# Patient Record
Sex: FEMALE | Race: WHITE | NOT HISPANIC OR LATINO | Employment: FULL TIME | URBAN - METROPOLITAN AREA
[De-identification: names, ages, dates, MRNs, and addresses within clinical notes are randomized per-mention and may not be internally consistent; named-entity substitution may affect disease eponyms.]

---

## 2017-10-23 ENCOUNTER — HOSPITAL ENCOUNTER (EMERGENCY)
Facility: HOSPITAL | Age: 62
Discharge: HOME | End: 2017-10-23
Attending: EMERGENCY MEDICINE | Admitting: EMERGENCY MEDICINE

## 2017-10-23 VITALS
DIASTOLIC BLOOD PRESSURE: 63 MMHG | HEART RATE: 90 BPM | TEMPERATURE: 98.5 F | OXYGEN SATURATION: 94 % | RESPIRATION RATE: 18 BRPM | SYSTOLIC BLOOD PRESSURE: 134 MMHG

## 2017-10-23 DIAGNOSIS — F43.9 FEELING STRESSED OUT: ICD-10-CM

## 2017-10-23 DIAGNOSIS — F41.9 ANXIETY: Primary | ICD-10-CM

## 2017-10-23 DIAGNOSIS — Z91.14 OVERUSE OF MEDICATION: ICD-10-CM

## 2017-10-23 PROCEDURE — 99282 EMERGENCY DEPT VISIT SF MDM: CPT

## 2017-10-23 RX ORDER — SERTRALINE HYDROCHLORIDE 100 MG/1
50 TABLET, FILM COATED ORAL
Status: ON HOLD | COMMUNITY
End: 2019-07-01 | Stop reason: DRUGHIGH

## 2017-10-23 RX ORDER — LITHIUM CARBONATE 600 MG/1
600 CAPSULE ORAL DAILY
Status: ON HOLD | COMMUNITY
End: 2019-07-01 | Stop reason: DRUGHIGH

## 2017-10-23 RX ORDER — TRAZODONE HYDROCHLORIDE 100 MG/1
400 TABLET ORAL
Status: ON HOLD | COMMUNITY
End: 2019-07-01 | Stop reason: DRUGHIGH

## 2017-10-23 NOTE — ED PROVIDER NOTES
History  Chief Complaint   Patient presents with    Psychiatric Evaluation     Pt states "I took a full bottle of 1mg Ativan yesterday and it was a full 90 days " Pt states that she is SI     39-year-old female with psychiatric disease presenting for evaluation  Patient reports that she has had many stressors over the past few days and yesterday she admits to taking more Ativan than as prescribed  She is prescribed Ativan to take on an as needed basis, however over the course of 2 hours she took approximately 20 1 mg tablets  She reports that she took these to try to "feel better ", and denies that this was a suicide attempt  She reports that she has never done anything like this before  She reports that prior to this she was taking her medications as prescribed  She sees a psychiatrist in [place] every 3 months  She required inpatient admission several years ago 1 time  No prior suicide attempts  Patient denies homicidal ideation, auditory or visual hallucinations  Patient reports that she was late going to work today and that her coworkers were calling her  She reports that when she got to work she was talking to her co-workers about her recent stressors and what happened yesterday and they brought her to the ED for evaluation  Patient reports that she feels safe at home and has neighbors and coworkers that look out for her  She denies any physical complaints  She denies any alcohol or drug use  A/P: 59 yo F with increased stress/anxiety, pt adamantly denies that yesterday was a suicide attempt and denies currently feeling suicidal/homicidal or unsafe at home  Crisis was going to evaluate the pt, however she left the ED prior to evaluation and without any discharge paperwork/information            Prior to Admission Medications   Prescriptions Last Dose Informant Patient Reported?  Taking?   lithium 600 MG capsule Past Week at Unknown time  Yes Yes   Sig: Take 600 mg by mouth daily   sertraline (ZOLOFT) 100 mg tablet Past Week at Unknown time  Yes Yes   Sig: Take 50 mg by mouth daily at bedtime   traZODone (DESYREL) 100 mg tablet Past Week at Unknown time  Yes Yes   Sig: Take 400 mg by mouth daily at bedtime      Facility-Administered Medications: None       History reviewed  No pertinent past medical history  Past Surgical History:   Procedure Laterality Date     SECTION      HYSTERECTOMY         History reviewed  No pertinent family history  I have reviewed and agree with the history as documented  Social History   Substance Use Topics    Smoking status: Current Every Day Smoker     Packs/day: 0 50     Types: Cigarettes    Smokeless tobacco: Never Used    Alcohol use Yes      Comment: rarely        Review of Systems   Constitutional: Negative for chills and fever  HENT: Negative for rhinorrhea and sore throat  Respiratory: Negative for cough and shortness of breath  Cardiovascular: Negative for chest pain and leg swelling  Gastrointestinal: Negative for abdominal pain, constipation, diarrhea, nausea and vomiting  Genitourinary: Negative for dysuria and urgency  Musculoskeletal: Negative for back pain and neck pain  Neurological: Negative for light-headedness and headaches  Psychiatric/Behavioral: Negative for agitation and self-injury  All other systems reviewed and are negative  Physical Exam  ED Triage Vitals [10/23/17 1149]   Temperature Pulse Respirations Blood Pressure SpO2   98 5 °F (36 9 °C) 90 18 134/63 94 %      Temp Source Heart Rate Source Patient Position - Orthostatic VS BP Location FiO2 (%)   Oral Monitor Sitting Left arm --      Pain Score       No Pain           Physical Exam   Constitutional: She is oriented to person, place, and time  She appears well-developed and well-nourished  HENT:   Head: Normocephalic and atraumatic  Mouth/Throat: Oropharynx is clear and moist    Neck: Normal range of motion  Neck supple     Cardiovascular: Normal rate and regular rhythm  Pulmonary/Chest: Effort normal and breath sounds normal    Abdominal: Soft  There is no tenderness  Musculoskeletal: She exhibits no edema or deformity  Neurological: She is alert and oriented to person, place, and time  She exhibits normal muscle tone  Coordination normal    Skin: Skin is warm and dry  Psychiatric: She has a normal mood and affect  Her behavior is normal    Nursing note and vitals reviewed  ED Medications  Medications - No data to display    Diagnostic Studies  Labs Reviewed - No data to display    No orders to display       Procedures  Procedures      Phone Consults  ED Phone Contact    ED Course  ED Course as of Oct 23 1414   Mon Oct 23, 2017   1356 Crisis was aware and going to evaluate the patient  Nursing staff went into patient's room and noted that she was not present  Pt appears to have left ED prior to crisis evaluation and discharge paperwork                                MDM  Number of Diagnoses or Management Options  Diagnosis management comments: 61 yo F with increased anxiety/stress who took more Ativan yesterday than prescribed  Pt denies SI/HI/AH/VH and feels safe going home  Pt reports she has good support with  across the street, other neighbors and coworkers (which are who brought her to the ED)   Unfortunately, pt left the ED prior to crisis evaluation and without discharge papers    CritCare Time    Disposition  Final diagnoses:   Anxiety   Feeling stressed out   Overuse of medication     ED Disposition     ED Disposition Condition Comment    Left from Room after Provider Exam        Follow-up Information    None       Discharge Medication List as of 10/23/2017  1:58 PM      CONTINUE these medications which have NOT CHANGED    Details   lithium 600 MG capsule Take 600 mg by mouth daily, Historical Med      sertraline (ZOLOFT) 100 mg tablet Take 50 mg by mouth daily at bedtime, Historical Med      traZODone (DESYREL) 100 mg tablet Take 400 mg by mouth daily at bedtime, Historical Med           No discharge procedures on file  ED Provider  Attending physically available and evaluated Tess Andino I managed the patient along with the ED Attending      Electronically Signed by       Anuj Dobbins DO  Resident  10/23/17 0876

## 2017-10-23 NOTE — ED ATTENDING ATTESTATION
Chris Bardales DO, saw and evaluated the patient  I have discussed the patient with the resident/non-physician practitioner and agree with the resident's/non-physician practitioner's findings, Plan of Care, and MDM as documented in the resident's/non-physician practitioner's note, except where noted  All available labs and Radiology studies were reviewed  At this point I agree with the current assessment done in the Emergency Department  I have conducted an independent evaluation of this patient a history and physical is as follows:    Patient presents for evaluation after intentionally taking excess doses of Ativan through the evening and night yesterday while upset after interacting with her ex-  She denies trying to commit suicide and wanting to die but admits that 20 1 mg tablets of Ativan, even spread out, was excessive  She has no h/o SI or prior attempts  She denies HI and command hallucinations  She does live alone but reports a good base of support in her neighborhood and at work as evidenced by her co-workers concern for her well-being today  She sees a psychiatrist  She has never been involuntarily admitted and was last voluntarily psychiatrically admitted many years ago  ROS: Denies f/c, HA, CP, SOB, abdominal pain, n/v/d  12 system ROS o/w negative  PE: NAD, appears comfortable, alert, cooperative; PERRL, EOMI; MMM, no posterior oropharyngeal exudate, edema or erythema; HRR, no murmur; lungs CTA w/o w/r/r, POx 94% on RA (nl); abdomen s/nt/nd, nl BS in all 4 quadrant; (-) LE edema or calf TTP, FROM extremities x4; skin p/w/d; CN II-XII GI/NF, oriented; Psych odd affect though not inappropriate, good eye contact, good insight  DDx: Anxiety/depression, no apparent SI or plan  A/P: Will consult crisis for improved out-patient options      Critical Care Time  CritCare Time

## 2017-10-23 NOTE — ED NOTES
Went into room to see/assess pt  Pt not in room  Dr Shaunna Pena and charge RN made aware        Amanda Lai RN  37/15/07 5921

## 2019-07-01 ENCOUNTER — APPOINTMENT (EMERGENCY)
Dept: RADIOLOGY | Facility: HOSPITAL | Age: 64
End: 2019-07-01
Payer: MEDICARE

## 2019-07-01 ENCOUNTER — HOSPITAL ENCOUNTER (OUTPATIENT)
Facility: HOSPITAL | Age: 64
Setting detail: OBSERVATION
Discharge: HOME | End: 2019-07-02
Attending: EMERGENCY MEDICINE | Admitting: INTERNAL MEDICINE
Payer: MEDICARE

## 2019-07-01 DIAGNOSIS — R94.31 T WAVE INVERSION IN EKG: ICD-10-CM

## 2019-07-01 DIAGNOSIS — R07.9 CHEST PAIN: Primary | ICD-10-CM

## 2019-07-01 PROBLEM — F31.9 BIPOLAR DISORDER (HCC): Status: ACTIVE | Noted: 2019-07-01

## 2019-07-01 PROBLEM — K21.9 GERD (GASTROESOPHAGEAL REFLUX DISEASE): Status: ACTIVE | Noted: 2019-07-01

## 2019-07-01 PROBLEM — G47.00 INSOMNIA: Status: ACTIVE | Noted: 2019-07-01

## 2019-07-01 LAB
ANION GAP SERPL CALCULATED.3IONS-SCNC: 5 MMOL/L (ref 4–13)
ATRIAL RATE: 61 BPM
ATRIAL RATE: 64 BPM
ATRIAL RATE: 76 BPM
BASOPHILS # BLD AUTO: 0.04 THOUSANDS/ΜL (ref 0–0.1)
BASOPHILS NFR BLD AUTO: 1 % (ref 0–1)
BUN SERPL-MCNC: 18 MG/DL (ref 5–25)
CALCIUM SERPL-MCNC: 8.9 MG/DL (ref 8.3–10.1)
CHLORIDE SERPL-SCNC: 107 MMOL/L (ref 100–108)
CHOLEST SERPL-MCNC: 215 MG/DL (ref 50–200)
CO2 SERPL-SCNC: 27 MMOL/L (ref 21–32)
CREAT SERPL-MCNC: 0.94 MG/DL (ref 0.6–1.3)
EOSINOPHIL # BLD AUTO: 0.14 THOUSAND/ΜL (ref 0–0.61)
EOSINOPHIL NFR BLD AUTO: 3 % (ref 0–6)
ERYTHROCYTE [DISTWIDTH] IN BLOOD BY AUTOMATED COUNT: 13.3 % (ref 11.6–15.1)
EST. AVERAGE GLUCOSE BLD GHB EST-MCNC: 120 MG/DL
GFR SERPL CREATININE-BSD FRML MDRD: 64 ML/MIN/1.73SQ M
GLUCOSE SERPL-MCNC: 112 MG/DL (ref 65–140)
HBA1C MFR BLD: 5.8 % (ref 4.2–6.3)
HCT VFR BLD AUTO: 39.5 % (ref 34.8–46.1)
HDLC SERPL-MCNC: 38 MG/DL (ref 40–60)
HGB BLD-MCNC: 13.1 G/DL (ref 11.5–15.4)
IMM GRANULOCYTES # BLD AUTO: 0.02 THOUSAND/UL (ref 0–0.2)
IMM GRANULOCYTES NFR BLD AUTO: 0 % (ref 0–2)
LDLC SERPL CALC-MCNC: 141 MG/DL (ref 0–100)
LYMPHOCYTES # BLD AUTO: 1.97 THOUSANDS/ΜL (ref 0.6–4.47)
LYMPHOCYTES NFR BLD AUTO: 38 % (ref 14–44)
MCH RBC QN AUTO: 28.5 PG (ref 26.8–34.3)
MCHC RBC AUTO-ENTMCNC: 33.2 G/DL (ref 31.4–37.4)
MCV RBC AUTO: 86 FL (ref 82–98)
MONOCYTES # BLD AUTO: 0.3 THOUSAND/ΜL (ref 0.17–1.22)
MONOCYTES NFR BLD AUTO: 6 % (ref 4–12)
NEUTROPHILS # BLD AUTO: 2.66 THOUSANDS/ΜL (ref 1.85–7.62)
NEUTS SEG NFR BLD AUTO: 52 % (ref 43–75)
NONHDLC SERPL-MCNC: 177 MG/DL
NRBC BLD AUTO-RTO: 0 /100 WBCS
P AXIS: 55 DEGREES
P AXIS: 57 DEGREES
P AXIS: 62 DEGREES
PLATELET # BLD AUTO: 154 THOUSANDS/UL (ref 149–390)
PMV BLD AUTO: 11.4 FL (ref 8.9–12.7)
POTASSIUM SERPL-SCNC: 3.7 MMOL/L (ref 3.5–5.3)
PR INTERVAL: 124 MS
PR INTERVAL: 126 MS
PR INTERVAL: 130 MS
QRS AXIS: 61 DEGREES
QRS AXIS: 63 DEGREES
QRS AXIS: 67 DEGREES
QRSD INTERVAL: 82 MS
QRSD INTERVAL: 84 MS
QRSD INTERVAL: 84 MS
QT INTERVAL: 358 MS
QT INTERVAL: 444 MS
QT INTERVAL: 464 MS
QTC INTERVAL: 402 MS
QTC INTERVAL: 458 MS
QTC INTERVAL: 467 MS
RBC # BLD AUTO: 4.6 MILLION/UL (ref 3.81–5.12)
SODIUM SERPL-SCNC: 139 MMOL/L (ref 136–145)
T WAVE AXIS: 144 DEGREES
T WAVE AXIS: 148 DEGREES
T WAVE AXIS: 187 DEGREES
TRIGL SERPL-MCNC: 179 MG/DL
TROPONIN I SERPL-MCNC: <0.02 NG/ML
VENTRICULAR RATE: 61 BPM
VENTRICULAR RATE: 64 BPM
VENTRICULAR RATE: 76 BPM
WBC # BLD AUTO: 5.13 THOUSAND/UL (ref 4.31–10.16)

## 2019-07-01 PROCEDURE — 99220 PR INITIAL OBSERVATION CARE/DAY 70 MINUTES: CPT | Performed by: INTERNAL MEDICINE

## 2019-07-01 PROCEDURE — 1124F ACP DISCUSS-NO DSCNMKR DOCD: CPT | Performed by: INTERNAL MEDICINE

## 2019-07-01 PROCEDURE — 99285 EMERGENCY DEPT VISIT HI MDM: CPT | Performed by: EMERGENCY MEDICINE

## 2019-07-01 PROCEDURE — 93005 ELECTROCARDIOGRAM TRACING: CPT

## 2019-07-01 PROCEDURE — 36415 COLL VENOUS BLD VENIPUNCTURE: CPT | Performed by: EMERGENCY MEDICINE

## 2019-07-01 PROCEDURE — 93010 ELECTROCARDIOGRAM REPORT: CPT | Performed by: INTERNAL MEDICINE

## 2019-07-01 PROCEDURE — 85025 COMPLETE CBC W/AUTO DIFF WBC: CPT | Performed by: EMERGENCY MEDICINE

## 2019-07-01 PROCEDURE — 80048 BASIC METABOLIC PNL TOTAL CA: CPT | Performed by: EMERGENCY MEDICINE

## 2019-07-01 PROCEDURE — 80061 LIPID PANEL: CPT | Performed by: INTERNAL MEDICINE

## 2019-07-01 PROCEDURE — 84484 ASSAY OF TROPONIN QUANT: CPT | Performed by: EMERGENCY MEDICINE

## 2019-07-01 PROCEDURE — 99285 EMERGENCY DEPT VISIT HI MDM: CPT

## 2019-07-01 PROCEDURE — 84484 ASSAY OF TROPONIN QUANT: CPT | Performed by: INTERNAL MEDICINE

## 2019-07-01 PROCEDURE — 71046 X-RAY EXAM CHEST 2 VIEWS: CPT

## 2019-07-01 PROCEDURE — 83036 HEMOGLOBIN GLYCOSYLATED A1C: CPT | Performed by: INTERNAL MEDICINE

## 2019-07-01 RX ORDER — LITHIUM CARBONATE 300 MG/1
300 CAPSULE ORAL
Status: DISCONTINUED | OUTPATIENT
Start: 2019-07-01 | End: 2019-07-02 | Stop reason: HOSPADM

## 2019-07-01 RX ORDER — ASPIRIN 81 MG/1
324 TABLET, CHEWABLE ORAL ONCE
Status: COMPLETED | OUTPATIENT
Start: 2019-07-01 | End: 2019-07-01

## 2019-07-01 RX ORDER — LITHIUM CARBONATE 300 MG/1
300 CAPSULE ORAL
COMMUNITY

## 2019-07-01 RX ORDER — ACETAMINOPHEN 325 MG/1
650 TABLET ORAL EVERY 6 HOURS PRN
Status: DISCONTINUED | OUTPATIENT
Start: 2019-07-01 | End: 2019-07-02 | Stop reason: HOSPADM

## 2019-07-01 RX ORDER — DIPHENHYDRAMINE HCL 25 MG
25 TABLET ORAL
Status: DISCONTINUED | OUTPATIENT
Start: 2019-07-01 | End: 2019-07-01

## 2019-07-01 RX ORDER — GABAPENTIN 300 MG/1
300 CAPSULE ORAL DAILY
Status: DISCONTINUED | OUTPATIENT
Start: 2019-07-02 | End: 2019-07-02 | Stop reason: HOSPADM

## 2019-07-01 RX ORDER — LORAZEPAM 1 MG/1
2 TABLET ORAL
COMMUNITY

## 2019-07-01 RX ORDER — CALCIUM CARBONATE 200(500)MG
500 TABLET,CHEWABLE ORAL ONCE
Status: DISCONTINUED | OUTPATIENT
Start: 2019-07-01 | End: 2019-07-01

## 2019-07-01 RX ORDER — DIPHENHYDRAMINE HCL 25 MG
25 TABLET ORAL
Status: DISCONTINUED | OUTPATIENT
Start: 2019-07-01 | End: 2019-07-02 | Stop reason: HOSPADM

## 2019-07-01 RX ORDER — KETOROLAC TROMETHAMINE 30 MG/ML
15 INJECTION, SOLUTION INTRAMUSCULAR; INTRAVENOUS ONCE
Status: COMPLETED | OUTPATIENT
Start: 2019-07-01 | End: 2019-07-01

## 2019-07-01 RX ORDER — DIPHENHYDRAMINE HCL 25 MG
25 TABLET ORAL
Status: ON HOLD | COMMUNITY
End: 2021-01-20 | Stop reason: CLARIF

## 2019-07-01 RX ORDER — SERTRALINE HYDROCHLORIDE 100 MG/1
200 TABLET, FILM COATED ORAL DAILY
COMMUNITY

## 2019-07-01 RX ORDER — OMEPRAZOLE 20 MG/1
20 CAPSULE, DELAYED RELEASE ORAL DAILY
COMMUNITY

## 2019-07-01 RX ORDER — SENNOSIDES 8.6 MG
1 TABLET ORAL
Status: DISCONTINUED | OUTPATIENT
Start: 2019-07-01 | End: 2019-07-02 | Stop reason: HOSPADM

## 2019-07-01 RX ORDER — TRAZODONE HYDROCHLORIDE 100 MG/1
200 TABLET ORAL
Status: DISCONTINUED | OUTPATIENT
Start: 2019-07-01 | End: 2019-07-02 | Stop reason: HOSPADM

## 2019-07-01 RX ORDER — TRAZODONE HYDROCHLORIDE 100 MG/1
200 TABLET ORAL
COMMUNITY

## 2019-07-01 RX ORDER — PANTOPRAZOLE SODIUM 20 MG/1
20 TABLET, DELAYED RELEASE ORAL
Status: DISCONTINUED | OUTPATIENT
Start: 2019-07-02 | End: 2019-07-02 | Stop reason: HOSPADM

## 2019-07-01 RX ORDER — GABAPENTIN 300 MG/1
300 CAPSULE ORAL DAILY
COMMUNITY

## 2019-07-01 RX ORDER — 0.9 % SODIUM CHLORIDE 0.9 %
3 VIAL (ML) INJECTION AS NEEDED
Status: DISCONTINUED | OUTPATIENT
Start: 2019-07-01 | End: 2019-07-02 | Stop reason: HOSPADM

## 2019-07-01 RX ADMIN — LITHIUM CARBONATE 300 MG: 300 CAPSULE, GELATIN COATED ORAL at 21:56

## 2019-07-01 RX ADMIN — KETOROLAC TROMETHAMINE 15 MG: 30 INJECTION, SOLUTION INTRAMUSCULAR at 10:09

## 2019-07-01 RX ADMIN — ASPIRIN 81 MG 324 MG: 81 TABLET ORAL at 08:30

## 2019-07-01 RX ADMIN — TRAZODONE HYDROCHLORIDE 200 MG: 100 TABLET ORAL at 21:56

## 2019-07-01 RX ADMIN — SERTRALINE HYDROCHLORIDE 150 MG: 100 TABLET ORAL at 21:56

## 2019-07-01 RX ADMIN — LORAZEPAM 1.5 MG: 1 TABLET ORAL at 21:57

## 2019-07-01 NOTE — ASSESSMENT & PLAN NOTE
Atypical chest pain, suspect musculoskeletal   "substernal" "pressure-like" "radiating to L shoulder," not associated with SOB, HA, lightheadedness, dizziness  Began at rest, not relieved with rest, not worsened with exertion, complete resolution after administration of IV Toradol in the ED  Heart score 4 for nonspecific ST changes, family Hx of MI in both parents, in father in his late 46s  Pt reports mother had multiple blockages and father had open-heart surgery  EKG with global T wave inversions in all leads (upright in avr), however no baseline EKG on record  CXR performed and negative for acute cardiopulmonary disease  Will monitor on telemetry  First troponin negative, will obtain every 3 hours for two more troponins  EKG with each troponin  Patient reports similar episode 9 years ago and stress test was performed inpatient which she states was negative  Patient reports she did not need to follow up with Cardiology at that time  S/p aspirin, toradol in ED  Will check lipid panel and hemoglobin a1c  Tylenol p r n  Mild pain

## 2019-07-01 NOTE — ED PROVIDER NOTES
History  Chief Complaint   Patient presents with    Chest Pain     Pt presnet swith substernal Cp since waking up this morning  HPI    69yo pmhx bipolar, GERD presents for evaluation of substernal chest pain  Patient says chest pain began around 5:00 a m and lasted about an hour  Pain radiates to her left shoulder and upper arm  Pain resolved on its own but then returned a couple of hours of later  She admits to having pain now of less intensity  Patient says she had similar pain 9 years ago and was evaluated with a stress test which was normal   She she notes mild shortness of breath  Denies fever, chills, cough, numbness/tingling, nausea, vomiting, and diaphoresis, abdominal pain, dysuria  Prior to Admission Medications   Prescriptions Last Dose Informant Patient Reported? Taking? LORazepam (ATIVAN) 1 mg tablet 2019 at Unknown time Self Yes Yes   Sig: Take 1 5 mg by mouth daily at bedtime   diphenhydrAMINE (BENADRYL) 25 mg tablet 2019 at Unknown time Self Yes Yes   Sig: Take 25 mg by mouth daily at bedtime   gabapentin (NEURONTIN) 300 mg capsule 2019 at Unknown time Self Yes Yes   Sig: Take 300 mg by mouth daily   lithium carbonate 300 mg capsule 2019 at Unknown time Self Yes Yes   Sig: Take 300 mg by mouth daily at bedtime   omeprazole (PriLOSEC) 20 mg delayed release capsule 2019 at Unknown time Self Yes Yes   Sig: Take 20 mg by mouth daily   sertraline (ZOLOFT) 100 mg tablet 2019 at Unknown time Self Yes Yes   Sig: Take 150 mg by mouth daily   traZODone (DESYREL) 100 mg tablet 2019 at Unknown time Self Yes Yes   Sig: Take 200 mg by mouth daily at bedtime      Facility-Administered Medications: None       Past Medical History:   Diagnosis Date    Bipolar disorder ([facility] [address] )     GERD (gastroesophageal reflux disease)        Past Surgical History:   Procedure Laterality Date     SECTION      HYSTERECTOMY         History reviewed   No pertinent family history  I have reviewed and agree with the history as documented  Social History     Tobacco Use    Smoking status: Former Smoker     Packs/day: 0 10     Years: 5 00     Pack years: 0 50     Types: Cigarettes     Last attempt to quit: 2018     Years since quittin 6    Smokeless tobacco: Never Used   Substance Use Topics    Alcohol use: Yes     Frequency: 2-4 times a month     Drinks per session: 1 or 2     Comment: rarely    Drug use: Never        Review of Systems   Constitutional: Negative for chills, diaphoresis, fatigue and fever  HENT: Negative for congestion, rhinorrhea and sore throat  Eyes: Negative for photophobia and visual disturbance  Respiratory: Positive for shortness of breath  Negative for cough and chest tightness  Cardiovascular: Positive for chest pain  Negative for palpitations  Gastrointestinal: Negative for abdominal pain, blood in stool, constipation, diarrhea, nausea and vomiting  Genitourinary: Negative for dysuria, frequency and hematuria  Musculoskeletal: Negative for back pain, gait problem, myalgias, neck pain and neck stiffness  Skin: Negative for pallor and rash  Neurological: Negative for weakness, light-headedness, numbness and headaches  Hematological: Negative for adenopathy  Does not bruise/bleed easily  All other systems reviewed and are negative        Physical Exam  ED Triage Vitals   Temperature Pulse Respirations Blood Pressure SpO2   19 0811 19 0811 19 0811 19 0811 19 0811   98 2 °F (36 8 °C) 80 16 126/64 96 %      Temp src Heart Rate Source Patient Position - Orthostatic VS BP Location FiO2 (%)   -- 19 0901 19 0901 19 0930 --    Monitor Lying Right arm       Pain Score       19 0811       6             Orthostatic Vital Signs  Vitals:    19 1525 19 1917 19 2246 19 0711   BP: 105/79 117/68 116/67 134/76   Pulse: 71 65 59 73   Patient Position - Orthostatic VS: Physical Exam   Constitutional: She is oriented to person, place, and time  She appears well-developed and well-nourished  No distress  Patient is alert and oriented, appears well and nontoxic, in no acute distress   HENT:   Head: Normocephalic and atraumatic  Mouth/Throat: Oropharynx is clear and moist  No oropharyngeal exudate  Eyes: Pupils are equal, round, and reactive to light  Conjunctivae and EOM are normal    Neck: Normal range of motion  Neck supple  Cardiovascular: Normal rate, regular rhythm, normal heart sounds and intact distal pulses  Pulmonary/Chest: Effort normal and breath sounds normal  No respiratory distress  Abdominal: Soft  Bowel sounds are normal  She exhibits no distension  There is no tenderness  Musculoskeletal: Normal range of motion  Unable to reproduce chest pain   Lymphadenopathy:     She has no cervical adenopathy  Neurological: She is alert and oriented to person, place, and time  No facial asymmetry noted, CN 2-12 intact, full ROM of upper and lower extremities, muscle strength 5/5 throughout, DTRs normal   Skin: Skin is warm and dry  Capillary refill takes less than 2 seconds  No rash noted  She is not diaphoretic  No erythema  No pallor  Psychiatric: She has a normal mood and affect  Her behavior is normal  Judgment and thought content normal    Nursing note and vitals reviewed        ED Medications  Medications   sodium chloride (PF) 0 9 % injection 3 mL (has no administration in time range)   gabapentin (NEURONTIN) capsule 300 mg (has no administration in time range)   lithium carbonate capsule 300 mg (300 mg Oral Given 7/1/19 2156)   LORazepam (ATIVAN) tablet 1 5 mg (1 5 mg Oral Given 7/1/19 2157)   pantoprazole (PROTONIX) EC tablet 20 mg (20 mg Oral Given 7/2/19 5905)   sertraline (ZOLOFT) tablet 150 mg (150 mg Oral Given 7/1/19 2156)   traZODone (DESYREL) tablet 200 mg (200 mg Oral Given 7/1/19 2156)   acetaminophen (TYLENOL) tablet 650 mg (has no administration in time range)   senna (SENOKOT) tablet 8 6 mg (has no administration in time range)   diphenhydrAMINE (BENADRYL) tablet 25 mg (has no administration in time range)   aspirin chewable tablet 324 mg (324 mg Oral Given 7/1/19 0830)   ketorolac (TORADOL) injection 15 mg (15 mg Intravenous Given 7/1/19 1009)       Diagnostic Studies  Results Reviewed     Procedure Component Value Units Date/Time    Hemoglobin A1C [[id number]] Collected:  07/01/19 0813    Lab Status:  Final result Specimen:  Blood from Arm, Left Updated:  07/01/19 1159     Hemoglobin A1C 5 8 %       mg/dl     Troponin I [[id number]]  (Normal) Collected:  07/01/19 0813    Lab Status:  Final result Specimen:  Blood from Arm, Left Updated:  07/01/19 0836     Troponin I <0 02 ng/mL     Basic metabolic panel [[id number]] Collected:  07/01/19 0813    Lab Status:  Final result Specimen:  Blood from Arm, Left Updated:  07/01/19 1284     Sodium 139 mmol/L      Potassium 3 7 mmol/L      Chloride 107 mmol/L      CO2 27 mmol/L      ANION GAP 5 mmol/L      BUN 18 mg/dL      Creatinine 0 94 mg/dL      Glucose 112 mg/dL      Calcium 8 9 mg/dL      eGFR 64 ml/min/1 73sq m     Narrative:       [place] guidelines for Chronic Kidney Disease (CKD):     Stage 1 with normal or high GFR (GFR > 90 mL/min/1 73 square meters)    Stage 2 Mild CKD (GFR = 60-89 mL/min/1 73 square meters)    Stage 3A Moderate CKD (GFR = 45-59 mL/min/1 73 square meters)    Stage 3B Moderate CKD (GFR = 30-44 mL/min/1 73 square meters)    Stage 4 Severe CKD (GFR = 15-29 mL/min/1 73 square meters)    Stage 5 End Stage CKD (GFR <15 mL/min/1 73 square meters)  Note: GFR calculation is accurate only with a steady state creatinine    CBC and differential [[id number]] Collected:  07/01/19 0813    Lab Status:  Final result Specimen:  Blood from Arm, Left Updated:  07/01/19 0821     WBC 5 13 Thousand/uL      RBC 4 60 Million/uL      Hemoglobin 13 1 g/dL Hematocrit 39 5 %      MCV 86 fL      MCH 28 5 pg      MCHC 33 2 g/dL      RDW 13 3 %      MPV 11 4 fL      Platelets 559 Thousands/uL      nRBC 0 /100 WBCs      Neutrophils Relative 52 %      Immat GRANS % 0 %      Lymphocytes Relative 38 %      Monocytes Relative 6 %      Eosinophils Relative 3 %      Basophils Relative 1 %      Neutrophils Absolute 2 66 Thousands/µL      Immature Grans Absolute 0 02 Thousand/uL      Lymphocytes Absolute 1 97 Thousands/µL      Monocytes Absolute 0 30 Thousand/µL      Eosinophils Absolute 0 14 Thousand/µL      Basophils Absolute 0 04 Thousands/µL                  X-ray chest 2 views   Final Result by Breanna Ro MD (07/01 5896)      No acute cardiopulmonary disease  Workstation performed: [id number]               Procedures  ECG 12 Lead Documentation Only  Date/Time: 7/1/2019 6:32 PM  Performed by: Mike Greco MD  Authorized by: Mike Greco MD     Indications / Diagnosis:  Chest pain  ECG reviewed by me, the ED Provider: yes    Patient location:  ED and bedside  Previous ECG:     Previous ECG:  Unavailable    Comparison to cardiac monitor: Yes    Interpretation:     Interpretation: abnormal    Rate:     ECG rate:  76    ECG rate assessment: normal    Rhythm:     Rhythm: sinus rhythm    Ectopy:     Ectopy: none    QRS:     QRS axis:  Normal    QRS intervals:  Normal  Conduction:     Conduction: normal    ST segments:     ST segments:  Normal  T waves:     T waves: inverted      Inverted:  I, II, aVL, V2, V3, V4, V5, V6 and aVF            ED Course                               MDM  Number of Diagnoses or Management Options  Chest pain:   T wave inversion in EKG:   Diagnosis management comments: 41-year-old female presents for evaluation of chest pain or shortness of breath  Patient appears well, hemodynamically stable  Will do cardiac workup and admitted for ACS rule out        Disposition  Final diagnoses:   Chest pain   T wave inversion in EKG     Time reflects when diagnosis was documented in both MDM as applicable and the Disposition within this note     Time User Action Codes Description Comment    7/1/2019  9:40 AM Zhen Jama Add [R07 9] Chest pain     7/1/2019  9:40 AM Zhen Jama Add [R94 31] T wave inversion in EKG       ED Disposition     ED Disposition Condition Date/Time Comment    Admit Stable Mon Jul 1, 2019  9:40 AM Case was discussed with SOD resident and the patient's admission status was agreed to be Admission Status: observation status to the service of Dr Karina Wang   Follow-up Information     Follow up With Specialties Details Why Contact Info    [facility]  Follow up  [phone]            Current Discharge Medication List      CONTINUE these medications which have NOT CHANGED    Details   diphenhydrAMINE (BENADRYL) 25 mg tablet Take 25 mg by mouth daily at bedtime      gabapentin (NEURONTIN) 300 mg capsule Take 300 mg by mouth daily      lithium carbonate 300 mg capsule Take 300 mg by mouth daily at bedtime      LORazepam (ATIVAN) 1 mg tablet Take 1 5 mg by mouth daily at bedtime      omeprazole (PriLOSEC) 20 mg delayed release capsule Take 20 mg by mouth daily      sertraline (ZOLOFT) 100 mg tablet Take 150 mg by mouth daily      traZODone (DESYREL) 100 mg tablet Take 200 mg by mouth daily at bedtime           No discharge procedures on file  ED Provider  Attending physically available and evaluated Clarke Le  I managed the patient along with the ED Attending      Electronically Signed by         Ariel Bryan MD  07/02/19 5694

## 2019-07-01 NOTE — SOCIAL WORK
CM met w/ pt for d/c planning  Pt aware of CM role  Pt is a RN employed by [employer]  Pt lives alone in 1st floor apartment w/ 3 steps to enter  Bed & bath on main level  PTA pt is independent w/ all ADLS & Ambulation without assistive device  Pt drives  Pt does not have POA  Emergency contact is [facility] [phone]  Pt uses [email or web address] in [place] for pharmacy needs  Pt does not have PCP  [facility] information provided  Pt has had no short term rehab, or D & A admission  Pt had inpatient psych in 2010  CM reviewed d/c planning process including the following: identifying help at home, patient preference for d/c planning needs, Discharge Lounge, [facility] Meds to Bed program, availability of treatment team to discuss questions or concerns patient and/or family may have regarding understanding medications and recognizing signs and symptoms once discharged  CM also encouraged patient to follow up with all recommended appointments after discharge  Patient advised of importance for patient and family to participate in managing patients medical well being    Discharge checklist discussed with patient

## 2019-07-01 NOTE — PLAN OF CARE
Problem: Potential for Falls  Goal: Patient will remain free of falls  Description  INTERVENTIONS:  - Assess patient frequently for physical needs  -  Identify cognitive and physical deficits and behaviors that affect risk of falls    -  [place] fall precautions as indicated by assessment   - Educate patient/family on patient safety including physical limitations  - Instruct patient to call for assistance with activity based on assessment  - Modify environment to reduce risk of injury  - Consider OT/PT consult to assist with strengthening/mobility  Outcome: Progressing

## 2019-07-01 NOTE — H&P
INTERNAL MEDICINE RESIDENCY ADMISSION H&P     Name: Kailee Pedersen   Age & Sex: 71 y o  female   MRN: [id number]  Unit/Bed#: -01   Encounter: [id number]  Primary Care Provider: No primary care provider on file  Code Status: Level 1 - Full Code  Admission Status: OBSERVATION  Disposition: Patient requires Med/Surg with Telemetry    ASSESSMENT/PLAN     Principal Problem:    Chest pain  Active Problems:    Bipolar disorder (HCC)    GERD (gastroesophageal reflux disease)    Insomnia    * Chest pain  Assessment & Plan  Atypical chest pain, suspect musculoskeletal   "substernal" "pressure-like" "radiating to L shoulder," not associated with SOB, HA, lightheadedness, dizziness  Began at rest, not relieved with rest, not worsened with exertion, complete resolution after administration of IV Toradol in the ED  Heart score 4 for nonspecific ST changes, family Hx of MI in both parents, in father in his late 46s  Pt reports mother had multiple blockages and father had open-heart surgery  EKG with global T wave inversions in all leads (upright in avr), however no baseline EKG on record  CXR performed and negative for acute cardiopulmonary disease  Will monitor on telemetry  First troponin negative, will obtain every 3 hours for two more troponins  EKG with each troponin  Patient reports similar episode 9 years ago and stress test was performed inpatient which she states was negative  Patient reports she did not need to follow up with Cardiology at that time  S/p aspirin, toradol in ED  Will check lipid panel and hemoglobin a1c  Tylenol p r n  Mild pain  Insomnia  Assessment & Plan  Continue home trazodone 200 mg at night, Benadryl q h s  P r n  GERD (gastroesophageal reflux disease)  Assessment & Plan  On omeprazole 20 mg daily at home, will continue pantoprazole inpatient      Bipolar disorder [facility])  Assessment & Plan  Patient follows with Psychiatry outpatient,  Continue lithium, zoloft, Ativan  VTE Pharmacologic Prophylaxis: Reason for no pharmacologic prophylaxis low risk  VTE Mechanical Prophylaxis: sequential compression device    CHIEF COMPLAINT     Chief Complaint   Patient presents with    Chest Pain     Pt presnet jc substernal Cp since waking up this morning  HISTORY OF PRESENT ILLNESS     Katja Young is a pleasant 14-year-old female with history of bipolar disorder, GERD presenting with substernal pressure-like chest pain with radiation to the left shoulder since 5:00 a m  this morning  Patient states pain is a 5/10  Pain started at rest, and had no alleviating or exacerbating factors (not relieved with rest, not relieved with exertion) prior to admission  Patient reports she had an episode like this several weeks ago that resolved within several hours without intervention  Patient also reports similar episode 9 years ago in which she was admitted and stress test was performed inpatient which came back negative  Patient was not recommended follow up with Cardiology at that time  Patient denied any other symptoms related to her chest pain - denies shortness of breath, nausea, vomiting, lightheadedness, diaphoresis, palpitations  Patient did report chronic temporary dizziness with standing up  Patient lives at home alone but 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  Patient reports being fully functional at home; states she is estranged from her children  Reports she has smoked approximately 5 years total in her lifetime, most recently for 2 or so years approximately 2 cigarettes per day and quit in November 2018; had previously smoked in college roughly same amount at that time  Does not currently smoke, drinks alcohol one drink once a week, does not use recreational drugs  Pt is interested in establishing care with a PCP and states it has been many years since she has had a primary doctor      In the ED, patient's vitals were stable at 98 2 F, pulse 80, respirations 16, blood pressure 126/64, SpO2 96% on room air  Patient was given aspirin 324 mg and IV Toradol in the ED  Patient reported relief of her symptoms with Toradol and at time of my re-evaluation reported 0/10 pain  Pt is admitted to observation for ACS rule out  REVIEW OF SYSTEMS     Review of Systems   Constitutional: Negative for chills, fatigue and fever  Eyes: Negative for visual disturbance  Respiratory: Negative for choking, shortness of breath and wheezing  Cardiovascular: Negative for chest pain, palpitations and leg swelling  Gastrointestinal: Positive for constipation (chronic)  Negative for abdominal distention, abdominal pain, diarrhea, nausea and vomiting  Genitourinary: Negative for difficulty urinating  Skin: Negative for rash and wound  Neurological: Negative for weakness, light-headedness and headaches  Psychiatric/Behavioral: Positive for sleep disturbance (chronic insomnia)  OBJECTIVE     Vitals:    19 0811 19 0901 19 0930 19 1056   BP: 126/64 125/57 114/58 114/68   BP Location:   Right arm    Pulse: 80 73 70 63   Resp: 16 16 18 18   Temp: 98 2 °F (36 8 °C)   97 9 °F (36 6 °C)   SpO2: 96% 96% 96% 96%      Temperature:   Temp (24hrs), Av 1 °F (36 7 °C), Min:97 9 °F (36 6 °C), Max:98 2 °F (36 8 °C)    Temperature: 97 9 °F (36 6 °C)  Intake & Output:  I/O     None        Weights: There is no height or weight on file to calculate BMI  Weight (last 2 days)     None        Physical Exam   Constitutional: She is oriented to person, place, and time  She appears well-developed and well-nourished  HENT:   Head: Normocephalic and atraumatic  Nose: Nose normal    Mouth/Throat: Oropharynx is clear and moist    Eyes: Right eye exhibits no discharge  Left eye exhibits no discharge  Cardiovascular: Normal rate, regular rhythm and normal heart sounds  Exam reveals no gallop and no friction rub     No murmur heard  No systolic murmur is present  Pulmonary/Chest: Effort normal and breath sounds normal  No respiratory distress  She has no wheezes  She has no rales  Chest pain is not reproducible   Abdominal: Soft  Bowel sounds are normal  She exhibits no distension  There is no tenderness  There is no guarding  Neurological: She is alert and oriented to person, place, and time  Skin: Skin is warm and dry  Psychiatric: Her speech is normal      PAST MEDICAL HISTORY     Past Medical History:   Diagnosis Date    Bipolar disorder ([address] )     GERD (gastroesophageal reflux disease)      PAST SURGICAL HISTORY     Past Surgical History:   Procedure Laterality Date     SECTION      HYSTERECTOMY       SOCIAL & FAMILY HISTORY     Social History     Substance and Sexual Activity   Alcohol Use Yes    Frequency: 2-4 times a month    Drinks per session: 1 or 2    Comment: rarely     Substance and Sexual Activity   Alcohol Use Yes    Frequency: 2-4 times a month    Drinks per session: 1 or 2    Comment: rarely        Substance and Sexual Activity   Drug Use Never     Social History     Tobacco Use   Smoking Status Former Smoker    Packs/day: 0 10    Years: 5 00    Pack years: 0 50    Types: Cigarettes    Last attempt to quit: 2018    Years since quittin 6   Smokeless Tobacco Never Used     History reviewed  No pertinent family history  LABORATORY DATA     Labs: I have personally reviewed pertinent reports  Results from last 7 days   Lab Units 19  0813   WBC Thousand/uL 5 13   HEMOGLOBIN g/dL 13 1   HEMATOCRIT % 39 5   PLATELETS Thousands/uL 154   NEUTROS PCT % 52   MONOS PCT % 6      Results from last 7 days   Lab Units 19  0813   POTASSIUM mmol/L 3 7   CHLORIDE mmol/L 107   CO2 mmol/L 27   BUN mg/dL 18   CREATININE mg/dL 0 94   CALCIUM mg/dL 8 9                      Results from last 7 days   Lab Units 19  1118 19  0813   TROPONIN I ng/mL <0 02 <0 02     Micro:   No results found for: Jessica Pierce [address]37 DIAGNOSTIC TESTS     Imaging: I have personally reviewed pertinent reports  X-ray Chest 2 Views    Result Date: 7/1/2019  Impression: No acute cardiopulmonary disease  Workstation performed: [id number]     EKG, Pathology, and Other Studies: I have personally reviewed pertinent reports  ALLERGIES   No Known Allergies  MEDICATIONS PRIOR TO ARRIVAL     Prior to Admission medications    Medication Sig Start Date End Date Taking?  Authorizing Provider   diphenhydrAMINE (BENADRYL) 25 mg tablet Take 25 mg by mouth daily at bedtime   Yes Historical Provider, MD   gabapentin (NEURONTIN) 300 mg capsule Take 300 mg by mouth daily   Yes Historical Provider, MD   lithium carbonate 300 mg capsule Take 300 mg by mouth daily at bedtime   Yes Historical Provider, MD   LORazepam (ATIVAN) 1 mg tablet Take 1 5 mg by mouth daily at bedtime   Yes Historical Provider, MD   omeprazole (PriLOSEC) 20 mg delayed release capsule Take 20 mg by mouth daily   Yes Historical Provider, MD   sertraline (ZOLOFT) 100 mg tablet Take 150 mg by mouth daily   Yes Historical Provider, MD   traZODone (DESYREL) 100 mg tablet Take 200 mg by mouth daily at bedtime   Yes Historical Provider, MD   lithium 600 MG capsule Take 600 mg by mouth daily  7/1/19  Historical Provider, MD   sertraline (ZOLOFT) 100 mg tablet Take 50 mg by mouth daily at bedtime  7/1/19  Historical Provider, MD   traZODone (DESYREL) 100 mg tablet Take 400 mg by mouth daily at bedtime  7/1/19  Historical Provider, MD     MEDICATIONS ADMINISTERED IN LAST 24 HOURS     Medication Administration - last 24 hours from 06/30/2019 1224 to 07/01/2019 1224       Date/Time Order Dose Route Action Action by     07/01/2019 0830 aspirin chewable tablet 324 mg 324 mg Oral Given Makenna Altamirano RN     07/01/2019 1009 ketorolac (TORADOL) injection 15 mg 15 mg Intravenous Given Geovani Abdul RN     07/01/2019 1211 calcium carbonate (TUMS) chewable tablet 500 mg 500 mg Oral Refused Linda Carbone RN        CURRENT MEDICATIONS       Current Facility-Administered Medications:  acetaminophen 650 mg Oral Q6H PRN Richa Chamakkala, DO   diphenhydrAMINE 25 mg Oral HS PRN Richa Chamakkala, DO   [START ON 7/2/2019] gabapentin 300 mg Oral Daily Richa Chamakkala, DO   lithium carbonate 300 mg Oral HS Richa Chamakkala, DO   LORazepam 1 5 mg Oral HS Richa Chamakkala, DO   [START ON 7/2/2019] pantoprazole 20 mg Oral Early Morning Richa Chamakkala, DO   senna 1 tablet Oral HS PRN Richa Chamakkala, DO   sertraline 150 mg Oral HS Richa Chamakkala, DO   sodium chloride (PF) 3 mL Intravenous PRN Holden Calderon MD   traZODone 200 mg Oral HS Richa Chamakkala, DO          acetaminophen 650 mg Q6H PRN   diphenhydrAMINE 25 mg HS PRN   senna 1 tablet HS PRN   sodium chloride (PF) 3 mL PRN       Admission Time  I spent 30 minutes admitting the patient  This involved direct patient contact where I performed a full history and physical, reviewing previous records, and reviewing laboratory and other diagnostic studies  Portions of the record may have been created with voice recognition software  Occasional wrong word or "sound a like" substitutions may have occurred due to the inherent limitations of voice recognition software    Read the chart carefully and recognize, using context, where substitutions have occurred     ==  Fabiola Martinez DO  [address]  Internal Medicine Residency PGY-2

## 2019-07-02 VITALS
DIASTOLIC BLOOD PRESSURE: 71 MMHG | SYSTOLIC BLOOD PRESSURE: 108 MMHG | OXYGEN SATURATION: 97 % | HEART RATE: 60 BPM | TEMPERATURE: 97.9 F | RESPIRATION RATE: 18 BRPM

## 2019-07-02 LAB
ANION GAP SERPL CALCULATED.3IONS-SCNC: 5 MMOL/L (ref 4–13)
BUN SERPL-MCNC: 24 MG/DL (ref 5–25)
CALCIUM SERPL-MCNC: 9 MG/DL (ref 8.3–10.1)
CHLORIDE SERPL-SCNC: 108 MMOL/L (ref 100–108)
CO2 SERPL-SCNC: 27 MMOL/L (ref 21–32)
CREAT SERPL-MCNC: 0.89 MG/DL (ref 0.6–1.3)
ERYTHROCYTE [DISTWIDTH] IN BLOOD BY AUTOMATED COUNT: 13.3 % (ref 11.6–15.1)
GFR SERPL CREATININE-BSD FRML MDRD: 68 ML/MIN/1.73SQ M
GLUCOSE SERPL-MCNC: 99 MG/DL (ref 65–140)
HCT VFR BLD AUTO: 40.1 % (ref 34.8–46.1)
HGB BLD-MCNC: 13.3 G/DL (ref 11.5–15.4)
MCH RBC QN AUTO: 28.5 PG (ref 26.8–34.3)
MCHC RBC AUTO-ENTMCNC: 33.2 G/DL (ref 31.4–37.4)
MCV RBC AUTO: 86 FL (ref 82–98)
PLATELET # BLD AUTO: 169 THOUSANDS/UL (ref 149–390)
PMV BLD AUTO: 12.1 FL (ref 8.9–12.7)
POTASSIUM SERPL-SCNC: 4 MMOL/L (ref 3.5–5.3)
RBC # BLD AUTO: 4.66 MILLION/UL (ref 3.81–5.12)
SODIUM SERPL-SCNC: 140 MMOL/L (ref 136–145)
WBC # BLD AUTO: 5.89 THOUSAND/UL (ref 4.31–10.16)

## 2019-07-02 PROCEDURE — 85027 COMPLETE CBC AUTOMATED: CPT | Performed by: INTERNAL MEDICINE

## 2019-07-02 PROCEDURE — 80048 BASIC METABOLIC PNL TOTAL CA: CPT | Performed by: INTERNAL MEDICINE

## 2019-07-02 PROCEDURE — NC001 PR NO CHARGE: Performed by: INTERNAL MEDICINE

## 2019-07-02 RX ORDER — KETOROLAC TROMETHAMINE 10 MG/1
10 TABLET, FILM COATED ORAL EVERY 6 HOURS PRN
Qty: 20 TABLET | Refills: 0 | Status: SHIPPED | OUTPATIENT
Start: 2019-07-02 | End: 2020-12-04

## 2019-07-02 RX ADMIN — PANTOPRAZOLE SODIUM 20 MG: 20 TABLET, DELAYED RELEASE ORAL at 05:47

## 2019-07-02 RX ADMIN — GABAPENTIN 300 MG: 300 CAPSULE ORAL at 08:24

## 2019-07-02 NOTE — UTILIZATION REVIEW
Initial Clinical Review    Admission: Date/Time/Statement: 07-01-19 @ 0710    Orders Placed This Encounter   Procedures    Place in Observation (expected length of stay for this patient is less than two midnights)     Standing Status:   Standing     Number of Occurrences:   1     Order Specific Question:   Admitting Physician     Answer:   Sasha Mays     Order Specific Question:   Level of Care     Answer:   Med Surg [16]     ED Arrival Information     Expected Arrival Acuity Means of Arrival Escorted By Service Admission Type    - 7/1/2019 07:41 Emergent Walk-In Self General Medicine Emergency    Arrival Complaint    -        Chief Complaint   Patient presents with    Chest Pain     Pt presnet swith substernal Cp since waking up this morning  Assessment/Plan: 65yo pmhx bipolar, GERD presents for evaluation of substernal chest pain  Patient says chest pain began around 5:00 a m and lasted about an hour  Pain radiates to her left shoulder and upper arm  Pain resolved on its own but then returned a couple of hours of later  She admits to having pain now of less intensity  Patient says she had similar pain 9 years ago and was evaluated with a stress test which was normal   She she notes mild shortness of breath    Denies fever, chills, cough, numbness/tingling, nausea, vomiting, and diaphoresis, abdominal pain, dysuria  ED Triage Vitals   Temperature Pulse Respirations Blood Pressure SpO2   07/01/19 0811 07/01/19 0811 07/01/19 0811 07/01/19 0811 07/01/19 0811   98 2 °F (36 8 °C) 80 16 126/64 96 %      Temp src Heart Rate Source Patient Position - Orthostatic VS BP Location FiO2 (%)   -- 07/01/19 0901 07/01/19 0901 07/01/19 0930 --    Monitor Lying Right arm       Pain Score       07/01/19 0811       6        Wt Readings from Last 1 Encounters:   No data found for Wt     Additional Vital Signs:   07/02/19 07:11:17  96 5 °F (35 8 °C)Abnormal   73  18  134/76  95  93 %    07/01/19 22:46:57  97 6 °F (36 4 °C)  59  16  116/67  83  97 %    07/01/19 19:17:09    65    117/68  84  96 %    07/01/19 15:25:58  98 5 °F (36 9 °C)  71  18  105/79  88  97 %    07/01/19 10:56:24  97 9 °F (36 6 °C)  63  18  114/68  83  96 %    07/01/19 0930    70  18  114/58    96 %    07/01/19 0901    73  16  125/57    96 %    07/01/19 0816                    Pertinent Labs/Diagnostic Test Results:   Results from last 7 days   Lab Units 07/02/19  0430 07/01/19  0813   WBC Thousand/uL 5 89 5 13   HEMOGLOBIN g/dL 13 3 13 1   HEMATOCRIT % 40 1 39 5   PLATELETS Thousands/uL 169 154   NEUTROS ABS Thousands/µL  --  2 66     Results from last 7 days   Lab Units 07/02/19  0429 07/01/19  0813   SODIUM mmol/L 140 139   POTASSIUM mmol/L 4 0 3 7   CHLORIDE mmol/L 108 107   CO2 mmol/L 27 27   ANION GAP mmol/L 5 5   BUN mg/dL 24 18   CREATININE mg/dL 0 89 0 94   EGFR ml/min/1 73sq m 68 64   CALCIUM mg/dL 9 0 8 9     Results from last 7 days   Lab Units 07/02/19  0429 07/01/19  0813   GLUCOSE RANDOM mg/dL 99 112     Results from last 7 days   Lab Units 07/01/19  0813   HEMOGLOBIN A1C % 5 8   EAG mg/dl 120     Results from last 7 days   Lab Units 07/01/19  1443 07/01/19  1118 07/01/19  0813   TROPONIN I ng/mL <0 02 <0 02 <0 02     EKG   Normal sinus rhythm  ST & T wave abnormality, consider inferior ischemia  ST & T wave abnormality, consider anterolateral ischemia  Abnormal ECG   7/1/2019 8:56:15 AM    EKG  ST & Marked T wave abnormality, consider anterolateral ischemia  Abnormal ECG  When compared with ECG of 01-JUL-2019 07:58,  Non-specific change in ST segment in Inferior leads  Nonspecific T wave abnormality has replaced inverted T waves in Inferior leads  T wave inversion more evident in Anterolateral leads  QT has lengthened  07-01-19  @ 11;00    EKG   Normal sinus rhythm  ST & Marked T wave abnormality, consider anterolateral ischemia  Abnormal ECG  When compared with ECG of 01-JUL-2019 11:02, (unconfirmed)  on 7/1/2019 10:59:47 PM      ED Treatment:   Medication Administration from 07/01/2019 0741 to 07/01/2019 1014       Date/Time Order Dose Route Action Action by Comments     07/01/2019 0830 aspirin chewable tablet 324 mg 324 mg Oral Given Marianne Holland RN      07/01/2019 1009 ketorolac (TORADOL) injection 15 mg 15 mg Intravenous Given Marianne Holland RN         Past Medical History:   Diagnosis Date    Bipolar disorder ([facility] )     GERD (gastroesophageal reflux disease)      Present on Admission:   Chest pain   Bipolar disorder ([facility] )   GERD (gastroesophageal reflux disease)      Admitting Diagnosis: Chest pain [R07 9]  T wave inversion in EKG [R94 31]  Age/Sex: 71 y o  female  Admission Orders:    Current Facility-Administered Medications:  acetaminophen 650 mg Oral Q6H PRN   diphenhydrAMINE 25 mg Oral HS PRN   gabapentin 300 mg Oral Daily   lithium carbonate 300 mg Oral HS   LORazepam 1 5 mg Oral HS   pantoprazole 20 mg Oral Early Morning   senna 1 tablet Oral HS PRN   sertraline 150 mg Oral HS   sodium chloride (PF) 3 mL Intravenous PRN   traZODone 200 mg Oral HS       ST SEGMENT MONITORING  TELEMETRY  SCD      Network Utilization Review Department  Phone: [phone]; Fax [phone]  [email or web address]  ATTENTION: Please call with any questions or concerns to [phone]  and carefully listen to the prompts so that you are directed to the right person  Send all requests for admission clinical reviews, approved or denied determinations and any other requests to fax [phone]   All voicemails are confidential

## 2019-07-02 NOTE — ED ATTENDING ATTESTATION
I, [address] [place] [address], DO, saw and evaluated the patient  I have discussed the patient with the resident/non-physician practitioner and agree with the resident's/non-physician practitioner's findings, Plan of Care, and MDM as documented in the resident's/non-physician practitioner's note, except where noted  All available labs and Radiology studies were reviewed  I was present for key portions of any procedure(s) performed by the resident/non-physician practitioner and I was immediately available to provide assistance  At this point I agree with the current assessment done in the Emergency Department  I have conducted an independent evaluation of this patient a history and physical is as follows:    69-year-old male presents for evaluation chest pain  Patient states that is substernal and began around 5:00 a m     Last 1 hour  Radiates to her left shoulder and upper arm  Resolve spontaneously but return to couple hours later  If it is having intermittent pain now as well  Had similar pain 9 years ago and evaluate the stress test which was normal   Also admits to mild shortness of breath  Denies fevers or chills, no cough, no nausea or vomiting, denies diaphoresis, no abdominal pain  Patient states she has a strong family history of heart disease as well  On exam-no acute distress, heart regular, lungs clear, abdomen soft nontender    Plan-cardiac evaluation and likely admit secondary to EKG changes and no old EKG to compare to    Critical Care Time  Procedures

## 2019-07-02 NOTE — DISCHARGE SUMMARY
IMR Discharge Summary - Medical [facility] 71 y o  female MRN: [id number]    [place] [address] Room / Bed: /-01 Encounter: [id number]    BRIEF OVERVIEW    Admitting Provider: Jane Galan MD  Discharge Provider: Bentley Yepez MD  Primary Care Physician at Discharge: No PCP - establish care with [id number]  Admission Date: 7/1/2019     Discharge Date: 7/2/2019    Hospital Course  Patient came in for ACS rule out  She was complaining of left sided chest pain that was pressure-like in nature and radiated to the shoulder  She also mentioned that for the past 2 days, her dog (large in size) has been pulling on the leash when she walked her  She had a series of 3 EKGs that showed normal sinus rhythm, ST and T wave abnormalities  Troponins x3 were negative  Her pain diminished with toradol  Her pain seemed to be musculoskeletal in nature  Patient was given 5 days of toradol for pain and told to follow up with PCP  Physical Exam   Constitutional: She is oriented to person, place, and time  She appears well-developed and well-nourished  HENT:   Head: Normocephalic and atraumatic  Eyes: Pupils are equal, round, and reactive to light  Conjunctivae and EOM are normal    Neck: Normal range of motion  Neck supple  Cardiovascular: Normal rate, regular rhythm, normal heart sounds and intact distal pulses  Pulmonary/Chest: Effort normal and breath sounds normal    Abdominal: Soft  Bowel sounds are normal    Musculoskeletal: She exhibits tenderness  Left sided chest wall pain on palpation   Neurological: She is alert and oriented to person, place, and time  Presenting Problem/History of Present Illness  Principal Problem:    Chest pain  Active Problems:    Bipolar disorder (HCC)    GERD (gastroesophageal reflux disease)    Insomnia  Resolved Problems:    * No resolved hospital problems   *        Diagnostic Procedures Performed  Imaging Studies:    X-ray chest 2 views   Final Result by Shima Boss MD (07/01 8953)      No acute cardiopulmonary disease              Workstation performed: [id number]             Pertinent Labs:      Results Reviewed     Procedure Component Value Units Date/Time    Hemoglobin A1C [[id number]] Collected:  07/01/19 0813    Lab Status:  Final result Specimen:  Blood from Arm, Left Updated:  07/01/19 1159     Hemoglobin A1C 5 8 %       mg/dl     Troponin I [[id number]]  (Normal) Collected:  07/01/19 0813    Lab Status:  Final result Specimen:  Blood from Arm, Left Updated:  07/01/19 0836     Troponin I <0 02 ng/mL     Basic metabolic panel [[id number]] Collected:  07/01/19 0813    Lab Status:  Final result Specimen:  Blood from Arm, Left Updated:  07/01/19 4504     Sodium 139 mmol/L      Potassium 3 7 mmol/L      Chloride 107 mmol/L      CO2 27 mmol/L      ANION GAP 5 mmol/L      BUN 18 mg/dL      Creatinine 0 94 mg/dL      Glucose 112 mg/dL      Calcium 8 9 mg/dL      eGFR 64 ml/min/1 73sq m     Narrative:       [facility] guidelines for [facility] Kid[facility] (CKD):     Stage 1 with normal or high GFR (GFR > 90 mL/min/1 73 square meters)    Stage 2 Mild CKD (GFR = 60-89 mL/min/1 73 square meters)    Stage 3A Moderate CKD (GFR = 45-59 mL/min/1 73 square meters)    Stage 3B Moderate CKD (GFR = 30-44 mL/min/1 73 square meters)    Stage 4 Severe CKD (GFR = 15-29 mL/min/1 73 square meters)    Stage 5 End Stage CKD (GFR <15 mL/min/1 73 square meters)  Note: GFR calculation is accurate only with a steady state creatinine    CBC and differential [[id number]] Collected:  07/01/19 0813    Lab Status:  Final result Specimen:  Blood from Arm, Left Updated:  07/01/19 0821     WBC 5 13 Thousand/uL      RBC 4 60 Million/uL      Hemoglobin 13 1 g/dL      Hematocrit 39 5 %      MCV 86 fL      MCH 28 5 pg      MCHC 33 2 g/dL      RDW 13 3 %      MPV 11 4 fL      Platelets 374 Thousands/uL      nRBC 0 /100 WBCs      Neutrophils Relative 52 %      Immat GRANS % 0 %      Lymphocytes Relative 38 %      Monocytes Relative 6 %      Eosinophils Relative 3 %      Basophils Relative 1 %      Neutrophils Absolute 2 66 Thousands/µL      Immature Grans Absolute 0 02 Thousand/uL      Lymphocytes Absolute 1 97 Thousands/µL      Monocytes Absolute 0 30 Thousand/µL      Eosinophils Absolute 0 14 Thousand/µL      Basophils Absolute 0 04 Thousands/µL           Therapeutic Procedures Performed  None    Test Results Pending at Discharge: None    Medications       Current Facility-Administered Medications:  acetaminophen 650 mg Oral Q6H PRN Richa Chamakkala, DO   diphenhydrAMINE 25 mg Oral HS PRN Richa Chamakkala, DO   gabapentin 300 mg Oral Daily Richa Chamakkala, DO   lithium carbonate 300 mg Oral HS Richa Chamakkala, DO   LORazepam 1 5 mg Oral HS Richa Chamakkala, DO   pantoprazole 20 mg Oral Early Morning Richa Chamakkala, DO   senna 1 tablet Oral HS PRN Richa Chamakkala, DO   sertraline 150 mg Oral HS Richa Chamakkala, DO   sodium chloride (PF) 3 mL Intravenous PRN Davon Grimes MD   traZODone 200 mg Oral HS Richa Chamakkala, DO       Medication List to be Continued at Discharge  Current Discharge Medication List      CONTINUE these medications which have NOT CHANGED    Details   diphenhydrAMINE (BENADRYL) 25 mg tablet Take 25 mg by mouth daily at bedtime      gabapentin (NEURONTIN) 300 mg capsule Take 300 mg by mouth daily      lithium carbonate 300 mg capsule Take 300 mg by mouth daily at bedtime      LORazepam (ATIVAN) 1 mg tablet Take 1 5 mg by mouth daily at bedtime      omeprazole (PriLOSEC) 20 mg delayed release capsule Take 20 mg by mouth daily      sertraline (ZOLOFT) 100 mg tablet Take 150 mg by mouth daily      traZODone (DESYREL) 100 mg tablet Take 200 mg by mouth daily at bedtime           Current Discharge Medication List      START taking these medications    Details   ketorolac (TORADOL) 10 mg tablet Take 1 tablet (10 mg total) by mouth every 6 (six) hours as needed for moderate pain for up to 5 days  Qty: 20 tablet, Refills: 0    Associated Diagnoses: Chest pain           Current Discharge Medication List          Allergies  No Known Allergies  Discharge Diet: regular diet  Activity restrictions: none  Discharge Condition: stable  Discharged With Lines: no    Discharge Disposition: Home/Self Care    Outpatient Follow-Up  yes      Follow up: Within 1-2 weeks  Date and time: Obtain appointment from Dr Easton  Code Status: Level 1 - Full Code  Advance Directive and Living Will: <no information>    Discharge  Statement   I spent 20 minutes minutes discharging the patient  This time was spent on the day of discharge  I had direct contact with the patient on the day of discharge  Additional documentation is required if more than 30 minutes were spent on discharge

## 2019-07-12 ENCOUNTER — TELEPHONE (OUTPATIENT)
Dept: INTERNAL MEDICINE CLINIC | Facility: CLINIC | Age: 64
End: 2019-07-12

## 2019-07-12 NOTE — TELEPHONE ENCOUNTER
Discharge summary indicated discharge date 7/2/19  Patient is not yet established and a TCM call was not placed within 2 days post discharge  This visit will be a hospital follow up visit

## 2019-07-12 NOTE — TELEPHONE ENCOUNTER
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

## 2019-10-24 ENCOUNTER — APPOINTMENT (EMERGENCY)
Dept: RADIOLOGY | Facility: HOSPITAL | Age: 64
End: 2019-10-24
Payer: MEDICARE

## 2019-10-24 ENCOUNTER — HOSPITAL ENCOUNTER (OUTPATIENT)
Facility: HOSPITAL | Age: 64
Setting detail: OBSERVATION
Discharge: HOME | End: 2019-10-25
Attending: EMERGENCY MEDICINE | Admitting: INTERNAL MEDICINE
Payer: MEDICARE

## 2019-10-24 DIAGNOSIS — J45.21 MILD INTERMITTENT ASTHMA WITH EXACERBATION: ICD-10-CM

## 2019-10-24 DIAGNOSIS — J44.1 COPD EXACERBATION (HCC): Primary | ICD-10-CM

## 2019-10-24 DIAGNOSIS — J40 BRONCHITIS: ICD-10-CM

## 2019-10-24 PROBLEM — R06.02 SOB (SHORTNESS OF BREATH): Status: ACTIVE | Noted: 2019-10-24

## 2019-10-24 PROBLEM — J45.901 ASTHMA EXACERBATION: Status: ACTIVE | Noted: 2019-10-24

## 2019-10-24 LAB
ALBUMIN SERPL BCP-MCNC: 4 G/DL (ref 3.5–5)
ALP SERPL-CCNC: 110 U/L (ref 46–116)
ALT SERPL W P-5'-P-CCNC: 17 U/L (ref 12–78)
ANION GAP SERPL CALCULATED.3IONS-SCNC: 6 MMOL/L (ref 4–13)
AST SERPL W P-5'-P-CCNC: 9 U/L (ref 5–45)
ATRIAL RATE: 90 BPM
BASOPHILS # BLD AUTO: 0.02 THOUSANDS/ΜL (ref 0–0.1)
BASOPHILS NFR BLD AUTO: 0 % (ref 0–1)
BILIRUB SERPL-MCNC: 0.29 MG/DL (ref 0.2–1)
BUN SERPL-MCNC: 15 MG/DL (ref 5–25)
CALCIUM SERPL-MCNC: 9.1 MG/DL (ref 8.3–10.1)
CHLORIDE SERPL-SCNC: 108 MMOL/L (ref 100–108)
CO2 SERPL-SCNC: 25 MMOL/L (ref 21–32)
CREAT SERPL-MCNC: 0.96 MG/DL (ref 0.6–1.3)
EOSINOPHIL # BLD AUTO: 0 THOUSAND/ΜL (ref 0–0.61)
EOSINOPHIL NFR BLD AUTO: 0 % (ref 0–6)
ERYTHROCYTE [DISTWIDTH] IN BLOOD BY AUTOMATED COUNT: 13.6 % (ref 11.6–15.1)
GFR SERPL CREATININE-BSD FRML MDRD: 62 ML/MIN/1.73SQ M
GLUCOSE SERPL-MCNC: 125 MG/DL (ref 65–140)
HCT VFR BLD AUTO: 39.3 % (ref 34.8–46.1)
HGB BLD-MCNC: 13.2 G/DL (ref 11.5–15.4)
IMM GRANULOCYTES # BLD AUTO: 0.04 THOUSAND/UL (ref 0–0.2)
IMM GRANULOCYTES NFR BLD AUTO: 0 % (ref 0–2)
LYMPHOCYTES # BLD AUTO: 0.93 THOUSANDS/ΜL (ref 0.6–4.47)
LYMPHOCYTES NFR BLD AUTO: 9 % (ref 14–44)
MCH RBC QN AUTO: 28.5 PG (ref 26.8–34.3)
MCHC RBC AUTO-ENTMCNC: 33.6 G/DL (ref 31.4–37.4)
MCV RBC AUTO: 85 FL (ref 82–98)
MONOCYTES # BLD AUTO: 0.11 THOUSAND/ΜL (ref 0.17–1.22)
MONOCYTES NFR BLD AUTO: 1 % (ref 4–12)
NEUTROPHILS # BLD AUTO: 9.32 THOUSANDS/ΜL (ref 1.85–7.62)
NEUTS SEG NFR BLD AUTO: 90 % (ref 43–75)
NRBC BLD AUTO-RTO: 0 /100 WBCS
P AXIS: 74 DEGREES
PLATELET # BLD AUTO: 189 THOUSANDS/UL (ref 149–390)
PLATELET # BLD AUTO: 200 THOUSANDS/UL (ref 149–390)
PMV BLD AUTO: 11.7 FL (ref 8.9–12.7)
PMV BLD AUTO: 11.9 FL (ref 8.9–12.7)
POTASSIUM SERPL-SCNC: 3.8 MMOL/L (ref 3.5–5.3)
PR INTERVAL: 122 MS
PROCALCITONIN SERPL-MCNC: <0.05 NG/ML
PROT SERPL-MCNC: 7 G/DL (ref 6.4–8.2)
QRS AXIS: 72 DEGREES
QRSD INTERVAL: 78 MS
QT INTERVAL: 328 MS
QTC INTERVAL: 401 MS
RBC # BLD AUTO: 4.63 MILLION/UL (ref 3.81–5.12)
SODIUM SERPL-SCNC: 139 MMOL/L (ref 136–145)
T WAVE AXIS: 234 DEGREES
TROPONIN I SERPL-MCNC: <0.02 NG/ML
VENTRICULAR RATE: 90 BPM
WBC # BLD AUTO: 10.42 THOUSAND/UL (ref 4.31–10.16)

## 2019-10-24 PROCEDURE — 93005 ELECTROCARDIOGRAM TRACING: CPT

## 2019-10-24 PROCEDURE — 85049 AUTOMATED PLATELET COUNT: CPT | Performed by: INTERNAL MEDICINE

## 2019-10-24 PROCEDURE — 36415 COLL VENOUS BLD VENIPUNCTURE: CPT | Performed by: EMERGENCY MEDICINE

## 2019-10-24 PROCEDURE — 94640 AIRWAY INHALATION TREATMENT: CPT

## 2019-10-24 PROCEDURE — 71046 X-RAY EXAM CHEST 2 VIEWS: CPT

## 2019-10-24 PROCEDURE — 85025 COMPLETE CBC W/AUTO DIFF WBC: CPT | Performed by: EMERGENCY MEDICINE

## 2019-10-24 PROCEDURE — 99285 EMERGENCY DEPT VISIT HI MDM: CPT | Performed by: EMERGENCY MEDICINE

## 2019-10-24 PROCEDURE — 93010 ELECTROCARDIOGRAM REPORT: CPT | Performed by: INTERNAL MEDICINE

## 2019-10-24 PROCEDURE — 94150 VITAL CAPACITY TEST: CPT

## 2019-10-24 PROCEDURE — 99285 EMERGENCY DEPT VISIT HI MDM: CPT

## 2019-10-24 PROCEDURE — 94664 DEMO&/EVAL PT USE INHALER: CPT

## 2019-10-24 PROCEDURE — 96375 TX/PRO/DX INJ NEW DRUG ADDON: CPT

## 2019-10-24 PROCEDURE — 94760 N-INVAS EAR/PLS OXIMETRY 1: CPT

## 2019-10-24 PROCEDURE — 96365 THER/PROPH/DIAG IV INF INIT: CPT

## 2019-10-24 PROCEDURE — 84484 ASSAY OF TROPONIN QUANT: CPT | Performed by: EMERGENCY MEDICINE

## 2019-10-24 PROCEDURE — 80053 COMPREHEN METABOLIC PANEL: CPT | Performed by: EMERGENCY MEDICINE

## 2019-10-24 PROCEDURE — 87631 RESP VIRUS 3-5 TARGETS: CPT | Performed by: INTERNAL MEDICINE

## 2019-10-24 PROCEDURE — 84145 PROCALCITONIN (PCT): CPT | Performed by: INTERNAL MEDICINE

## 2019-10-24 RX ORDER — GUAIFENESIN 100 MG/5ML
200 SOLUTION ORAL EVERY 4 HOURS PRN
Status: DISCONTINUED | OUTPATIENT
Start: 2019-10-24 | End: 2019-10-25 | Stop reason: HOSPADM

## 2019-10-24 RX ORDER — METHYLPREDNISOLONE SODIUM SUCCINATE 40 MG/ML
40 INJECTION, POWDER, LYOPHILIZED, FOR SOLUTION INTRAMUSCULAR; INTRAVENOUS DAILY
Status: DISCONTINUED | OUTPATIENT
Start: 2019-10-25 | End: 2019-10-25 | Stop reason: HOSPADM

## 2019-10-24 RX ORDER — PANTOPRAZOLE SODIUM 40 MG/1
40 TABLET, DELAYED RELEASE ORAL
Status: DISCONTINUED | OUTPATIENT
Start: 2019-10-25 | End: 2019-10-25 | Stop reason: HOSPADM

## 2019-10-24 RX ORDER — OSELTAMIVIR PHOSPHATE 75 MG/1
75 CAPSULE ORAL ONCE
Status: DISCONTINUED | OUTPATIENT
Start: 2019-10-24 | End: 2019-10-24

## 2019-10-24 RX ORDER — ACETAMINOPHEN 325 MG/1
650 TABLET ORAL EVERY 4 HOURS PRN
Status: DISCONTINUED | OUTPATIENT
Start: 2019-10-24 | End: 2019-10-25 | Stop reason: HOSPADM

## 2019-10-24 RX ORDER — GUAIFENESIN/DEXTROMETHORPHAN 100-10MG/5
10 SYRUP ORAL ONCE
Status: COMPLETED | OUTPATIENT
Start: 2019-10-24 | End: 2019-10-24

## 2019-10-24 RX ORDER — ALBUTEROL SULFATE 2.5 MG/3ML
2.5 SOLUTION RESPIRATORY (INHALATION) EVERY 4 HOURS PRN
Status: DISCONTINUED | OUTPATIENT
Start: 2019-10-24 | End: 2019-10-24

## 2019-10-24 RX ORDER — ALBUTEROL SULFATE 2.5 MG/3ML
5 SOLUTION RESPIRATORY (INHALATION) ONCE
Status: COMPLETED | OUTPATIENT
Start: 2019-10-24 | End: 2019-10-24

## 2019-10-24 RX ORDER — ALBUTEROL SULFATE 90 UG/1
2 AEROSOL, METERED RESPIRATORY (INHALATION) EVERY 4 HOURS PRN
Status: DISCONTINUED | OUTPATIENT
Start: 2019-10-24 | End: 2019-10-25 | Stop reason: HOSPADM

## 2019-10-24 RX ORDER — SODIUM CHLORIDE FOR INHALATION 0.9 %
3 VIAL, NEBULIZER (ML) INHALATION
Status: DISCONTINUED | OUTPATIENT
Start: 2019-10-24 | End: 2019-10-24

## 2019-10-24 RX ORDER — TRAZODONE HYDROCHLORIDE 100 MG/1
200 TABLET ORAL
Status: DISCONTINUED | OUTPATIENT
Start: 2019-10-24 | End: 2019-10-25 | Stop reason: HOSPADM

## 2019-10-24 RX ORDER — BUDESONIDE 0.5 MG/2ML
0.5 INHALANT ORAL
Status: DISCONTINUED | OUTPATIENT
Start: 2019-10-24 | End: 2019-10-25 | Stop reason: HOSPADM

## 2019-10-24 RX ORDER — LEVALBUTEROL 1.25 MG/.5ML
1.25 SOLUTION, CONCENTRATE RESPIRATORY (INHALATION)
Status: DISCONTINUED | OUTPATIENT
Start: 2019-10-24 | End: 2019-10-25 | Stop reason: HOSPADM

## 2019-10-24 RX ORDER — LORAZEPAM 0.5 MG/1
1.5 TABLET ORAL
Status: DISCONTINUED | OUTPATIENT
Start: 2019-10-24 | End: 2019-10-25 | Stop reason: HOSPADM

## 2019-10-24 RX ORDER — METHYLPREDNISOLONE SODIUM SUCCINATE 125 MG/2ML
125 INJECTION, POWDER, LYOPHILIZED, FOR SOLUTION INTRAMUSCULAR; INTRAVENOUS ONCE
Status: COMPLETED | OUTPATIENT
Start: 2019-10-24 | End: 2019-10-24

## 2019-10-24 RX ORDER — LITHIUM CARBONATE 300 MG/1
300 CAPSULE ORAL
Status: DISCONTINUED | OUTPATIENT
Start: 2019-10-24 | End: 2019-10-25 | Stop reason: HOSPADM

## 2019-10-24 RX ORDER — GABAPENTIN 300 MG/1
300 CAPSULE ORAL DAILY
Status: DISCONTINUED | OUTPATIENT
Start: 2019-10-25 | End: 2019-10-25 | Stop reason: HOSPADM

## 2019-10-24 RX ORDER — OSELTAMIVIR PHOSPHATE 30 MG/1
30 CAPSULE ORAL EVERY 12 HOURS SCHEDULED
Status: DISCONTINUED | OUTPATIENT
Start: 2019-10-24 | End: 2019-10-25 | Stop reason: HOSPADM

## 2019-10-24 RX ORDER — OSELTAMIVIR PHOSPHATE 30 MG/1
30 CAPSULE ORAL ONCE
Status: COMPLETED | OUTPATIENT
Start: 2019-10-24 | End: 2019-10-24

## 2019-10-24 RX ADMIN — IPRATROPIUM BROMIDE 0.5 MG: 0.5 SOLUTION RESPIRATORY (INHALATION) at 16:52

## 2019-10-24 RX ADMIN — ACETAMINOPHEN 650 MG: 325 TABLET ORAL at 13:22

## 2019-10-24 RX ADMIN — METHYLPREDNISOLONE SODIUM SUCCINATE 125 MG: 125 INJECTION, POWDER, FOR SOLUTION INTRAMUSCULAR; INTRAVENOUS at 11:33

## 2019-10-24 RX ADMIN — IPRATROPIUM BROMIDE 0.5 MG: 0.5 SOLUTION RESPIRATORY (INHALATION) at 10:40

## 2019-10-24 RX ADMIN — LEVALBUTEROL HYDROCHLORIDE 1.25 MG: 1.25 SOLUTION, CONCENTRATE RESPIRATORY (INHALATION) at 16:52

## 2019-10-24 RX ADMIN — GUAIFENESIN AND DEXTROMETHORPHAN 10 ML: 100; 10 SYRUP ORAL at 13:22

## 2019-10-24 RX ADMIN — IPRATROPIUM BROMIDE 0.5 MG: 0.5 SOLUTION RESPIRATORY (INHALATION) at 20:16

## 2019-10-24 RX ADMIN — BUDESONIDE 0.5 MG: 0.5 INHALANT RESPIRATORY (INHALATION) at 20:16

## 2019-10-24 RX ADMIN — GUAIFENESIN 200 MG: 100 SOLUTION ORAL at 21:07

## 2019-10-24 RX ADMIN — LEVALBUTEROL HYDROCHLORIDE 1.25 MG: 1.25 SOLUTION, CONCENTRATE RESPIRATORY (INHALATION) at 20:16

## 2019-10-24 RX ADMIN — AZITHROMYCIN MONOHYDRATE 500 MG: 500 INJECTION, POWDER, LYOPHILIZED, FOR SOLUTION INTRAVENOUS at 11:45

## 2019-10-24 RX ADMIN — ALBUTEROL SULFATE 5 MG: 2.5 SOLUTION RESPIRATORY (INHALATION) at 10:40

## 2019-10-24 RX ADMIN — LITHIUM CARBONATE 300 MG: 300 CAPSULE, GELATIN COATED ORAL at 21:05

## 2019-10-24 RX ADMIN — GUAIFENESIN AND DEXTROMETHORPHAN 10 ML: 100; 10 SYRUP ORAL at 16:53

## 2019-10-24 RX ADMIN — SERTRALINE HYDROCHLORIDE 150 MG: 100 TABLET ORAL at 21:04

## 2019-10-24 RX ADMIN — OSELTAMIVIR PHOSPHATE 30 MG: 30 CAPSULE ORAL at 21:05

## 2019-10-24 RX ADMIN — LORAZEPAM 1.5 MG: 0.5 TABLET ORAL at 21:04

## 2019-10-24 RX ADMIN — OSELTAMIVIR PHOSPHATE 30 MG: 30 CAPSULE ORAL at 13:17

## 2019-10-24 RX ADMIN — TRAZODONE HYDROCHLORIDE 200 MG: 100 TABLET ORAL at 21:04

## 2019-10-24 NOTE — ED PROVIDER NOTES
History  Chief Complaint   Patient presents with    Shortness of Breath     Pt reports sob, wheezing, congestion, fevers starting yesterday  [facility]  Pt reports dizziness from fever  Patient is a 70-year-old female who presents today complaining of shortness of breath, wheezing, congestion and fevers that started yesterday  [facility] was [facility] for evaluation  Patient also complains of dizziness  Upon further questioning patient admits to having a tobacco history for which he quit smoking approximately a year ago  She states that she smoked on and off for many years but did not provide further information  She has not been hospitalized for any respiratory illnesses but does use a Ventolin inhaler at home  She said she has not received flu shot to date  Patient states that she is coughing yellow/green sputum  No blood in her sputum  She denies having nausea vomiting or diarrhea  She states that she has had low-grade temperatures 99  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 Taking?    LORazepam (ATIVAN) 1 mg tablet  Self Yes No   Sig: Take 1 5 mg by mouth daily at bedtime   diphenhydrAMINE (BENADRYL) 25 mg tablet  Self Yes No   Sig: Take 25 mg by mouth daily at bedtime   gabapentin (NEURONTIN) 300 mg capsule  Self Yes No   Sig: Take 300 mg by mouth daily   ketorolac (TORADOL) 10 mg tablet   No No   Sig: Take 1 tablet (10 mg total) by mouth every 6 (six) hours as needed for moderate pain for up to 5 days   lithium carbonate 300 mg capsule  Self Yes No   Sig: Take 300 mg by mouth daily at bedtime   omeprazole (PriLOSEC) 20 mg delayed release capsule  Self Yes No   Sig: Take 20 mg by mouth daily   sertraline (ZOLOFT) 100 mg tablet  Self Yes No   Sig: Take 150 mg by mouth daily   traZODone (DESYREL) 100 mg tablet Self Yes No   Sig: Take 200 mg by mouth daily at bedtime      Facility-Administered Medications: None       Past Medical History:   Diagnosis Date    Bipolar disorder ([facility] [address] )     GERD (gastroesophageal reflux disease)        Past Surgical History:   Procedure Laterality Date     SECTION      HYSTERECTOMY         History reviewed  No pertinent family history  I have reviewed and agree with the history as documented  Social History     Tobacco Use    Smoking status: Former Smoker     Packs/day: 0 10     Years: 5 00     Pack years: 0 50     Types: Cigarettes     Last attempt to quit: 2018     Years since quittin 9    Smokeless tobacco: Never Used   Substance Use Topics    Alcohol use: Yes     Frequency: 2-4 times a month     Drinks per session: 1 or 2     Comment: rarely    Drug use: Never        Review of Systems   Constitutional: Positive for fever  Negative for chills and fatigue  HENT: Negative for sinus pressure and sore throat  Eyes: Negative for visual disturbance  Respiratory: Positive for shortness of breath and wheezing  Negative for cough  Cardiovascular: Negative for chest pain, palpitations and leg swelling  Gastrointestinal: Negative for abdominal pain, constipation, diarrhea, nausea and vomiting  Genitourinary: Negative for dysuria  Neurological: Positive for dizziness  Negative for light-headedness and headaches  Psychiatric/Behavioral: Negative for agitation and confusion  All other systems reviewed and are negative  Physical Exam  Physical Exam   Constitutional: She appears well-developed and well-nourished  No distress  HENT:   Head: Normocephalic and atraumatic  Mouth/Throat: Oropharynx is clear and moist    Post nasal drip   Eyes: Pupils are equal, round, and reactive to light  Conjunctivae and EOM are normal    Neck: Normal range of motion  Neck supple  Cardiovascular: Normal rate, regular rhythm, normal heart sounds and intact distal pulses  No murmur heard  Pulmonary/Chest: Effort normal  No respiratory distress  She has wheezes  She has rhonchi  She has no rales  Coarse breath sounds anterior more than posterior   Abdominal: Soft  Bowel sounds are normal  There is no rebound and no guarding  Musculoskeletal: Normal range of motion  Neurological: She is alert  She has normal strength  No cranial nerve deficit  Skin: Skin is warm and dry  Capillary refill takes less than 2 seconds  No rash noted  No erythema  Psychiatric: She has a normal mood and affect  Nursing note and vitals reviewed  Vital Signs  ED Triage Vitals [10/24/19 1032]   Temp Pulse Respirations Blood Pressure SpO2   -- 80 20 138/75 93 %      Temp src Heart Rate Source Patient Position - Orthostatic VS BP Location FiO2 (%)   -- Monitor Sitting Right arm --      Pain Score       No Pain           Vitals:    10/24/19 1032   BP: 138/75   Pulse: 80   Patient Position - Orthostatic VS: Sitting         Visual Acuity      ED Medications  Medications   albuterol inhalation solution 5 mg (5 mg Nebulization Given 10/24/19 1040)   ipratropium (ATROVENT) 0 02 % inhalation solution 0 5 mg (0 5 mg Nebulization Given 10/24/19 1040)       Diagnostic Studies  Results Reviewed     Procedure Component Value Units Date/Time    CBC and differential [[id number]]     Lab Status:  No result Specimen:  Blood     Comprehensive metabolic panel [[id number]]     Lab Status:  No result Specimen:  Blood     Troponin I [[id number]]     Lab Status:  No result Specimen:  Blood                  XR chest 2 views    (Results Pending)              Procedures  Procedures       ED Course  ED Course as of Oct 31 0854   Thu Oct 24, 2019   1119 EKG with sinus rhythm, T-wave inversions V3, V4 ,V5, V6 with new inversions noted in 1 and aVL  No obvious ST elevations or depressions nonspecific changes  1320 Patient requesting medications for her cough as well as Tylenol for headache    SOD contacted for observation admission  Identification of Seniors at Risk      Most Recent Value   (ISAR) Identification of Seniors at Risk   Before the illness or injury that brought you to the Emergency, did you need someone to help you on a regular basis? 0 Filed at: 10/24/2019 1035   In the last 24 hours, have you needed more help than usual?  0 Filed at: 10/24/2019 1035   Have you been hospitalized for one or more nights during the past 6 months? 0 Filed at: 10/24/2019 1035   In general, do you see well?  0 Filed at: 10/24/2019 1035   In general, do you have serious problems with your memory? 0 Filed at: 10/24/2019 1035   Do you take more than three different medications every day? 1 Filed at: 10/24/2019 1035   ISAR Score  1 Filed at: 10/24/2019 1035                          MDM  Number of Diagnoses or Management Options  Diagnosis management comments: Shortness of breath possibly related to bronchitis versus COPD exacerbation versus influenza versus pneumonia  Plan to check laboratory workup as well as imaging  Patient is presently receiving DuoNeb, plan to add steroids as well as Zithromax  If patient has pneumonia on chest x-ray antibiotics will be broadened to cover community-acquired pneumonia  Disposition  Final diagnoses:   None     ED Disposition     None      Follow-up Information    None         Patient's Medications   Discharge Prescriptions    No medications on file     No discharge procedures on file      ED Provider  Electronically Signed by           Sudhir Waddell DO  10/31/19 9315

## 2019-10-24 NOTE — SOCIAL WORK
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

## 2019-10-24 NOTE — H&P
INTERNAL MEDICINE HISTORY AND PHYSICAL  - SOD Team B     NAME: Chely Ponce  AGE: 71 y o  SEX: female  : 1955   MRN: [id number]  ENCOUNTER: [id number]    DATE: 10/24/2019  TIME: 6:00 PM    Primary Care Physician: No primary care provider on file  Admitting Provider: Leigh Ann Best MD    Chief complaint: SOB/Cough    History of Present Illness     Chely Ponce is a 71 y o  female medical history of asthma, bipolar disorder, GERD  Patient denies history of asthma exacerbation requiring intubation or hospitalization  Patient presenting for evaluation of shortness of breath/cough  She reports that she felt well prior to going to bed last night but subsequently developed cough, fever, congestion, shortness of breath  Cough productive of yellow sputum without blood  She tried using her home beta agonist inhaler without significant relief  She does report she smoked a few cigarettes daily during her college years but states that she has not had a cigarette in decades  She has not had a flu shot at this time  She denies nausea, vomiting, diarrhea  States that she feels feverish and has a home temperature of 99°  She does have a 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  In the ED vital signs unremarkable, breathing room air  WBC 10 4  Procalcitonin negative  Chest x-ray without acute issue  Patient was given Atrovent and albuterol neb in the ED was well as IV steroids without significant relief of symptoms  Review of Systems     Review of Systems   Constitutional: Positive for fever  Negative for chills and fatigue  HENT: Negative for sinus pressure and sore throat  Eyes: Negative for visual disturbance  Respiratory: Positive for shortness of breath and wheezing  Negative for cough  Cardiovascular: Negative for chest pain, palpitations and leg swelling  Gastrointestinal: Negative for abdominal pain, constipation, diarrhea, nausea and vomiting     Genitourinary: Negative for dysuria  Neurological: Positive for dizziness  Negative for light-headedness and headaches  Psychiatric/Behavioral: Negative for agitation and confusion  All other systems reviewed and are negative  Past Medical History     Past Medical History:   Diagnosis Date    Asthma     Bipolar disorder ([facility] [address] [facility]     GERD (gastroesophageal reflux disease)        Past Surgical History     Past Surgical History:   Procedure Laterality Date     SECTION      HYSTERECTOMY         Social History     Social History     Substance and Sexual Activity   Alcohol Use Yes    Frequency: 2-4 times a month    Drinks per session: 1 or 2    Comment: rarely     Social History     Substance and Sexual Activity   Drug Use Never     Social History     Tobacco Use   Smoking Status Former Smoker    Packs/day: 0 10    Years: 5 00    Pack years: 0 50    Types: Cigarettes    Last attempt to quit: 2018    Years since quittin 9   Smokeless Tobacco Never Used       Family History   History reviewed  No pertinent family history  Medications Prior to Admission     Prior to Admission medications    Medication Sig Start Date End Date Taking?  Authorizing Provider   diphenhydrAMINE (BENADRYL) 25 mg tablet Take 25 mg by mouth daily at bedtime   Yes Historical Provider, MD   gabapentin (NEURONTIN) 300 mg capsule Take 300 mg by mouth daily   Yes Historical Provider, MD   lithium carbonate 300 mg capsule Take 300 mg by mouth daily at bedtime   Yes Historical Provider, MD   LORazepam (ATIVAN) 1 mg tablet Take 1 5 mg by mouth daily at bedtime   Yes Historical Provider, MD   omeprazole (PriLOSEC) 20 mg delayed release capsule Take 20 mg by mouth daily   Yes Historical Provider, MD   sertraline (ZOLOFT) 100 mg tablet Take 150 mg by mouth daily   Yes Historical Provider, MD   traZODone (DESYREL) 100 mg tablet Take 200 mg by mouth daily at bedtime   Yes Historical Provider, MD   ketorolac (TORADOL) 10 mg tablet Take 1 tablet (10 mg total) by mouth every 6 (six) hours as needed for moderate pain for up to 5 days 7/2/19 7/7/19  Luba Sky MD       Allergies     Allergies   Allergen Reactions    Adderall [Amphetamine-Dextroamphetamine]      Chest pain- was placed on Adderall after son passed away for depression, and she developed chest pain in 1990's; she had full cardiac work up, and was negative, so she has allergy to Adderall    Molds & Smuts     Other      Cats    Sulfa Antibiotics Other (See Comments)       Objective     Vitals:    10/24/19 1256 10/24/19 1612 10/24/19 1621 10/24/19 1652   BP: 125/56  128/64    BP Location: Right arm      Pulse: 92  97    Resp: 20  17    Temp:   99 5 °F (37 5 °C)    TempSrc:       SpO2: 93%  94% 96%   Weight:  83 7 kg (184 lb 9 6 oz)     Height:  5' 1" (1 549 m)       Body mass index is 34 88 kg/m²  Intake/Output Summary (Last 24 hours) at 10/24/2019 1800  Last data filed at 10/24/2019 1258  Gross per 24 hour   Intake 250 ml   Output --   Net 250 ml     Invasive Devices     Peripheral Intravenous Line            Peripheral IV 10/24/19 Left Forearm less than 1 day                Physical Exam  GENERAL: Appears well-developed and well-nourished  Appears in no acute distress   HEENT: Normocephalic and atraumatic  No scleral icterus  PERRLA  EOMI B/L  No oropharyngeal edema  MM moist    NECK: Neck supple with no lymphadenopathy  Trachea midline  No JVD  CARDIOVASCULAR: S1 and S2 are present  Regular rate and rhythm  No murmurs, rubs, or gallops  RESPIRATORY: With coarse breath sounds throughout and wheezing throughout  Normal respiratory expansion  ABDOMINAL: Bowel sounds present in all 4 quadrants, non-tender, soft, non-distended  No organomegaly, rebound, or guarding  EXTREMITIES: 2+ DP and PT pulses bilaterally; no cyanosis, clubbing, edema  ROM intact  MALDONADO x4   MUSCULOSKELETAL: No joint tenderness, deformity or swelling, full range of motion without pain     NEUROLOGIC: Patient is alert and oriented to person, place, and time  No sensory or motor deficits  CN 2-12 intact  Plantars downgoing bilaterally  Speech fluent  SKIN: Skin is warm and dry  No skin lesions are present  No rashes  PSYCHIATRIC: Normal mood and affect     Lab Results: I have personally reviewed pertinent reports  CBC:   Results from last 7 days   Lab Units 10/24/19  1643 10/24/19  1048   WBC Thousand/uL  --  10 42*   RBC Million/uL  --  4 63   HEMOGLOBIN g/dL  --  13 2   HEMATOCRIT %  --  39 3   MCV fL  --  85   MCH pg  --  28 5   MCHC g/dL  --  33 6   RDW %  --  13 6   MPV fL 11 9 11 7   PLATELETS Thousands/uL 200 189   NRBC AUTO /100 WBCs  --  0   NEUTROS PCT %  --  90*   LYMPHS PCT %  --  9*   MONOS PCT %  --  1*   EOS PCT %  --  0   BASOS PCT %  --  0   NEUTROS ABS Thousands/µL  --  9 32*   LYMPHS ABS Thousands/µL  --  0 93   MONOS ABS Thousand/µL  --  0 11*   EOS ABS Thousand/µL  --  0 00   , Chemistry Profile:   Results from last 7 days   Lab Units 10/24/19  1048   POTASSIUM mmol/L 3 8   CHLORIDE mmol/L 108   CO2 mmol/L 25   BUN mg/dL 15   CREATININE mg/dL 0 96   CALCIUM mg/dL 9 1   AST U/L 9   ALT U/L 17   ALK PHOS U/L 110   EGFR ml/min/1 73sq m 62       Imaging: I have personally reviewed pertinent films in PACS  Xr Chest 2 Views    Result Date: 10/24/2019  Narrative: CHEST INDICATION:   Chest Pain  Wheezing COMPARISON:  July 1, 2019 EXAM PERFORMED/VIEWS:  XR CHEST PA & LATERAL FINDINGS: Cardiomediastinal silhouette appears unremarkable  The lungs are clear  Prior calcified nodule in the right mid lung likely partially secured by the overlying EKG leads  No pneumothorax or pleural effusion  Osseous structures appear within normal limits for patient age  Impression: Stable exam   No acute cardiopulmonary disease   Workstation performed: [id number]           Urinalysis:       Invalid input(s): URIBILINOGEN     Urine Micro:        EKG, Pathology, and Other Studies: I have personally reviewed pertinent reports        Medications given in Emergency Department     Medication Administration - last 24 hours from 10/23/2019 1800 to 10/24/2019 1800       Date/Time Order Dose Route Action Action by     10/24/2019 1040 albuterol inhalation solution 5 mg 5 mg Nebulization Given Alton Vargas, RN     10/24/2019 1040 ipratropium (ATROVENT) 0 02 % inhalation solution 0 5 mg 0 5 mg Nebulization Given Alton Vargas, RN     10/24/2019 1136 oseltamivir (TAMIFLU) capsule 75 mg 75 mg Oral Not Given Alton Vargas, RN     10/24/2019 1258 azithromycin (ZITHROMAX) 500 mg in sodium chloride 0 9% 250mL IVPB 500 mg 0 mg Intravenous Stopped Dnag Alicia, RN     10/24/2019 1145 azithromycin (ZITHROMAX) 500 mg in sodium chloride 0 9% 250mL IVPB 500 mg 500 mg Intravenous [facility], RN     10/24/2019 1133 methylPREDNISolone sodium succinate (Solu-MEDROL) injection 125 mg 125 mg Intravenous Given Dang Alicia, RN     10/24/2019 1317 oseltamivir (TAMIFLU) capsule 30 mg 30 mg Oral Given Dang Derickint, RN     10/24/2019 1322 dextromethorphan-guaiFENesin (ROBITUSSIN DM)  mg/5 mL oral syrup 10 mL 10 mL Oral Given Alton Vargas, RN     10/24/2019 1322 acetaminophen (TYLENOL) tablet 650 mg 650 mg Oral Given Dang Alicia, RN     10/24/2019 1653 dextromethorphan-guaiFENesin (ROBITUSSIN DM)  mg/5 mL oral syrup 10 mL 10 mL Oral Given Rosangela Domínguez, RN     10/24/2019 1652 levalbuterol (XOPENEX) inhalation solution 1 25 mg 1 25 mg Nebulization Given Lady Marlow, RT     10/24/2019 1722 sodium chloride 0 9 % inhalation solution 3 mL 3 mL Nebulization Not Given Lady Marlow, RT     10/24/2019 1652 ipratropium (ATROVENT) 0 02 % inhalation solution 0 5 mg 0 5 mg Nebulization Given Lady Marlow, RT          Assessment and Plan     Patient Active Problem List   Diagnosis    Chest pain    [facility] disorder ([facility] [address] )    GERD (gastroesophageal reflux disease)    Insomnia    Asthma exacerbation    SOB (shortness of breath)     Shortness of breath:  Likely secondary to asthma exacerbation  Possibly in the setting of influenza versus viral URI  Procalcitonin and chest x-ray negative   -continue Tamiflu  Will check influenza  -IV Solu-Medrol  -albuterol p r n , Pulmicort b i d , Atrovent t i d , Xopenex t i d   -Robitussin for symptomatic relief  -will check peak flow      Bipolar disorder:  Will controled this time  -continue home lithium      GERD  -Protonix    Insomnia  -home Ativan, Zoloft, trazodone    Code Status: Level 1 - Full Code  VTE Pharmacologic Prophylaxis: Sequential compression device (Venodyne)    VTE Mechanical Prophylaxis: sequential compression device  Admission Status: OBSERVATION    Admission Time  I spent 30 minutes admitting the patient  This involved direct patient contact where I performed a full history and physical, reviewing previous records, and reviewing laboratory and other diagnostic studies      Barbara Dillon MD  Internal Medicine  PGY-2

## 2019-10-24 NOTE — ED NOTES
SOD at bedside  Pt requesting another breathing treatment   Physician made aware     Gwendolyn Reese, DAHLIA  10/24/19 180 Zapata Kirill, DAHLIA  10/24/19 8735

## 2019-10-24 NOTE — RESPIRATORY THERAPY NOTE
RT Protocol Note  Marleen Little 71 y o  female MRN: [id number]  Unit/Bed#: -01 Encounter: [id number]    Assessment    Principal Problem:    Asthma exacerbation  Active Problems:    Bipolar disorder ([address] )    GERD (gastroesophageal reflux disease)    SOB (shortness of breath)      Home Pulmonary Medications:    Home Devices/Therapy: (P) Other (Comment)(UDN/MDI PRN)    Past Medical History:   Diagnosis Date    Asthma     Bipolar disorder (HCC)     GERD (gastroesophageal reflux disease)      Social History     Socioeconomic History    Marital status: Single     Spouse name: None    Number of children: None    Years of education: None    Highest education level: None   Occupational History    None   Social Needs    Financial resource strain: None    Food insecurity:     Worry: None     Inability: None    Transportation needs:     Medical: None     Non-medical: None   Tobacco Use    Smoking status: Former Smoker     Packs/day: 0 10     Years: 5 00     Pack years: 0 50     Types: Cigarettes     Last attempt to quit: 2018     Years since quittin 9    Smokeless tobacco: Never Used   Substance and Sexual Activity    Alcohol use: Yes     Frequency: 2-4 times a month     Drinks per session: 1 or 2     Comment: rarely    Drug use: Never    Sexual activity: None   Lifestyle    Physical activity:     Days per week: None     Minutes per session: None    Stress: None   Relationships    Social connections:     Talks on phone: None     Gets together: None     Attends [employer] service: None     Active member of club or organization: None     Attends meetings of clubs or organizations: None     Relationship status: None    Intimate partner violence:     Fear of current or ex partner: None     Emotionally abused: None     Physically abused: None     Forced sexual activity: None   Other Topics Concern    None   Social History Narrative    None       Subjective         Objective    Physical Exam: Assessment Type: (P) Pre-treatment  General Appearance: (P) Alert, Awake  Respiratory Pattern: (P) Normal  Chest Assessment: (P) Chest expansion symmetrical  Bilateral Breath Sounds: (P) Clear, Diminished    Vitals:  Blood pressure 128/64, pulse 97, temperature 99 5 °F (37 5 °C), resp  rate 17, height 5' 1" (1 549 m), weight 83 7 kg (184 lb 9 6 oz), SpO2 96 %  Imaging and other studies: I have personally reviewed pertinent reports  O2 Device: (P) (Room air)     Plan    Respiratory Plan: (P) Home Bronchodilator Patient pathway        Resp Comments: (P) Pt  requested UDN  Pt  instructed and given  Per protocol will continue UDN TID  Albuterol MDI q4 PRN

## 2019-10-25 VITALS
WEIGHT: 184.6 LBS | SYSTOLIC BLOOD PRESSURE: 128 MMHG | BODY MASS INDEX: 34.85 KG/M2 | HEIGHT: 61 IN | TEMPERATURE: 98.1 F | HEART RATE: 84 BPM | RESPIRATION RATE: 16 BRPM | DIASTOLIC BLOOD PRESSURE: 66 MMHG | OXYGEN SATURATION: 94 %

## 2019-10-25 PROBLEM — J45.901 ASTHMA EXACERBATION: Status: RESOLVED | Noted: 2019-10-24 | Resolved: 2019-10-25

## 2019-10-25 PROBLEM — R06.02 SOB (SHORTNESS OF BREATH): Status: RESOLVED | Noted: 2019-10-24 | Resolved: 2019-10-25

## 2019-10-25 LAB
ANION GAP SERPL CALCULATED.3IONS-SCNC: 7 MMOL/L (ref 4–13)
BUN SERPL-MCNC: 20 MG/DL (ref 5–25)
CALCIUM SERPL-MCNC: 9.5 MG/DL (ref 8.3–10.1)
CHLORIDE SERPL-SCNC: 112 MMOL/L (ref 100–108)
CHOLEST SERPL-MCNC: 195 MG/DL (ref 50–200)
CO2 SERPL-SCNC: 25 MMOL/L (ref 21–32)
CREAT SERPL-MCNC: 0.89 MG/DL (ref 0.6–1.3)
ERYTHROCYTE [DISTWIDTH] IN BLOOD BY AUTOMATED COUNT: 13.9 % (ref 11.6–15.1)
EST. AVERAGE GLUCOSE BLD GHB EST-MCNC: 120 MG/DL
FLUAV AG SPEC QL: NOT DETECTED
FLUBV AG SPEC QL: NOT DETECTED
GFR SERPL CREATININE-BSD FRML MDRD: 68 ML/MIN/1.73SQ M
GLUCOSE P FAST SERPL-MCNC: 112 MG/DL (ref 65–99)
GLUCOSE SERPL-MCNC: 112 MG/DL (ref 65–140)
HBA1C MFR BLD: 5.8 % (ref 4.2–6.3)
HCT VFR BLD AUTO: 36.8 % (ref 34.8–46.1)
HDLC SERPL-MCNC: 47 MG/DL
HGB BLD-MCNC: 12.2 G/DL (ref 11.5–15.4)
LDLC SERPL CALC-MCNC: 119 MG/DL (ref 0–100)
MCH RBC QN AUTO: 28.4 PG (ref 26.8–34.3)
MCHC RBC AUTO-ENTMCNC: 33.2 G/DL (ref 31.4–37.4)
MCV RBC AUTO: 86 FL (ref 82–98)
PLATELET # BLD AUTO: 179 THOUSANDS/UL (ref 149–390)
PMV BLD AUTO: 12.3 FL (ref 8.9–12.7)
POTASSIUM SERPL-SCNC: 3.9 MMOL/L (ref 3.5–5.3)
RBC # BLD AUTO: 4.29 MILLION/UL (ref 3.81–5.12)
RSV B RNA SPEC QL NAA+PROBE: NOT DETECTED
SODIUM SERPL-SCNC: 144 MMOL/L (ref 136–145)
TRIGL SERPL-MCNC: 145 MG/DL
WBC # BLD AUTO: 11.49 THOUSAND/UL (ref 4.31–10.16)

## 2019-10-25 PROCEDURE — 85027 COMPLETE CBC AUTOMATED: CPT | Performed by: INTERNAL MEDICINE

## 2019-10-25 PROCEDURE — 80061 LIPID PANEL: CPT | Performed by: INTERNAL MEDICINE

## 2019-10-25 PROCEDURE — 94150 VITAL CAPACITY TEST: CPT

## 2019-10-25 PROCEDURE — 94760 N-INVAS EAR/PLS OXIMETRY 1: CPT

## 2019-10-25 PROCEDURE — 94640 AIRWAY INHALATION TREATMENT: CPT

## 2019-10-25 PROCEDURE — 80048 BASIC METABOLIC PNL TOTAL CA: CPT | Performed by: INTERNAL MEDICINE

## 2019-10-25 PROCEDURE — 83036 HEMOGLOBIN GLYCOSYLATED A1C: CPT | Performed by: INTERNAL MEDICINE

## 2019-10-25 RX ORDER — PREDNISONE 20 MG/1
40 TABLET ORAL DAILY
Qty: 8 TABLET | Refills: 0 | Status: SHIPPED | OUTPATIENT
Start: 2019-10-25 | End: 2020-04-08 | Stop reason: ALTCHOICE

## 2019-10-25 RX ORDER — FLUTICASONE PROPIONATE 220 UG/1
2 AEROSOL, METERED RESPIRATORY (INHALATION) 2 TIMES DAILY
Qty: 3 INHALER | Refills: 0 | Status: SHIPPED | OUTPATIENT
Start: 2019-10-25 | End: 2019-10-29

## 2019-10-25 RX ORDER — ALBUTEROL SULFATE 90 UG/1
2 AEROSOL, METERED RESPIRATORY (INHALATION) EVERY 4 HOURS PRN
Qty: 18 G | Refills: 2 | Status: SHIPPED | OUTPATIENT
Start: 2019-10-25

## 2019-10-25 RX ADMIN — GABAPENTIN 300 MG: 300 CAPSULE ORAL at 08:28

## 2019-10-25 RX ADMIN — METHYLPREDNISOLONE SODIUM SUCCINATE 40 MG: 40 INJECTION, POWDER, FOR SOLUTION INTRAMUSCULAR; INTRAVENOUS at 08:29

## 2019-10-25 RX ADMIN — BUDESONIDE 0.5 MG: 0.5 INHALANT RESPIRATORY (INHALATION) at 08:30

## 2019-10-25 RX ADMIN — LEVALBUTEROL HYDROCHLORIDE 1.25 MG: 1.25 SOLUTION, CONCENTRATE RESPIRATORY (INHALATION) at 08:30

## 2019-10-25 RX ADMIN — OSELTAMIVIR PHOSPHATE 30 MG: 30 CAPSULE ORAL at 08:28

## 2019-10-25 RX ADMIN — PANTOPRAZOLE SODIUM 40 MG: 40 TABLET, DELAYED RELEASE ORAL at 05:16

## 2019-10-25 RX ADMIN — IPRATROPIUM BROMIDE 0.5 MG: 0.5 SOLUTION RESPIRATORY (INHALATION) at 08:30

## 2019-10-25 NOTE — UTILIZATION REVIEW
Initial Clinical Review    Admission: Date/Time/Statement:  10/24/19 1407  Orders Placed This Encounter   Procedures    Place in Observation (expected length of stay for this patient is less than two midnights)     Standing Status:   Standing     Number of Occurrences:   1     Order Specific Question:   Admitting Physician     Answer:   Padmini Hussein     Order Specific Question:   Level of Care     Answer:   Med Surg [16]     ED Arrival Information     Expected Arrival Acuity Means of Arrival Escorted By Service Admission Type    - 10/24/2019 10:25 Emergent Ambulance [address]  Complaint    Sob        Chief Complaint   Patient presents with    Shortness of Breath     Pt reports sob, wheezing, congestion, fevers starting yesterday  [facility]  Pt reports dizziness from fever  Assessment/Plan:     71year old female presents today from home for evaluation and treatment of dizziness, wheezing, shortness of breath, congestion and fevers starting yesterday  PMHX of smoking  Physical examination positive for wheezes, rhonchi and coarse breath sound  Treated in ed with iv azithromycin, iv solumedrol, atrovent, tamiflu  Admit to observation for asthma exacerbation    Plan to continue iv solumedrol, check peak flow, atrovent and xopenex nebulizers,          ED Triage Vitals   10/24/19 1137 10/24/19 1032 10/24/19 1032 10/24/19 1032 10/24/19 1032   98 4 °F (36 9 °C) 80 20 138/75 93 %      Oral          No Pain       10/24/19 83 7 kg (184 lb 9 6 oz)     Additional Vital Signs:    Date/Time  Temp  Pulse  Resp  BP  MAP (mmHg)  SpO2  O2 Device    10/25/19 0825  --  84  --  --  --  94 %  None (Room air)    10/25/19 0733  98 1 °F (36 7 °C)  75  16  128/66  87  94 %  --    10/24/19 2352  --  --  --  --  --  95 %  None (Room air)    10/24/19 23:28:53  97 4 °F (36 3 °C)Abnormal   78  16  105/44Abnormal   64  95 %  --    10/24/19 2016  --  --  --  --  -- 95 %  --    10/24/19 1652  --  --  --  --  --  96 %  None (Room air)    10/24/19 16:21:21  99 5 °F (37 5 °C)  97  17  128/64  85  94 %  --    10/24/19 1256  --  92  20  125/56  --  93 %  None (Room air)    10/24/19 1137  98 4 °F (36 9 °C)  90  20  140/77  --  93 %  None (Room air)    10/24/19 1035  --  --  --  --  --  --  None (Room air)              Pertinent Labs/Diagnostic Test Results:     Chest x ray  10-24 -19  1449  No acute finding       Results from last 7 days   Lab Units 10/25/19  0520 10/24/19  1643 10/24/19  1048   WBC Thousand/uL 11 49*  --  10 42*   HEMOGLOBIN g/dL 12 2  --  13 2   HEMATOCRIT % 36 8  --  39 3   PLATELETS Thousands/uL 179 200 189   NEUTROS ABS Thousands/µL  --   --  9 32*         Results from last 7 days   Lab Units 10/25/19  0520 10/24/19  1048   SODIUM mmol/L 144 139   POTASSIUM mmol/L 3 9 3 8   CHLORIDE mmol/L 112* 108   CO2 mmol/L 25 25   ANION GAP mmol/L 7 6   BUN mg/dL 20 15   CREATININE mg/dL 0 89 0 96   EGFR ml/min/1 73sq m 68 62   CALCIUM mg/dL 9 5 9 1     Results from last 7 days   Lab Units 10/24/19  1048   AST U/L 9   ALT U/L 17   ALK PHOS U/L 110   TOTAL PROTEIN g/dL 7 0   ALBUMIN g/dL 4 0   TOTAL BILIRUBIN mg/dL 0 29         Results from last 7 days   Lab Units 10/25/19  0520 10/24/19  1048   GLUCOSE RANDOM mg/dL 112 125     Results from last 7 days   Lab Units 10/24/19  1048   TROPONIN I ng/mL <0 02     Results from last 7 days   Lab Units 10/24/19  1642   PROCALCITONIN ng/ml <0 05     ED Treatment:   Medication Administration from 10/24/2019 1025 to 10/24/2019 1612       Date/Time Order Dose Route Action     10/24/2019 1040 albuterol inhalation solution 5 mg 5 mg Nebulization Given     10/24/2019 1040 ipratropium (ATROVENT) 0 02 % inhalation solution 0 5 mg 0 5 mg Nebulization Given     10/24/2019 1145 azithromycin (ZITHROMAX) 500 mg in sodium chloride 0 9% 250mL IVPB 500 mg 500 mg Intravenous New Bag     10/24/2019 1133 methylPREDNISolone sodium succinate (Solu-MEDROL) injection 125 mg 125 mg Intravenous Given     10/24/2019 1317 oseltamivir (TAMIFLU) capsule 30 mg 30 mg Oral Given     10/24/2019 1322 dextromethorphan-guaiFENesin (ROBITUSSIN DM)  mg/5 mL oral syrup 10 mL 10 mL Oral Given     10/24/2019 1322 acetaminophen (TYLENOL) tablet 650 mg 650 mg Oral Given        Past Medical History:   Diagnosis Date    Asthma     [facility]ipolar disorder ([facility] )     GERD (gastroesophageal reflux disease)      Present on Admission:   Bipolar disorder ([facility] )   GERD (gastroesophageal reflux disease)      Admitting Diagnosis:   SOB (shortness of breath) [R06 02]  Bronchitis [J40]  COPD exacerbation ([facility]) [J44 1]    Age/Sex: 71 y o  female     Admission Orders:    Medications:  budesonide 0 5 mg Nebulization Q12H   enoxaparin 40 mg Subcutaneous Daily   gabapentin 300 mg Oral Daily   ipratropium 0 5 mg Nebulization TID   levalbuterol 1 25 mg Nebulization TID   lithium carbonate 300 mg Oral HS   LORazepam 1 5 mg Oral HS   methylPREDNISolone sodium succinate 40 mg Intravenous Daily   oseltamivir 30 mg Oral Q12H LEAH   pantoprazole 40 mg Oral Early Morning   sertraline 150 mg Oral Daily   traZODone 200 mg Oral HS          acetaminophen 650 mg Oral Q4H PRN   albuterol 2 puff Inhalation Q4H PRN   guaiFENesin 200 mg Oral Q4H PRN       IP CONSULT TO CASE MANAGEMENT    Network Utilization Review Department  [email or web address]  ATTENTION: Please call with any questions or concerns to [phone] and carefully listen to the prompts so that you are directed to the right person  All voicemails are confidential   Amanda Malone all requests for admission clinical reviews, approved or denied determinations and any other requests to dedicated fax number below belonging to the campus where the patient is receiving treatment    FACILITY NAME UR FAX NUMBER   ADMISSION DENIALS (Administrative/Medical Necessity) [phone]   [address] (Maternity/NICU/Pediatrics) [phone]   [facility] Maureen Kirk [phone]   [facility] [phone]   [place] [phone]   [place] [id number]  [phone]   [facility] [address] [phone]

## 2019-10-25 NOTE — DISCHARGE INSTRUCTIONS
Asthma, Ambulatory Care   GENERAL INFORMATION:   Asthma  is a lung disease that makes breathing difficult  Chronic inflammation and reactions to triggers narrow the airways in your lungs  Asthma can become life-threatening if it is not managed  Common symptoms include the following:   · Coughing     · Wheezing     · Shortness of breath     · Chest tightness  Seek immediate care for the following symptoms:   · Severe shortness of breath    · Blue or gray lips or nails    · Skin around your neck and ribs pulls in with each breath    · Shortness of breath, even after you take your short-term medicine as directed     · Peak flow numbers in the red zone of your asthma action plan  Treatment for asthma  will depend on how severe it is  Medicine may decrease inflammation, open airways, and make it easier to breathe  Medicines may be inhaled, taken as a pill, or injected  Short-term medicines relieve your symptoms quickly  Long-term medicines are used to prevent future attacks  You may also need medicine to help control your allergies  Manage and prevent future asthma attacks:   · Follow your asthma action pan  This is a written plan that you and your healthcare provider create  It explains which medicine you need and when to change doses if necessary  It also explains how you can monitor symptoms and use a peak flow meter  The meter measures how well your lungs are working  · Manage other health conditions , such as allergies, acid reflux, and sleep apnea  · Identify and avoid triggers  These may include pets, dust mites, mold, and cockroaches  · Do not smoke and avoid others who smoke  If you smoke, it is never too late to quit  Ask your healthcare provider if you need help quitting  · Ask about a flu vaccine  The flu can make your asthma worse  You may need a yearly flu shot  Follow up with your healthcare provider as directed:   You will need to return to make sure your medicine is working and your symptoms are controlled  You may be referred to an asthma or allergy specialist  Eugeniawolf Tompkins may be asked to keep a record of your peak flow values and bring it with you to your appointments  Write down your questions so you remember to ask them during your visits  CARE AGREEMENT:   You have the right to help plan your care  Learn about your health condition and how it may be treated  Discuss treatment options with your caregivers to decide what care you want to receive  You always have the right to refuse treatment  The above information is an  only  It is not intended as medical advice for individual conditions or treatments  Talk to your doctor, nurse or pharmacist before following any medical regimen to see if it is safe and effective for you  © 2014 [address] is for End User's use only and may not be sold, redistributed or otherwise used for commercial purposes  All illustrations and images included in CareNotes® are the copyrighted property of A D A M , Inc  or Kaleb Schwab

## 2019-10-25 NOTE — SOCIAL WORK
CM discussed pt in care coordination rounds  CM met w/ pt for d/c planning  Pt aware of CM role  Pt is a RN employed by [employer]  Pt lives alone in 1st floor apartment w/ 3 steps to enter  Bed & bath on main level  PTA pt is independent w/ all ADLS & Ambulation without assistive device  Pt drives  Pt does not have POA  Emergency contact is partner Susie Taylor [phone]  Pt uses [facility] in [place] for pharmacy needs  Pt does not have PCP  [facility] information provided  Pt has had no short term rehab, or D & A admission  Pt had inpatient psych in 2010  CM reviewed d/c planning process including the following: identifying help at home, patient preference for d/c planning needs, Discharge Lounge, [facility] Meds to Bed program, availability of treatment team to discuss questions or concerns patient and/or family may have regarding understanding medications and recognizing signs and symptoms once discharged  CM also encouraged patient to follow up with all recommended appointments after discharge  Patient advised of importance for patient and family to participate in managing patients medical well being    Discharge checklist discussed with patient

## 2019-10-28 ENCOUNTER — TRANSITIONAL CARE MANAGEMENT (OUTPATIENT)
Dept: INTERNAL MEDICINE CLINIC | Facility: CLINIC | Age: 64
End: 2019-10-28

## 2019-10-29 ENCOUNTER — OFFICE VISIT (OUTPATIENT)
Dept: INTERNAL MEDICINE CLINIC | Facility: CLINIC | Age: 64
End: 2019-10-29

## 2019-10-29 VITALS
HEIGHT: 61 IN | SYSTOLIC BLOOD PRESSURE: 120 MMHG | TEMPERATURE: 98 F | DIASTOLIC BLOOD PRESSURE: 70 MMHG | BODY MASS INDEX: 34.71 KG/M2 | WEIGHT: 183.86 LBS | HEART RATE: 72 BPM

## 2019-10-29 DIAGNOSIS — J45.21 MILD INTERMITTENT ASTHMA WITH ACUTE EXACERBATION: Primary | ICD-10-CM

## 2019-10-29 PROCEDURE — 99496 TRANSJ CARE MGMT HIGH F2F 7D: CPT | Performed by: HOSPITALIST

## 2019-10-29 RX ORDER — PREDNISONE 20 MG/1
20 TABLET ORAL DAILY
Qty: 3 TABLET | Refills: 0 | Status: SHIPPED | OUTPATIENT
Start: 2019-10-29 | End: 2019-11-01

## 2019-10-29 NOTE — PROGRESS NOTES
ASSESSMENT/PLAN:  Diagnoses and all orders for this visit:    Mild intermittent asthma with acute exacerbation  -     predniSONE 20 mg tablet; Take 1 tablet (20 mg total) by mouth daily for 3 days  - Patient was hospitalized 10/24-10/25 for an acute asthma exacerbation 2/2 sick contacts (patient works as a nurse)  - Today she is feeling much better, but she still has some wheezing  She finished her oral prednisone 40 mg for 4 days; I gave her another 3 days of prednisone 20 mg    -Patient wanted to go back to work as soon as possible, stating that she wants to go back tomorrow  I told her to wait 1-2 more days, but she did not want to, stating that she had a light schedule    -She was given a note stating that she can go back to work  -She was educated on the use of albuterol and told to use only if she is in an exacerbation, not on a scheduled basis  -She does not need a daily inhaler for her asthma; she states that she uses her albuterol once a year  Health Maintenance:  Advised diet and exercise  Advised to refrain from tobacco, alcohol, illicit drug use  Advised medical compliance  Schedule a follow-up appointment in 6 months  CHIEF COMPLAINT: TCM    HISTORY OF PRESENT ILLNESS:    Patient is a 71year old female with a PMH significant for asthma, bipolar disorder, and GERD  She presents to the clinic after being hospitalized from 10/24/2019 to 10/25/2019 for an acute asthma exacerbation that did not require intubation  She reported that she has been feeling better and would like to go back to work      She finished her 4 days of prednisone 40 mg; I gave her another 20 mg for 3 days  The following portions of the patient's history were reviewed and updated as appropriate: allergies, current medications, past family history, past medical history, past social history, past surgical history and problem list     Review of Systems   Constitutional: Negative  HENT: Negative      Eyes: Negative  Respiratory: Positive for cough and wheezing  Negative for apnea, choking, chest tightness, shortness of breath and stridor  Cardiovascular: Negative  Gastrointestinal: Negative  Endocrine: Negative  Genitourinary: Negative  Musculoskeletal: Negative  Allergic/Immunologic: Negative  Neurological: Negative  Hematological: Negative  Psychiatric/Behavioral: Negative  OBJECTIVE:  Vitals:    10/29/19 0939   BP: 120/70   Pulse: 72   Temp: 98 °F (36 7 °C)   Weight: 83 4 kg (183 lb 13 8 oz)   Height: 5' 1" (1 549 m)     Physical Exam   Constitutional: She is oriented to person, place, and time  She appears well-developed and well-nourished  HENT:   Head: Normocephalic and atraumatic  Eyes: Pupils are equal, round, and reactive to light  Conjunctivae and EOM are normal    Neck: Normal range of motion  Neck supple  Pulmonary/Chest: Effort normal  She has wheezes  End expiratory wheezes heard through out  Coarse breath sounds heard in the right anterior upper lung field  Abdominal: Soft  Bowel sounds are normal    Musculoskeletal: Normal range of motion  Neurological: She is alert and oriented to person, place, and time  Skin: Skin is warm and dry  Psychiatric: She has a normal mood and affect   Her behavior is normal          Current Outpatient Medications:     albuterol (PROVENTIL HFA,VENTOLIN HFA) 90 mcg/act inhaler, Inhale 2 puffs every 4 (four) hours as needed for wheezing, Disp: 18 g, Rfl: 2    diphenhydrAMINE (BENADRYL) 25 mg tablet, Take 25 mg by mouth daily at bedtime, Disp: , Rfl:     gabapentin (NEURONTIN) 300 mg capsule, Take 300 mg by mouth daily, Disp: , Rfl:     lithium carbonate 300 mg capsule, Take 300 mg by mouth daily at bedtime, Disp: , Rfl:     LORazepam (ATIVAN) 1 mg tablet, Take 1 5 mg by mouth daily at bedtime, Disp: , Rfl:     omeprazole (PriLOSEC) 20 mg delayed release capsule, Take 20 mg by mouth daily, Disp: , Rfl:    predniSONE 20 mg tablet, Take 2 tablets (40 mg total) by mouth daily, Disp: 8 tablet, Rfl: 0    sertraline (ZOLOFT) 100 mg tablet, Take 150 mg by mouth daily, Disp: , Rfl:     traZODone (DESYREL) 100 mg tablet, Take 200 mg by mouth daily at bedtime, Disp: , Rfl:     fluticasone (FLOVENT HFA) 220 mcg/act inhaler, Inhale 2 puffs 2 (two) times a day Rinse mouth after use  (Patient not taking: Reported on 10/29/2019), Disp: 3 Inhaler, Rfl: 0    ketorolac (TORADOL) 10 mg tablet, Take 1 tablet (10 mg total) by mouth every 6 (six) hours as needed for moderate pain for up to 5 days, Disp: 20 tablet, Rfl: 0    Past Medical History:   Diagnosis Date    Asthma     [facility]     GERD (gastroesophageal reflux disease)      Past Surgical History:   Procedure Laterality Date     SECTION      HYSTERECTOMY       Social History     Socioeconomic History    Marital status: Single     Spouse name: Not on file    Number of children: Not on file    Years of education: Not on file    Highest education level: Not on file   Occupational History    Not on file   Social Needs    Financial resource strain: Not hard at all   AVIS insecurity:     Worry: Never true     Inability: Never true   [employer] needs:     Medical: No     Non-medical: No   Tobacco Use    Smoking status: Current Every Day Smoker     Packs/day: 0 10     Years: 5 00     Pack years: 0 50     Types: Cigarettes     Last attempt to quit: 2018     Years since quittin 9    Smokeless tobacco: Never Used   Substance and Sexual Activity    Alcohol use: Yes     Frequency: 2-4 times a month     Drinks per session: 1 or 2     Comment: rarely    Drug use: Never    Sexual activity: Not on file   Lifestyle    Physical activity:     Days per week: 3 days     Minutes per session: 10 min    Stress:  Only a little   Relationships    Social connections:     Talks on phone: More than three times a week     Gets together: Never     Attends [employer] service: More than 4 times per year     Active member of club or organization: Yes     Attends meetings of clubs or organizations: Never     Relationship status:     Intimate partner violence:     Fear of current or ex partner: No     Emotionally abused: No     Physically abused: No     Forced sexual activity: No   Other Topics Concern    Not on file   Social History Narrative    Not on file     History reviewed  No pertinent family history     ==  MD Marivel Maier 72 Internal Medicine PGY-1    [facility]  [address] , [address], [address] [place] [address]  Office: [phone]  Fax: [phone]

## 2019-10-29 NOTE — LETTER
October 29, 2019     Patient: Jami Regan   YOB: 1955   Date of Visit: 10/29/2019       To Whom it May Concern:    Jami Regan is under my professional care  She was seen in my office on 10/29/2019  She may return to work on 10/30/2019  If you have any questions or concerns, please don't hesitate to call           Sincerely,          Elizabeth Givens MD        CC: No Recipients

## 2020-04-08 ENCOUNTER — TELEMEDICINE (OUTPATIENT)
Dept: FAMILY MEDICINE CLINIC | Facility: CLINIC | Age: 65
End: 2020-04-08
Payer: MEDICARE

## 2020-04-08 DIAGNOSIS — R50.9 FEVER, UNSPECIFIED FEVER CAUSE: ICD-10-CM

## 2020-04-08 DIAGNOSIS — Z20.828 EXPOSURE TO SARS-ASSOCIATED CORONAVIRUS: Primary | ICD-10-CM

## 2020-04-08 DIAGNOSIS — M79.10 MYALGIA: ICD-10-CM

## 2020-04-08 DIAGNOSIS — R21 RASH AND NONSPECIFIC SKIN ERUPTION: ICD-10-CM

## 2020-04-08 PROCEDURE — 99213 OFFICE O/P EST LOW 20 MIN: CPT | Performed by: NURSE PRACTITIONER

## 2020-04-08 RX ORDER — BETAMETHASONE DIPROPIONATE 0.05 %
OINTMENT (GRAM) TOPICAL DAILY
Status: SHIPPED | OUTPATIENT
Start: 2020-04-08

## 2020-09-28 ENCOUNTER — TELEMEDICINE (OUTPATIENT)
Dept: FAMILY MEDICINE CLINIC | Facility: CLINIC | Age: 65
End: 2020-09-28
Payer: MEDICARE

## 2020-09-28 VITALS — WEIGHT: 183 LBS | TEMPERATURE: 96.7 F | HEIGHT: 61 IN | BODY MASS INDEX: 34.55 KG/M2

## 2020-09-28 DIAGNOSIS — H00.015 HORDEOLUM EXTERNUM OF LEFT LOWER EYELID: Primary | ICD-10-CM

## 2020-09-28 PROCEDURE — 99212 OFFICE O/P EST SF 10 MIN: CPT | Performed by: NURSE PRACTITIONER

## 2020-09-28 RX ORDER — IMIQUIMOD 12.5 MG/.25G
CREAM TOPICAL
Status: ON HOLD | COMMUNITY
Start: 2020-06-23 | End: 2021-01-20 | Stop reason: CLARIF

## 2020-09-28 RX ORDER — FLUOCINONIDE CREAM (EMULSIFIED BASE) 0.5 MG/G
CREAM TOPICAL
COMMUNITY
Start: 2020-06-24

## 2020-09-28 RX ORDER — ERYTHROMYCIN 5 MG/G
0.5 OINTMENT OPHTHALMIC
Qty: 3.5 G | Refills: 0 | Status: ON HOLD | OUTPATIENT
Start: 2020-09-28 | End: 2021-01-20 | Stop reason: CLARIF

## 2020-09-28 NOTE — PROGRESS NOTES
Virtual Regular Visit      Assessment/Plan:    Problem List Items Addressed This Visit     None               Reason for visit is   Chief Complaint   Patient presents with    Facial Swelling     left eye swollen, red, pain, has been draining for 5 days        Encounter provider LUIS CARLOS Quinones    Provider located at [address] [facility] [id number] [phone]      Recent Visits  No visits were found meeting these conditions  Showing recent visits within past 7 days and meeting all other requirements     Today's Visits  Date Type Provider Dept   09/28/20 819 [facility], [address] [facility]   Showing today's visits and meeting all other requirements     Future Appointments  No visits were found meeting these conditions  Showing future appointments within next 150 days and meeting all other requirements        The patient was identified by name and date of birth  Marilu Benz was informed that this is a telemedicine visit and that the visit is being conducted through [facility] [place] and patient was informed that this is not a secure, HIPAA-complaint platform  She agrees to proceed     Other methods to assure confidentiality were taken   No one else was in the room  She acknowledged consent and understanding of privacy and security of the video platform  The patient has agreed to participate and understands they can discontinue the visit at any time  Patient is aware this is a billable service  Subjective  Marilu Benz is a 76 y o  female  With slight L eye tenderness at the lower lid no changes in vision   [place] [employer] has had a bump on the lower lid of the L eye for several days  It is red and slightly tender  No changes in vision  She frequesntly gets "stys" and that is what she is call this  She denies any other symptoms          Past Medical History:   Diagnosis Date    Asthma     Bipolar disorder ([address] )     GERD (gastroesophageal reflux disease)        Past Surgical History:   Procedure Laterality Date     SECTION      x3    CHOLECYSTECTOMY      HYSTERECTOMY         Current Outpatient Medications   Medication Sig Dispense Refill    albuterol (PROVENTIL HFA,VENTOLIN HFA) 90 mcg/act inhaler Inhale 2 puffs every 4 (four) hours as needed for wheezing 18 g 2    diphenhydrAMINE (BENADRYL) 25 mg tablet Take 25 mg by mouth daily at bedtime      Fluocinonide Emulsified Base 0 05 % CREA APPLY TO AFFECTED AREA(S) TOPICALLY TWO TIMES DAILY TO LOWER LEGS FOR 14 DAYS MAX AS NEEDED      gabapentin (NEURONTIN) 300 mg capsule Take 300 mg by mouth daily      imiquimod (ALDARA) 5 % cream APPLY TO AFFECTED AREA(S) TOPICALLY EVERY DAY AT BEDTIME      lithium carbonate 300 mg capsule Take 300 mg by mouth daily at bedtime      LORazepam (ATIVAN) 1 mg tablet Take 1 5 mg by mouth daily at bedtime      mupirocin (BACTROBAN) 2 % ointment APPLY TO AFFECTED AREA(S) TOPICALLY TWO TIMES DAILY      omeprazole (PriLOSEC) 20 mg delayed release capsule Take 20 mg by mouth daily      sertraline (ZOLOFT) 100 mg tablet Take 150 mg by mouth daily      traZODone (DESYREL) 100 mg tablet Take 200 mg by mouth daily at bedtime      ketorolac (TORADOL) 10 mg tablet Take 1 tablet (10 mg total) by mouth every 6 (six) hours as needed for moderate pain for up to 5 days 20 tablet 0     Current Facility-Administered Medications   Medication Dose Route Frequency Provider Last Rate Last Dose    betamethasone dipropionate (DIPROSONE) 0 05 % ointment   Topical Daily Everlina Mortimer, CRNP            Allergies   Allergen Reactions    Amphetamine-Dextroamphetamine Other (See Comments)     Chest pain- was placed on Adderall after son passed away for depression, and she developed chest pain in ; she had full cardiac work up, and was negative, so she has allergy to Adderall  Chest pain- was placed on Adderall after son passed away for depression, and she developed chest pain in 1990's; she had full cardiac work up, and was negative, so she has allergy to Adderall    Molds & Smuts     Other      Cats    Sulfa Antibiotics Other (See Comments)       Review of Systems   Constitutional: Negative for appetite change and fever  HENT: Negative for sinus pressure and sore throat  Eyes: Positive for redness (LL lid) and itching  Negative for pain  Respiratory: Negative for shortness of breath  Cardiovascular: Negative for chest pain  Gastrointestinal: Negative for abdominal pain  Genitourinary: Negative for dysuria  Musculoskeletal: Negative for arthralgias and myalgias  Skin: Negative for color change  Neurological: Negative for light-headedness  Psychiatric/Behavioral: Negative for behavioral problems  Video Exam    Vitals:    09/28/20 0908   Temp: (!) 96 7 °F (35 9 °C)   TempSrc: Temporal   Weight: 83 kg (183 lb)   Height: 5' 1" (1 549 m)       Physical Exam  Vitals signs and nursing note reviewed  Constitutional:       General: She is not in acute distress  Appearance: She is well-developed  She is not diaphoretic  HENT:      Head: Normocephalic and atraumatic  Eyes:      Pupils: Pupils are equal, round, and reactive to light  Comments: 4 mm lesion LL eye lid slight redness s;ight tender no changes in the conjunctiva   Neck:      Musculoskeletal: Normal range of motion  Pulmonary:      Effort: Pulmonary effort is normal    Skin:     General: Skin is dry  Neurological:      General: No focal deficit present  Mental Status: She is alert and oriented to person, place, and time  Psychiatric:         Behavior: Behavior normal          Thought Content: Thought content normal           I spent 10 minutes directly with the patient during this visit      VIRTUAL VISIT DISCLAIMER    Kiley Schrader acknowledges that she has consented to an online visit or consultation   She understands that the online visit is based solely on information provided by her, and that, in the absence of a face-to-face physical evaluation by the physician, the diagnosis she receives is both limited and provisional in terms of accuracy and completeness  This is not intended to replace a full medical face-to-face evaluation by the physician  Linwood Diana understands and accepts these terms

## 2020-10-02 ENCOUNTER — TELEPHONE (OUTPATIENT)
Dept: FAMILY MEDICINE CLINIC | Facility: CLINIC | Age: 65
End: 2020-10-02

## 2020-10-09 ENCOUNTER — TELEPHONE (OUTPATIENT)
Dept: FAMILY MEDICINE CLINIC | Facility: CLINIC | Age: 65
End: 2020-10-09

## 2020-10-09 ENCOUNTER — NURSE TRIAGE (OUTPATIENT)
Dept: OTHER | Facility: OTHER | Age: 65
End: 2020-10-09

## 2020-10-09 DIAGNOSIS — R50.9 FEVER, UNSPECIFIED FEVER CAUSE: Primary | ICD-10-CM

## 2020-10-09 DIAGNOSIS — R50.9 FEVER, UNSPECIFIED FEVER CAUSE: ICD-10-CM

## 2020-10-09 DIAGNOSIS — Z20.828 SARS-ASSOCIATED CORONAVIRUS EXPOSURE: Primary | ICD-10-CM

## 2020-10-09 PROCEDURE — U0003 INFECTIOUS AGENT DETECTION BY NUCLEIC ACID (DNA OR RNA); SEVERE ACUTE RESPIRATORY SYNDROME CORONAVIRUS 2 (SARS-COV-2) (CORONAVIRUS DISEASE [COVID-19]), AMPLIFIED PROBE TECHNIQUE, MAKING USE OF HIGH THROUGHPUT TECHNOLOGIES AS DESCRIBED BY CMS-2020-01-R: HCPCS | Performed by: FAMILY MEDICINE

## 2020-10-10 LAB — SARS-COV-2 RNA SPEC QL NAA+PROBE: NOT DETECTED

## 2020-11-29 ENCOUNTER — OFFICE VISIT (OUTPATIENT)
Dept: URGENT CARE | Age: 65
End: 2020-11-29
Payer: MEDICARE

## 2020-11-29 VITALS
HEIGHT: 61 IN | TEMPERATURE: 99.8 F | OXYGEN SATURATION: 95 % | WEIGHT: 183 LBS | BODY MASS INDEX: 34.55 KG/M2 | RESPIRATION RATE: 18 BRPM | HEART RATE: 94 BPM

## 2020-11-29 DIAGNOSIS — R06.2 WHEEZING: ICD-10-CM

## 2020-11-29 DIAGNOSIS — J06.9 VIRAL UPPER RESPIRATORY TRACT INFECTION: Primary | ICD-10-CM

## 2020-11-29 PROCEDURE — G0463 HOSPITAL OUTPT CLINIC VISIT: HCPCS | Performed by: FAMILY MEDICINE

## 2020-11-29 PROCEDURE — U0003 INFECTIOUS AGENT DETECTION BY NUCLEIC ACID (DNA OR RNA); SEVERE ACUTE RESPIRATORY SYNDROME CORONAVIRUS 2 (SARS-COV-2) (CORONAVIRUS DISEASE [COVID-19]), AMPLIFIED PROBE TECHNIQUE, MAKING USE OF HIGH THROUGHPUT TECHNOLOGIES AS DESCRIBED BY CMS-2020-01-R: HCPCS | Performed by: FAMILY MEDICINE

## 2020-11-29 PROCEDURE — 99213 OFFICE O/P EST LOW 20 MIN: CPT | Performed by: FAMILY MEDICINE

## 2020-11-29 RX ORDER — PREDNISONE 50 MG/1
50 TABLET ORAL DAILY
Qty: 5 TABLET | Refills: 0 | Status: SHIPPED | OUTPATIENT
Start: 2020-11-29 | End: 2021-01-11 | Stop reason: HOSPADM

## 2020-11-30 LAB — SARS-COV-2 RNA SPEC QL NAA+PROBE: DETECTED

## 2020-12-01 ENCOUNTER — TELEPHONE (OUTPATIENT)
Dept: OTHER | Facility: OTHER | Age: 65
End: 2020-12-01

## 2020-12-04 ENCOUNTER — APPOINTMENT (EMERGENCY)
Dept: RADIOLOGY | Facility: HOSPITAL | Age: 65
DRG: 177 | End: 2020-12-04
Payer: MEDICARE

## 2020-12-04 ENCOUNTER — HOSPITAL ENCOUNTER (INPATIENT)
Facility: HOSPITAL | Age: 65
LOS: 38 days | Discharge: HOME HEALTH | DRG: 177 | End: 2021-01-11
Attending: EMERGENCY MEDICINE | Admitting: INTERNAL MEDICINE
Payer: MEDICARE

## 2020-12-04 DIAGNOSIS — R77.8 ELEVATED TROPONIN: ICD-10-CM

## 2020-12-04 DIAGNOSIS — R09.02 HYPOXIA: Primary | ICD-10-CM

## 2020-12-04 DIAGNOSIS — J44.9 COPD (CHRONIC OBSTRUCTIVE PULMONARY DISEASE) (HCC): ICD-10-CM

## 2020-12-04 DIAGNOSIS — U07.1 COVID-19 VIRUS INFECTION: ICD-10-CM

## 2020-12-04 DIAGNOSIS — J96.01 ACUTE RESPIRATORY FAILURE WITH HYPOXIA (HCC): ICD-10-CM

## 2020-12-04 DIAGNOSIS — U07.1 COVID-19: ICD-10-CM

## 2020-12-04 DIAGNOSIS — J18.9 PNEUMONIA: ICD-10-CM

## 2020-12-04 PROBLEM — R79.89 ELEVATED TROPONIN: Status: ACTIVE | Noted: 2020-12-04

## 2020-12-04 LAB
ABO GROUP BLD: NORMAL
ALBUMIN SERPL BCP-MCNC: 3.7 G/DL (ref 3.5–5)
ALP SERPL-CCNC: 85 U/L (ref 46–116)
ALT SERPL W P-5'-P-CCNC: 31 U/L (ref 12–78)
ANION GAP SERPL CALCULATED.3IONS-SCNC: 6 MMOL/L (ref 4–13)
AST SERPL W P-5'-P-CCNC: 33 U/L (ref 5–45)
ATRIAL RATE: 80 BPM
BASOPHILS # BLD AUTO: 0.01 THOUSANDS/ΜL (ref 0–0.1)
BASOPHILS NFR BLD AUTO: 0 % (ref 0–1)
BILIRUB SERPL-MCNC: 0.27 MG/DL (ref 0.2–1)
BUN SERPL-MCNC: 21 MG/DL (ref 5–25)
CALCIUM SERPL-MCNC: 8.9 MG/DL (ref 8.3–10.1)
CHLORIDE SERPL-SCNC: 106 MMOL/L (ref 100–108)
CK SERPL-CCNC: 105 U/L (ref 26–192)
CO2 SERPL-SCNC: 29 MMOL/L (ref 21–32)
CREAT SERPL-MCNC: 1.15 MG/DL (ref 0.6–1.3)
CRP SERPL QL: 36.6 MG/L
D DIMER PPP FEU-MCNC: 0.64 UG/ML FEU
EOSINOPHIL # BLD AUTO: 0 THOUSAND/ΜL (ref 0–0.61)
EOSINOPHIL NFR BLD AUTO: 0 % (ref 0–6)
ERYTHROCYTE [DISTWIDTH] IN BLOOD BY AUTOMATED COUNT: 13.8 % (ref 11.6–15.1)
EST. AVERAGE GLUCOSE BLD GHB EST-MCNC: 117 MG/DL
FERRITIN SERPL-MCNC: 369 NG/ML (ref 8–388)
GFR SERPL CREATININE-BSD FRML MDRD: 50 ML/MIN/1.73SQ M
GLUCOSE SERPL-MCNC: 116 MG/DL (ref 65–140)
HBA1C MFR BLD: 5.7 %
HBV CORE AB SER QL: NORMAL
HBV CORE IGM SER QL: NORMAL
HBV SURFACE AG SER QL: NORMAL
HCT VFR BLD AUTO: 44.1 % (ref 34.8–46.1)
HCV AB SER QL: NORMAL
HGB BLD-MCNC: 13.9 G/DL (ref 11.5–15.4)
HIV 1+2 AB+HIV1 P24 AG SERPL QL IA: NORMAL
HIV1 P24 AG SER QL: NORMAL
IMM GRANULOCYTES # BLD AUTO: 0.01 THOUSAND/UL (ref 0–0.2)
IMM GRANULOCYTES NFR BLD AUTO: 0 % (ref 0–2)
LYMPHOCYTES # BLD AUTO: 1.12 THOUSANDS/ΜL (ref 0.6–4.47)
LYMPHOCYTES NFR BLD AUTO: 23 % (ref 14–44)
MCH RBC QN AUTO: 27 PG (ref 26.8–34.3)
MCHC RBC AUTO-ENTMCNC: 31.5 G/DL (ref 31.4–37.4)
MCV RBC AUTO: 86 FL (ref 82–98)
MONOCYTES # BLD AUTO: 0.25 THOUSAND/ΜL (ref 0.17–1.22)
MONOCYTES NFR BLD AUTO: 5 % (ref 4–12)
NEUTROPHILS # BLD AUTO: 3.46 THOUSANDS/ΜL (ref 1.85–7.62)
NEUTS SEG NFR BLD AUTO: 72 % (ref 43–75)
NRBC BLD AUTO-RTO: 0 /100 WBCS
NT-PROBNP SERPL-MCNC: 144 PG/ML
P AXIS: 82 DEGREES
PLATELET # BLD AUTO: 116 THOUSANDS/UL (ref 149–390)
PMV BLD AUTO: 11.5 FL (ref 8.9–12.7)
POTASSIUM SERPL-SCNC: 3.8 MMOL/L (ref 3.5–5.3)
PR INTERVAL: 112 MS
PROCALCITONIN SERPL-MCNC: 0.05 NG/ML
PROT SERPL-MCNC: 7.2 G/DL (ref 6.4–8.2)
QRS AXIS: 60 DEGREES
QRSD INTERVAL: 80 MS
QT INTERVAL: 350 MS
QTC INTERVAL: 403 MS
RBC # BLD AUTO: 5.14 MILLION/UL (ref 3.81–5.12)
RH BLD: POSITIVE
SODIUM SERPL-SCNC: 141 MMOL/L (ref 136–145)
T WAVE AXIS: 208 DEGREES
TROPONIN I SERPL-MCNC: 0.26 NG/ML
TROPONIN I SERPL-MCNC: 0.28 NG/ML
TROPONIN I SERPL-MCNC: 0.3 NG/ML
TROPONIN I SERPL-MCNC: 0.32 NG/ML
VENTRICULAR RATE: 80 BPM
WBC # BLD AUTO: 4.85 THOUSAND/UL (ref 4.31–10.16)

## 2020-12-04 PROCEDURE — 83036 HEMOGLOBIN GLYCOSYLATED A1C: CPT | Performed by: INTERNAL MEDICINE

## 2020-12-04 PROCEDURE — 71045 X-RAY EXAM CHEST 1 VIEW: CPT

## 2020-12-04 PROCEDURE — 86705 HEP B CORE ANTIBODY IGM: CPT | Performed by: EMERGENCY MEDICINE

## 2020-12-04 PROCEDURE — 83520 IMMUNOASSAY QUANT NOS NONAB: CPT | Performed by: EMERGENCY MEDICINE

## 2020-12-04 PROCEDURE — 96365 THER/PROPH/DIAG IV INF INIT: CPT

## 2020-12-04 PROCEDURE — 80053 COMPREHEN METABOLIC PANEL: CPT | Performed by: EMERGENCY MEDICINE

## 2020-12-04 PROCEDURE — 99283 EMERGENCY DEPT VISIT LOW MDM: CPT | Performed by: EMERGENCY MEDICINE

## 2020-12-04 PROCEDURE — 83880 ASSAY OF NATRIURETIC PEPTIDE: CPT | Performed by: EMERGENCY MEDICINE

## 2020-12-04 PROCEDURE — 36415 COLL VENOUS BLD VENIPUNCTURE: CPT | Performed by: EMERGENCY MEDICINE

## 2020-12-04 PROCEDURE — 84484 ASSAY OF TROPONIN QUANT: CPT | Performed by: EMERGENCY MEDICINE

## 2020-12-04 PROCEDURE — 87340 HEPATITIS B SURFACE AG IA: CPT | Performed by: EMERGENCY MEDICINE

## 2020-12-04 PROCEDURE — XW033E5 INTRODUCTION OF REMDESIVIR ANTI-INFECTIVE INTO PERIPHERAL VEIN, PERCUTANEOUS APPROACH, NEW TECHNOLOGY GROUP 5: ICD-10-PCS | Performed by: EMERGENCY MEDICINE

## 2020-12-04 PROCEDURE — 84145 PROCALCITONIN (PCT): CPT | Performed by: EMERGENCY MEDICINE

## 2020-12-04 PROCEDURE — 85379 FIBRIN DEGRADATION QUANT: CPT | Performed by: EMERGENCY MEDICINE

## 2020-12-04 PROCEDURE — 93005 ELECTROCARDIOGRAM TRACING: CPT

## 2020-12-04 PROCEDURE — 86803 HEPATITIS C AB TEST: CPT | Performed by: EMERGENCY MEDICINE

## 2020-12-04 PROCEDURE — 86704 HEP B CORE ANTIBODY TOTAL: CPT | Performed by: EMERGENCY MEDICINE

## 2020-12-04 PROCEDURE — 93010 ELECTROCARDIOGRAM REPORT: CPT | Performed by: INTERNAL MEDICINE

## 2020-12-04 PROCEDURE — 86901 BLOOD TYPING SEROLOGIC RH(D): CPT | Performed by: EMERGENCY MEDICINE

## 2020-12-04 PROCEDURE — 87806 HIV AG W/HIV1&2 ANTB W/OPTIC: CPT | Performed by: EMERGENCY MEDICINE

## 2020-12-04 PROCEDURE — 84484 ASSAY OF TROPONIN QUANT: CPT | Performed by: INTERNAL MEDICINE

## 2020-12-04 PROCEDURE — 86140 C-REACTIVE PROTEIN: CPT | Performed by: EMERGENCY MEDICINE

## 2020-12-04 PROCEDURE — 86900 BLOOD TYPING SEROLOGIC ABO: CPT | Performed by: EMERGENCY MEDICINE

## 2020-12-04 PROCEDURE — 99285 EMERGENCY DEPT VISIT HI MDM: CPT

## 2020-12-04 PROCEDURE — 85025 COMPLETE CBC W/AUTO DIFF WBC: CPT | Performed by: EMERGENCY MEDICINE

## 2020-12-04 PROCEDURE — 1124F ACP DISCUSS-NO DSCNMKR DOCD: CPT | Performed by: EMERGENCY MEDICINE

## 2020-12-04 PROCEDURE — 82728 ASSAY OF FERRITIN: CPT | Performed by: EMERGENCY MEDICINE

## 2020-12-04 PROCEDURE — 82550 ASSAY OF CK (CPK): CPT | Performed by: EMERGENCY MEDICINE

## 2020-12-04 PROCEDURE — 99223 1ST HOSP IP/OBS HIGH 75: CPT | Performed by: INTERNAL MEDICINE

## 2020-12-04 RX ORDER — FAMOTIDINE 20 MG/1
20 TABLET, FILM COATED ORAL DAILY
Status: DISCONTINUED | OUTPATIENT
Start: 2020-12-04 | End: 2020-12-07

## 2020-12-04 RX ORDER — ONDANSETRON 2 MG/ML
4 INJECTION INTRAMUSCULAR; INTRAVENOUS EVERY 6 HOURS PRN
Status: DISCONTINUED | OUTPATIENT
Start: 2020-12-04 | End: 2021-01-11 | Stop reason: HOSPADM

## 2020-12-04 RX ORDER — DEXAMETHASONE SODIUM PHOSPHATE 4 MG/ML
6 INJECTION, SOLUTION INTRA-ARTICULAR; INTRALESIONAL; INTRAMUSCULAR; INTRAVENOUS; SOFT TISSUE EVERY 24 HOURS
Status: COMPLETED | OUTPATIENT
Start: 2020-12-04 | End: 2020-12-13

## 2020-12-04 RX ORDER — DOCUSATE SODIUM 100 MG/1
100 CAPSULE, LIQUID FILLED ORAL 2 TIMES DAILY
Status: DISCONTINUED | OUTPATIENT
Start: 2020-12-04 | End: 2020-12-07

## 2020-12-04 RX ORDER — LITHIUM CARBONATE 300 MG/1
300 CAPSULE ORAL
Status: DISCONTINUED | OUTPATIENT
Start: 2020-12-04 | End: 2021-01-11 | Stop reason: HOSPADM

## 2020-12-04 RX ORDER — MAGNESIUM HYDROXIDE/ALUMINUM HYDROXICE/SIMETHICONE 120; 1200; 1200 MG/30ML; MG/30ML; MG/30ML
30 SUSPENSION ORAL EVERY 6 HOURS PRN
Status: DISCONTINUED | OUTPATIENT
Start: 2020-12-04 | End: 2020-12-07

## 2020-12-04 RX ORDER — ZINC SULFATE 50(220)MG
220 CAPSULE ORAL DAILY
Status: COMPLETED | OUTPATIENT
Start: 2020-12-04 | End: 2020-12-10

## 2020-12-04 RX ORDER — MELATONIN
2000 DAILY
Status: DISCONTINUED | OUTPATIENT
Start: 2020-12-04 | End: 2020-12-24

## 2020-12-04 RX ORDER — ASCORBIC ACID 500 MG
1000 TABLET ORAL EVERY 12 HOURS SCHEDULED
Status: COMPLETED | OUTPATIENT
Start: 2020-12-04 | End: 2020-12-10

## 2020-12-04 RX ORDER — MULTIVIT-MIN/FERROUS GLUCONATE 9 MG/15 ML
15 LIQUID (ML) ORAL DAILY
Status: DISCONTINUED | OUTPATIENT
Start: 2020-12-11 | End: 2020-12-24

## 2020-12-04 RX ORDER — ACETAMINOPHEN 325 MG/1
650 TABLET ORAL EVERY 6 HOURS PRN
Status: DISCONTINUED | OUTPATIENT
Start: 2020-12-04 | End: 2021-01-11 | Stop reason: HOSPADM

## 2020-12-04 RX ORDER — SERTRALINE HYDROCHLORIDE 100 MG/1
100 TABLET, FILM COATED ORAL
Status: DISCONTINUED | OUTPATIENT
Start: 2020-12-04 | End: 2021-01-11 | Stop reason: HOSPADM

## 2020-12-04 RX ORDER — TRAZODONE HYDROCHLORIDE 100 MG/1
100 TABLET ORAL
Status: DISCONTINUED | OUTPATIENT
Start: 2020-12-04 | End: 2021-01-11 | Stop reason: HOSPADM

## 2020-12-04 RX ORDER — ASPIRIN 81 MG/1
324 TABLET, CHEWABLE ORAL ONCE
Status: COMPLETED | OUTPATIENT
Start: 2020-12-04 | End: 2020-12-04

## 2020-12-04 RX ADMIN — ASPIRIN 81 MG CHEWABLE TABLET 324 MG: 81 TABLET CHEWABLE at 10:42

## 2020-12-04 RX ADMIN — Medication 2000 UNITS: at 14:32

## 2020-12-04 RX ADMIN — DOXYCYCLINE 100 MG: 100 INJECTION, POWDER, LYOPHILIZED, FOR SOLUTION INTRAVENOUS at 11:31

## 2020-12-04 RX ADMIN — CEFTRIAXONE SODIUM 1000 MG: 10 INJECTION, POWDER, FOR SOLUTION INTRAVENOUS at 10:26

## 2020-12-04 RX ADMIN — ACETAMINOPHEN 650 MG: 325 TABLET, FILM COATED ORAL at 15:30

## 2020-12-04 RX ADMIN — OXYCODONE HYDROCHLORIDE AND ACETAMINOPHEN 1000 MG: 500 TABLET ORAL at 21:21

## 2020-12-04 RX ADMIN — ENOXAPARIN SODIUM 40 MG: 40 INJECTION SUBCUTANEOUS at 15:29

## 2020-12-04 RX ADMIN — REMDESIVIR 200 MG: 100 INJECTION, POWDER, LYOPHILIZED, FOR SOLUTION INTRAVENOUS at 15:27

## 2020-12-04 RX ADMIN — ZINC SULFATE 220 MG (50 MG) CAPSULE 220 MG: CAPSULE at 14:32

## 2020-12-04 RX ADMIN — FAMOTIDINE 20 MG: 20 TABLET ORAL at 14:32

## 2020-12-04 RX ADMIN — DEXAMETHASONE SODIUM PHOSPHATE 6 MG: 4 INJECTION INTRA-ARTICULAR; INTRALESIONAL; INTRAMUSCULAR; INTRAVENOUS; SOFT TISSUE at 14:33

## 2020-12-04 RX ADMIN — OXYCODONE HYDROCHLORIDE AND ACETAMINOPHEN 1000 MG: 500 TABLET ORAL at 14:32

## 2020-12-04 NOTE — ASSESSMENT & PLAN NOTE
COVID-19 infection requiring 2-3 L supplemental oxygen worsening shortness of breath  Patient be placed on mild treatment protocol  Monitor inflammatory markers  IV dexamethasone, IV remdesivir  IV antibiotics consider discontinuing if procalcitonin is negative x2  Supportive care

## 2020-12-04 NOTE — ED PROVIDER NOTES
History  Chief Complaint   Patient presents with    Shortness of Breath     Patient reports that starting last Sunday she wasn't feeling well and slowly felt worse; tested positive for COVID-19 a few days ago was started on steroids but last yesterday felt like she was getting worse again and started with more SOB     71-year-old female presents for evaluation of dyspnea  Patient was recently diagnosed with COVID-19 infection, she states that her symptoms started around Thanksgiving and had been persistent with nonbloody non dark diarrhea, anosmia, lack of appetite  Patient presents today for worsening dyspnea over the last few days, patient is a former nurse and 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 Taking?    Fluocinonide Emulsified Base 0 05 % CREA 12/3/2020  Yes Yes   Sig: APPLY TO AFFECTED AREA(S) TOPICALLY TWO TIMES DAILY TO LOWER LEGS FOR 14 DAYS MAX AS NEEDED   LORazepam (ATIVAN) 1 mg tablet 12/3/2020 Self Yes Yes   Sig: Take 1 5 mg by mouth daily at bedtime   albuterol (PROVENTIL HFA,VENTOLIN HFA) 90 mcg/act inhaler 12/3/2020  No Yes   Sig: Inhale 2 puffs every 4 (four) hours as needed for wheezing   diphenhydrAMINE (BENADRYL) 25 mg tablet 12/3/2020 Self Yes Yes   Sig: Take 25 mg by mouth daily at bedtime   erythromycin (ILOTYCIN) ophthalmic ointment 12/3/2020  No Yes   Sig: Administer 0 5 inches into the left eye daily at bedtime   gabapentin (NEURONTIN) 300 mg capsule 12/3/2020 Self Yes Yes Sig: Take 300 mg by mouth daily   imiquimod (ALDARA) 5 % cream 12/3/2020  Yes Yes   Sig: APPLY TO AFFECTED AREA(S) TOPICALLY EVERY DAY AT BEDTIME   lithium carbonate 300 mg capsule 12/3/2020 Self Yes Yes   Sig: Take 300 mg by mouth daily at bedtime   mupirocin (BACTROBAN) 2 % ointment 12/3/2020  Yes Yes   Sig: APPLY TO AFFECTED AREA(S) TOPICALLY TWO TIMES DAILY   omeprazole (PriLOSEC) 20 mg delayed release capsule 12/3/2020 Self Yes Yes   Sig: Take 20 mg by mouth daily   predniSONE 50 mg tablet 12/3/2020  No Yes   Sig: Take 1 tablet (50 mg total) by mouth daily for 5 days   sertraline (ZOLOFT) 100 mg tablet 12/3/2020 Self Yes Yes   Sig: Take 150 mg by mouth daily   traZODone (DESYREL) 100 mg tablet 12/3/2020 Self Yes Yes   Sig: Take 200 mg by mouth daily at bedtime      Facility-Administered Medications Last Administration Doses Remaining   betamethasone dipropionate (DIPROSONE) 0 05 % ointment None recorded           Past Medical History:   Diagnosis Date    Asthma     Bipolar disorder ([facility] [address] )     GERD (gastroesophageal reflux disease)        Past Surgical History:   Procedure Laterality Date     SECTION      x3    CHOLECYSTECTOMY      HYSTERECTOMY         Family History   Problem Relation Age of Onset    Heart disease Mother     Hypertension Mother     Heart disease Father     Hypertension Father      I have reviewed and agree with the history as documented  E-Cigarette/Vaping    E-Cigarette Use Never User      E-Cigarette/Vaping Substances     Social History     Tobacco Use    Smoking status: Former Smoker     Packs/day: 0 10     Years: 5 00     Pack years: 0 50     Types: Cigarettes     Quit date: 2018     Years since quittin 0    Smokeless tobacco: Never Used   Substance Use Topics    Alcohol use: Yes     Frequency: 2-4 times a month     Drinks per session: 1 or 2     Comment: rarely    Drug use: Never        Review of Systems   Constitutional: Positive for appetite change  Respiratory: Positive for cough and shortness of breath  Cardiovascular: Negative for chest pain  Gastrointestinal: Positive for diarrhea  Negative for abdominal pain and vomiting  All other systems reviewed and are negative  Physical Exam  ED Triage Vitals [12/04/20 0921]   Temperature Pulse Respirations Blood Pressure SpO2   97 9 °F (36 6 °C) 79 18 132/86 94 %      Temp Source Heart Rate Source Patient Position - Orthostatic VS BP Location FiO2 (%)   Oral Monitor Lying Right arm --      Pain Score       No Pain             Orthostatic Vital Signs  Vitals:    12/05/20 1605 12/05/20 2023 12/05/20 2229 12/06/20 0858   BP: 122/68  124/67 126/67   Pulse: 73 71 74 71   Patient Position - Orthostatic VS:           Physical Exam  Vitals signs reviewed  Constitutional:       General: She is not in acute distress  Appearance: She is well-developed  She is not ill-appearing  HENT:      Head: Normocephalic and atraumatic  Cardiovascular:      Rate and Rhythm: Normal rate and regular rhythm  Pulmonary:      Effort: Pulmonary effort is normal  No tachypnea  Breath sounds: Examination of the right-middle field reveals rales  Examination of the left-middle field reveals rales  Examination of the right-lower field reveals rales  Examination of the left-lower field reveals rales  Rales present  No decreased breath sounds, wheezing or rhonchi  Neurological:      Mental Status: She is alert           ED Medications  Medications   cholecalciferol (VITAMIN D3) tablet 2,000 Units (2,000 Units Oral Given 12/6/20 0849)   ascorbic acid (VITAMIN C) tablet 1,000 mg (1,000 mg Oral Given 12/6/20 0849)   dexamethasone (DECADRON) injection 6 mg (6 mg Intravenous Given 12/5/20 1232)   zinc sulfate (ZINCATE) capsule 220 mg (220 mg Per NG Tube Given 12/6/20 0850)     Followed by   multivitamin with iron-minerals liquid 15 mL (has no administration in time range)   famotidine (PEPCID) tablet 20 mg (20 mg Oral Given 12/6/20 0851)   acetaminophen (TYLENOL) tablet 650 mg (650 mg Oral Given 12/6/20 0436)   docusate sodium (COLACE) capsule 100 mg (100 mg Oral Not Given 12/6/20 0849)   ondansetron (ZOFRAN) injection 4 mg (has no administration in time range)   aluminum-magnesium hydroxide-simethicone (MYLANTA) oral suspension 30 mL (has no administration in time range)   remdesivir (Veklury) 200 mg in sodium chloride 0 9 % 250 mL IVPB (0 mg Intravenous Stopped 12/4/20 1557)     Followed by   remdesivir Sherleen Halt) 100 mg in sodium chloride 0 9 % 250 mL IVPB (100 mg Intravenous New Bag 12/5/20 1411)   lithium carbonate capsule 300 mg (300 mg Oral Given 12/5/20 2100)   sertraline (ZOLOFT) tablet 100 mg (100 mg Oral Given 12/5/20 2100)   traZODone (DESYREL) tablet 100 mg (100 mg Oral Given 12/5/20 2100)   enoxaparin (LOVENOX) subcutaneous injection 30 mg (30 mg Subcutaneous Given 12/6/20 0849)   albuterol (PROVENTIL HFA,VENTOLIN HFA) inhaler 2 puff (2 puffs Inhalation Given 12/5/20 2121)   sodium chloride (OCEAN) 0 65 % nasal spray 1 spray (1 spray Each Nare Given 12/5/20 2256)   fluticasone (FLONASE) 50 mcg/act nasal spray 2 spray (2 sprays Each Nare Given 12/6/20 0850)   LORazepam (ATIVAN) tablet 0 5 mg (0 5 mg Oral Given 12/6/20 0606)   atorvastatin (LIPITOR) tablet 40 mg (has no administration in time range)   ceftriaxone (ROCEPHIN) 1 g/50 mL in dextrose IVPB (0 mg Intravenous Stopped 12/4/20 1043)   doxycycline (VIBRAMYCIN) 100 mg in sodium chloride 0 9 % 100 mL IVPB (0 mg Intravenous Stopped 12/4/20 1231)   aspirin chewable tablet 324 mg (324 mg Oral Given 12/4/20 1042)       Diagnostic Studies  Results Reviewed     Procedure Component Value Units Date/Time    Procalcitonin Reflex [[id number]]  (Normal) Collected: 12/05/20 0542    Lab Status: Final result Specimen: Blood from Arm, Right Updated: 12/05/20 0855     Procalcitonin <0 05 ng/ml     CBC and differential [[id number]]  (Abnormal) Collected: 12/05/20 0537    Lab Status: Final result Specimen: Blood from Arm, Right Updated: 12/05/20 0657     WBC 4 46 Thousand/uL      RBC 4 83 Million/uL      Hemoglobin 13 0 g/dL      Hematocrit 40 8 %      MCV 85 fL      MCH 26 9 pg      MCHC 31 9 g/dL      RDW 13 6 %      MPV 11 5 fL      Platelets 806 Thousands/uL      nRBC 0 /100 WBCs      Neutrophils Relative 71 %      Immat GRANS % 0 %      Lymphocytes Relative 24 %      Monocytes Relative 5 %      Eosinophils Relative 0 %      Basophils Relative 0 %      Neutrophils Absolute 3 14 Thousands/µL      Immature Grans Absolute 0 01 Thousand/uL      Lymphocytes Absolute 1 08 Thousands/µL      Monocytes Absolute 0 23 Thousand/µL      Eosinophils Absolute 0 00 Thousand/µL      Basophils Absolute 0 00 Thousands/µL     Ferritin [[id number]]  (Abnormal) Collected: 12/05/20 0537    Lab Status: Final result Specimen: Blood from Arm, Right Updated: 12/05/20 0637     Ferritin 435 ng/mL     NT-BNP PRO [[id number]]  (Normal) Collected: 12/05/20 0537    Lab Status: Final result Specimen: Blood from Arm, Right Updated: 12/05/20 0637     NT-proBNP 114 pg/mL     Comprehensive metabolic panel [[id number]]  (Abnormal) Collected: 12/05/20 0537    Lab Status: Final result Specimen: Blood from Arm, Right Updated: 12/05/20 0636     Sodium 142 mmol/L      Potassium 3 6 mmol/L      Chloride 110 mmol/L      CO2 25 mmol/L      ANION GAP 7 mmol/L      BUN 19 mg/dL      Creatinine 0 74 mg/dL      Glucose 89 mg/dL      Calcium 9 0 mg/dL      AST 42 U/L      ALT 41 U/L      Alkaline Phosphatase 81 U/L      Total Protein 6 8 g/dL      Albumin 3 5 g/dL      Total Bilirubin 0 27 mg/dL      eGFR 85 ml/min/1 73sq m     Narrative:      [place] guidelines for Chronic Kidney Disease (CKD):     Stage 1 with normal or high GFR (GFR > 90 mL/min/1 73 square meters)    Stage 2 Mild CKD (GFR = 60-89 mL/min/1 73 square meters)    Stage 3A Moderate CKD (GFR = 45-59 mL/min/1 73 square meters)    Stage 3B Moderate CKD (GFR = 30-44 mL/min/1 73 square meters)    Stage 4 Severe CKD (GFR = 15-29 mL/min/1 73 square meters)    Stage 5 End Stage CKD (GFR <15 mL/min/1 73 square meters)  Note: GFR calculation is accurate only with a steady state creatinine    C-reactive protein [[id number]]  (Abnormal) Collected: 12/05/20 0537    Lab Status: Final result Specimen: Blood from Arm, Right Updated: 12/05/20 0636     CRP 75 0 mg/L     CK (with reflex to MB) [[id number]]  (Normal) Collected: 12/05/20 0537    Lab Status: Final result Specimen: Blood from Arm, Right Updated: 12/05/20 0636     Total  U/L     Troponin I [[id number]]  (Abnormal) Collected: 12/05/20 0537    Lab Status: Final result Specimen: Blood from Arm, Right Updated: 12/05/20 0633     Troponin I 0 22 ng/mL     D-dimer, quantitative [[id number]]  (Abnormal) Collected: 12/05/20 0537    Lab Status: Final result Specimen: Blood from Arm, Right Updated: 12/05/20 0615     D-Dimer, Quant 0 78 ug/ml FEU     Hemoglobin A1C [[id number]]  (Abnormal) Collected: 12/04/20 0959    Lab Status: Final result Specimen: Blood from Arm, Left Updated: 12/04/20 2241     Hemoglobin A1C 5 7 %       mg/dl     Troponin I [[id number]]  (Abnormal) Collected: 12/04/20 1836    Lab Status: Final result Specimen: Blood from Arm, Right Updated: 12/04/20 1917     Troponin I 0 28 ng/mL     Troponin I [[id number]]  (Abnormal) Collected: 12/04/20 1602    Lab Status: Final result Specimen: Blood from Arm, Left Updated: 12/04/20 1635     Troponin I 0 30 ng/mL     Troponin I [[id number]]  (Abnormal) Collected: 12/04/20 1304    Lab Status: Final result Specimen: Blood from Arm, Left Updated: 12/04/20 1336     Troponin I 0 32 ng/mL     Chronic Hepatitis Panel [[id number]]  (Normal) Collected: 12/04/20 0959    Lab Status: Final result Specimen: Blood from Arm, Left Updated: 12/04/20 1113     Hepatitis B Surface Ag Non-reactive     Hepatitis C Ab Non-reactive     Hep B C IgM Non-reactive     Hep B Core Total Ab Non-reactive Procalcitonin with AM Reflex [[id number]]  (Normal) Collected: 12/04/20 0959    Lab Status: Final result Specimen: Blood from Arm, Left Updated: 12/04/20 1057     Procalcitonin 0 05 ng/ml     Rapid HIV 1/2 AB-AG Combo [[id number]]  (Normal) Collected: 12/04/20 0959    Lab Status: Final result Specimen: Blood from Arm, Left Updated: 12/04/20 1049     Rapid HIV 1 AND 2 Non-Reactive     HIV-1 P24 Ag Screen Non-Reactive    Narrative:      Negative for HIV-1 p24 Antigen  Negative for HIV-1 and/or HIV-2 Antibody      D-dimer, quantitative [[id number]]  (Abnormal) Collected: 12/04/20 0959    Lab Status: Final result Specimen: Blood from Arm, Left Updated: 12/04/20 1030     D-Dimer, Quant 0 64 ug/ml FEU     Ferritin [[id number]]  (Normal) Collected: 12/04/20 0959    Lab Status: Final result Specimen: Blood from Arm, Left Updated: 12/04/20 1029     Ferritin 369 ng/mL     NT-BNP PRO [[id number]]  (Abnormal) Collected: 12/04/20 0959    Lab Status: Final result Specimen: Blood from Arm, Left Updated: 12/04/20 1029     NT-proBNP 144 pg/mL     Troponin I [[id number]]  (Abnormal) Collected: 12/04/20 0959    Lab Status: Final result Specimen: Blood from Arm, Left Updated: 12/04/20 1026     Troponin I 0 26 ng/mL     Comprehensive metabolic panel [[id number]] Collected: 12/04/20 0959    Lab Status: Final result Specimen: Blood from Arm, Left Updated: 12/04/20 1025     Sodium 141 mmol/L      Potassium 3 8 mmol/L      Chloride 106 mmol/L      CO2 29 mmol/L      ANION GAP 6 mmol/L      BUN 21 mg/dL      Creatinine 1 15 mg/dL      Glucose 116 mg/dL      Calcium 8 9 mg/dL      AST 33 U/L      ALT 31 U/L      Alkaline Phosphatase 85 U/L      Total Protein 7 2 g/dL      Albumin 3 7 g/dL      Total Bilirubin 0 27 mg/dL      eGFR 50 ml/min/1 73sq m     Narrative:      [place] guidelines for Chronic Kidney Disease (CKD):     Stage 1 with normal or high GFR (GFR > 90 mL/min/1 73 square meters)    Stage 2 Mild CKD (GFR = 60-89 mL/min/1 73 square meters)    Stage 3A Moderate CKD (GFR = 45-59 mL/min/1 73 square meters)    Stage 3B Moderate CKD (GFR = 30-44 mL/min/1 73 square meters)    Stage 4 Severe CKD (GFR = 15-29 mL/min/1 73 square meters)    Stage 5 End Stage CKD (GFR <15 mL/min/1 73 square meters)  Note: GFR calculation is accurate only with a steady state creatinine    CK (with reflex to MB) [[id number]]  (Normal) Collected: 12/04/20 0959    Lab Status: Final result Specimen: Blood from Arm, Left Updated: 12/04/20 1025     Total  U/L     C-reactive protein [[id number]]  (Abnormal) Collected: 12/04/20 0959    Lab Status: Final result Specimen: Blood from Arm, Left Updated: 12/04/20 1025     CRP 36 6 mg/L     CBC and differential [[id number]]  (Abnormal) Collected: 12/04/20 0959    Lab Status: Final result Specimen: Blood from Arm, Left Updated: 12/04/20 1012     WBC 4 85 Thousand/uL      RBC 5 14 Million/uL      Hemoglobin 13 9 g/dL      Hematocrit 44 1 %      MCV 86 fL      MCH 27 0 pg      MCHC 31 5 g/dL      RDW 13 8 %      MPV 11 5 fL      Platelets 224 Thousands/uL      nRBC 0 /100 WBCs      Neutrophils Relative 72 %      Immat GRANS % 0 %      Lymphocytes Relative 23 %      Monocytes Relative 5 %      Eosinophils Relative 0 %      Basophils Relative 0 %      Neutrophils Absolute 3 46 Thousands/µL      Immature Grans Absolute 0 01 Thousand/uL      Lymphocytes Absolute 1 12 Thousands/µL      Monocytes Absolute 0 25 Thousand/µL      Eosinophils Absolute 0 00 Thousand/µL      Basophils Absolute 0 01 Thousands/µL     Interleukin-6,Serum [[id number]] Collected: 12/04/20 0959    Lab Status: In process Specimen: Blood from Arm, Left Updated: 12/04/20 1004                 XR chest 1 view portable   Final Result by Raf Galarza MD (12/04 1013)      Left lung base infiltrate     In the setting of clinically suspected/proven COVID-19, this plain film appearance does not contain findings that raise concern for viral pneumonia such as COVID-19, but does not rule out this diagnosis  The study was marked in EPIC for significant notification  Workstation performed: [id number]               Procedures  Procedures      ED Course  ED Course as of Dec 06 1109   Fri Dec 04, 2020   1034 No chest pain, EKG shows anterolateral T-wave inversions consistent with previous EKG on 10/24/19 although worsened  Will administer ASA  Troponin I(!): 0 26             Identification of Seniors at Risk      Most Recent Value   (ISAR) Identification of Seniors at Risk   Before the illness or injury that brought you to the Emergency, did you need someone to help you on a regular basis? 0 Filed at: 12/04/2020 0926   In the last 24 hours, have you needed more help than usual?  0 Filed at: 12/04/2020 1378   Have you been hospitalized for one or more nights during the past 6 months? 0 Filed at: 12/04/2020 2586   In general, do you see well?  0 Filed at: 12/04/2020 5045   In general, do you have serious problems with your memory? 0 Filed at: 12/04/2020 4394   Do you take more than three different medications every day? 1 Filed at: 12/04/2020 2377   ISAR Score  1 Filed at: 12/04/2020 7217                    SBIRT 22yo+      Most Recent Value   SBIRT (23 yo +)   In order to provide better care to our patients, we are screening all of our patients for alcohol and drug use  Would it be okay to ask you these screening questions? No Filed at: 12/04/2020 0940                  MDM  Number of Diagnoses or Management Options  COPD (chronic obstructive pulmonary disease) ([facility] [address] ):   COVID-19:   Elevated troponin:   Hypoxia:   Pneumonia:   Diagnosis management comments: 50-year-old female presents for evaluation of dyspnea  Patient was recently diagnosed with COVID-19 infection, last night patient took her pulse ox at home and was 85% on room air  Presently she is on 2 L saturating in the low 90s with no distress    Patient was evaluated laboratory pathway for infection, chest x-ray shows possible left lower lobe infiltrate and she was started on Rocephin and doxycycline  Patient was admitted to [facility] for further care  Disposition  Final diagnoses:   Hypoxia   COVID-19   COPD (chronic obstructive pulmonary disease) (HCC)   Elevated troponin   Pneumonia     Time reflects when diagnosis was documented in both MDM as applicable and the Disposition within this note     Time User Action Codes Description Comment    12/4/2020 11:06 AM Anabela Nadege Add [R09 02] Hypoxia     12/4/2020 11:07 AM Anabela Nadege Add [U07 1] COVID-19     12/4/2020 11:07 AM Marii Mehta Add [J44 9] COPD (chronic obstructive pulmonary disease) ([facility] [address] )     12/4/2020 11:07 AM Anabelanoé Light Add [R77 8] Elevated troponin     12/4/2020 11:07 AM Marii Mehta Add [J18 9] Pneumonia     12/6/2020  5:02 AM Remi Ochoa Add [J96 01] Acute respiratory failure with hypoxia [facility])       ED Disposition     ED Disposition Condition Date/Time Comment    Admit Stable Fri Dec 4, 2020 11:06 AM Case was discussed with [facility] and the patient's admission status was agreed to be Admission Status: inpatient status to the service of Dr Radha Cardoso   Follow-up Information    None         Current Discharge Medication List      CONTINUE these medications which have NOT CHANGED    Details   albuterol (PROVENTIL HFA,VENTOLIN HFA) 90 mcg/act inhaler Inhale 2 puffs every 4 (four) hours as needed for wheezing  Qty: 18 g, Refills: 2    Comments: Substitution to a formulary equivalent within the same pharmaceutical class is authorized    Associated Diagnoses: Mild intermittent asthma with exacerbation      diphenhydrAMINE (BENADRYL) 25 mg tablet Take 25 mg by mouth daily at bedtime      erythromycin (ILOTYCIN) ophthalmic ointment Administer 0 5 inches into the left eye daily at bedtime  Qty: 3 5 g, Refills: 0    Associated Diagnoses: Hordeolum externum of left lower eyelid      Fluocinonide Emulsified Base 0 05 % CREA APPLY TO AFFECTED AREA(S) TOPICALLY TWO TIMES DAILY TO LOWER LEGS FOR 14 DAYS MAX AS NEEDED      gabapentin (NEURONTIN) 300 mg capsule Take 300 mg by mouth daily      imiquimod (ALDARA) 5 % cream APPLY TO AFFECTED AREA(S) TOPICALLY EVERY DAY AT BEDTIME      lithium carbonate 300 mg capsule Take 300 mg by mouth daily at bedtime      LORazepam (ATIVAN) 1 mg tablet Take 1 5 mg by mouth daily at bedtime      mupirocin (BACTROBAN) 2 % ointment APPLY TO AFFECTED AREA(S) TOPICALLY TWO TIMES DAILY      omeprazole (PriLOSEC) 20 mg delayed release capsule Take 20 mg by mouth daily      sertraline (ZOLOFT) 100 mg tablet Take 150 mg by mouth daily      traZODone (DESYREL) 100 mg tablet Take 200 mg by mouth daily at bedtime         STOP taking these medications       predniSONE 50 mg tablet Comments:   Reason for Stopping:             No discharge procedures on file  PDMP Review     None           ED Provider  Attending physically available and evaluated Mercedes Guerrero I managed the patient along with the ED Attending      Electronically Signed by         Yang Sierra DO  12/06/20 9985

## 2020-12-04 NOTE — H&P
H&P- Jacob Wagner 1955, 76 y o  female MRN: [id number]    Unit/Bed#: ED 15 Encounter: [id number]    Primary Care Provider: LUIS CARLOS Huerta   Date and time admitted to hospital: 12/4/2020  9:15 AM        * Acute respiratory failure with hypoxia ([address] )  Assessment & Plan  Acute hypoxic respiratory failure likely due to COVID-19 infection  Continue supplemental oxygen maintain O2 sats more than 92-94%  Prone position ventilation discussed with the patient  Incentive spirometry    COVID-19 virus infection  Assessment & Plan  COVID-19 infection requiring 2-3 L supplemental oxygen worsening shortness of breath  Patient be placed on mild treatment protocol  Monitor inflammatory markers  IV dexamethasone, IV remdesivir  IV antibiotics consider discontinuing if procalcitonin is negative x2  Supportive care    Elevated troponin  Assessment & Plan  Elevated troponin likely due to Covid-19 infection  ECG - T wave inversions noted  Monitor     GERD (gastroesophageal reflux disease)  Assessment & Plan  Famotidine    Bipolar disorder (HCC)  Assessment & Plan  Continue lithium sertraline, trazodone          VTE Prophylaxis: Enoxaparin (Lovenox)  / sequential compression device   Code Status:  Full code  POLST: There is no POLST form on file for this patient (pre-hospital)  Discussion with family:  Discussed with the patient, updated significant other Jake Galvin     Anticipated Length of Stay:  Patient will be admitted on an Inpatient basis with an anticipated length of stay of  More than 2 midnights  Justification for Hospital Stay: Acute hypoxic resp failure, Covid-19 infection       Chief Complaint:       Shortness of breath    History of Present Illness:    Jacob Wagner is a 76 y o  female who presents with worsening shortness of breath  Patient diagnosed with COVID-19 infection reports her symptoms started around Thanksgiving    She reports worsening shortness of breath since last couple of days, diarrhea watery multiple episodes generalized weakness fatigue poor appetite  Presently she reports dyspneic at rest and desaturates on minimal exertion, she is being admitted for further management  Denies chest pain diaphoresis palpitations presyncope syncope  Denies prior history of exertional chest pain palpitations or diaphoresis  No known cardiac history    Review of systems inquired and are negative    Review of Systems:    Review of Systems   All other systems reviewed and are negative  Past Medical and Surgical History:     Past Medical History:   Diagnosis Date    Asthma     Bipolar disorder ([facility] [address] )     GERD (gastroesophageal reflux disease)        Past Surgical History:   Procedure Laterality Date     SECTION      x3    CHOLECYSTECTOMY      HYSTERECTOMY         Meds/Allergies:    Prior to Admission medications    Medication Sig Start Date End Date Taking?  Authorizing Provider   albuterol (PROVENTIL HFA,VENTOLIN HFA) 90 mcg/act inhaler Inhale 2 puffs every 4 (four) hours as needed for wheezing 10/25/19  Yes Felice Ferrer MD   diphenhydrAMINE (BENADRYL) 25 mg tablet Take 25 mg by mouth daily at bedtime   Yes Historical Provider, MD   erythromycin (ILOTYCIN) ophthalmic ointment Administer 0 5 inches into the left eye daily at bedtime 20  Yes LUIS CARLOS Eli   Fluocinonide Emulsified Base 0 05 % CREA APPLY TO AFFECTED AREA(S) TOPICALLY TWO TIMES DAILY TO LOWER LEGS FOR 14 DAYS MAX AS NEEDED 20  Yes Historical Provider, MD   gabapentin (NEURONTIN) 300 mg capsule Take 300 mg by mouth daily   Yes Historical Provider, MD   imiquimod (ALDARA) 5 % cream APPLY TO AFFECTED AREA(S) TOPICALLY EVERY DAY AT BEDTIME 20  Yes Historical Provider, MD   lithium carbonate 300 mg capsule Take 300 mg by mouth daily at bedtime   Yes Historical Provider, MD   LORazepam (ATIVAN) 1 mg tablet Take 1 5 mg by mouth daily at bedtime   Yes Historical Provider, MD   mupirocin (BACTROBAN) 2 % ointment APPLY TO AFFECTED AREA(S) TOPICALLY TWO TIMES DAILY 20  Yes Historical Provider, MD   omeprazole (PriLOSEC) 20 mg delayed release capsule Take 20 mg by mouth daily   Yes Historical Provider, MD   predniSONE 50 mg tablet Take 1 tablet (50 mg total) by mouth daily for 5 days 20 Yes Theodosia Goldberg, DO   sertraline (ZOLOFT) 100 mg tablet Take 150 mg by mouth daily   Yes Historical Provider, MD   traZODone (DESYREL) 100 mg tablet Take 200 mg by mouth daily at bedtime   Yes Historical Provider, MD   ketorolac (TORADOL) 10 mg tablet Take 1 tablet (10 mg total) by mouth every 6 (six) hours as needed for moderate pain for up to 5 days 19  Danielle Cates MD     I have reviewed home medications with patient personally  Allergies:    Allergies   Allergen Reactions    Amphetamine-Dextroamphetamine Other (See Comments)     Chest pain- was placed on Adderall after son passed away for depression, and she developed chest pain in ; she had full cardiac work up, and was negative, so she has allergy to Adderall  Chest pain- was placed on Adderall after son passed away for depression, and she developed chest pain in ; she had full cardiac work up, and was negative, so she has allergy to Adderall    Molds & Smuts     Other      Cats    Sulfa Antibiotics Other (See Comments)       Social History:     Marital Status: Single   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  Patient Pre-hospital Living Situation: home  Patient Pre-hospital Level of Mobility: independent   Patient Pre-hospital Diet Restrictions: no  Substance Use History:   Social History     Substance and Sexual Activity   Alcohol Use Yes    Frequency: 2-4 times a month    Drinks per session: 1 or 2    Comment: rarely     Social History     Tobacco Use   Smoking Status Former Smoker    Packs/day: 0 10    Years: 5 00    Pack years: 0 50    Types: Cigarettes    Quit date: 2018    Years since quittin 0   Smokeless Tobacco Never Used     Social History     Substance and Sexual Activity   Drug Use Never       Family History:    Family History   Problem Relation Age of Onset    Heart disease Mother     Hypertension Mother     Heart disease Father     Hypertension Father        Physical Exam:     Vitals:   Blood Pressure: 119/73 (12/04/20 1400)  Pulse: 82 (12/04/20 1400)  Temperature: 97 9 °F (36 6 °C) (12/04/20 0921)  Temp Source: Oral (12/04/20 0921)  Respirations: 18 (12/04/20 1400)  Height: 5' 1" (154 9 cm) (12/04/20 0921)  Weight - Scale: 79 4 kg (175 lb) (12/04/20 0921)  SpO2: 94 % (12/04/20 1400)    Physical Exam    Comfortably lying in bed  Dyspneic at rest but able to complete sentences  Neck supple  Lungs diminished breath sounds bilaterally, crackles noted  Heart sounds S1 and S2 noted  Abdomen soft non tender   Awake, alert obeys verbal commands  Pulses noted  No rash  No pedal edema    Additional Data:     Lab Results: I have personally reviewed pertinent reports  Results from last 7 days   Lab Units 12/04/20  0959   WBC Thousand/uL 4 85   HEMOGLOBIN g/dL 13 9   HEMATOCRIT % 44 1   PLATELETS Thousands/uL 116*   NEUTROS PCT % 72   LYMPHS PCT % 23   MONOS PCT % 5   EOS PCT % 0     Results from last 7 days   Lab Units 12/04/20  0959   SODIUM mmol/L 141   POTASSIUM mmol/L 3 8   CHLORIDE mmol/L 106   CO2 mmol/L 29   BUN mg/dL 21   CREATININE mg/dL 1 15   ANION GAP mmol/L 6   CALCIUM mg/dL 8 9   ALBUMIN g/dL 3 7   TOTAL BILIRUBIN mg/dL 0 27   ALK PHOS U/L 85   ALT U/L 31   AST U/L 33   GLUCOSE RANDOM mg/dL 116         Results from last 7 days   Lab Units 12/04/20  0959   PROCALCITONIN ng/ml 0 05       Imaging: I have personally reviewed pertinent reports  Chest radiograph - basilar infiltrates noted    XR chest 1 view portable   Final Result by Salome Rubio MD (12/04 1013)      Left lung base infiltrate     In the setting of clinically suspected/proven COVID-19, this plain film appearance does not contain findings that raise concern for viral pneumonia such as COVID-19, but does not rule out this diagnosis  The study was marked in EPIC for significant notification  Workstation performed: [id number]             EKG, Pathology, and Other Studies Reviewed on Admission:   · EKG: sinus rhythm, T wave inversions I, II aVf, V3-V6    Allscripts / Epic Records Reviewed: Yes     ** Please Note: This note has been constructed using a voice recognition system   **

## 2020-12-04 NOTE — ED ATTENDING ATTESTATION
12/4/2020  I, Anh Zhang MD, saw and evaluated the patient  I have discussed the patient with the resident/non-physician practitioner and agree with the resident's/non-physician practitioner's findings, Plan of Care, and MDM as documented in the resident's/non-physician practitioner's note, except where noted  All available labs and Radiology studies were reviewed  I was present for key portions of any procedure(s) performed by the resident/non-physician practitioner and I was immediately available to provide assistance  At this point I agree with the current assessment done in the Emergency Department    I have conducted an independent evaluation of this patient a history and physical is as follows:  Pt diagnosed with covid on Tuesday Pt has had symptoms since thanksgiving Pt states symptoms worsening with sob Pt states she had a pulse ox of 85% 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

## 2020-12-04 NOTE — ED NOTES
Patient wanted to try RA; taken off of 2L NC for about 15 mins; oxygen dropped to about 86% on RA; placed back on 2L NC and resting in bed O2 back up to 94%; patient provided with turkey sandwich and drink; resting comfortably at this time with no complaints       Rod Parra RN  12/04/20 6998

## 2020-12-04 NOTE — ASSESSMENT & PLAN NOTE
Acute hypoxic respiratory failure likely due to COVID-19 infection  Continue supplemental oxygen maintain O2 sats more than 92-94%  Prone position ventilation discussed with the patient  Incentive spirometry

## 2020-12-05 LAB
ALBUMIN SERPL BCP-MCNC: 3.5 G/DL (ref 3.5–5)
ALP SERPL-CCNC: 81 U/L (ref 46–116)
ALT SERPL W P-5'-P-CCNC: 41 U/L (ref 12–78)
ANION GAP SERPL CALCULATED.3IONS-SCNC: 7 MMOL/L (ref 4–13)
AST SERPL W P-5'-P-CCNC: 42 U/L (ref 5–45)
BASOPHILS # BLD AUTO: 0 THOUSANDS/ΜL (ref 0–0.1)
BASOPHILS NFR BLD AUTO: 0 % (ref 0–1)
BILIRUB SERPL-MCNC: 0.27 MG/DL (ref 0.2–1)
BUN SERPL-MCNC: 19 MG/DL (ref 5–25)
CALCIUM SERPL-MCNC: 9 MG/DL (ref 8.3–10.1)
CHLORIDE SERPL-SCNC: 110 MMOL/L (ref 100–108)
CK SERPL-CCNC: 105 U/L (ref 26–192)
CO2 SERPL-SCNC: 25 MMOL/L (ref 21–32)
CREAT SERPL-MCNC: 0.74 MG/DL (ref 0.6–1.3)
CRP SERPL QL: 75 MG/L
D DIMER PPP FEU-MCNC: 0.78 UG/ML FEU
EOSINOPHIL # BLD AUTO: 0 THOUSAND/ΜL (ref 0–0.61)
EOSINOPHIL NFR BLD AUTO: 0 % (ref 0–6)
ERYTHROCYTE [DISTWIDTH] IN BLOOD BY AUTOMATED COUNT: 13.6 % (ref 11.6–15.1)
FERRITIN SERPL-MCNC: 435 NG/ML (ref 8–388)
GFR SERPL CREATININE-BSD FRML MDRD: 85 ML/MIN/1.73SQ M
GLUCOSE SERPL-MCNC: 89 MG/DL (ref 65–140)
HCT VFR BLD AUTO: 40.8 % (ref 34.8–46.1)
HGB BLD-MCNC: 13 G/DL (ref 11.5–15.4)
IMM GRANULOCYTES # BLD AUTO: 0.01 THOUSAND/UL (ref 0–0.2)
IMM GRANULOCYTES NFR BLD AUTO: 0 % (ref 0–2)
LYMPHOCYTES # BLD AUTO: 1.08 THOUSANDS/ΜL (ref 0.6–4.47)
LYMPHOCYTES NFR BLD AUTO: 24 % (ref 14–44)
MCH RBC QN AUTO: 26.9 PG (ref 26.8–34.3)
MCHC RBC AUTO-ENTMCNC: 31.9 G/DL (ref 31.4–37.4)
MCV RBC AUTO: 85 FL (ref 82–98)
MONOCYTES # BLD AUTO: 0.23 THOUSAND/ΜL (ref 0.17–1.22)
MONOCYTES NFR BLD AUTO: 5 % (ref 4–12)
NEUTROPHILS # BLD AUTO: 3.14 THOUSANDS/ΜL (ref 1.85–7.62)
NEUTS SEG NFR BLD AUTO: 71 % (ref 43–75)
NRBC BLD AUTO-RTO: 0 /100 WBCS
NT-PROBNP SERPL-MCNC: 114 PG/ML
PLATELET # BLD AUTO: 116 THOUSANDS/UL (ref 149–390)
PMV BLD AUTO: 11.5 FL (ref 8.9–12.7)
POTASSIUM SERPL-SCNC: 3.6 MMOL/L (ref 3.5–5.3)
PROCALCITONIN SERPL-MCNC: <0.05 NG/ML
PROT SERPL-MCNC: 6.8 G/DL (ref 6.4–8.2)
RBC # BLD AUTO: 4.83 MILLION/UL (ref 3.81–5.12)
SODIUM SERPL-SCNC: 142 MMOL/L (ref 136–145)
TROPONIN I SERPL-MCNC: 0.14 NG/ML
TROPONIN I SERPL-MCNC: 0.22 NG/ML
WBC # BLD AUTO: 4.46 THOUSAND/UL (ref 4.31–10.16)

## 2020-12-05 PROCEDURE — 85379 FIBRIN DEGRADATION QUANT: CPT | Performed by: INTERNAL MEDICINE

## 2020-12-05 PROCEDURE — 84145 PROCALCITONIN (PCT): CPT | Performed by: EMERGENCY MEDICINE

## 2020-12-05 PROCEDURE — 85025 COMPLETE CBC W/AUTO DIFF WBC: CPT | Performed by: INTERNAL MEDICINE

## 2020-12-05 PROCEDURE — 82550 ASSAY OF CK (CPK): CPT | Performed by: INTERNAL MEDICINE

## 2020-12-05 PROCEDURE — 84484 ASSAY OF TROPONIN QUANT: CPT | Performed by: INTERNAL MEDICINE

## 2020-12-05 PROCEDURE — 99232 SBSQ HOSP IP/OBS MODERATE 35: CPT | Performed by: INTERNAL MEDICINE

## 2020-12-05 PROCEDURE — 82728 ASSAY OF FERRITIN: CPT | Performed by: INTERNAL MEDICINE

## 2020-12-05 PROCEDURE — 93005 ELECTROCARDIOGRAM TRACING: CPT

## 2020-12-05 PROCEDURE — 84484 ASSAY OF TROPONIN QUANT: CPT | Performed by: PHYSICIAN ASSISTANT

## 2020-12-05 PROCEDURE — 86140 C-REACTIVE PROTEIN: CPT | Performed by: INTERNAL MEDICINE

## 2020-12-05 PROCEDURE — 83880 ASSAY OF NATRIURETIC PEPTIDE: CPT | Performed by: INTERNAL MEDICINE

## 2020-12-05 PROCEDURE — 80053 COMPREHEN METABOLIC PANEL: CPT | Performed by: INTERNAL MEDICINE

## 2020-12-05 RX ORDER — FLUTICASONE PROPIONATE 50 MCG
2 SPRAY, SUSPENSION (ML) NASAL DAILY
Status: DISCONTINUED | OUTPATIENT
Start: 2020-12-05 | End: 2021-01-11 | Stop reason: HOSPADM

## 2020-12-05 RX ORDER — ECHINACEA PURPUREA EXTRACT 125 MG
1 TABLET ORAL EVERY 2 HOUR PRN
Status: DISCONTINUED | OUTPATIENT
Start: 2020-12-05 | End: 2020-12-20

## 2020-12-05 RX ORDER — ALBUTEROL SULFATE 90 UG/1
2 AEROSOL, METERED RESPIRATORY (INHALATION) EVERY 4 HOURS PRN
Status: DISCONTINUED | OUTPATIENT
Start: 2020-12-05 | End: 2021-01-11 | Stop reason: HOSPADM

## 2020-12-05 RX ADMIN — TRAZODONE HYDROCHLORIDE 100 MG: 100 TABLET ORAL at 00:23

## 2020-12-05 RX ADMIN — ALBUTEROL SULFATE 2 PUFF: 90 AEROSOL, METERED RESPIRATORY (INHALATION) at 21:21

## 2020-12-05 RX ADMIN — ENOXAPARIN SODIUM 30 MG: 30 INJECTION SUBCUTANEOUS at 21:00

## 2020-12-05 RX ADMIN — OXYCODONE HYDROCHLORIDE AND ACETAMINOPHEN 1000 MG: 500 TABLET ORAL at 21:00

## 2020-12-05 RX ADMIN — FAMOTIDINE 20 MG: 20 TABLET ORAL at 09:51

## 2020-12-05 RX ADMIN — REMDESIVIR 100 MG: 100 INJECTION, POWDER, LYOPHILIZED, FOR SOLUTION INTRAVENOUS at 14:11

## 2020-12-05 RX ADMIN — LITHIUM CARBONATE 300 MG: 300 CAPSULE, GELATIN COATED ORAL at 21:00

## 2020-12-05 RX ADMIN — Medication 1 SPRAY: at 22:56

## 2020-12-05 RX ADMIN — SERTRALINE HYDROCHLORIDE 100 MG: 100 TABLET ORAL at 21:00

## 2020-12-05 RX ADMIN — Medication 2000 UNITS: at 09:51

## 2020-12-05 RX ADMIN — ACETAMINOPHEN 650 MG: 325 TABLET, FILM COATED ORAL at 21:20

## 2020-12-05 RX ADMIN — DOXYCYCLINE 100 MG: 100 INJECTION, POWDER, LYOPHILIZED, FOR SOLUTION INTRAVENOUS at 00:23

## 2020-12-05 RX ADMIN — SERTRALINE HYDROCHLORIDE 100 MG: 100 TABLET ORAL at 00:24

## 2020-12-05 RX ADMIN — ZINC SULFATE 220 MG (50 MG) CAPSULE 220 MG: CAPSULE at 09:51

## 2020-12-05 RX ADMIN — TRAZODONE HYDROCHLORIDE 100 MG: 100 TABLET ORAL at 21:00

## 2020-12-05 RX ADMIN — CEFTRIAXONE SODIUM 1000 MG: 10 INJECTION, POWDER, FOR SOLUTION INTRAVENOUS at 09:51

## 2020-12-05 RX ADMIN — FLUTICASONE PROPIONATE 2 SPRAY: 50 SPRAY, METERED NASAL at 22:56

## 2020-12-05 RX ADMIN — LITHIUM CARBONATE 300 MG: 300 CAPSULE, GELATIN COATED ORAL at 00:24

## 2020-12-05 RX ADMIN — DOXYCYCLINE 100 MG: 100 INJECTION, POWDER, LYOPHILIZED, FOR SOLUTION INTRAVENOUS at 11:35

## 2020-12-05 RX ADMIN — DEXAMETHASONE SODIUM PHOSPHATE 6 MG: 4 INJECTION INTRA-ARTICULAR; INTRALESIONAL; INTRAMUSCULAR; INTRAVENOUS; SOFT TISSUE at 12:32

## 2020-12-05 RX ADMIN — ENOXAPARIN SODIUM 40 MG: 40 INJECTION SUBCUTANEOUS at 09:51

## 2020-12-05 RX ADMIN — ACETAMINOPHEN 650 MG: 325 TABLET, FILM COATED ORAL at 10:34

## 2020-12-05 RX ADMIN — OXYCODONE HYDROCHLORIDE AND ACETAMINOPHEN 1000 MG: 500 TABLET ORAL at 09:51

## 2020-12-05 NOTE — PLAN OF CARE
Problem: Potential for Falls  Goal: Patient will remain free of falls  Description: INTERVENTIONS:  - Assess patient frequently for physical needs  -  Identify cognitive and physical deficits and behaviors that affect risk of falls    -  [place] fall precautions as indicated by assessment   - Educate patient/family on patient safety including physical limitations  - Instruct patient to call for assistance with activity based on assessment  - Modify environment to reduce risk of injury  - Consider OT/PT consult to assist with strengthening/mobility  Outcome: Progressing     Problem: RESPIRATORY - ADULT  Goal: Achieves optimal ventilation and oxygenation  Description: INTERVENTIONS:  - Assess for changes in respiratory status  - Assess for changes in mentation and behavior  - Position to facilitate oxygenation and minimize respiratory effort  - Oxygen administered by appropriate delivery if ordered  - Initiate smoking cessation education as indicated  - Encourage broncho-pulmonary hygiene including cough, deep breathe, Incentive Spirometry  - Assess the need for suctioning and aspirate as needed  - Assess and instruct to report SOB or any respiratory difficulty  - Respiratory Therapy support as indicated  Outcome: Progressing     Problem: METABOLIC, FLUID AND ELECTROLYTES - ADULT  Goal: Electrolytes maintained within normal limits  Description: INTERVENTIONS:  - Monitor labs and assess patient for signs and symptoms of electrolyte imbalances  - Administer electrolyte replacement as ordered  - Monitor response to electrolyte replacements, including repeat lab results as appropriate  - Instruct patient on fluid and nutrition as appropriate  Outcome: Progressing  Goal: Fluid balance maintained  Description: INTERVENTIONS:  - Monitor labs   - Monitor I/O and WT  - Instruct patient on fluid and nutrition as appropriate  - Assess for signs & symptoms of volume excess or deficit  Outcome: Progressing     Problem: HEMATOLOGIC - ADULT  Goal: Maintains hematologic stability  Description: INTERVENTIONS  - Assess for signs and symptoms of bleeding or hemorrhage  - Monitor labs  - Administer supportive blood products/factors as ordered and appropriate  Outcome: Progressing     Problem: INFECTION - ADULT  Goal: Absence or prevention of progression during hospitalization  Description: INTERVENTIONS:  - Assess and monitor for signs and symptoms of infection  - Monitor lab/diagnostic results  - Monitor all insertion sites, i e  indwelling lines, tubes, and drains  - Monitor endotracheal if appropriate and nasal secretions for changes in amount and color  - [place] appropriate cooling/warming therapies per order  - Administer medications as ordered  - Instruct and encourage patient and family to use good hand hygiene technique  - Identify and instruct in appropriate isolation precautions for identified infection/condition  Outcome: Progressing  Goal: Absence of fever/infection during neutropenic period  Description: INTERVENTIONS:  - Monitor WBC    Outcome: Progressing     Problem: SAFETY ADULT  Goal: Patient will remain free of falls  Description: INTERVENTIONS:  - Assess patient frequently for physical needs  -  Identify cognitive and physical deficits and behaviors that affect risk of falls    -  [place] fall precautions as indicated by assessment   - Educate patient/family on patient safety including physical limitations  - Instruct patient to call for assistance with activity based on assessment  - Modify environment to reduce risk of injury  - Consider OT/PT consult to assist with strengthening/mobility  Outcome: Progressing  Goal: Maintain or return to baseline ADL function  Description: INTERVENTIONS:  -  Assess patient's ability to carry out ADLs; assess patient's baseline for ADL function and identify physical deficits which impact ability to perform ADLs (bathing, care of mouth/teeth, toileting, grooming, dressing, etc )  - Assess/evaluate cause of self-care deficits   - Assess range of motion  - Assess patient's mobility; develop plan if impaired  - Assess patient's need for assistive devices and provide as appropriate  - Encourage maximum independence but intervene and supervise when necessary  - Involve family in performance of ADLs  - Assess for home care needs following discharge   - Consider OT consult to assist with ADL evaluation and planning for discharge  - Provide patient education as appropriate  Outcome: Progressing  Goal: Maintain or return mobility status to optimal level  Description: INTERVENTIONS:  - Assess patient's baseline mobility status (ambulation, transfers, stairs, etc )    - Identify cognitive and physical deficits and behaviors that affect mobility  - Identify mobility aids required to assist with transfers and/or ambulation (gait belt, sit-to-stand, lift, walker, cane, etc )  - [place] fall precautions as indicated by assessment  - Record patient progress and toleration of activity level on Mobility SBAR; progress patient to next Phase/Stage  - Instruct patient to call for assistance with activity based on assessment  - Consider rehabilitation consult to assist with strengthening/weightbearing, etc   Outcome: Progressing     Problem: DISCHARGE PLANNING  Goal: Discharge to home or other facility with appropriate resources  Description: INTERVENTIONS:  - Identify barriers to discharge w/patient and caregiver  - Arrange for needed discharge resources and transportation as appropriate  - Identify discharge learning needs (meds, wound care, etc )  - Arrange for interpretive services to assist at discharge as needed  - Refer to Case Management Department for coordinating discharge planning if the patient needs post-hospital services based on physician/advanced practitioner order or complex needs related to functional status, cognitive ability, or social support system  Outcome: Progressing     Problem: Knowledge Deficit  Goal: Patient/family/caregiver demonstrates understanding of disease process, treatment plan, medications, and discharge instructions  Description: Complete learning assessment and assess knowledge base    Interventions:  - Provide teaching at level of understanding  - Provide teaching via preferred learning methods  Outcome: Progressing

## 2020-12-05 NOTE — PROGRESS NOTES
Progress Note - Juan Carlos Fuentes 1955, 76 y o  female MRN: [id number]    Unit/Bed#: -01 Encounter: [id number]    Primary Care Provider: LUIS CARLOS Easley   Date and time admitted to hospital: 2020  9:15 AM        * Acute respiratory failure with hypoxia (HCC)  Assessment & Plan  Acute hypoxic respiratory failure likely due to COVID-19 infection  Continue supplemental oxygen maintain O2 sats more than 92-94%  Prone position ventilation discussed with the patient  Incentive spirometry    Elevated troponin  Assessment & Plan  Elevated troponin likely due to Covid-19 infection  ECG - T wave inversions noted  Monitor     COVID-19 virus infection  Assessment & Plan  COVID-19 infection requiring 2-3 L supplemental oxygen worsening shortness of breath  Patient be placed on mild treatment protocol  Monitor inflammatory markers  IV dexamethasone, IV remdesivir  IV antibiotics consider discontinuing if procalcitonin is negative x2  Supportive care    GERD (gastroesophageal reflux disease)  Assessment & Plan  Famotidine    Bipolar disorder (HCC)  Assessment & Plan  Continue lithium sertraline, trazodone        VTE Pharmacologic Prophylaxis:   Pharmacologic: Enoxaparin (Lovenox)  Mechanical VTE Prophylaxis in Place: No    Patient Centered Rounds: I have performed bedside rounds with nursing staff today  Time Spent for Care: 15 minutes  More than 50% of total time spent on counseling and coordination of care as described above      Current Length of Stay: 1 day(s)    Current Patient Status: Inpatient   Certification Statement: The patient will continue to require additional inpatient hospital stay due to Monitor symptoms        Code Status: Level 1 - Full Code      Subjective:   No acute distress    Objective:     Vitals:   Temp (24hrs), Av 1 °F (37 8 °C), Min:99 8 °F (37 7 °C), Max:100 4 °F (38 °C)    Temp:  [99 8 °F (37 7 °C)-100 4 °F (38 °C)] 99 8 °F (37 7 °C)  HR:  [72-93] 72  Resp:  [18-22] 22  BP: ()/(55-73) 122/71  SpO2:  [87 %-94 %] 91 %  Body mass index is 33 07 kg/m²  Input and Output Summary (last 24 hours): Intake/Output Summary (Last 24 hours) at 12/5/2020 1135  Last data filed at 12/4/2020 1557  Gross per 24 hour   Intake 350 ml   Output --   Net 350 ml       Physical Exam:     Physical Exam  Constitutional:       Appearance: Normal appearance  HENT:      Head: Normocephalic and atraumatic  Cardiovascular:      Pulses: Normal pulses  Heart sounds: Normal heart sounds  Pulmonary:      Effort: Pulmonary effort is normal  No respiratory distress  Breath sounds: Normal breath sounds  Abdominal:      General: Abdomen is flat  Palpations: Abdomen is soft  Musculoskeletal:         General: No swelling or deformity  Skin:     General: Skin is warm and dry  Coloration: Skin is not jaundiced  Neurological:      General: No focal deficit present  Mental Status: She is alert and oriented to person, place, and time  Additional Data:     Labs:    Results from last 7 days   Lab Units 12/05/20  0537   WBC Thousand/uL 4 46   HEMOGLOBIN g/dL 13 0   HEMATOCRIT % 40 8   PLATELETS Thousands/uL 116*   NEUTROS PCT % 71   LYMPHS PCT % 24   MONOS PCT % 5   EOS PCT % 0     Results from last 7 days   Lab Units 12/05/20  0537   POTASSIUM mmol/L 3 6   CHLORIDE mmol/L 110*   CO2 mmol/L 25   BUN mg/dL 19   CREATININE mg/dL 0 74   CALCIUM mg/dL 9 0   ALK PHOS U/L 81   ALT U/L 41   AST U/L 42           * I Have Reviewed All Lab Data Listed Above  * Additional Pertinent Lab Tests Reviewed:  All Labs Within Last 24 Hours Reviewed        Recent Cultures (last 7 days):           Last 24 Hours Medication List:   Current Facility-Administered Medications   Medication Dose Route Frequency Provider Last Rate    acetaminophen  650 mg Oral Q6H PRN Silvana Saleh MD      aluminum-magnesium hydroxide-simethicone  30 mL Oral Q6H PRN Silvana Saleh MD      ascorbic acid  1,000 mg Oral Q12H Zina Claude, MD      cholecalciferol  2,000 Units Oral Daily Candida Beal MD      dexamethasone  6 mg Intravenous Q24H Candida Beal MD      docusate sodium  100 mg Oral BID Candida Beal MD      enoxaparin  40 mg Subcutaneous Daily Candida Beal MD      famotidine  20 mg Oral Daily Candida Beal MD      lithium carbonate  300 mg Oral HS Remer Minus, PA-C      zinc sulfate  220 mg Per NG Tube Daily Candida Beal MD      Followed by   William Callejas ON 12/11/2020] multivitamin with iron-minerals  15 mL Per NG Tube Daily Candida Beal MD      ondansetron  4 mg Intravenous Q6H PRN Candida Beal MD      remdesivir  100 mg Intravenous Q24H Candida Beal MD      sertraline  100 mg Oral HS Remer Minus, PA-C      traZODone  100 mg Oral HS Kiana Minus, PA-C          Today, Patient Was Seen By: Shakira Gutierrez DO    ** Please Note: Dictation voice to text software may have been used in the creation of this document   **

## 2020-12-05 NOTE — CASE MANAGEMENT
Cm spoke to pt primary contact Alvarez, introduced self, role and discharged process  Per Alvarez, pt lives alone in 1st floor aprtmt, 2 steps from front and 30 feet to aprtmt  Pt indep with ADLS PTA  Pt owns no DME  Per Alvarez, pt has hx of inpt rehab, denied HHC, MH or subst abuse  Mayco Gu reported that pt works full time and drives independently  Transportation to [facility]  Cm will continue to follow  CM reviewed d/c planning process including the following: identifying help at home, patient preference for d/c planning needs, Discharge Lounge, [facility] Meds to Bed program, availability of treatment team to discuss questions or concerns patient and/or family may have regarding understanding medications and recognizing signs and symptoms once discharged  CM also encouraged patient to follow up with all recommended appointments after discharge  Patient advised of importance for patient and family to participate in managing patients medical well being  Stable

## 2020-12-06 LAB
ALBUMIN SERPL BCP-MCNC: 3.4 G/DL (ref 3.5–5)
ALP SERPL-CCNC: 85 U/L (ref 46–116)
ALT SERPL W P-5'-P-CCNC: 36 U/L (ref 12–78)
ANION GAP SERPL CALCULATED.3IONS-SCNC: 6 MMOL/L (ref 4–13)
AST SERPL W P-5'-P-CCNC: 40 U/L (ref 5–45)
ATRIAL RATE: 69 BPM
BILIRUB SERPL-MCNC: 0.28 MG/DL (ref 0.2–1)
BUN SERPL-MCNC: 22 MG/DL (ref 5–25)
CALCIUM ALBUM COR SERPL-MCNC: 9.5 MG/DL (ref 8.3–10.1)
CALCIUM SERPL-MCNC: 9 MG/DL (ref 8.3–10.1)
CHLORIDE SERPL-SCNC: 114 MMOL/L (ref 100–108)
CO2 SERPL-SCNC: 24 MMOL/L (ref 21–32)
CREAT SERPL-MCNC: 0.78 MG/DL (ref 0.6–1.3)
CRP SERPL QL: 49.1 MG/L
D DIMER PPP FEU-MCNC: 0.83 UG/ML FEU
ERYTHROCYTE [DISTWIDTH] IN BLOOD BY AUTOMATED COUNT: 13.8 % (ref 11.6–15.1)
FERRITIN SERPL-MCNC: 513 NG/ML (ref 8–388)
GFR SERPL CREATININE-BSD FRML MDRD: 79 ML/MIN/1.73SQ M
GLUCOSE SERPL-MCNC: 102 MG/DL (ref 65–140)
HCT VFR BLD AUTO: 41.6 % (ref 34.8–46.1)
HGB BLD-MCNC: 13.5 G/DL (ref 11.5–15.4)
MCH RBC QN AUTO: 27 PG (ref 26.8–34.3)
MCHC RBC AUTO-ENTMCNC: 32.5 G/DL (ref 31.4–37.4)
MCV RBC AUTO: 83 FL (ref 82–98)
P AXIS: 30 DEGREES
PLATELET # BLD AUTO: 147 THOUSANDS/UL (ref 149–390)
PMV BLD AUTO: 11.5 FL (ref 8.9–12.7)
POTASSIUM SERPL-SCNC: 3.6 MMOL/L (ref 3.5–5.3)
PR INTERVAL: 114 MS
PROCALCITONIN SERPL-MCNC: <0.05 NG/ML
PROT SERPL-MCNC: 6.9 G/DL (ref 6.4–8.2)
QRS AXIS: 50 DEGREES
QRSD INTERVAL: 86 MS
QT INTERVAL: 422 MS
QTC INTERVAL: 452 MS
RBC # BLD AUTO: 5 MILLION/UL (ref 3.81–5.12)
SODIUM SERPL-SCNC: 144 MMOL/L (ref 136–145)
T WAVE AXIS: 161 DEGREES
VENTRICULAR RATE: 69 BPM
WBC # BLD AUTO: 4.45 THOUSAND/UL (ref 4.31–10.16)

## 2020-12-06 PROCEDURE — 84145 PROCALCITONIN (PCT): CPT | Performed by: PHYSICIAN ASSISTANT

## 2020-12-06 PROCEDURE — 82728 ASSAY OF FERRITIN: CPT | Performed by: PHYSICIAN ASSISTANT

## 2020-12-06 PROCEDURE — 93010 ELECTROCARDIOGRAM REPORT: CPT | Performed by: INTERNAL MEDICINE

## 2020-12-06 PROCEDURE — 86140 C-REACTIVE PROTEIN: CPT | Performed by: PHYSICIAN ASSISTANT

## 2020-12-06 PROCEDURE — 99232 SBSQ HOSP IP/OBS MODERATE 35: CPT | Performed by: INTERNAL MEDICINE

## 2020-12-06 PROCEDURE — 80053 COMPREHEN METABOLIC PANEL: CPT | Performed by: PHYSICIAN ASSISTANT

## 2020-12-06 PROCEDURE — 85379 FIBRIN DEGRADATION QUANT: CPT | Performed by: PHYSICIAN ASSISTANT

## 2020-12-06 PROCEDURE — 85027 COMPLETE CBC AUTOMATED: CPT | Performed by: PHYSICIAN ASSISTANT

## 2020-12-06 PROCEDURE — 94760 N-INVAS EAR/PLS OXIMETRY 1: CPT

## 2020-12-06 PROCEDURE — XW13325 TRANSFUSION OF CONVALESCENT PLASMA (NONAUTOLOGOUS) INTO PERIPHERAL VEIN, PERCUTANEOUS APPROACH, NEW TECHNOLOGY GROUP 5: ICD-10-PCS | Performed by: INTERNAL MEDICINE

## 2020-12-06 PROCEDURE — 99222 1ST HOSP IP/OBS MODERATE 55: CPT | Performed by: INTERNAL MEDICINE

## 2020-12-06 RX ORDER — OXYCODONE HYDROCHLORIDE 5 MG/1
2.5 TABLET ORAL EVERY 4 HOURS PRN
Status: DISCONTINUED | OUTPATIENT
Start: 2020-12-06 | End: 2020-12-07

## 2020-12-06 RX ORDER — ATORVASTATIN CALCIUM 40 MG/1
40 TABLET, FILM COATED ORAL
Status: DISCONTINUED | OUTPATIENT
Start: 2020-12-06 | End: 2020-12-21

## 2020-12-06 RX ORDER — LORAZEPAM 0.5 MG/1
0.5 TABLET ORAL
Status: DISCONTINUED | OUTPATIENT
Start: 2020-12-06 | End: 2020-12-06

## 2020-12-06 RX ORDER — OXYMETAZOLINE HYDROCHLORIDE 0.05 G/100ML
2 SPRAY NASAL EVERY 12 HOURS SCHEDULED
Status: DISPENSED | OUTPATIENT
Start: 2020-12-06 | End: 2020-12-09

## 2020-12-06 RX ORDER — LORAZEPAM 1 MG/1
1 TABLET ORAL 2 TIMES DAILY PRN
Status: DISCONTINUED | OUTPATIENT
Start: 2020-12-06 | End: 2021-01-11 | Stop reason: HOSPADM

## 2020-12-06 RX ORDER — LORAZEPAM 0.5 MG/1
0.5 TABLET ORAL 2 TIMES DAILY PRN
Status: DISCONTINUED | OUTPATIENT
Start: 2020-12-06 | End: 2020-12-06

## 2020-12-06 RX ORDER — FUROSEMIDE 10 MG/ML
20 INJECTION INTRAMUSCULAR; INTRAVENOUS ONCE
Status: COMPLETED | OUTPATIENT
Start: 2020-12-06 | End: 2020-12-06

## 2020-12-06 RX ADMIN — ENOXAPARIN SODIUM 30 MG: 30 INJECTION SUBCUTANEOUS at 08:49

## 2020-12-06 RX ADMIN — FLUTICASONE PROPIONATE 2 SPRAY: 50 SPRAY, METERED NASAL at 21:42

## 2020-12-06 RX ADMIN — ZINC SULFATE 220 MG (50 MG) CAPSULE 220 MG: CAPSULE at 08:50

## 2020-12-06 RX ADMIN — Medication 2000 UNITS: at 08:49

## 2020-12-06 RX ADMIN — FLUTICASONE PROPIONATE 2 SPRAY: 50 SPRAY, METERED NASAL at 08:50

## 2020-12-06 RX ADMIN — LORAZEPAM 0.5 MG: 0.5 TABLET ORAL at 06:06

## 2020-12-06 RX ADMIN — DEXAMETHASONE SODIUM PHOSPHATE 6 MG: 4 INJECTION INTRA-ARTICULAR; INTRALESIONAL; INTRAMUSCULAR; INTRAVENOUS; SOFT TISSUE at 13:55

## 2020-12-06 RX ADMIN — FAMOTIDINE 20 MG: 20 TABLET ORAL at 08:51

## 2020-12-06 RX ADMIN — ALBUTEROL SULFATE 2 PUFF: 90 AEROSOL, METERED RESPIRATORY (INHALATION) at 21:42

## 2020-12-06 RX ADMIN — OXYCODONE HYDROCHLORIDE AND ACETAMINOPHEN 1000 MG: 500 TABLET ORAL at 21:41

## 2020-12-06 RX ADMIN — FUROSEMIDE 20 MG: 10 INJECTION, SOLUTION INTRAMUSCULAR; INTRAVENOUS at 18:27

## 2020-12-06 RX ADMIN — ATORVASTATIN CALCIUM 40 MG: 40 TABLET, FILM COATED ORAL at 21:41

## 2020-12-06 RX ADMIN — REMDESIVIR 100 MG: 100 INJECTION, POWDER, LYOPHILIZED, FOR SOLUTION INTRAVENOUS at 14:00

## 2020-12-06 RX ADMIN — TRAZODONE HYDROCHLORIDE 100 MG: 100 TABLET ORAL at 21:41

## 2020-12-06 RX ADMIN — ENOXAPARIN SODIUM 30 MG: 30 INJECTION SUBCUTANEOUS at 21:41

## 2020-12-06 RX ADMIN — LORAZEPAM 1.5 MG: 1 TABLET ORAL at 21:41

## 2020-12-06 RX ADMIN — ACETAMINOPHEN 650 MG: 325 TABLET, FILM COATED ORAL at 04:36

## 2020-12-06 RX ADMIN — OXYCODONE HYDROCHLORIDE AND ACETAMINOPHEN 1000 MG: 500 TABLET ORAL at 08:49

## 2020-12-06 RX ADMIN — LORAZEPAM 1 MG: 1 TABLET ORAL at 13:55

## 2020-12-06 RX ADMIN — SERTRALINE HYDROCHLORIDE 100 MG: 100 TABLET ORAL at 21:41

## 2020-12-06 RX ADMIN — LITHIUM CARBONATE 300 MG: 300 CAPSULE, GELATIN COATED ORAL at 21:42

## 2020-12-06 NOTE — ASSESSMENT & PLAN NOTE
Acute hypoxic respiratory failure likely due to COVID-19 infection  Continue supplemental oxygen maintain O2 sats more than 92-94%  Patient upgraded to moderate pathway for COVID my night team   Patient requiring increased amounts of O2    Continue supportive measures  Monitor symptoms closely

## 2020-12-06 NOTE — ASSESSMENT & PLAN NOTE
COVID-19 infection requiring increased oxygen requirements overnight on 12/ 5 to 12/ 6  Patient be placed on mild treatment protocol  Monitor inflammatory markers  IV dexamethasone, IV remdesivir  Procalcitonin negative x2 -antibiotics were discontinued  Pulmonary consulted given increased O2 requirements overnight

## 2020-12-06 NOTE — PROGRESS NOTES
Progress Note - Vinny Malone 1955, 76 y o  female MRN: [id number]    Unit/Bed#: -01 Encounter: [id number]    Primary Care Provider: LUIS CARLOS Ann   Date and time admitted to hospital: 2020  9:15 AM        * Acute respiratory failure with hypoxia ([address] )  Assessment & Plan  Acute hypoxic respiratory failure likely due to COVID-19 infection  Continue supplemental oxygen maintain O2 sats more than 92-94%  Patient upgraded to moderate pathway for COVID my night team   Patient requiring increased amounts of O2  Continue supportive measures  Monitor symptoms closely    Elevated troponin  Assessment & Plan  Elevated troponin likely due to Covid-19 infection  ECG - T wave inversions noted  Monitor     COVID-19 virus infection  Assessment & Plan  COVID-19 infection requiring increased oxygen requirements overnight on  to   Patient be placed on mild treatment protocol  Monitor inflammatory markers  IV dexamethasone, IV remdesivir  Procalcitonin negative x2 -antibiotics were discontinued  Pulmonary consulted given increased O2 requirements overnight  GERD (gastroesophageal reflux disease)  Assessment & Plan  Famotidine    Bipolar disorder (HCC)  Assessment & Plan  Continue lithium sertraline, trazodone      VTE Pharmacologic Prophylaxis:   Pharmacologic: Enoxaparin (Lovenox)  Mechanical VTE Prophylaxis in Place: No    Patient Centered Rounds: I have performed bedside rounds with nursing staff today  Time Spent for Care: 15 minutes  More than 50% of total time spent on counseling and coordination of care as described above      Current Length of Stay: 2 day(s)    Current Patient Status: Inpatient   Certification Statement: The patient will continue to require additional inpatient hospital stay due to Need to monitor symptoms    Code Status: Level 1 - Full Code      Subjective:    no acute distress    Objective:     Vitals:   Temp (24hrs), Av °F (37 2 °C), Min:98 3 °F (36 8 °C), Max:99 8 °F (37 7 °C)    Temp:  [98 3 °F (36 8 °C)-99 8 °F (37 7 °C)] 98 3 °F (36 8 °C)  HR:  [70-74] 71  BP: (122-126)/(67-68) 126/67  SpO2:  [89 %-92 %] 91 %  Body mass index is 33 07 kg/m²  Input and Output Summary (last 24 hours): Intake/Output Summary (Last 24 hours) at 12/6/2020 0954  Last data filed at 12/5/2020 1700  Gross per 24 hour   Intake 120 ml   Output --   Net 120 ml       Physical Exam:     Physical Exam  HENT:      Head: Normocephalic and atraumatic  Cardiovascular:      Rate and Rhythm: Normal rate and regular rhythm  Pulmonary:      Breath sounds: Rales present  Musculoskeletal:         General: No swelling  Skin:     Coloration: Skin is not jaundiced  Additional Data:     Labs:    Results from last 7 days   Lab Units 12/06/20  0558 12/05/20  0537   WBC Thousand/uL 4 45 4 46   HEMOGLOBIN g/dL 13 5 13 0   HEMATOCRIT % 41 6 40 8   PLATELETS Thousands/uL 147* 116*   NEUTROS PCT %  --  71   LYMPHS PCT %  --  24   MONOS PCT %  --  5   EOS PCT %  --  0     Results from last 7 days   Lab Units 12/06/20  0558   POTASSIUM mmol/L 3 6   CHLORIDE mmol/L 114*   CO2 mmol/L 24   BUN mg/dL 22   CREATININE mg/dL 0 78   CALCIUM mg/dL 9 0   ALK PHOS U/L 85   ALT U/L 36   AST U/L 40           * I Have Reviewed All Lab Data Listed Above  * Additional Pertinent Lab Tests Reviewed:  All Labs Within Last 24 Hours Reviewed      Recent Cultures (last 7 days):           Last 24 Hours Medication List:   Current Facility-Administered Medications   Medication Dose Route Frequency Provider Last Rate    acetaminophen  650 mg Oral Q6H PRN Collette Hire, MD      albuterol  2 puff Inhalation Q4H PRN Sweta Raymond DO      aluminum-magnesium hydroxide-simethicone  30 mL Oral Q6H PRN Collette Hire, MD      ascorbic acid  1,000 mg Oral Q12H Evgeny Grove MD      atorvastatin  40 mg Oral HS Bakari Estrada PA-C      cholecalciferol  2,000 Units Oral Daily Collette Hire, MD      dexamethasone  6 mg Intravenous Q24H Brenda Drop, MD      docusate sodium  100 mg Oral BID Brenda Drop, MD      enoxaparin  30 mg Subcutaneous Q12H [address] Hetul DO aMndi      famotidine  20 mg Oral Daily Brenda Drop, MD      fluticasone  2 spray Each Nare Daily Zenovia Peels, PA-C      lithium carbonate  300 mg Oral HS Zenovia Peels, PA-C      LORazepam  0 5 mg Oral HS PRN Zenovia Peels, PA-C      zinc sulfate  220 mg Per NG Tube Daily Brenda Drop, MD      Followed by   Randa Donaldson ON 12/11/2020] multivitamin with iron-minerals  15 mL Per NG Tube Daily Brenda Drop, MD      ondansetron  4 mg Intravenous Q6H PRN Brenda Drop, MD      remdesivir  100 mg Intravenous Q24H Brenda Drop, MD      sertraline  100 mg Oral HS Zenovia Peels, PA-C      sodium chloride  1 spray Each Nare Q2H PRN Zenovia Peels, PA-C      traZODone  100 mg Oral HS Zenovia Peels, PA-C          Today, Patient Was Seen By: Marck Alegre DO    ** Please Note: Dictation voice to text software may have been used in the creation of this document   **

## 2020-12-06 NOTE — CONSULTS
Consultation - Pulmonary Medicine  Jessica Ty 76 y o  female MRN: [id number]  Unit/Bed#: -01 Encounter: [id number]  Code Status: Level 1 - Full Code    Assessment/Plan:  AEPEG-10 virus infection  Assessment & Plan     50-year-old female with past medical history of asthma, GERD, major depression anxiety disorder, chronic pain diagnosed with COVID-19 11/29  +COVID 19  Has  moderate Severity Range infection /  Currently on  6 L NC   Last fever 100 4 2 days ago    Dexamethasone day #  3  Remdesivir day #  3  Vitamin C 1000 mg day #  3  Zinc 220 mg Day #  3  Vit D 2000 U day #  3  IL6 : pending  Type and Screen for Convalescent Plasma:      discussed convalescent plasma the patient  And that she is likely  Either out or near ending the acute phase of illness  If worsening respiratory failure despite this could potentially utilize  There is some potential benefit for this at this time  She is agreeable to this  Informed will discuss with Pulmonary attending for ordering convalescent plasma      Re emphasized PRONING        Results from last 7 days   Lab Units 12/06/20  0559 12/06/20  0558 12/05/20  0537 12/04/20  0959   D-DIMER QUANTITATIVE ug/ml FEU 0 83*  --  0 78* 0 64*   CRP mg/L  --  49 1* 75 0* 36 6*   FERRITIN ng/mL  --  513* 435* 369     Results from last 7 days   Lab Units 12/06/20  0558 12/05/20  0542 12/04/20  0959   PROCALCITONIN ng/ml <0 05 <0 05 0 05           * Acute respiratory failure with hypoxia (HCC)  Assessment & Plan   Acute hypoxemic respiratory failure secondary to COVID-19   titrate for oxygen saturation 90%   currently on 6 L cannula   currently on [employer] continuous oxygen monitoring system        Thank you for this consultation; we will be happy to follow with you     ______________________________________________________________________      History of Present Illness   Physician Requesting Consult: Mariana Mcrae,   Reason for Consult / Principal Problem:  COVID-19 pneumonia  HX and PE limited by:     HPI:  Jami Regan is a 76 y o  female  has a past medical history of Asthma, Bipolar disorder ([address] ), and GERD (gastroesophageal reflux disease)  who presents with Chief Complaint of : shortness of breath  69-year-old female with history of depression anxiety, GERD, remote history of asthma presents with COVID-19 pneumonia and hypoxemia  No significant cardiac or otherwise pulmonary history  Symptoms began day of Thanksgiving with fever, chills, myalgias, diarrhea  She went and got tested that Sunday with results coming back positive that Tuesday reports can the hospital Friday  Worsening shortness of breath  Currently hospital day 2  Increasing oxygen requirements  6 L nasal cannula  Day 3  Dexamethasone and Remdesivir , vitamin therapy  on prophylactic Lovenox  D-dimer mildly elevated 0 83     anxious with only mild dyspnea  most pressing complaint for the patient that she is anxious and not on her normal home dose of Ativan nor her home dose of nightly trazodone  Smoking history: Quarter pack-a-day x5  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  Environmental History: no significant environmental exposures  Travel history: no recent travel  Respiratory History: asthma diagnosed at age 38 /  Trigger factors of odors and perfumes as well as respiratory infection  Oxygen Therapy: no prior oxygen therapy  PAP Therapy: no Pap therapy  DME Company: defer  Rx Insurance: deferred  Labs Reviewed: yes  Imaging Reviewed: yes   12/4 CXR  Echo Reviewed: no prior echo  Consults Reviewed: medicine notes reviewed  Collaborative Discussion: discussed with medicine team  PFT: no prior PFT  PSG:No prior polysomnogram    Review of Systems   Constitutional: Negative for activity change, appetite change, chills, fatigue and fever  HENT: Negative for postnasal drip, rhinorrhea, sinus pressure and sinus pain  Eyes: Negative for visual disturbance  Respiratory: Positive for shortness of breath  Negative for apnea, cough, chest tightness, wheezing and stridor  Cardiovascular: Negative for chest pain and leg swelling  Gastrointestinal: Negative for diarrhea, nausea and vomiting  Endocrine: Negative for cold intolerance and heat intolerance  Musculoskeletal: Positive for back pain  Negative for arthralgias, joint swelling and myalgias  Skin: Negative for color change  Neurological: Negative for syncope and light-headedness  Hematological: Negative for adenopathy  Psychiatric/Behavioral: Negative for confusion and sleep disturbance           anxiety       Past Medical/Surgical History  Past Medical History:   Diagnosis Date    Asthma     B[facility] disorder [facility] )     GERD (gastroesophageal reflux disease)      Past Surgical History:   Procedure Laterality Date     SECTION      x3    CHOLECYSTECTOMY      HYSTERECTOMY         Social History  Social History     Substance and Sexual Activity   Alcohol Use Yes    Frequency: 2-4 times a month    Drinks per session: 1 or 2    Comment: rarely     Social History     Substance and Sexual Activity   Drug Use Never     Social History     Tobacco Use   Smoking Status Former Smoker    Packs/day: 0 10    Years: 5 00    Pack years: 0 50    Types: Cigarettes    Quit date: 2018    Years since quittin 0   Smokeless Tobacco Never Used       Family History  Family History   Problem Relation Age of Onset    Heart disease Mother     Hypertension Mother     Heart disease Father     Hypertension Father        Allergies  Allergies   Allergen Reactions    Amphetamine-Dextroamphetamine Other (See Comments)     Chest pain- was placed on Adderall after son passed away for depression, and she developed chest pain in ; she had full cardiac work up, and was negative, so she has allergy to Adderall  Chest pain- was placed on Adderall after son passed away for depression, and she developed chest pain in ; she had full cardiac work up, and was negative, so she has allergy to Adderall    Molds & Smuts     Other      Cats    Sulfa Antibiotics Other (See Comments)       Home Meds:   Facility-Administered Medications Prior to Admission   Medication Dose Route Frequency Provider Last Rate Last Admin    betamethasone dipropionate (DIPROSONE) 0 05 % ointment   Topical Daily LUIS CARLOS Ochoa         Medications Prior to Admission   Medication Sig Dispense Refill Last Dose    albuterol (PROVENTIL HFA,VENTOLIN HFA) 90 mcg/act inhaler Inhale 2 puffs every 4 (four) hours as needed for wheezing 18 g 2 12/3/2020    diphenhydrAMINE (BENADRYL) 25 mg tablet Take 25 mg by mouth daily at bedtime   12/3/2020    erythromycin (ILOTYCIN) ophthalmic ointment Administer 0 5 inches into the left eye daily at bedtime 3 5 g 0 12/3/2020    Fluocinonide Emulsified Base 0 05 % CREA APPLY TO AFFECTED AREA(S) TOPICALLY TWO TIMES DAILY TO LOWER LEGS FOR 14 DAYS MAX AS NEEDED   12/3/2020    gabapentin (NEURONTIN) 300 mg capsule Take 300 mg by mouth daily   12/3/2020    imiquimod (ALDARA) 5 % cream APPLY TO AFFECTED AREA(S) TOPICALLY EVERY DAY AT BEDTIME   12/3/2020    lithium carbonate 300 mg capsule Take 300 mg by mouth daily at bedtime   12/3/2020    LORazepam (ATIVAN) 1 mg tablet Take 1 5 mg by mouth daily at bedtime   12/3/2020    mupirocin (BACTROBAN) 2 % ointment APPLY TO AFFECTED AREA(S) TOPICALLY TWO TIMES DAILY   12/3/2020    omeprazole (PriLOSEC) 20 mg delayed release capsule Take 20 mg by mouth daily   12/3/2020    [] predniSONE 50 mg tablet Take 1 tablet (50 mg total) by mouth daily for 5 days 5 tablet 0 12/3/2020    sertraline (ZOLOFT) 100 mg tablet Take 150 mg by mouth daily   12/3/2020    traZODone (DESYREL) 100 mg tablet Take 200 mg by mouth daily at bedtime   12/3/2020     Current Meds:   Scheduled Meds:  Current Facility-Administered Medications   Medication Dose Route Frequency Provider Last Rate    acetaminophen  650 mg Oral Q6H PRN Heron Hoffmann MD      albuterol  2 puff Inhalation Q4H PRN Sweta Raymond DO      aluminum-magnesium hydroxide-simethicone  30 mL Oral Q6H PRN Heron Hoffmann MD      ascorbic acid  1,000 mg Oral Q12H [facility] Heron Hoffmann MD      atorvastatin  40 mg Oral HS Chapo Fredyumecesar, PA-C      cholecalciferol  2,000 Units Oral Daily Heron Hoffmann MD      dexamethasone  6 mg Intravenous Q24H Heron Hoffmann MD      docusate sodium  100 mg Oral BID Heron Hoffmann MD      enoxaparin  30 mg Subcutaneous Q12H [facility] Sweta Chun DO      famotidine  20 mg Oral Daily Heron Hoffmann MD      fluticasone  2 spray Each Nare Daily Chapo Grumet, PA-C      lithium carbonate  300 mg Oral HS Chapo Grumet, PA-C      LORazepam  0 5 mg Oral HS PRN Chapo Grumet, PA-C      zinc sulfate  220 mg Per NG Tube Daily Heron Hoffmann MD      Followed by   Vaishnavi Mckinney ON 12/11/2020] multivitamin with iron-minerals  15 mL Per NG Tube Daily Heron Hoffmann MD      ondansetron  4 mg Intravenous Q6H PRN Heron Hoffmann MD      remdesivir  100 mg Intravenous Q24H Heron Hoffmann MD      sertraline  100 mg Oral HS Chapo Grumet, PA-C      sodium chloride  1 spray Each Nare Q2H PRN Chapo Grumet, PA-C      traZODone  100 mg Oral HS Chapo Grumet, PA-C       PRN Meds:acetaminophen, 650 mg, Q6H PRN  albuterol, 2 puff, Q4H PRN  aluminum-magnesium hydroxide-simethicone, 30 mL, Q6H PRN  LORazepam, 0 5 mg, HS PRN  ondansetron, 4 mg, Q6H PRN  sodium chloride, 1 spray, Q2H PRN        ____________________________________________________________________    Objective   Vitals:   Temp:  [98 3 °F (36 8 °C)-99 8 °F (37 7 °C)] 98 3 °F (36 8 °C)  HR:  [70-74] 71  BP: (122-126)/(67-68) 126/67  Weight (last 2 days)     Date/Time   Weight    12/04/20 0921   79 4 (175)            Vitals:    12/05/20 1605 12/05/20 2023 20 2229 20 0858   BP: 122/68  124/67 126/67   BP Location:       Pulse: 73 71 74 71   Resp:       Temp: 99 8 °F (37 7 °C) 98 4 °F (36 9 °C) 98 9 °F (37 2 °C) 98 3 °F (36 8 °C)   TempSrc:       SpO2: 92% (!) 89% (!) 89% 91%   Weight:       Height:         Temp (24hrs), Av °F (37 2 °C), Min:98 3 °F (36 8 °C), Max:99 8 °F (37 7 °C)  Current: Temperature: 98 3 °F (36 8 °C)        SpO2: SpO2: 91 %, SpO2 Activity: SpO2 Activity: At Rest, SpO2 Device: O2 Device: Nasal cannula      IV Infusions:        Nutrition:        Diet Orders   (From admission, onward)             Start     Ordered    20 0834  Diet Regular; Regular House  Diet effective now     Question Answer Comment   Diet Type Regular    Regular Regular House    Special Instructions Disposable Meal    RD to adjust diet per protocol? Yes        20 0833                  Ins/Outs:   I/O       701 -  07 -  07 -  0700    P  O   120     IV Piggyback 400      Total Intake(mL/kg) 400 (5) 120 (1 5)     Net +400 +120            Unmeasured Urine Occurrence 1 x 2 x             Lines/Drains:  Invasive Devices     Peripheral Intravenous Line            Peripheral IV 20 Left Antecubital 1 day                ____________________________________________________________________      Physical Exam  Constitutional:       Appearance: She is well-developed  HENT:      Head: Normocephalic and atraumatic  Eyes:      Conjunctiva/sclera: Conjunctivae normal       Pupils: Pupils are equal, round, and reactive to light  Neck:      Musculoskeletal: Normal range of motion and neck supple  Cardiovascular:      Rate and Rhythm: Normal rate and regular rhythm  Heart sounds: Normal heart sounds  Pulmonary:      Effort: Pulmonary effort is normal  No tachypnea, accessory muscle usage or respiratory distress  Breath sounds: Examination of the right-upper field reveals rales   Examination of the left-upper field reveals rales  Examination of the right-middle field reveals rales  Examination of the left-middle field reveals rales  Examination of the right-lower field reveals rales  Examination of the left-lower field reveals rales  Rales present  No wheezing or rhonchi  Comments:   93% 6 L nasal cannula  Chest:      Chest wall: No tenderness  Abdominal:      General: Bowel sounds are normal       Palpations: Abdomen is soft  Musculoskeletal: Normal range of motion  Right lower leg: No edema  Left lower leg: No edema  Skin:     General: Skin is warm and dry  Neurological:      Mental Status: She is alert and oriented to person, place, and time           ____________________________________________________________________    Invasive/non-invasive ventilation settings   Respiratory    Lab Data (Last 4 hours)    None         O2/Vent Data (Last 4 hours)    None                Laboratory and Diagnostics:  Results from last 7 days   Lab Units 12/06/20  0558 12/05/20  0537 12/04/20  0959   WBC Thousand/uL 4 45 4 46 4 85   HEMOGLOBIN g/dL 13 5 13 0 13 9   HEMATOCRIT % 41 6 40 8 44 1   PLATELETS Thousands/uL 147* 116* 116*   NEUTROS PCT %  --  71 72   MONOS PCT %  --  5 5     Results from last 7 days   Lab Units 12/06/20  0558 12/05/20  0537 12/04/20  0959   SODIUM mmol/L 144 142 141   POTASSIUM mmol/L 3 6 3 6 3 8   CHLORIDE mmol/L 114* 110* 106   CO2 mmol/L 24 25 29   ANION GAP mmol/L 6 7 6   BUN mg/dL 22 19 21   CREATININE mg/dL 0 78 0 74 1 15   CALCIUM mg/dL 9 0 9 0 8 9   GLUCOSE RANDOM mg/dL 102 89 116   ALT U/L 36 41 31   AST U/L 40 42 33   ALK PHOS U/L 85 81 85   ALBUMIN g/dL 3 4* 3 5 3 7   TOTAL BILIRUBIN mg/dL 0 28 0 27 0 27               Results from last 7 days   Lab Units 12/05/20  2123 12/05/20  0537 12/04/20  1836 12/04/20  1602 12/04/20  1304 12/04/20  0959   TROPONIN I ng/mL 0 14* 0 22* 0 28* 0 30* 0 32* 0 26*         ABG:    VBG:    Results from last 7 days   Lab Units 12/06/20  0558 12/05/20  0542 12/04/20  0959   PROCALCITONIN ng/ml <0 05 <0 05 0 05       Micro        Imaging:   XR chest 1 view portable   Final Result by Grzegorz Mckeon MD (12/04 1013)      Left lung base infiltrate  In the setting of clinically suspected/proven COVID-19, this plain film appearance does not contain findings that raise concern for viral pneumonia such as COVID-19, but does not rule out this diagnosis  The study was marked in EPIC for significant notification        Workstation performed: [id number]               Micro: No results found for: Ric Rodriguez [place], Mae Merino     ____________________________________________________________________

## 2020-12-06 NOTE — ASSESSMENT & PLAN NOTE
50-year-old female with past medical history of asthma, GERD, major depression anxiety disorder, chronic pain diagnosed with COVID-19 11/29  +COVID 19  Has  moderate Severity Range infection /  Currently on  6 L NC   Last fever 100 4 2 days ago    Dexamethasone day #  3  Remdesivir day #  3  Vitamin C 1000 mg day #  3  Zinc 220 mg Day #  3  Vit D 2000 U day #  3  IL6 : pending  Type and Screen for Convalescent Plasma:      discussed convalescent plasma the patient  And that she is likely  Either out or near ending the acute phase of illness  If worsening respiratory failure despite this could potentially utilize  There is some potential benefit for this at this time  She is agreeable to this  Informed will discuss with Pulmonary attending for ordering convalescent plasma      Re emphasized PRONING        Results from last 7 days   Lab Units 12/06/20  0559 12/06/20  0558 12/05/20  0537 12/04/20  0959   D-DIMER QUANTITATIVE ug/ml FEU 0 83*  --  0 78* 0 64*   CRP mg/L  --  49 1* 75 0* 36 6*   FERRITIN ng/mL  --  513* 435* 369     Results from last 7 days   Lab Units 12/06/20  0558 12/05/20  0542 12/04/20  0959   PROCALCITONIN ng/ml <0 05 <0 05 0 05

## 2020-12-06 NOTE — QUICK NOTE
Earlier in night patient complaining of some chest tightness  States it has been ongoing through day, unchanged tonight  HR 60-70s EKG NSR, TWI improved in inferior leads and worse in anterior leads compared to admission EKG however nearly identical to past EKGs  Troponin trend down from 0 22 to 0 14  SpO2 87-89% on 4L, increased to 6L with no effect  VSS otherwise  No increased WOB or distress  Congested  Humidify NC, Flonase, ocean nasal spray q2hr prn  With this, O2 improved to mid 90s  Sustaining low 90s on 6L through night  Upgrade to moderate pathway for now, may be able to down titrate today  Will trend labs this AM  Consult placed to pulmonology  Continue to monitor and notify of change

## 2020-12-06 NOTE — CASE MANAGEMENT
TC to pt in room & explained CM role  Pt reports she lives alone in [address] apt with 1 shereen  Reports was Farrah Burnham, works & drives  No dme  No h/o vna or rhb  Denies any MH,D&A tx  Uses [facility] [address]  No POA  [employer] contact, boyfriend Vianca Robert [phone]  Will have ride home  CM reviewed d/c planning process including the following: identifying help at home, patient preference for d/c planning needs, Discharge Lounge, [facility] Meds to Bed program, availability of treatment team to discuss questions or concerns patient and/or family may have regarding understanding medications and recognizing signs and symptoms once discharged  CM also encouraged patient to follow up with all recommended appointments after discharge  Patient advised of importance for patient and family to participate in managing patients medical well being

## 2020-12-06 NOTE — ASSESSMENT & PLAN NOTE
Acute hypoxemic respiratory failure secondary to COVID-19   titrate for oxygen saturation 90%   currently on 6 L cannula   currently on [employer] continuous oxygen monitoring system

## 2020-12-06 NOTE — PLAN OF CARE
Problem: Potential for Falls  Goal: Patient will remain free of falls  Description: INTERVENTIONS:  - Assess patient frequently for physical needs  -  Identify cognitive and physical deficits and behaviors that affect risk of falls    -  [place] fall precautions as indicated by assessment   - Educate patient/family on patient safety including physical limitations  - Instruct patient to call for assistance with activity based on assessment  - Modify environment to reduce risk of injury  - Consider OT/PT consult to assist with strengthening/mobility  Outcome: Progressing     Problem: RESPIRATORY - ADULT  Goal: Achieves optimal ventilation and oxygenation  Description: INTERVENTIONS:  - Assess for changes in respiratory status  - Assess for changes in mentation and behavior  - Position to facilitate oxygenation and minimize respiratory effort  - Oxygen administered by appropriate delivery if ordered  - Initiate smoking cessation education as indicated  - Encourage broncho-pulmonary hygiene including cough, deep breathe, Incentive Spirometry  - Assess the need for suctioning and aspirate as needed  - Assess and instruct to report SOB or any respiratory difficulty  - Respiratory Therapy support as indicated  Outcome: Progressing     Problem: METABOLIC, FLUID AND ELECTROLYTES - ADULT  Goal: Electrolytes maintained within normal limits  Description: INTERVENTIONS:  - Monitor labs and assess patient for signs and symptoms of electrolyte imbalances  - Administer electrolyte replacement as ordered  - Monitor response to electrolyte replacements, including repeat lab results as appropriate  - Instruct patient on fluid and nutrition as appropriate  Outcome: Progressing  Goal: Fluid balance maintained  Description: INTERVENTIONS:  - Monitor labs   - Monitor I/O and WT  - Instruct patient on fluid and nutrition as appropriate  - Assess for signs & symptoms of volume excess or deficit  Outcome: Progressing     Problem: HEMATOLOGIC - ADULT  Goal: Maintains hematologic stability  Description: INTERVENTIONS  - Assess for signs and symptoms of bleeding or hemorrhage  - Monitor labs  - Administer supportive blood products/factors as ordered and appropriate  Outcome: Progressing     Problem: INFECTION - ADULT  Goal: Absence or prevention of progression during hospitalization  Description: INTERVENTIONS:  - Assess and monitor for signs and symptoms of infection  - Monitor lab/diagnostic results  - Monitor all insertion sites, i e  indwelling lines, tubes, and drains  - Monitor endotracheal if appropriate and nasal secretions for changes in amount and color  - [place] appropriate cooling/warming therapies per order  - Administer medications as ordered  - Instruct and encourage patient and family to use good hand hygiene technique  - Identify and instruct in appropriate isolation precautions for identified infection/condition  Outcome: Progressing  Goal: Absence of fever/infection during neutropenic period  Description: INTERVENTIONS:  - Monitor WBC    Outcome: Progressing     Problem: SAFETY ADULT  Goal: Patient will remain free of falls  Description: INTERVENTIONS:  - Assess patient frequently for physical needs  -  Identify cognitive and physical deficits and behaviors that affect risk of falls    -  [place] fall precautions as indicated by assessment   - Educate patient/family on patient safety including physical limitations  - Instruct patient to call for assistance with activity based on assessment  - Modify environment to reduce risk of injury  - Consider OT/PT consult to assist with strengthening/mobility  Outcome: Progressing  Goal: Maintain or return to baseline ADL function  Description: INTERVENTIONS:  -  Assess patient's ability to carry out ADLs; assess patient's baseline for ADL function and identify physical deficits which impact ability to perform ADLs (bathing, care of mouth/teeth, toileting, grooming, dressing, etc )  - Assess/evaluate cause of self-care deficits   - Assess range of motion  - Assess patient's mobility; develop plan if impaired  - Assess patient's need for assistive devices and provide as appropriate  - Encourage maximum independence but intervene and supervise when necessary  - Involve family in performance of ADLs  - Assess for home care needs following discharge   - Consider OT consult to assist with ADL evaluation and planning for discharge  - Provide patient education as appropriate  Outcome: Progressing  Goal: Maintain or return mobility status to optimal level  Description: INTERVENTIONS:  - Assess patient's baseline mobility status (ambulation, transfers, stairs, etc )    - Identify cognitive and physical deficits and behaviors that affect mobility  - Identify mobility aids required to assist with transfers and/or ambulation (gait belt, sit-to-stand, lift, walker, cane, etc )  - [place] fall precautions as indicated by assessment  - Record patient progress and toleration of activity level on Mobility SBAR; progress patient to next Phase/Stage  - Instruct patient to call for assistance with activity based on assessment  - Consider rehabilitation consult to assist with strengthening/weightbearing, etc   Outcome: Progressing     Problem: DISCHARGE PLANNING  Goal: Discharge to home or other facility with appropriate resources  Description: INTERVENTIONS:  - Identify barriers to discharge w/patient and caregiver  - Arrange for needed discharge resources and transportation as appropriate  - Identify discharge learning needs (meds, wound care, etc )  - Arrange for interpretive services to assist at discharge as needed  - Refer to Case Management Department for coordinating discharge planning if the patient needs post-hospital services based on physician/advanced practitioner order or complex needs related to functional status, cognitive ability, or social support system  Outcome: Progressing     Problem: Knowledge Deficit  Goal: Patient/family/caregiver demonstrates understanding of disease process, treatment plan, medications, and discharge instructions  Description: Complete learning assessment and assess knowledge base    Interventions:  - Provide teaching at level of understanding  - Provide teaching via preferred learning methods  Outcome: Progressing

## 2020-12-06 NOTE — PLAN OF CARE
Problem: Potential for Falls  Goal: Patient will remain free of falls  Description: INTERVENTIONS:  - Assess patient frequently for physical needs  -  Identify cognitive and physical deficits and behaviors that affect risk of falls    -  [place] fall precautions as indicated by assessment   - Educate patient/family on patient safety including physical limitations  - Instruct patient to call for assistance with activity based on assessment  - Modify environment to reduce risk of injury  - Consider OT/PT consult to assist with strengthening/mobility  Outcome: Progressing     Problem: RESPIRATORY - ADULT  Goal: Achieves optimal ventilation and oxygenation  Description: INTERVENTIONS:  - Assess for changes in respiratory status  - Assess for changes in mentation and behavior  - Position to facilitate oxygenation and minimize respiratory effort  - Oxygen administered by appropriate delivery if ordered  - Initiate smoking cessation education as indicated  - Encourage broncho-pulmonary hygiene including cough, deep breathe, Incentive Spirometry  - Assess the need for suctioning and aspirate as needed  - Assess and instruct to report SOB or any respiratory difficulty  - Respiratory Therapy support as indicated  Outcome: Progressing     Problem: METABOLIC, FLUID AND ELECTROLYTES - ADULT  Goal: Electrolytes maintained within normal limits  Description: INTERVENTIONS:  - Monitor labs and assess patient for signs and symptoms of electrolyte imbalances  - Administer electrolyte replacement as ordered  - Monitor response to electrolyte replacements, including repeat lab results as appropriate  - Instruct patient on fluid and nutrition as appropriate  Outcome: Progressing  Goal: Fluid balance maintained  Description: INTERVENTIONS:  - Monitor labs   - Monitor I/O and WT  - Instruct patient on fluid and nutrition as appropriate  - Assess for signs & symptoms of volume excess or deficit  Outcome: Progressing     Problem: HEMATOLOGIC - ADULT  Goal: Maintains hematologic stability  Description: INTERVENTIONS  - Assess for signs and symptoms of bleeding or hemorrhage  - Monitor labs  - Administer supportive blood products/factors as ordered and appropriate  Outcome: Progressing     Problem: INFECTION - ADULT  Goal: Absence or prevention of progression during hospitalization  Description: INTERVENTIONS:  - Assess and monitor for signs and symptoms of infection  - Monitor lab/diagnostic results  - Monitor all insertion sites, i e  indwelling lines, tubes, and drains  - Monitor endotracheal if appropriate and nasal secretions for changes in amount and color  - [place] appropriate cooling/warming therapies per order  - Administer medications as ordered  - Instruct and encourage patient and family to use good hand hygiene technique  - Identify and instruct in appropriate isolation precautions for identified infection/condition  Outcome: Progressing  Goal: Absence of fever/infection during neutropenic period  Description: INTERVENTIONS:  - Monitor WBC    Outcome: Progressing     Problem: SAFETY ADULT  Goal: Patient will remain free of falls  Description: INTERVENTIONS:  - Assess patient frequently for physical needs  -  Identify cognitive and physical deficits and behaviors that affect risk of falls    -  [place] fall precautions as indicated by assessment   - Educate patient/family on patient safety including physical limitations  - Instruct patient to call for assistance with activity based on assessment  - Modify environment to reduce risk of injury  - Consider OT/PT consult to assist with strengthening/mobility  Outcome: Progressing  Goal: Maintain or return to baseline ADL function  Description: INTERVENTIONS:  -  Assess patient's ability to carry out ADLs; assess patient's baseline for ADL function and identify physical deficits which impact ability to perform ADLs (bathing, care of mouth/teeth, toileting, grooming, dressing, etc )  - Assess/evaluate cause of self-care deficits   - Assess range of motion  - Assess patient's mobility; develop plan if impaired  - Assess patient's need for assistive devices and provide as appropriate  - Encourage maximum independence but intervene and supervise when necessary  - Involve family in performance of ADLs  - Assess for home care needs following discharge   - Consider OT consult to assist with ADL evaluation and planning for discharge  - Provide patient education as appropriate  Outcome: Progressing  Goal: Maintain or return mobility status to optimal level  Description: INTERVENTIONS:  - Assess patient's baseline mobility status (ambulation, transfers, stairs, etc )    - Identify cognitive and physical deficits and behaviors that affect mobility  - Identify mobility aids required to assist with transfers and/or ambulation (gait belt, sit-to-stand, lift, walker, cane, etc )  - [place] fall precautions as indicated by assessment  - Record patient progress and toleration of activity level on Mobility SBAR; progress patient to next Phase/Stage  - Instruct patient to call for assistance with activity based on assessment  - Consider rehabilitation consult to assist with strengthening/weightbearing, etc   Outcome: Progressing     Problem: DISCHARGE PLANNING  Goal: Discharge to home or other facility with appropriate resources  Description: INTERVENTIONS:  - Identify barriers to discharge w/patient and caregiver  - Arrange for needed discharge resources and transportation as appropriate  - Identify discharge learning needs (meds, wound care, etc )  - Arrange for interpretive services to assist at discharge as needed  - Refer to Case Management Department for coordinating discharge planning if the patient needs post-hospital services based on physician/advanced practitioner order or complex needs related to functional status, cognitive ability, or social support system  Outcome: Progressing     Problem: Knowledge Deficit  Goal: Patient/family/caregiver demonstrates understanding of disease process, treatment plan, medications, and discharge instructions  Description: Complete learning assessment and assess knowledge base    Interventions:  - Provide teaching at level of understanding  - Provide teaching via preferred learning methods  Outcome: Progressing

## 2020-12-07 LAB
ABO GROUP BLD BPU: NORMAL
ALBUMIN SERPL BCP-MCNC: 4 G/DL (ref 3.5–5)
ALP SERPL-CCNC: 101 U/L (ref 46–116)
ALT SERPL W P-5'-P-CCNC: 56 U/L (ref 12–78)
ANION GAP SERPL CALCULATED.3IONS-SCNC: 7 MMOL/L (ref 4–13)
AST SERPL W P-5'-P-CCNC: 57 U/L (ref 5–45)
ATRIAL RATE: 80 BPM
BILIRUB SERPL-MCNC: 0.49 MG/DL (ref 0.2–1)
BPU ID: NORMAL
BUN SERPL-MCNC: 26 MG/DL (ref 5–25)
CALCIUM SERPL-MCNC: 9.5 MG/DL (ref 8.3–10.1)
CHLORIDE SERPL-SCNC: 112 MMOL/L (ref 100–108)
CO2 SERPL-SCNC: 27 MMOL/L (ref 21–32)
CREAT SERPL-MCNC: 0.93 MG/DL (ref 0.6–1.3)
CRP SERPL QL: 44.5 MG/L
D DIMER PPP FEU-MCNC: 0.8 UG/ML FEU
ERYTHROCYTE [DISTWIDTH] IN BLOOD BY AUTOMATED COUNT: 13.6 % (ref 11.6–15.1)
FERRITIN SERPL-MCNC: 526 NG/ML (ref 8–388)
GFR SERPL CREATININE-BSD FRML MDRD: 64 ML/MIN/1.73SQ M
GLUCOSE SERPL-MCNC: 109 MG/DL (ref 65–140)
HCT VFR BLD AUTO: 46.8 % (ref 34.8–46.1)
HGB BLD-MCNC: 15.1 G/DL (ref 11.5–15.4)
MCH RBC QN AUTO: 26.9 PG (ref 26.8–34.3)
MCHC RBC AUTO-ENTMCNC: 32.3 G/DL (ref 31.4–37.4)
MCV RBC AUTO: 83 FL (ref 82–98)
P AXIS: 72 DEGREES
PLATELET # BLD AUTO: 206 THOUSANDS/UL (ref 149–390)
PMV BLD AUTO: 11 FL (ref 8.9–12.7)
POTASSIUM SERPL-SCNC: 3.5 MMOL/L (ref 3.5–5.3)
PR INTERVAL: 113 MS
PROCALCITONIN SERPL-MCNC: <0.05 NG/ML
PROT SERPL-MCNC: 7.9 G/DL (ref 6.4–8.2)
QRS AXIS: 83 DEGREES
QRSD INTERVAL: 88 MS
QT INTERVAL: 358 MS
QTC INTERVAL: 413 MS
RBC # BLD AUTO: 5.61 MILLION/UL (ref 3.81–5.12)
SODIUM SERPL-SCNC: 146 MMOL/L (ref 136–145)
T WAVE AXIS: 246 DEGREES
UNIT DISPENSE STATUS: NORMAL
UNIT PRODUCT CODE: NORMAL
UNIT RH: NORMAL
VENTRICULAR RATE: 80 BPM
WBC # BLD AUTO: 6.31 THOUSAND/UL (ref 4.31–10.16)

## 2020-12-07 PROCEDURE — 99223 1ST HOSP IP/OBS HIGH 75: CPT | Performed by: INTERNAL MEDICINE

## 2020-12-07 PROCEDURE — 84145 PROCALCITONIN (PCT): CPT | Performed by: PHYSICIAN ASSISTANT

## 2020-12-07 PROCEDURE — 94760 N-INVAS EAR/PLS OXIMETRY 1: CPT

## 2020-12-07 PROCEDURE — 85027 COMPLETE CBC AUTOMATED: CPT | Performed by: PHYSICIAN ASSISTANT

## 2020-12-07 PROCEDURE — 93005 ELECTROCARDIOGRAM TRACING: CPT

## 2020-12-07 PROCEDURE — 93010 ELECTROCARDIOGRAM REPORT: CPT | Performed by: INTERNAL MEDICINE

## 2020-12-07 PROCEDURE — 80053 COMPREHEN METABOLIC PANEL: CPT | Performed by: PHYSICIAN ASSISTANT

## 2020-12-07 PROCEDURE — 85379 FIBRIN DEGRADATION QUANT: CPT | Performed by: PHYSICIAN ASSISTANT

## 2020-12-07 PROCEDURE — 82728 ASSAY OF FERRITIN: CPT | Performed by: INTERNAL MEDICINE

## 2020-12-07 PROCEDURE — 86140 C-REACTIVE PROTEIN: CPT | Performed by: INTERNAL MEDICINE

## 2020-12-07 PROCEDURE — 99232 SBSQ HOSP IP/OBS MODERATE 35: CPT | Performed by: INTERNAL MEDICINE

## 2020-12-07 RX ORDER — MAGNESIUM HYDROXIDE/ALUMINUM HYDROXICE/SIMETHICONE 120; 1200; 1200 MG/30ML; MG/30ML; MG/30ML
30 SUSPENSION ORAL EVERY 4 HOURS PRN
Status: DISCONTINUED | OUTPATIENT
Start: 2020-12-07 | End: 2021-01-11 | Stop reason: HOSPADM

## 2020-12-07 RX ORDER — POLYETHYLENE GLYCOL 3350 17 G/17G
17 POWDER, FOR SOLUTION ORAL DAILY
Status: DISCONTINUED | OUTPATIENT
Start: 2020-12-07 | End: 2021-01-01

## 2020-12-07 RX ORDER — GABAPENTIN 300 MG/1
300 CAPSULE ORAL DAILY
Status: DISCONTINUED | OUTPATIENT
Start: 2020-12-07 | End: 2021-01-11 | Stop reason: HOSPADM

## 2020-12-07 RX ORDER — FUROSEMIDE 10 MG/ML
20 INJECTION INTRAMUSCULAR; INTRAVENOUS ONCE
Status: COMPLETED | OUTPATIENT
Start: 2020-12-07 | End: 2020-12-07

## 2020-12-07 RX ORDER — AMOXICILLIN 250 MG
1 CAPSULE ORAL 2 TIMES DAILY
Status: DISCONTINUED | OUTPATIENT
Start: 2020-12-07 | End: 2020-12-24

## 2020-12-07 RX ORDER — FAMOTIDINE 20 MG/1
20 TABLET, FILM COATED ORAL EVERY 12 HOURS
Status: DISCONTINUED | OUTPATIENT
Start: 2020-12-07 | End: 2021-01-11 | Stop reason: HOSPADM

## 2020-12-07 RX ORDER — BISACODYL 10 MG
10 SUPPOSITORY, RECTAL RECTAL DAILY PRN
Status: DISCONTINUED | OUTPATIENT
Start: 2020-12-07 | End: 2020-12-27

## 2020-12-07 RX ORDER — LANOLIN ALCOHOL/MO/W.PET/CERES
6 CREAM (GRAM) TOPICAL
Status: DISCONTINUED | OUTPATIENT
Start: 2020-12-07 | End: 2021-01-11 | Stop reason: HOSPADM

## 2020-12-07 RX ADMIN — ALUMINUM HYDROXIDE, MAGNESIUM HYDROXIDE, AND SIMETHICONE 30 ML: 200; 200; 20 SUSPENSION ORAL at 21:34

## 2020-12-07 RX ADMIN — FLUTICASONE PROPIONATE 2 SPRAY: 50 SPRAY, METERED NASAL at 08:00

## 2020-12-07 RX ADMIN — ALUMINUM HYDROXIDE, MAGNESIUM HYDROXIDE, AND SIMETHICONE 30 ML: 200; 200; 20 SUSPENSION ORAL at 15:10

## 2020-12-07 RX ADMIN — Medication 2000 UNITS: at 10:13

## 2020-12-07 RX ADMIN — LITHIUM CARBONATE 300 MG: 300 CAPSULE, GELATIN COATED ORAL at 21:13

## 2020-12-07 RX ADMIN — DEXAMETHASONE SODIUM PHOSPHATE 6 MG: 4 INJECTION INTRA-ARTICULAR; INTRALESIONAL; INTRAMUSCULAR; INTRAVENOUS; SOFT TISSUE at 12:30

## 2020-12-07 RX ADMIN — OXYCODONE HYDROCHLORIDE AND ACETAMINOPHEN 1000 MG: 500 TABLET ORAL at 10:13

## 2020-12-07 RX ADMIN — OXYMETAZOLINE HYDROCHLORIDE 2 SPRAY: 0.05 SPRAY NASAL at 07:56

## 2020-12-07 RX ADMIN — ALUMINUM HYDROXIDE, MAGNESIUM HYDROXIDE, AND SIMETHICONE 30 ML: 200; 200; 20 SUSPENSION ORAL at 11:41

## 2020-12-07 RX ADMIN — LORAZEPAM 1.5 MG: 1 TABLET ORAL at 21:12

## 2020-12-07 RX ADMIN — LORAZEPAM 1 MG: 1 TABLET ORAL at 04:56

## 2020-12-07 RX ADMIN — LORAZEPAM 1 MG: 1 TABLET ORAL at 15:09

## 2020-12-07 RX ADMIN — SERTRALINE HYDROCHLORIDE 100 MG: 100 TABLET ORAL at 21:13

## 2020-12-07 RX ADMIN — ENOXAPARIN SODIUM 30 MG: 30 INJECTION SUBCUTANEOUS at 21:14

## 2020-12-07 RX ADMIN — TRAZODONE HYDROCHLORIDE 100 MG: 100 TABLET ORAL at 21:13

## 2020-12-07 RX ADMIN — ACETAMINOPHEN 650 MG: 325 TABLET, FILM COATED ORAL at 21:13

## 2020-12-07 RX ADMIN — ZINC SULFATE 220 MG (50 MG) CAPSULE 220 MG: CAPSULE at 10:13

## 2020-12-07 RX ADMIN — Medication 1 SPRAY: at 07:55

## 2020-12-07 RX ADMIN — FAMOTIDINE 20 MG: 20 TABLET ORAL at 21:24

## 2020-12-07 RX ADMIN — REMDESIVIR 100 MG: 100 INJECTION, POWDER, LYOPHILIZED, FOR SOLUTION INTRAVENOUS at 15:59

## 2020-12-07 RX ADMIN — OXYCODONE HYDROCHLORIDE AND ACETAMINOPHEN 1000 MG: 500 TABLET ORAL at 21:11

## 2020-12-07 RX ADMIN — ATORVASTATIN CALCIUM 40 MG: 40 TABLET, FILM COATED ORAL at 21:13

## 2020-12-07 RX ADMIN — FUROSEMIDE 20 MG: 10 INJECTION, SOLUTION INTRAMUSCULAR; INTRAVENOUS at 05:15

## 2020-12-07 RX ADMIN — ENOXAPARIN SODIUM 30 MG: 30 INJECTION SUBCUTANEOUS at 07:51

## 2020-12-07 RX ADMIN — OXYMETAZOLINE HYDROCHLORIDE 2 SPRAY: 0.05 SPRAY NASAL at 21:40

## 2020-12-07 RX ADMIN — SENNOSIDES AND DOCUSATE SODIUM 1 TABLET: 8.6; 5 TABLET ORAL at 19:00

## 2020-12-07 RX ADMIN — GABAPENTIN 300 MG: 300 CAPSULE ORAL at 10:13

## 2020-12-07 RX ADMIN — FAMOTIDINE 20 MG: 20 TABLET ORAL at 07:49

## 2020-12-07 NOTE — ASSESSMENT & PLAN NOTE
Acute hypoxic respiratory failure likely due to COVID-19 infection  Currently requiring 15 L mid flow oxygen saturating low 90s  Received p r n  Doses Lasix  Plan  Continue supplemental oxygen maintain O2 sats more than 92-94%  Encourage prone  See below for details of COVID 19 management  Looks euvolemic   P r n   Lasix

## 2020-12-07 NOTE — QUICK NOTE
Overnight patient titrated down from 13L to 7L mid flow, SpO2 maintaining low 90s through night  This AM desat to 80s on 7L  No increased WOB or SOB  Crackles left lung  Received 20mg IV lasix last evening with good response  Order for additional 20mg IV lasix now  Also with ongoing anxiety, dose of 1mg PO ativan  Uptitrated to 15L mid flow  Slow to recover, sustaining 90%  Continually encouraging to prone, tolerates for short time then lays on side  Will upgrade level of care to L2SD

## 2020-12-07 NOTE — PROGRESS NOTES
Patient found to be desatting on 6L of O2  O2 sat of 85%  Patient titrated up to 15L and still only 87%  Respiratory therapy contacted  Patient put on 13L mid flow oxygen  O2 sat now in the mid 90's  Pulmonology ordered one time dose of Lasix  Will continue to monitor

## 2020-12-07 NOTE — ASSESSMENT & PLAN NOTE
Elevated troponin, peak troponin 0 32- likely due to Covid-19 infection  Trended down to 0 14  ECG - T wave inversions noted  Monitor

## 2020-12-07 NOTE — CONSULTS
Jethro Mcdowell 76 y o  female MRN: [id number]  [facility] Encounter: [id number]      -------------------------------------------------------------------------------------------------------------  Chief Complaint: "I need another ativan"     History of Present Illness     Vin Carrasco is a 76 y o  female who presented on 12/4/20 with worsening shortness of breath after having a positive COVID test on 11/29/20  She began developing symptoms around Thanksgiving  She reports dyspnea, diarrhea, fatigue and weakness  She denies chest pain or headache  She also has significant anxiety  She was admitted to the general medical floor, but had increasing oxygen requirements from regular nasal cannula to midflow  She was transferred as a step down level 2 to the MICU this AM      History obtained from chart review and the patient   -------------------------------------------------------------------------------------------------------------  Assessment and Plan:      Neuro:    Bipolar disorder  o Sertraline 100mg qHS   o Lithium 300mg qHS    o Gabapentin 300mg daily   o Ativan 1mg BID PRN  o Sleep/wake cycle regulation  - Trazodone 100mg qHS   - Melatonin 6mg qHS   o CAM-ICU BID  o Neuro checks routine      CV:    No acute issues   o Maintain MAP >65  o Maintain SBP <180  ? COVID Cardiac markers(12/5/20):   § Troponin: 0 32 (peak)   § BNP: 114  § CK: 105  § Trend as needed   ? Continue to closely monitor for signs of developing cardiomyopathy  o Continue close telemetry monitoring       Pulm:   Acute hypoxic respiratory failure secondary to COVID-19 (POA)   o Continue midflow O2  o Albuterol PRN   o Maintain SpO2 > 90% per protocol as able   o Self-proning as able  o  Continue pulmonary hygiene  Incentive spirometer q1h while awake, encourage coughing and deep breathing   Upright positioning   o COVID treatment plan detailed below     GI:    Elevated LFT's   o Isolated AST elevation, consistent with COVID-19  o Check CMP as needed   o Discontinue statin if ALT/AST > 3x upper limit of normal    Bowel regimen:  o Senna/colace BID   o Miralax daily   o Dulcolax daily PRN     :    No acute issues   o Baseline creatinine: 0 89 - 0 94  o Admission creatinine: 1 15  o Creatinine peak: 1 15  o Current creatinine: 0 93  o Strict q4h I/O monitoring  o Continue to follow renal function tests    F/E/N:    Fluids:   o Maintenance fluids: None  o Diuresis plan: Received lasix this AM  Hold off further doses today given increase in creatine  Re-dose only if consistently positive    Electrolytes:   o Replete electrolytes with as needed to maintain K >4 0, Mag >2 0, Phos >3 0   Nutrition:   o Regular diet     Heme/Onc:    Hypercoaguable state secondary to COVID-19  o D-Dimer: 0 64 - 0 78 - 0 83 - 0 80  o Trend q1-3 days as needed  o Continue prophylactic anticoagulation   o Start systemic anticoagulation if D-dimer trends > 2 5  o Lovenox 30 mg q12h   o Transfuse for hemoglobin <7 0  o Transfuse for plts <15,000 or < 50,000 in the presence of bleeding    VTE prophylaxis:  Lovenox, SCD's to BLE    Endo:    Prediabetes  o Last hemoglobin A1c 5 7% on 12/4/20  o Monitor BGL on BMP to evaluate for steroid-induced hyperglycemia  o Add insulin regimen as needed to maintain goal -180    ID:   · COVID-19 pneumonia (POA)   ? 11/29/20 COVID-19: Positive  ? Continue anti-inflammatories/COVID therapies:   § Remdesivir (Day # 4/5)  § Dexamethasone 6 mg IV daily, day # 4/10  § Vitamin C 1000mg BID x7 days   § Zinc 220mg daily x7 days   § Atorvastatin 40mg qHS   § Famotidine 20mg BID  § Vitamin D3 2000 IU daily  ? Completed therapies:   § Convalescent Plasma (12/6/20)  ? Inflammatory markers:   § Ferritin: 369  435 - 513 - 526  § CRP: 36 6 - 75 0 - 49 1 - 44 5  § Follow q1-3 days as needed   ?  Continue to closely monitor for signs of secondary bacterial infection   § Procalcitonin: <0 05 x4  § Stopped doxycycline/ceftriaxone   ? Continue to monitor fever and WBC curve offantibiotics   ? CBCD daily    MSK/Skin:    Activity as tolerated  o Encourage TID    Reposition q2h, eliminate pressure points while in bed   Close skin surveillance     Disposition: Continue Stepdown Level 2 level of care   Code Status: Level 1 - Full Code  --------------------------------------------------------------------------------------------------------------  Review of Systems   Constitutional: Positive for appetite change and fatigue  Decreased appetite   HENT: Negative for congestion, sore throat and trouble swallowing  Eyes: Negative  Respiratory: Positive for cough and shortness of breath  Negative for chest tightness and wheezing  Cardiovascular: Negative  Negative for chest pain, palpitations and leg swelling  Gastrointestinal: Positive for diarrhea and nausea  Negative for vomiting  Dyspepsia    Endocrine: Negative  Genitourinary: Negative  Musculoskeletal: Negative  Skin: Negative  Neurological: Positive for weakness  Negative for headaches  Psychiatric/Behavioral: The patient is nervous/anxious  All other systems reviewed and are negative  A 12-point, complete review of systems was reviewed and negative except as stated above     Physical Exam  Vitals signs reviewed  Constitutional:       Appearance: Normal appearance  Interventions: Nasal cannula in place  HENT:      Head: Normocephalic and atraumatic  Mouth/Throat:      Mouth: Mucous membranes are dry  Eyes:      Conjunctiva/sclera: Conjunctivae normal    Neck:      Musculoskeletal: Full passive range of motion without pain  Cardiovascular:      Rate and Rhythm: Normal rate and regular rhythm  Pulses:           Radial pulses are 2+ on the right side and 2+ on the left side  Dorsalis pedis pulses are 2+ on the right side and 2+ on the left side  Heart sounds: Normal heart sounds     Pulmonary: Effort: Pulmonary effort is normal       Breath sounds: Decreased breath sounds present  No wheezing, rhonchi or rales  Comments: Dry cough  Abdominal:      General: Bowel sounds are normal       Palpations: Abdomen is soft  Tenderness: There is no abdominal tenderness  Musculoskeletal:      Right lower leg: No edema  Left lower leg: No edema  Skin:     General: Skin is warm and dry  Capillary Refill: Capillary refill takes less than 2 seconds  Neurological:      General: No focal deficit present  Mental Status: She is alert  GCS: GCS eye subscore is 4  GCS verbal subscore is 5  GCS motor subscore is 6  Psychiatric:         Mood and Affect: Mood is anxious  Behavior: Behavior is cooperative        --------------------------------------------------------------------------------------------------------------  Vitals:   Vitals:    12/07/20 0130 12/07/20 0145 12/07/20 0152 12/07/20 0744   BP:       Pulse: 64 88     Resp:       Temp:       TempSrc:       SpO2: 97% 97% 98% 92%   Weight:       Height:         Temp  Min: 97 7 °F (36 5 °C)  Max: 100 4 °F (38 °C)  IBW: 47 8 kg  Height: 5' 1" (154 9 cm)  Body mass index is 33 07 kg/m²        Laboratory and Diagnostics:  Results from last 7 days   Lab Units 12/07/20 0624 12/06/20 0558 12/05/20 0537 12/04/20  0959   WBC Thousand/uL 6 31 4 45 4 46 4 85   HEMOGLOBIN g/dL 15 1 13 5 13 0 13 9   HEMATOCRIT % 46 8* 41 6 40 8 44 1   PLATELETS Thousands/uL 206 147* 116* 116*   NEUTROS PCT %  --   --  71 72   MONOS PCT %  --   --  5 5     Results from last 7 days   Lab Units 12/07/20 0624 12/06/20 0558 12/05/20  0537 12/04/20  0959   SODIUM mmol/L 146* 144 142 141   POTASSIUM mmol/L 3 5 3 6 3 6 3 8   CHLORIDE mmol/L 112* 114* 110* 106   CO2 mmol/L 27 24 25 29   ANION GAP mmol/L 7 6 7 6   BUN mg/dL 26* 22 19 21   CREATININE mg/dL 0 93 0 78 0 74 1 15   CALCIUM mg/dL 9 5 9 0 9 0 8 9   GLUCOSE RANDOM mg/dL 109 102 89 116   ALT U/L 56 36 41 31   AST U/L 57* 40 42 33   ALK PHOS U/L 101 85 81 85   ALBUMIN g/dL 4 0 3 4* 3 5 3 7   TOTAL BILIRUBIN mg/dL 0 49 0 28 0 27 0 27               Results from last 7 days   Lab Units 20  2123 20  0537 20  1836 20  1602 20  1304 20  0959   TROPONIN I ng/mL 0 14* 0 22* 0 28* 0 30* 0 32* 0 26*         ABG:    VBG:    Results from last 7 days   Lab Units 20  0624 20  0558 20  0542 20  0959   PROCALCITONIN ng/ml <0 05 <0 05 <0 05 0 05       Micro:        EKG: NSR  Imaging: No new imaging over last 24 hours  I have personally reviewed pertinent reports     and I have personally reviewed pertinent films in PACS    Historical Information   Past Medical History:   Diagnosis Date    Asthma     Bipolar disorder ([facility] [address] )     GERD (gastroesophageal reflux disease)      Past Surgical History:   Procedure Laterality Date     SECTION      x3    CHOLECYSTECTOMY      HYSTERECTOMY       Social History   Social History     Substance and Sexual Activity   Alcohol Use Yes    Frequency: 2-4 times a month    Drinks per session: 1 or 2    Comment: rarely     Social History     Substance and Sexual Activity   Drug Use Never     Social History     Tobacco Use   Smoking Status Former Smoker    Packs/day: 0 10    Years: 5 00    Pack years: 0 50    Types: Cigarettes    Quit date: 2018    Years since quittin 1   Smokeless Tobacco Never Used     Family History:   Family History   Problem Relation Age of Onset    Heart disease Mother     Hypertension Mother     Heart disease Father     Hypertension Father      I have reviewed this patient's family history and commented on sigificant items within the HPI      Medications:  Current Facility-Administered Medications   Medication Dose Route Frequency    acetaminophen (TYLENOL) tablet 650 mg  650 mg Oral Q6H PRN    albuterol (PROVENTIL HFA,VENTOLIN HFA) inhaler 2 puff  2 puff Inhalation Q4H PRN    aluminum-magnesium hydroxide-simethicone (MYLANTA) oral suspension 30 mL  30 mL Oral Q6H PRN    ascorbic acid (VITAMIN C) tablet 1,000 mg  1,000 mg Oral Q12H [facility]    atorvastatin (LIPITOR) tablet 40 mg  40 mg Oral HS    cholecalciferol (VITAMIN D3) tablet 2,000 Units  2,000 Units Oral Daily    dexamethasone (DECADRON) injection 6 mg  6 mg Intravenous Q24H    docusate sodium (COLACE) capsule 100 mg  100 mg Oral BID    enoxaparin (LOVENOX) subcutaneous injection 30 mg  30 mg Subcutaneous Q12H [facility]    famotidine (PEPCID) tablet 20 mg  20 mg Oral Q12H    fluticasone (FLONASE) 50 mcg/act nasal spray 2 spray  2 spray Each Nare Daily    gabapentin (NEURONTIN) capsule 300 mg  300 mg Oral Daily    lithium carbonate capsule 300 mg  300 mg Oral HS    LORazepam (ATIVAN) tablet 1 mg  1 mg Oral BID PRN    LORazepam (ATIVAN) tablet 1 5 mg  1 5 mg Oral HS    melatonin tablet 6 mg  6 mg Oral HS    zinc sulfate (ZINCATE) capsule 220 mg  220 mg Per NG Tube Daily    Followed by   Natalie Ventura ON 12/11/2020] multivitamin with iron-minerals liquid 15 mL  15 mL Per NG Tube Daily    ondansetron (ZOFRAN) injection 4 mg  4 mg Intravenous Q6H PRN    oxyCODONE (ROXICODONE) IR tablet 2 5 mg  2 5 mg Oral Q4H PRN    oxymetazoline (AFRIN) 0 05 % nasal spray 2 spray  2 spray Each Nare Q12H LE[facility]    remdesivir (Veklury) 100 mg in sodium chloride 0 9 % 250 mL IVPB  100 mg Intravenous Q24H    sertraline (ZOLOFT) tablet 100 mg  100 mg Oral HS    sodium chloride (OCEAN) 0 65 % nasal spray 1 spray  1 spray Each Nare Q2H PRN    traZODone (DESYREL) tablet 100 mg  100 mg Oral HS     Home medications:  Prior to Admission Medications   Prescriptions Last Dose Informant Patient Reported? Taking?    Fluocinonide Emulsified Base 0 05 % CREA 12/3/2020  Yes Yes   Sig: APPLY TO AFFECTED AREA(S) TOPICALLY TWO TIMES DAILY TO LOWER LEGS FOR 14 DAYS MAX AS NEEDED   LORazepam (ATIVAN) 1 mg tablet 12/3/2020 Self Yes Yes   Sig: Take 1 5 mg by mouth daily at bedtime   albuterol (PROVENTIL HFA,VENTOLIN HFA) 90 mcg/act inhaler 12/3/2020  No Yes   Sig: Inhale 2 puffs every 4 (four) hours as needed for wheezing   diphenhydrAMINE (BENADRYL) 25 mg tablet 12/3/2020 Self Yes Yes   Sig: Take 25 mg by mouth daily at bedtime   erythromycin (ILOTYCIN) ophthalmic ointment 12/3/2020  No Yes   Sig: Administer 0 5 inches into the left eye daily at bedtime   gabapentin (NEURONTIN) 300 mg capsule 12/3/2020 Self Yes Yes   Sig: Take 300 mg by mouth daily   imiquimod (ALDARA) 5 % cream 12/3/2020  Yes Yes   Sig: APPLY TO AFFECTED AREA(S) TOPICALLY EVERY DAY AT BEDTIME   lithium carbonate 300 mg capsule 12/3/2020 Self Yes Yes   Sig: Take 300 mg by mouth daily at bedtime   mupirocin (BACTROBAN) 2 % ointment 12/3/2020  Yes Yes   Sig: APPLY TO AFFECTED AREA(S) TOPICALLY TWO TIMES DAILY   omeprazole (PriLOSEC) 20 mg delayed release capsule 12/3/2020 Self Yes Yes   Sig: Take 20 mg by mouth daily   predniSONE 50 mg tablet 12/3/2020  No Yes   Sig: Take 1 tablet (50 mg total) by mouth daily for 5 days   sertraline (ZOLOFT) 100 mg tablet 12/3/2020 Self Yes Yes   Sig: Take 150 mg by mouth daily   traZODone (DESYREL) 100 mg tablet 12/3/2020 Self Yes Yes   Sig: Take 200 mg by mouth daily at bedtime      Facility-Administered Medications Last Administration Doses Remaining   betamethasone dipropionate (DIPROSONE) 0 05 % ointment None recorded         Allergies:   Allergies   Allergen Reactions    Amphetamine-Dextroamphetamine Other (See Comments)     Chest pain- was placed on Adderall after son passed away for depression, and she developed chest pain in 1990's; she had full cardiac work up, and was negative, so she has allergy to Adderall  Chest pain- was placed on Adderall after son passed away for depression, and she developed chest pain in 1990's; she had full cardiac work up, and was negative, so she has allergy to Adderall    Molds & Smuts     Other      Cats    Sulfa Antibiotics Other (See Comments)     ------------------------------------------------------------------------------------------------------------  Anticipated Length of Stay is > 2 midnights    Care Time Delivered:   No Critical Care time spent       LUIS CARLOS Titus        Portions of the record may have been created with voice recognition software  Occasional wrong word or "sound a like" substitutions may have occurred due to the inherent limitations of voice recognition software    Read the chart carefully and recognize, using context, where substitutions have occurred

## 2020-12-07 NOTE — PROGRESS NOTES
Progress Note - Mercedes Guerrero 1955, 76 y o  female MRN: [id number]    Unit/Bed#: [facility] 03 Encounter: [id number]    Primary Care Provider: LUIS CARLOS Antunez   Date and time admitted to hospital: 12/4/2020  9:15 AM        * Acute respiratory failure with hypoxia ([email or web address] )  Assessment & Plan  Acute hypoxic respiratory failure likely due to COVID-19 infection  Currently requiring 15 L mid flow oxygen saturating low 90s  Received p r n  Doses Lasix  Plan  Continue supplemental oxygen maintain O2 sats more than 92-94%  Encourage prone  See below for details of COVID 19 management  Looks euvolemic   P r n  Lasix    COVID-19 virus infection  Assessment & Plan  COVID-19 infection, tested positive on 11/29/2020  Patient be placed on moderate treatment protocol due to worsening of hypoxia  Labs:  · CRP- 7 5-->4 4  · D dimer 513--> 526  · D-dimer 0 8, procalcitonin negative x2  Plan  · Continue dexamethasone 4/10  · Continue remdesivir 4/5   · Continue vitamin-D, zinc, vitamin-C  · Antibiotic discontinued  · s/p convalescent plasma 12/7/2020  · Continue self proning ;  patient educated on the importance  · Check interleukin 6   · Pulmonary team on board input appreciated  · Monitor CRP, ferritin, D-dimer      Elevated troponin  Assessment & Plan  Elevated troponin, peak troponin 0 32- likely due to Covid-19 infection  Trended down to 0 14  ECG - T wave inversions noted  Monitor     GERD (gastroesophageal reflux disease)  Assessment & Plan  Continue Famotidine    Bipolar disorder ([email or web address] 75 )  Assessment & Plan  Continue lithium sertraline, trazodone  Patient is anxious    Continue bedtime Ativan        VTE Pharmacologic Prophylaxis:   Pharmacologic: Enoxaparin (Lovenox)  Mechanical VTE Prophylaxis in Place: Yes    Discussions with Specialists or Other Care Team Provider: Yes    Education and Discussions with Family / Patient: Yes    Current Length of Stay: 3 day(s)    Current Patient Status: Inpatient     Discharge Plan / Estimated Discharge Date: To be determined    Code Status: Level 1 - Full Code      Subjective:   Patient is seen and examined at the bedside this morning  Patient looks face  She denies any palpitation or chest pain  Her breathing is better  She complains of back pain and like to get gabapentin  Objective:     Vitals:   Temp (24hrs), Av 3 °F (36 8 °C), Min:97 7 °F (36 5 °C), Max:98 9 °F (37 2 °C)    Temp:  [97 7 °F (36 5 °C)-98 9 °F (37 2 °C)] 98 2 °F (36 8 °C)  HR:  [60-88] 72  Resp:  [17-27] 17  BP: (103-145)/(62-85) 115/68  SpO2:  [80 %-100 %] 92 %  Body mass index is 33 07 kg/m²  Input and Output Summary (last 24 hours): Intake/Output Summary (Last 24 hours) at 2020 1206  Last data filed at 2020 0900  Gross per 24 hour   Intake 550 ml   Output 900 ml   Net -350 ml       Physical Exam:     Physical Exam  HENT:      Head: Normocephalic and atraumatic  Mouth/Throat:      Mouth: Mucous membranes are moist    Neck:      Musculoskeletal: Neck supple  Cardiovascular:      Rate and Rhythm: Normal rate and regular rhythm  Pulmonary:      Effort: Pulmonary effort is normal  No respiratory distress  Breath sounds: Normal breath sounds  No rhonchi  Abdominal:      General: There is no distension  Palpations: Abdomen is soft  Tenderness: There is no abdominal tenderness  Musculoskeletal:         General: No swelling  Skin:     General: Skin is warm  Neurological:      General: No focal deficit present  Mental Status: She is alert and oriented to person, place, and time     Psychiatric:      Comments: Anxious           Additional Data:     Labs:    Results from last 7 days   Lab Units 20  0624  20  0537   WBC Thousand/uL 6 31   < > 4 46   HEMOGLOBIN g/dL 15 1   < > 13 0   HEMATOCRIT % 46 8*   < > 40 8   PLATELETS Thousands/uL 206   < > 116*   NEUTROS PCT %  --   --  71   LYMPHS PCT %  --   --  24   MONOS PCT %  --   --  5   EOS PCT %  --   --  0 < > = values in this interval not displayed  Results from last 7 days   Lab Units 12/07/20  0624   POTASSIUM mmol/L 3 5   CHLORIDE mmol/L 112*   CO2 mmol/L 27   BUN mg/dL 26*   CREATININE mg/dL 0 93   CALCIUM mg/dL 9 5   ALK PHOS U/L 101   ALT U/L 56   AST U/L 57*           * I Have Reviewed All Lab Data Listed Above  * Additional Pertinent Lab Tests Reviewed:  [facility] Admission Reviewed    Imaging:    Imaging Reports Reviewed Today Include: CXR  Imaging Personally Reviewed by Myself Includes:  CXR    Recent Cultures (last 7 days):           Last 24 Hours Medication List:   Current Facility-Administered Medications   Medication Dose Route Frequency Provider Last Rate    acetaminophen  650 mg Oral Q6H PRN Noris Bright MD      albuterol  2 puff Inhalation Q4H PRN Daynaul Mandi DO      aluminum-magnesium hydroxide-simethicone  30 mL Oral Q4H PRN Kathleendra Ace PA-C      ascorbic acid  1,000 mg Oral Q12H [facility] Tommie Ramos MD      atorvastatin  40 mg Oral HS Marcene Drain, PA-C      bisacodyl  10 mg Rectal Daily PRN Smiley [place], CRNP      cholecalciferol  2,000 Units Oral Daily Noris Bright MD      dexamethasone  6 mg Intravenous Q24H Noris Bright MD      enoxaparin  30 mg Subcutaneous Q12H [facility] Sweta Raymond DO      famotidine  20 mg Oral Q12H Candida [place], CRNP      fluticasone  2 spray Each Nare Daily Marcene Drain, PA-C      gabapentin  300 mg Oral Daily Candida [place], CRNP      lithium carbonate  300 mg Oral HS Marcene Drain, PA-C      LORazepam  1 mg Oral BID PRN Sweta Raymond, DO      LORazepam  1 5 mg Oral HS Hetul Mandi, DO      melatonin  6 mg Oral HS LUIS CARLOS Day      zinc sulfate  220 mg Per NG Tube Daily Noris Bright MD      Followed by   Selene Capone ON 12/11/2020] multivitamin with iron-minerals  15 mL Per NG Tube Daily Noris Bright MD      ondansetron  4 mg Intravenous Q6H PRCHRISTIAN Jessica MD      oxymetazoline  2 spray Each Nare Q12H [address] Veola Hernandez, PA-C      polyethylene glycol  17 g Oral Daily [place], [place]      remdesivir  100 mg Intravenous Q24H Lashawn Jessica  mg (12/06/20 1400)    senna-docusate sodium  1 tablet Oral BID LUIS CARLOS Pierre      sertraline  100 mg Oral HS Veola Hernandez, PA-C      sodium chloride  1 spray Each Nare Q2H PRN Veola Hernandez, PA-C      traZODone  100 mg Oral HS Veola Hernandez, PA-C          Today, Patient Was Seen By: Corine Petty MD    ** Please Note: This note has been constructed using a voice recognition system   **

## 2020-12-07 NOTE — PLAN OF CARE
Problem: Potential for Falls  Goal: Patient will remain free of falls  Description: INTERVENTIONS:  - Assess patient frequently for physical needs  -  Identify cognitive and physical deficits and behaviors that affect risk of falls    -  [place] fall precautions as indicated by assessment   - Educate patient/family on patient safety including physical limitations  - Instruct patient to call for assistance with activity based on assessment  - Modify environment to reduce risk of injury  - Consider OT/PT consult to assist with strengthening/mobility  Outcome: Progressing     Problem: RESPIRATORY - ADULT  Goal: Achieves optimal ventilation and oxygenation  Description: INTERVENTIONS:  - Assess for changes in respiratory status  - Assess for changes in mentation and behavior  - Position to facilitate oxygenation and minimize respiratory effort  - Oxygen administered by appropriate delivery if ordered  - Initiate smoking cessation education as indicated  - Encourage broncho-pulmonary hygiene including cough, deep breathe, Incentive Spirometry  - Assess the need for suctioning and aspirate as needed  - Assess and instruct to report SOB or any respiratory difficulty  - Respiratory Therapy support as indicated  Outcome: Progressing     Problem: METABOLIC, FLUID AND ELECTROLYTES - ADULT  Goal: Electrolytes maintained within normal limits  Description: INTERVENTIONS:  - Monitor labs and assess patient for signs and symptoms of electrolyte imbalances  - Administer electrolyte replacement as ordered  - Monitor response to electrolyte replacements, including repeat lab results as appropriate  - Instruct patient on fluid and nutrition as appropriate  Outcome: Progressing  Goal: Fluid balance maintained  Description: INTERVENTIONS:  - Monitor labs   - Monitor I/O and WT  - Instruct patient on fluid and nutrition as appropriate  - Assess for signs & symptoms of volume excess or deficit  Outcome: Progressing     Problem: HEMATOLOGIC - ADULT  Goal: Maintains hematologic stability  Description: INTERVENTIONS  - Assess for signs and symptoms of bleeding or hemorrhage  - Monitor labs  - Administer supportive blood products/factors as ordered and appropriate  Outcome: Progressing     Problem: INFECTION - ADULT  Goal: Absence or prevention of progression during hospitalization  Description: INTERVENTIONS:  - Assess and monitor for signs and symptoms of infection  - Monitor lab/diagnostic results  - Monitor all insertion sites, i e  indwelling lines, tubes, and drains  - Monitor endotracheal if appropriate and nasal secretions for changes in amount and color  - [place] appropriate cooling/warming therapies per order  - Administer medications as ordered  - Instruct and encourage patient and family to use good hand hygiene technique  - Identify and instruct in appropriate isolation precautions for identified infection/condition  Outcome: Progressing  Goal: Absence of fever/infection during neutropenic period  Description: INTERVENTIONS:  - Monitor WBC    Outcome: Progressing     Problem: SAFETY ADULT  Goal: Patient will remain free of falls  Description: INTERVENTIONS:  - Assess patient frequently for physical needs  -  Identify cognitive and physical deficits and behaviors that affect risk of falls    -  [place] fall precautions as indicated by assessment   - Educate patient/family on patient safety including physical limitations  - Instruct patient to call for assistance with activity based on assessment  - Modify environment to reduce risk of injury  - Consider OT/PT consult to assist with strengthening/mobility  Outcome: Progressing  Goal: Maintain or return to baseline ADL function  Description: INTERVENTIONS:  -  Assess patient's ability to carry out ADLs; assess patient's baseline for ADL function and identify physical deficits which impact ability to perform ADLs (bathing, care of mouth/teeth, toileting, grooming, dressing, etc )  - Assess/evaluate cause of self-care deficits   - Assess range of motion  - Assess patient's mobility; develop plan if impaired  - Assess patient's need for assistive devices and provide as appropriate  - Encourage maximum independence but intervene and supervise when necessary  - Involve family in performance of ADLs  - Assess for home care needs following discharge   - Consider OT consult to assist with ADL evaluation and planning for discharge  - Provide patient education as appropriate  Outcome: Progressing  Goal: Maintain or return mobility status to optimal level  Description: INTERVENTIONS:  - Assess patient's baseline mobility status (ambulation, transfers, stairs, etc )    - Identify cognitive and physical deficits and behaviors that affect mobility  - Identify mobility aids required to assist with transfers and/or ambulation (gait belt, sit-to-stand, lift, walker, cane, etc )  - [place] fall precautions as indicated by assessment  - Record patient progress and toleration of activity level on Mobility SBAR; progress patient to next Phase/Stage  - Instruct patient to call for assistance with activity based on assessment  - Consider rehabilitation consult to assist with strengthening/weightbearing, etc   Outcome: Progressing     Problem: DISCHARGE PLANNING  Goal: Discharge to home or other facility with appropriate resources  Description: INTERVENTIONS:  - Identify barriers to discharge w/patient and caregiver  - Arrange for needed discharge resources and transportation as appropriate  - Identify discharge learning needs (meds, wound care, etc )  - Arrange for interpretive services to assist at discharge as needed  - Refer to Case Management Department for coordinating discharge planning if the patient needs post-hospital services based on physician/advanced practitioner order or complex needs related to functional status, cognitive ability, or social support system  Outcome: Progressing     Problem: Knowledge Deficit  Goal: Patient/family/caregiver demonstrates understanding of disease process, treatment plan, medications, and discharge instructions  Description: Complete learning assessment and assess knowledge base    Interventions:  - Provide teaching at level of understanding  - Provide teaching via preferred learning methods  Outcome: Progressing     Problem: Prexisting or High Potential for Compromised Skin Integrity  Goal: Skin integrity is maintained or improved  Description: INTERVENTIONS:  - Identify patients at risk for skin breakdown  - Assess and monitor skin integrity  - Assess and monitor nutrition and hydration status  - Monitor labs   - Assess for incontinence   - Turn and reposition patient  - Assist with mobility/ambulation  - Relieve pressure over bony prominences  - Avoid friction and shearing  - Provide appropriate hygiene as needed including keeping skin clean and dry  - Evaluate need for skin moisturizer/barrier cream  - Collaborate with interdisciplinary team   - Patient/family teaching  - Consider wound care consult   Outcome: Progressing

## 2020-12-07 NOTE — ASSESSMENT & PLAN NOTE
COVID-19 infection, tested positive on 11/29/2020  Patient be placed on moderate treatment protocol due to worsening of hypoxia  Labs:  · CRP- 7 5-->4 4  · D dimer 513--> 526  · D-dimer 0 8, procalcitonin negative x2  Plan  · Continue dexamethasone 4/10  · Continue remdesivir 4/5   · Continue vitamin-D, zinc, vitamin-C  · Antibiotic discontinued  · s/p convalescent plasma 12/7/2020  · Continue self proning ;  patient educated on the importance  · Check interleukin 6   · Pulmonary team on board input appreciated  · Monitor CRP, ferritin, D-dimer

## 2020-12-08 PROBLEM — R74.01 TRANSAMINITIS: Status: ACTIVE | Noted: 2020-12-08

## 2020-12-08 LAB
ALBUMIN SERPL BCP-MCNC: 3.5 G/DL (ref 3.5–5)
ALP SERPL-CCNC: 91 U/L (ref 46–116)
ALT SERPL W P-5'-P-CCNC: 47 U/L (ref 12–78)
ANION GAP SERPL CALCULATED.3IONS-SCNC: 7 MMOL/L (ref 4–13)
ANISOCYTOSIS BLD QL SMEAR: PRESENT
AST SERPL W P-5'-P-CCNC: 39 U/L (ref 5–45)
BASOPHILS # BLD MANUAL: 0.05 THOUSAND/UL (ref 0–0.1)
BASOPHILS NFR MAR MANUAL: 1 % (ref 0–1)
BILIRUB SERPL-MCNC: 0.62 MG/DL (ref 0.2–1)
BUN SERPL-MCNC: 34 MG/DL (ref 5–25)
CALCIUM SERPL-MCNC: 9.6 MG/DL (ref 8.3–10.1)
CHLORIDE SERPL-SCNC: 110 MMOL/L (ref 100–108)
CO2 SERPL-SCNC: 26 MMOL/L (ref 21–32)
CREAT SERPL-MCNC: 0.83 MG/DL (ref 0.6–1.3)
CRP SERPL QL: 43 MG/L
D DIMER PPP FEU-MCNC: 0.76 UG/ML FEU
EOSINOPHIL # BLD MANUAL: 0 THOUSAND/UL (ref 0–0.4)
EOSINOPHIL NFR BLD MANUAL: 0 % (ref 0–6)
ERYTHROCYTE [DISTWIDTH] IN BLOOD BY AUTOMATED COUNT: 13.4 % (ref 11.6–15.1)
FERRITIN SERPL-MCNC: 496 NG/ML (ref 8–388)
GFR SERPL CREATININE-BSD FRML MDRD: 74 ML/MIN/1.73SQ M
GLUCOSE SERPL-MCNC: 99 MG/DL (ref 65–140)
HCT VFR BLD AUTO: 43.2 % (ref 34.8–46.1)
HGB BLD-MCNC: 13.9 G/DL (ref 11.5–15.4)
IL6 SERPL-MCNC: 15 PG/ML (ref 0–13)
LYMPHOCYTES # BLD AUTO: 1.12 THOUSAND/UL (ref 0.6–4.47)
LYMPHOCYTES # BLD AUTO: 22 % (ref 14–44)
MAGNESIUM SERPL-MCNC: 2.7 MG/DL (ref 1.6–2.6)
MCH RBC QN AUTO: 26.8 PG (ref 26.8–34.3)
MCHC RBC AUTO-ENTMCNC: 32.2 G/DL (ref 31.4–37.4)
MCV RBC AUTO: 83 FL (ref 82–98)
MONOCYTES # BLD AUTO: 0.15 THOUSAND/UL (ref 0–1.22)
MONOCYTES NFR BLD: 3 % (ref 4–12)
NEUTROPHILS # BLD MANUAL: 3.78 THOUSAND/UL (ref 1.85–7.62)
NEUTS SEG NFR BLD AUTO: 74 % (ref 43–75)
NRBC BLD AUTO-RTO: 0 /100 WBCS
PHOSPHATE SERPL-MCNC: 4.1 MG/DL (ref 2.3–4.1)
PLATELET # BLD AUTO: 200 THOUSANDS/UL (ref 149–390)
PLATELET BLD QL SMEAR: ADEQUATE
PMV BLD AUTO: 10.8 FL (ref 8.9–12.7)
POTASSIUM SERPL-SCNC: 3.4 MMOL/L (ref 3.5–5.3)
PROT SERPL-MCNC: 7 G/DL (ref 6.4–8.2)
RBC # BLD AUTO: 5.18 MILLION/UL (ref 3.81–5.12)
RBC MORPH BLD: PRESENT
SODIUM SERPL-SCNC: 143 MMOL/L (ref 136–145)
WBC # BLD AUTO: 5.11 THOUSAND/UL (ref 4.31–10.16)

## 2020-12-08 PROCEDURE — 80053 COMPREHEN METABOLIC PANEL: CPT | Performed by: NURSE PRACTITIONER

## 2020-12-08 PROCEDURE — 94760 N-INVAS EAR/PLS OXIMETRY 1: CPT

## 2020-12-08 PROCEDURE — 84100 ASSAY OF PHOSPHORUS: CPT | Performed by: NURSE PRACTITIONER

## 2020-12-08 PROCEDURE — 85027 COMPLETE CBC AUTOMATED: CPT | Performed by: NURSE PRACTITIONER

## 2020-12-08 PROCEDURE — 85007 BL SMEAR W/DIFF WBC COUNT: CPT | Performed by: NURSE PRACTITIONER

## 2020-12-08 PROCEDURE — 85379 FIBRIN DEGRADATION QUANT: CPT | Performed by: NURSE PRACTITIONER

## 2020-12-08 PROCEDURE — NC001 PR NO CHARGE: Performed by: PHYSICIAN ASSISTANT

## 2020-12-08 PROCEDURE — 82728 ASSAY OF FERRITIN: CPT | Performed by: NURSE PRACTITIONER

## 2020-12-08 PROCEDURE — 83735 ASSAY OF MAGNESIUM: CPT | Performed by: NURSE PRACTITIONER

## 2020-12-08 PROCEDURE — 83520 IMMUNOASSAY QUANT NOS NONAB: CPT | Performed by: INTERNAL MEDICINE

## 2020-12-08 PROCEDURE — 86140 C-REACTIVE PROTEIN: CPT | Performed by: NURSE PRACTITIONER

## 2020-12-08 PROCEDURE — 99233 SBSQ HOSP IP/OBS HIGH 50: CPT | Performed by: INTERNAL MEDICINE

## 2020-12-08 PROCEDURE — 99232 SBSQ HOSP IP/OBS MODERATE 35: CPT | Performed by: INTERNAL MEDICINE

## 2020-12-08 RX ORDER — POTASSIUM CHLORIDE 20 MEQ/1
40 TABLET, EXTENDED RELEASE ORAL 2 TIMES DAILY
Status: COMPLETED | OUTPATIENT
Start: 2020-12-08 | End: 2020-12-08

## 2020-12-08 RX ADMIN — FAMOTIDINE 20 MG: 20 TABLET ORAL at 21:00

## 2020-12-08 RX ADMIN — DEXAMETHASONE SODIUM PHOSPHATE 6 MG: 4 INJECTION INTRA-ARTICULAR; INTRALESIONAL; INTRAMUSCULAR; INTRAVENOUS; SOFT TISSUE at 12:40

## 2020-12-08 RX ADMIN — POTASSIUM CHLORIDE 40 MEQ: 1500 TABLET, EXTENDED RELEASE ORAL at 08:35

## 2020-12-08 RX ADMIN — OXYCODONE HYDROCHLORIDE AND ACETAMINOPHEN 1000 MG: 500 TABLET ORAL at 21:00

## 2020-12-08 RX ADMIN — GABAPENTIN 300 MG: 300 CAPSULE ORAL at 08:35

## 2020-12-08 RX ADMIN — LITHIUM CARBONATE 300 MG: 300 CAPSULE, GELATIN COATED ORAL at 21:03

## 2020-12-08 RX ADMIN — LORAZEPAM 1.5 MG: 1 TABLET ORAL at 21:05

## 2020-12-08 RX ADMIN — ONDANSETRON 4 MG: 2 INJECTION INTRAMUSCULAR; INTRAVENOUS at 09:05

## 2020-12-08 RX ADMIN — ACETAMINOPHEN 650 MG: 325 TABLET, FILM COATED ORAL at 17:47

## 2020-12-08 RX ADMIN — Medication 1 SPRAY: at 08:35

## 2020-12-08 RX ADMIN — OXYCODONE HYDROCHLORIDE AND ACETAMINOPHEN 1000 MG: 500 TABLET ORAL at 08:35

## 2020-12-08 RX ADMIN — ZINC SULFATE 220 MG (50 MG) CAPSULE 220 MG: CAPSULE at 08:30

## 2020-12-08 RX ADMIN — ALUMINUM HYDROXIDE, MAGNESIUM HYDROXIDE, AND SIMETHICONE 30 ML: 200; 200; 20 SUSPENSION ORAL at 21:15

## 2020-12-08 RX ADMIN — ENOXAPARIN SODIUM 30 MG: 30 INJECTION SUBCUTANEOUS at 08:35

## 2020-12-08 RX ADMIN — TRAZODONE HYDROCHLORIDE 100 MG: 100 TABLET ORAL at 21:05

## 2020-12-08 RX ADMIN — ENOXAPARIN SODIUM 30 MG: 30 INJECTION SUBCUTANEOUS at 21:06

## 2020-12-08 RX ADMIN — ALUMINUM HYDROXIDE, MAGNESIUM HYDROXIDE, AND SIMETHICONE 30 ML: 200; 200; 20 SUSPENSION ORAL at 08:30

## 2020-12-08 RX ADMIN — REMDESIVIR 100 MG: 100 INJECTION, POWDER, LYOPHILIZED, FOR SOLUTION INTRAVENOUS at 14:29

## 2020-12-08 RX ADMIN — FLUTICASONE PROPIONATE 2 SPRAY: 50 SPRAY, METERED NASAL at 08:35

## 2020-12-08 RX ADMIN — LORAZEPAM 1 MG: 1 TABLET ORAL at 12:35

## 2020-12-08 RX ADMIN — FAMOTIDINE 20 MG: 20 TABLET ORAL at 08:30

## 2020-12-08 RX ADMIN — ATORVASTATIN CALCIUM 40 MG: 40 TABLET, FILM COATED ORAL at 21:01

## 2020-12-08 RX ADMIN — Medication 2000 UNITS: at 08:35

## 2020-12-08 RX ADMIN — SERTRALINE HYDROCHLORIDE 100 MG: 100 TABLET ORAL at 21:03

## 2020-12-08 RX ADMIN — OXYMETAZOLINE HYDROCHLORIDE 2 SPRAY: 0.05 SPRAY NASAL at 22:31

## 2020-12-08 RX ADMIN — POTASSIUM CHLORIDE 40 MEQ: 1500 TABLET, EXTENDED RELEASE ORAL at 17:45

## 2020-12-08 NOTE — ASSESSMENT & PLAN NOTE
- ARF secondary to COVID 19 infection  -Patient weaned from 15 L mid-flow to 8 L NC  -Diuresed yesterday, appears euvolemic today  -Continue to self-prone as able  -Maintain O2 >90%

## 2020-12-08 NOTE — ASSESSMENT & PLAN NOTE
-Continue famotidine 20 mg per COVID protocol  -Mylanta PRN  -Holding home omeprazole  -Continue bowel regimen

## 2020-12-08 NOTE — ASSESSMENT & PLAN NOTE
COVID-19 infection, tested positive on 11/29/2020  Patient be placed on moderate treatment protocol due to worsening of hypoxia  Labs:  · CRP- 7 5-->4 4-> 4 3  · D dimer 513--> 526-> 496  · D-dimer 0 8--> 0 76, procalcitonin negative x2  Plan  · Continue dexamethasone 5/10  · Completed remdesivir today- 5/5   · Continue vitamin-D, zinc, vitamin-C  · Antibiotic discontinued  · s/p convalescent plasma 12/7/2020  · Continue self proning ;  patient educated on the importance  · interleukin 6 pending   · Pulmonary team on board input appreciated  · Monitor CRP, ferritin, D-dimer

## 2020-12-08 NOTE — QUICK NOTE
Patient's significant other, Maulik Lomeli, updated via telephone  Current clinical status discussed and care plan reviewed  All questions answered at this time       Jaycee Dillon PA-C  12/08/20  2:06 PM

## 2020-12-08 NOTE — CASE MANAGEMENT
VM received from pt S/O [facility] requesting CM return call  TC to [facility]  [address] [place] [address] Road reports that he originally planned to drive pt home when she is medically stable for d/c but now is not so sure he is able to  Alvarez aware if he is unable to transport a w/c Bozena Aflaro can be arranged but would be an [address] also requesting information on any professional cleaning services for COVID as pt is interested for her apartment  CM used the Internet and a local company [employer] offers COVID cleaning to both residential and [facility] [address] provided information to [address] [place] [address] Road who plans to contact them  Alvarez aware this is not a recommendation and Internet was used to search

## 2020-12-08 NOTE — ASSESSMENT & PLAN NOTE
-Isolated AST elevation in the setting of COVID 19 infection  -Continue to trend with CMP  -Discontinue atorvastatin if AST/ALT >3x ULN

## 2020-12-08 NOTE — PROGRESS NOTES
Daily Progress Note - Critical Care   Filomena Bernal 76 y o  female MRN: [id number]  [facility] Encounter: [id number]    ----------------------------------------------------------------------------------------  HPI/24hr events: 77 yo F with COVID 19 pneumonia  [facility] level 2 yesterday with increasing oxygen requirements to midlfow      ---------------------------------------------------------------------------------------  SUBJECTIVE  Complaining of heart burn with vitamin C pills, requesting Mylanta which she takes at home  Reports feeling mildly SOB with turning and moving but overall feels comfortable breathing  Has been lying on sides but not fully proning  Feels lonely in her room though is able to talk to daughter/family over facetime and on phone frequently  Review of Systems   Constitutional: Positive for fatigue  Negative for appetite change, chills, diaphoresis and fever  Respiratory: Positive for shortness of breath  Negative for cough, choking, chest tightness and wheezing  Cardiovascular: Negative for chest pain, palpitations and leg swelling  Gastrointestinal: Negative for abdominal distention, abdominal pain, blood in stool, constipation, diarrhea, nausea and vomiting         + heartburn   Genitourinary: Negative for difficulty urinating and dysuria  Musculoskeletal: Negative for arthralgias  Skin: Negative for color change, pallor, rash and wound  Neurological: Negative for dizziness, weakness, light-headedness, numbness and headaches  Psychiatric/Behavioral: Negative for confusion       Review of systems was reviewed and negative unless stated above in HPI/24-hour events   ---------------------------------------------------------------------------------------  Assessment and Plan:    Neuro:    Diagnosis: Bipolar disorder  o Plan: Sertraline 100 mg qHS   o Lithium 300 mg 1HS   o Gabapentin 300 mg daily   o Regulate sleep/wake cycle   - Melatonin 6 mg qHS   - Trazodone 100 mg qHS   o CAM-ICU daily   o Neuro checks       CV:    Diagnosis: No active issues   o Plan: MAP goal >65, SBP <180   o COVID 19 cardiac markers (12/5)  - Troponin peaked 0 32  -   -   o Continue close telemetry monitoring     Pulm:   Diagnosis: Acute hypoxic respiratory failure secondary to COVID 19 (POA)  o Plan: Weaned from 12 L midlfow to 8 L NC this morning   o Continue to wean FIO2 for spo2 >90% per protocol   o Self proning as able   o Continue aggressive pulmonary hygiene, IS q 1 hr, encourage coughing and deep breathing   o COVID 19 treatment as below     GI:    Diagnosis: Transaminitis  o Plan: Isolated AST elevation, consistent with COVID 19   o Monitor LFTs as needed   o Discontinue statin if LFTs 3x ULN   o Stress ulcer ppx: Famotidine 20 mg BID   o Bowel regimen: Senna/colace BID, Miralax daily, dulcolax PRN     :    Diagnosis: No active issues   o Plan: Cr 0 83, continuing to down trend   o Baseline 0 89-0 94  o Strict I/O monitoring   o Continue to trend BUN/Cr  o Urinary retention protocol     F/E/N:    F: Received lasix x 1 yesterday, continue PRN diuresis for persistently positive volume status   o No continuous fluids    E: Replete PRN   N: Regular diet      Heme/Onc:    Diagnosis: Hypercoagulable state 2/2 COVID 19   o Plan: D Dimer: 0 64 - 0 78 - 0 83 - 0 80 - 0 76  - Trend q1-3 days as needed   o Continue prophylactic anticoagulation with lovenox 30 mg q12h  o Start systemic anticoagulation with d-dime >2 5     Endo:    Diagnosis: Prediabetes  o Plan: A1C 5 7% on 12/4/20  o BG remained normoglycemic in setting of steroid use   o Start ISS if needed for goal -180      ID:    Diagnosis: COVID 19 pneumonia (POA)  ? 11/29/20 COVID-19: Positive  ?  Continue anti-inflammatories/COVID therapies:   § Remdesivir (Day # 5/5)  § Dexamethasone 6 mg IV daily, day # 5/10  § Vitamin C 1000mg BID x7 days   § Zinc 220mg daily x7 days   § Atorvastatin 40mg qHS § Famotidine 20mg BID  § Vitamin D3 2000 IU daily  ? Completed therapies:   § Convalescent Plasma (20)  ? Inflammatory markers:   § Ferritin: 369 - 435 - 513 - 526 - 496  § CRP: 36 6 - 75 0 - 49 1 - 44 5 - 43  § Follow q1-3 days as needed   ? Continue to closely monitor for signs of secondary bacterial infection   § Procalcitonin: <0 05 x4  § Stopped doxycycline/ceftriaxone   ? Continue to monitor fever and WBC curve offantibiotics   ? CBCD daily    MSK/Skin:   o Plan: Activity as tolerated  o Frequent turning and repositioning       Disposition: Transfer to Med-Surg   Code Status: Level 1 - Full Code  ---------------------------------------------------------------------------------------  ICU CORE MEASURES    Prophylaxis   VTE Pharmacologic Prophylaxis: Enoxaparin (Lovenox)  VTE Mechanical Prophylaxis: sequential compression device  Stress Ulcer Prophylaxis: Famotidine PO    ABCDE Protocol (if indicated)  Plan to perform spontaneous awakening trial today? Not applicable  Plan to perform spontaneous breathing trial today? Not applicable  Obvious barriers to extubation? Not applicable  CAM-ICU: Negative    Invasive Devices Review  Invasive Devices     Peripheral Intravenous Line            Peripheral IV 20 Distal;Right;Dorsal (posterior) Forearm less than 1 day          Drain            External Urinary Catheter 1 day              Can any invasive devices be discontinued today?  Not applicable  ---------------------------------------------------------------------------------------  OBJECTIVE    Vitals   Vitals:    20 0100 20 0324 20 0445 20 0600   BP:   107/59    BP Location:   Left arm    Pulse: 58  72    Resp: 12  (!) 23    Temp:   98 5 °F (36 9 °C)    TempSrc:   Oral    SpO2: 99% 97% 95%    Weight:    78 5 kg (173 lb 1 oz)   Height:         Temp (24hrs), Av 8 °F (37 1 °C), Min:98 2 °F (36 8 °C), Max:99 2 °F (37 3 °C)  Current: Temperature: 98 5 °F (36 9 °C)  HR: 60-80  BP: 107/59 - 125/84  RR: 12-28  SpO2: %    Respiratory:  SpO2: SpO2: 95 %, SpO2 Device: O2 Device: Mid flow nasal cannula  Nasal Cannula O2 Flow Rate (L/min): 11 L/min    Invasive/non-invasive ventilation settings   Respiratory    Lab Data (Last 4 hours)    None         O2/Vent Data (Last 4 hours)    None                Physical Exam  Vitals signs reviewed  Constitutional:       General: She is awake  She is not in acute distress  Appearance: She is well-developed and overweight  She is not ill-appearing or diaphoretic  Interventions: Nasal cannula in place  Comments: Appears comfortable in bed, speaking full sentances without dyspnea   HENT:      Head: Normocephalic and atraumatic  Eyes:      Pupils: Pupils are equal, round, and reactive to light  Neck:      Musculoskeletal: Neck supple  Cardiovascular:      Rate and Rhythm: Normal rate and regular rhythm  Pulses:           Dorsalis pedis pulses are 2+ on the right side and 2+ on the left side  Heart sounds: Normal heart sounds  No murmur  No friction rub  No gallop  Pulmonary:      Effort: Pulmonary effort is normal  No tachypnea, accessory muscle usage or respiratory distress  Breath sounds: Decreased breath sounds (throughout) present  No wheezing, rhonchi or rales  Abdominal:      General: Abdomen is protuberant  There is no distension  Palpations: Abdomen is soft  Tenderness: There is no abdominal tenderness  Musculoskeletal: Normal range of motion  Right lower leg: No edema  Left lower leg: No edema  Comments: Strength equal in all extremities    Skin:     General: Skin is warm and dry  Coloration: Skin is not cyanotic, mottled or pale  Neurological:      General: No focal deficit present  Mental Status: She is alert and oriented to person, place, and time     Psychiatric:         Attention and Perception: Attention and perception normal          Mood and Affect: Mood and affect normal          Speech: Speech normal          Laboratory and Diagnostics:  Results from last 7 days   Lab Units 12/08/20  0443 12/07/20  0624 12/06/20  0558 12/05/20  0537 12/04/20  0959   WBC Thousand/uL 5 11 6 31 4 45 4 46 4 85   HEMOGLOBIN g/dL 13 9 15 1 13 5 13 0 13 9   HEMATOCRIT % 43 2 46 8* 41 6 40 8 44 1   PLATELETS Thousands/uL 200 206 147* 116* 116*   NEUTROS PCT %  --   --   --  71 72   MONOS PCT %  --   --   --  5 5   MONO PCT % 3*  --   --   --   --      Results from last 7 days   Lab Units 12/08/20 0443 12/07/20  0624 12/06/20  0558 12/05/20  0537 12/04/20  0959   SODIUM mmol/L 143 146* 144 142 141   POTASSIUM mmol/L 3 4* 3 5 3 6 3 6 3 8   CHLORIDE mmol/L 110* 112* 114* 110* 106   CO2 mmol/L 26 27 24 25 29   ANION GAP mmol/L 7 7 6 7 6   BUN mg/dL 34* 26* 22 19 21   CREATININE mg/dL 0 83 0 93 0 78 0 74 1 15   CALCIUM mg/dL 9 6 9 5 9 0 9 0 8 9   GLUCOSE RANDOM mg/dL 99 109 102 89 116   ALT U/L 47 56 36 41 31   AST U/L 39 57* 40 42 33   ALK PHOS U/L 91 101 85 81 85   ALBUMIN g/dL 3 5 4 0 3 4* 3 5 3 7   TOTAL BILIRUBIN mg/dL 0 62 0 49 0 28 0 27 0 27     Results from last 7 days   Lab Units 12/08/20  0443   MAGNESIUM mg/dL 2 7*   PHOSPHORUS mg/dL 4 1           Results from last 7 days   Lab Units 12/05/20  2123 12/05/20  0537 12/04/20  1836 12/04/20  1602 12/04/20  1304 12/04/20  0959   TROPONIN I ng/mL 0 14* 0 22* 0 28* 0 30* 0 32* 0 26*         ABG:    VBG:    Results from last 7 days   Lab Units 12/07/20  0624 12/06/20  0558 12/05/20  0542 12/04/20  0959   PROCALCITONIN ng/ml <0 05 <0 05 <0 05 0 05       Micro        Imaging: No new imaging  I have personally reviewed pertinent reports  Intake and Output  I/O       12/06 0701 - 12/07 0700 12/07 0701 - 12/08 0700    P  O  180 941    Blood 250     IV Piggyback  280    Total Intake(mL/kg) 430 (5 4) 1221 (15 6)    Urine (mL/kg/hr) 900 (0 5) 100 (0 1)    Total Output 900 100    Net -470 +1121          Unmeasured Urine Occurrence 3 x           Height and Weights   Height: 5' 1" (154 9 cm)  IBW: 47 8 kg  Body mass index is 32 7 kg/m²  Weight (last 2 days)     Date/Time   Weight    12/08/20 0600   78 5 (173 06)                Nutrition       Diet Orders   (From admission, onward)             Start     Ordered    12/05/20 0834  Diet Regular; Regular House  Diet effective now     Question Answer Comment   Diet Type Regular    Regular Regular House    Special Instructions Disposable Meal    RD to adjust diet per protocol?  Yes        12/05/20 1358                Active Medications  Scheduled Meds:  Current Facility-Administered Medications   Medication Dose Route Frequency Provider Last Rate    acetaminophen  650 mg Oral Q6H PRN Heron Hoffmann MD      albuterol  2 puff Inhalation Q4H PRN Sweta Raymond DO      aluminum-magnesium hydroxide-simethicone  30 mL Oral Q4H PRN Juan C Tafoya PA-C      ascorbic acid  1,000 mg Oral Q12H Gladys Wilson MD      atorvastatin  40 mg Oral HS Chapo Medina PA-C      bisacodyl  10 mg Rectal Daily PRN LUIS CARLOS Hoffman      cholecalciferol  2,000 Units Oral Daily Heron Hoffmann MD      dexamethasone  6 mg Intravenous Q24H Heron Hoffmann MD      enoxaparin  30 mg Subcutaneous Q12H [address] Hetul Marlan Ligas,       famotidine  20 mg Oral Q12H LUIS CARLOS Hoffman      fluticasone  2 spray Each Nare Daily Chapo Medina PA-C      gabapentin  300 mg Oral Daily LUIS CARLOS Hoffman      lithium carbonate  300 mg Oral HS Chapo Medina PA-C      LORazepam  1 mg Oral BID PRN Sweta Raymond,       LORazepam  1 5 mg Oral HS Sweta Raymond, DO      melatonin  6 mg Oral HS LUIS CARLOS Day      zinc sulfate  220 mg Per NG Tube Daily Heron Hoffmann MD      Followed by   Vaishnavi Mckinney ON 12/11/2020] multivitamin with iron-minerals  15 mL Per NG Tube Daily Heron Hoffmann MD      ondansetron  4 mg Intravenous Q6H PRN Heron Hoffmann MD      oxymetazoline  2 spray Each Nare Q12H [address] Magnolia Zabala PA-C      polyethylene glycol  17 g Oral Daily LUIS CARLOS Red      potassium chloride  40 mEq Oral BID Calli Taylor PA-C      remdesivir  100 mg Intravenous Q24H Jacob Mayer  mg (12/06/20 1400)    senna-docusate sodium  1 tablet Oral BID LUIS CARLOS Red      sertraline  100 mg Oral HS Magnolia Zabala PA-C      sodium chloride  1 spray Each Nare Q2H PRN Magnolia Zabala PA-C      traZODone  100 mg Oral HS Magnolia Zabala PA-C       Continuous Infusions:     PRN Meds:   acetaminophen, 650 mg, Q6H PRN  albuterol, 2 puff, Q4H PRN  aluminum-magnesium hydroxide-simethicone, 30 mL, Q4H PRN  bisacodyl, 10 mg, Daily PRN  LORazepam, 1 mg, BID PRN  ondansetron, 4 mg, Q6H PRN  sodium chloride, 1 spray, Q2H PRN        Allergies   Allergies   Allergen Reactions    Amphetamine-Dextroamphetamine Other (See Comments)     Chest pain- was placed on Adderall after son passed away for depression, and she developed chest pain in 1990's; she had full cardiac work up, and was negative, so she has allergy to Adderall  Chest pain- was placed on Adderall after son passed away for depression, and she developed chest pain in 1990's; she had full cardiac work up, and was negative, so she has allergy to Adderall    Molds & Smuts     Other      Cats    Sulfa Antibiotics Other (See Comments)     ---------------------------------------------------------------------------------------  Advance Directive and Living Will:      Power of :    POLST:    ---------------------------------------------------------------------------------------  Care Time Delivered:   No Critical Care time spent     Rosette Olszewski, PA-C      Portions of the record may have been created with voice recognition software  Occasional wrong word or "sound a like" substitutions may have occurred due to the inherent limitations of voice recognition software    Read the chart carefully and recognize, using context, where substitutions have occurred

## 2020-12-08 NOTE — PROGRESS NOTES
Progress Note - [facility] 1955, 76 y o  female MRN: [id number]    [facility]: [id number]    Primary Care Provider: LUIS CARLOS Bingham   Date and time admitted to hospital: 12/4/2020  9:15 AM        * Acute respiratory failure with hypoxia ([facility] )  Assessment & Plan  Acute hypoxic respiratory failure likely due to COVID-19 infection  Currently requiring 8 L of oxygen saturating 93% (improved from 15 L mid flow yesterday)    Plan  Continue supplemental oxygen maintain O2 sats more than 92-94%  Encourage proning  See below for details of COVID 19 management  Looks euvolemic   P r n  Lasix    COVID-19 virus infection  Assessment & Plan  COVID-19 infection, tested positive on 11/29/2020  Patient be placed on moderate treatment protocol due to worsening of hypoxia  Labs:  · CRP- 7 5-->4 4-> 4 3  · D dimer 513--> 526-> 496  · D-dimer 0 8--> 0 76, procalcitonin negative x2  Plan  · Continue dexamethasone 5/10  · Completed remdesivir today- 5/5   · Continue vitamin-D, zinc, vitamin-C  · Antibiotic discontinued  · s/p convalescent plasma 12/7/2020  · Continue self proning ;  patient educated on the importance  · interleukin 6 pending   · Pulmonary team on board input appreciated  · Monitor CRP, ferritin, D-dimer      Elevated troponin  Assessment & Plan  Elevated troponin, peak troponin 0 32- likely due to Covid-19 infection  Trended down to 0 14  ECG - T wave inversions noted  Monitor     GERD (gastroesophageal reflux disease)  Assessment & Plan  Continue Famotidine  Mylanta added due to heartburn    Bipolar disorder ([facility] )  Assessment & Plan  Continue lithium sertraline, trazodone  Patient looks less anxious    Continue bedtime Ativan        VTE Pharmacologic Prophylaxis:   Pharmacologic: Enoxaparin (Lovenox)  Mechanical VTE Prophylaxis in Place: Yes    Discussions with Specialists or Other Care Team Provider: Yes    Education and Discussions with Family / Patient: Yes- Ashlie Sawyer updated and all questions answered    Current Length of Stay: 4 day(s)    Current Patient Status: Inpatient     Discharge Plan / Estimated Discharge Date:  To Be determined    Code Status: Level 1 - Full Code      Subjective:   Patient seen and examined the bedside this morning  She feels better today having heartburn and Chang is helping  Is shortness of breath chest pain or palpitation  No movement  She looks less anxious  Objective:     Vitals:   Temp (24hrs), Av 9 °F (37 2 °C), Min:98 5 °F (36 9 °C), Max:99 1 °F (37 3 °C)    Temp:  [98 5 °F (36 9 °C)-99 1 °F (37 3 °C)] 99 1 °F (37 3 °C)  HR:  [58-86] 78  Resp:  [12-30] 24  BP: (104-124)/(56-84) 104/63  SpO2:  [89 %-100 %] 93 %  Body mass index is 32 7 kg/m²  Input and Output Summary (last 24 hours): Intake/Output Summary (Last 24 hours) at 2020 1547  Last data filed at 2020 1429  Gross per 24 hour   Intake 1291 ml   Output 400 ml   Net 891 ml       Physical Exam:     Physical Exam  HENT:      Head: Normocephalic and atraumatic  Mouth/Throat:      Mouth: Mucous membranes are moist    Neck:      Musculoskeletal: Neck supple  Cardiovascular:      Rate and Rhythm: Normal rate and regular rhythm  Pulmonary:      Effort: Pulmonary effort is normal  No respiratory distress  Breath sounds: Normal breath sounds  Abdominal:      General: There is no distension  Palpations: Abdomen is soft  Tenderness: There is no abdominal tenderness  Musculoskeletal:         General: No swelling  Skin:     General: Skin is warm  Neurological:      General: No focal deficit present  Mental Status: She is alert     Psychiatric:         Mood and Affect: Mood normal            Additional Data:     Labs:    Results from last 7 days   Lab Units 20  0443  20  0537   WBC Thousand/uL 5 11   < > 4 46   HEMOGLOBIN g/dL 13 9   < > 13 0   HEMATOCRIT % 43 2   < > 40 8   PLATELETS Thousands/uL 200   < > 116*   NEUTROS PCT %  --   --  71 LYMPHS PCT %  --   --  24   LYMPHO PCT % 22  --   --    MONOS PCT %  --   --  5   MONO PCT % 3*  --   --    EOS PCT % 0  --  0    < > = values in this interval not displayed  Results from last 7 days   Lab Units 12/08/20  0443   POTASSIUM mmol/L 3 4*   CHLORIDE mmol/L 110*   CO2 mmol/L 26   BUN mg/dL 34*   CREATININE mg/dL 0 83   CALCIUM mg/dL 9 6   ALK PHOS U/L 91   ALT U/L 47   AST U/L 39           * I Have Reviewed All Lab Data Listed Above  * Additional Pertinent Lab Tests Reviewed:  Sahil [facility] Admission Reviewed    Imaging:    Imaging Reports Reviewed Today Include:  No new imaging  Imaging Personally Reviewed by Myself Includes:  No new imaging    Recent Cultures (last 7 days):           Last 24 Hours Medication List:   Current Facility-Administered Medications   Medication Dose Route Frequency Provider Last Rate    acetaminophen  650 mg Oral Q6H PRN Heron Hoffmann MD      albuterol  2 puff Inhalation Q4H PRN Sweta Raymond, DO      aluminum-magnesium hydroxide-simethicone  30 mL Oral Q4H PRN Juan C Tafoya PA-C      ascorbic acid  1,000 mg Oral Q12H [address] MD Richard      atorvastatin  40 mg Oral HS Chapo Medina PA-C      bisacodyl  10 mg Rectal Daily PRN LUIS CARLOS Hoffman      cholecalciferol  2,000 Units Oral Daily Heron Hoffmann MD      dexamethasone  6 mg Intravenous Q24H Heron Hoffmann MD      enoxaparin  30 mg Subcutaneous Q12H [address] Hetul Raymond,       famotidine  20 mg Oral Q12H LUIS CARLOS Hoffman      fluticasone  2 spray Each Nare Daily Chapo Medina PA-C      gabapentin  300 mg Oral Daily LUIS CARLOS Hoffman      lithium carbonate  300 mg Oral HS Chapo LAUREN Medina      LORazepam  1 mg Oral BID PRN Hetul Raymond, DO      LORazepam  1 5 mg Oral HS Hetul Raymond, DO      melatonin  6 mg Oral HS LUIS CARLOS Day      zinc sulfate  220 mg Per NG Tube Daily Heron Hoffmann MD      Followed by   Nayely Varela ON 12/11/2020] multivitamin with iron-minerals  15 mL Per NG Tube Daily Dakota Todd MD      ondansetron  4 mg Intravenous Q6H PRN Dakota Todd MD      oxymetazoline  2 spray Each Nare Q12H [address] Adri Dewey PA-C      polyethylene glycol  17 g Oral Daily Gauley [place] List [place], CRNP      potassium chloride  40 mEq Oral BID Mitchell PalLAUREN ortiz      senna-docusate sodium  1 tablet Oral BID LUIS CARLOS Murray      sertraline  100 mg Oral HS Adri Dewey PA-C      sodium chloride  1 spray Each Nare Q2H PRN Adri Dewey PA-C      traZODone  100 mg Oral HS Adri Dewey PA-C          Today, Patient Was Seen By: Kai Calderon MD    ** Please Note: This note has been constructed using a voice recognition system   **

## 2020-12-08 NOTE — ASSESSMENT & PLAN NOTE
Acute hypoxic respiratory failure likely due to COVID-19 infection  Currently requiring 8 L of oxygen saturating 93% (improved from 15 L mid flow yesterday)    Plan  Continue supplemental oxygen maintain O2 sats more than 92-94%  Encourage proning  See below for details of COVID 19 management  Looks euvolemic   P r n   Lasix

## 2020-12-08 NOTE — ASSESSMENT & PLAN NOTE
-11/29/20 COVID-19: Positive  -Continue anti-inflammatories/COVID therapies:   § Remdesivir (Day # 5/5)  § Dexamethasone 6 mg IV daily, day # 5/10  § Vitamin C 1000mg BID x7 days   § Zinc 220mg daily x7 days   § Atorvastatin 40mg qHS   § Famotidine 20mg BID  § Vitamin D3 2000 IU daily  § Lovenox 30 q 12  -Completed therapies:   § Convalescent Plasma (12/6/20)  -Inflammatory markers:   § Ferritin: 369 - 435 - 513 - 526 - 496  § CRP: 36 6 - 75 0 - 49 1 - 44 5 - 43  § Follow q1-3 days as needed   -Continue to closely monitor for signs of secondary bacterial infection   § Procalcitonin: <0 05 x4  § Stopped doxycycline/ceftriaxone   -Continue to monitor fever and WBC curve off antibiotics   ? CBC daily

## 2020-12-09 LAB
ALBUMIN SERPL BCP-MCNC: 3.4 G/DL (ref 3.5–5)
ALP SERPL-CCNC: 91 U/L (ref 46–116)
ALT SERPL W P-5'-P-CCNC: 36 U/L (ref 12–78)
ANION GAP SERPL CALCULATED.3IONS-SCNC: 5 MMOL/L (ref 4–13)
AST SERPL W P-5'-P-CCNC: 30 U/L (ref 5–45)
BILIRUB SERPL-MCNC: 0.58 MG/DL (ref 0.2–1)
BUN SERPL-MCNC: 31 MG/DL (ref 5–25)
CALCIUM ALBUM COR SERPL-MCNC: 10.1 MG/DL (ref 8.3–10.1)
CALCIUM SERPL-MCNC: 9.6 MG/DL (ref 8.3–10.1)
CHLORIDE SERPL-SCNC: 109 MMOL/L (ref 100–108)
CO2 SERPL-SCNC: 28 MMOL/L (ref 21–32)
CREAT SERPL-MCNC: 0.81 MG/DL (ref 0.6–1.3)
ERYTHROCYTE [DISTWIDTH] IN BLOOD BY AUTOMATED COUNT: 13.2 % (ref 11.6–15.1)
GFR SERPL CREATININE-BSD FRML MDRD: 76 ML/MIN/1.73SQ M
GLUCOSE SERPL-MCNC: 86 MG/DL (ref 65–140)
HCT VFR BLD AUTO: 43.9 % (ref 34.8–46.1)
HGB BLD-MCNC: 14 G/DL (ref 11.5–15.4)
MAGNESIUM SERPL-MCNC: 2.7 MG/DL (ref 1.6–2.6)
MCH RBC QN AUTO: 27 PG (ref 26.8–34.3)
MCHC RBC AUTO-ENTMCNC: 31.9 G/DL (ref 31.4–37.4)
MCV RBC AUTO: 85 FL (ref 82–98)
PHOSPHATE SERPL-MCNC: 2.9 MG/DL (ref 2.3–4.1)
PLATELET # BLD AUTO: 221 THOUSANDS/UL (ref 149–390)
PMV BLD AUTO: 10.8 FL (ref 8.9–12.7)
POTASSIUM SERPL-SCNC: 4.2 MMOL/L (ref 3.5–5.3)
PROT SERPL-MCNC: 6.9 G/DL (ref 6.4–8.2)
RBC # BLD AUTO: 5.19 MILLION/UL (ref 3.81–5.12)
SODIUM SERPL-SCNC: 142 MMOL/L (ref 136–145)
WBC # BLD AUTO: 8.58 THOUSAND/UL (ref 4.31–10.16)

## 2020-12-09 PROCEDURE — 85027 COMPLETE CBC AUTOMATED: CPT | Performed by: PHYSICIAN ASSISTANT

## 2020-12-09 PROCEDURE — 94003 VENT MGMT INPAT SUBQ DAY: CPT

## 2020-12-09 PROCEDURE — 80053 COMPREHEN METABOLIC PANEL: CPT | Performed by: PHYSICIAN ASSISTANT

## 2020-12-09 PROCEDURE — 84100 ASSAY OF PHOSPHORUS: CPT | Performed by: PHYSICIAN ASSISTANT

## 2020-12-09 PROCEDURE — 99232 SBSQ HOSP IP/OBS MODERATE 35: CPT | Performed by: INTERNAL MEDICINE

## 2020-12-09 PROCEDURE — 83735 ASSAY OF MAGNESIUM: CPT | Performed by: PHYSICIAN ASSISTANT

## 2020-12-09 PROCEDURE — 94760 N-INVAS EAR/PLS OXIMETRY 1: CPT

## 2020-12-09 RX ORDER — TRAMADOL HYDROCHLORIDE 50 MG/1
50 TABLET ORAL ONCE
Status: COMPLETED | OUTPATIENT
Start: 2020-12-09 | End: 2020-12-09

## 2020-12-09 RX ORDER — TRAMADOL HYDROCHLORIDE 50 MG/1
50 TABLET ORAL ONCE
Status: DISCONTINUED | OUTPATIENT
Start: 2020-12-09 | End: 2020-12-09

## 2020-12-09 RX ADMIN — OXYMETAZOLINE HYDROCHLORIDE 2 SPRAY: 0.05 SPRAY NASAL at 09:07

## 2020-12-09 RX ADMIN — GABAPENTIN 300 MG: 300 CAPSULE ORAL at 08:18

## 2020-12-09 RX ADMIN — DEXAMETHASONE SODIUM PHOSPHATE 6 MG: 4 INJECTION INTRA-ARTICULAR; INTRALESIONAL; INTRAMUSCULAR; INTRAVENOUS; SOFT TISSUE at 13:10

## 2020-12-09 RX ADMIN — Medication 2000 UNITS: at 08:19

## 2020-12-09 RX ADMIN — FLUTICASONE PROPIONATE 2 SPRAY: 50 SPRAY, METERED NASAL at 09:06

## 2020-12-09 RX ADMIN — Medication 1 SPRAY: at 09:06

## 2020-12-09 RX ADMIN — ALUMINUM HYDROXIDE, MAGNESIUM HYDROXIDE, AND SIMETHICONE 30 ML: 200; 200; 20 SUSPENSION ORAL at 09:01

## 2020-12-09 RX ADMIN — ACETAMINOPHEN 650 MG: 325 TABLET, FILM COATED ORAL at 16:55

## 2020-12-09 RX ADMIN — MELATONIN TAB 3 MG 6 MG: 3 TAB at 21:17

## 2020-12-09 RX ADMIN — FAMOTIDINE 20 MG: 20 TABLET ORAL at 20:56

## 2020-12-09 RX ADMIN — LORAZEPAM 1.5 MG: 1 TABLET ORAL at 21:17

## 2020-12-09 RX ADMIN — OXYCODONE HYDROCHLORIDE AND ACETAMINOPHEN 1000 MG: 500 TABLET ORAL at 08:18

## 2020-12-09 RX ADMIN — ZINC SULFATE 220 MG (50 MG) CAPSULE 220 MG: CAPSULE at 08:18

## 2020-12-09 RX ADMIN — ACETAMINOPHEN 650 MG: 325 TABLET, FILM COATED ORAL at 10:08

## 2020-12-09 RX ADMIN — LITHIUM CARBONATE 300 MG: 300 CAPSULE, GELATIN COATED ORAL at 21:18

## 2020-12-09 RX ADMIN — TRAMADOL HYDROCHLORIDE 50 MG: 50 TABLET, FILM COATED ORAL at 13:10

## 2020-12-09 RX ADMIN — ACETAMINOPHEN 650 MG: 325 TABLET, FILM COATED ORAL at 05:59

## 2020-12-09 RX ADMIN — OXYCODONE HYDROCHLORIDE AND ACETAMINOPHEN 1000 MG: 500 TABLET ORAL at 21:17

## 2020-12-09 RX ADMIN — ENOXAPARIN SODIUM 30 MG: 30 INJECTION SUBCUTANEOUS at 08:19

## 2020-12-09 RX ADMIN — ATORVASTATIN CALCIUM 40 MG: 40 TABLET, FILM COATED ORAL at 21:17

## 2020-12-09 RX ADMIN — FAMOTIDINE 20 MG: 20 TABLET ORAL at 08:18

## 2020-12-09 RX ADMIN — SERTRALINE HYDROCHLORIDE 100 MG: 100 TABLET ORAL at 21:17

## 2020-12-09 RX ADMIN — ENOXAPARIN SODIUM 30 MG: 30 INJECTION SUBCUTANEOUS at 21:17

## 2020-12-09 RX ADMIN — TRAZODONE HYDROCHLORIDE 100 MG: 100 TABLET ORAL at 21:17

## 2020-12-09 NOTE — PROGRESS NOTES
Progress Note - Andres Callejas 1955, 76 y o  female MRN: [id number]    Unit/Bed#: -01 Encounter: [id number]    Primary Care Provider: LUIS CARLOS Morris   Date and time admitted to hospital: 12/4/2020  9:15 AM        * Acute respiratory failure with hypoxia ([facility] )  Assessment & Plan  Acute hypoxic respiratory failure likely due to COVID-19 infection  Currently requiring 12 L of oxygen saturating 93%   Plan  Continue supplemental oxygen maintain O2 sats more than 92-94%  Encourage proning  See below for details of COVID 19 management  Looks euvolemic   P r n  Lasix    COVID-19 virus infection  Assessment & Plan  COVID-19 infection, tested positive on 11/29/2020  Patient be placed on moderate treatment protocol due to worsening of hypoxia  Labs:  · CRP- 7 5-->4 4-> 4 3  · D dimer 513--> 526-> 496  · D-dimer 0 8--> 0 76, procalcitonin negative x2  Plan  · Continue dexamethasone 6/10  · Completed remdesivir  12/8  · Continue vitamin-D, zinc, vitamin-C  · Antibiotic discontinued  · s/p convalescent plasma 12/7/2020  · Continue self proning   · interleukin 6 pending   · Pulmonary team on board input appreciated  · Monitor CRP, ferritin, D-dimer      Elevated troponin  Assessment & Plan  Elevated troponin, peak troponin 0 32- likely due to Covid-19 infection  Trended down to 0 14  ECG - T wave inversions noted  Monitor     GERD (gastroesophageal reflux disease)  Assessment & Plan  Continue Famotidine and Mylanta     Bipolar disorder (HCC)  Assessment & Plan  Continue lithium sertraline, trazodone  Patient looks less anxious    Continue bedtime Ativan          VTE Pharmacologic Prophylaxis:   Pharmacologic: Enoxaparin (Lovenox)  Mechanical VTE Prophylaxis in Place: Yes    Discussions with Specialists or Other Care Team Provider: yes    Education and Discussions with Family / Patient: yes-- Praneeth Braun updated, all questions answered    Current Length of Stay: 5 day(s)    Current Patient Status: Inpatient Discharge Plan / Estimated Discharge Date: 24-48 hours     Code Status: Level 1 - Full Code      Subjective:   Patient seen and examined at bedside  Her oxygen requirement increased to 12 L  She was hypoxic in the mid 80s when she lays on back  She has having pain right hip and lower back  She reports her breathing is better  Denies any abdominal pain nausea or vomiting  No diarrhea  A dose of tramadol given for back pain  Objective:     Vitals:   Temp (24hrs), Av 5 °F (36 9 °C), Min:98 1 °F (36 7 °C), Max:99 1 °F (37 3 °C)    Temp:  [98 1 °F (36 7 °C)-99 1 °F (37 3 °C)] 99 1 °F (37 3 °C)  HR:  [66-85] 85  Resp:  [17-28] 25  BP: ()/(42-76) 119/76  SpO2:  [83 %-97 %] 83 %  Body mass index is 32 82 kg/m²  Input and Output Summary (last 24 hours): Intake/Output Summary (Last 24 hours) at 2020 1305  Last data filed at 2020 0537  Gross per 24 hour   Intake 500 ml   Output 500 ml   Net 0 ml       Physical Exam:     Physical Exam  HENT:      Head: Normocephalic and atraumatic  Mouth/Throat:      Mouth: Mucous membranes are moist    Neck:      Musculoskeletal: Normal range of motion  Pulmonary:      Effort: Pulmonary effort is normal  No respiratory distress  Breath sounds: No wheezing or rhonchi  Abdominal:      General: There is no distension  Palpations: Abdomen is soft  Tenderness: There is no abdominal tenderness  Musculoskeletal:         General: No swelling  Skin:     General: Skin is warm  Neurological:      General: No focal deficit present  Mental Status: She is alert and oriented to person, place, and time         Additional Data:     Labs:    Results from last 7 days   Lab Units 20  0541 20  0443  20  0537   WBC Thousand/uL 8 58 5 11   < > 4 46   HEMOGLOBIN g/dL 14 0 13 9   < > 13 0   HEMATOCRIT % 43 9 43 2   < > 40 8   PLATELETS Thousands/uL 221 200   < > 116*   NEUTROS PCT %  --   --   --  71   LYMPHS PCT %  --   -- --  24   LYMPHO PCT %  --  22  --   --    MONOS PCT %  --   --   --  5   MONO PCT %  --  3*  --   --    EOS PCT %  --  0  --  0    < > = values in this interval not displayed  Results from last 7 days   Lab Units 12/09/20  0541   POTASSIUM mmol/L 4 2   CHLORIDE mmol/L 109*   CO2 mmol/L 28   BUN mg/dL 31*   CREATININE mg/dL 0 81   CALCIUM mg/dL 9 6   ALK PHOS U/L 91   ALT U/L 36   AST U/L 30           * I Have Reviewed All Lab Data Listed Above  * Additional Pertinent Lab Tests Reviewed:  Sahil 66 Admission Reviewed    Imaging:    Imaging Reports Reviewed Today Include: No new imaging  Imaging Personally Reviewed by Myself Includes:  No new imaging    Recent Cultures (last 7 days):           Last 24 Hours Medication List:   Current Facility-Administered Medications   Medication Dose Route Frequency Provider Last Rate    acetaminophen  650 mg Oral Q6H PRN Diamond Martines MD      albuterol  2 puff Inhalation Q4H PRN Diamond Martines MD      aluminum-magnesium hydroxide-simethicone  30 mL Oral Q4H PRN Diamond Martines MD      ascorbic acid  1,000 mg Oral Q12H [address] Diamond Martines MD      atorvastatin  40 mg Oral HS Diamond Martines MD      bisacodyl  10 mg Rectal Daily PRN Diamond Martines MD      cholecalciferol  2,000 Units Oral Daily Diamond Martines MD      dexamethasone  6 mg Intravenous Q24H Diamond Martines MD      enoxaparin  30 mg Subcutaneous Q12H [address] Diamond Martines MD      famotidine  20 mg Oral Q12H Diamond Martines MD      fluticasone  2 spray Each Nare Daily Diamond Martines MD      gabapentin  300 mg Oral Daily Diamond Martines MD      lithium carbonate  300 mg Oral HS Diamond Martines MD      LORazepam  1 mg Oral BID PRN Diamond Martines MD      LORazepam  1 5 mg Oral HS Diamond Martines MD      melatonin  6 mg Oral HS Diamond Martines MD      zinc sulfate  220 mg Per NG Tube Daily Diamond Martines MD      Followed by   Claudene Durham ON 12/11/2020] multivitamin with iron-minerals 15 mL Per NG Tube Daily Alyssa Ordoñez MD      ondansetron  4 mg Intravenous Q6H PRN Alyssa Ordoñez MD      oxymetazoline  2 spray Each Nare Q12H [address] Chapo Medina PA-C      polyethylene glycol  17 g Oral Daily Alyssa Ordoñez MD      senna-docusate sodium  1 tablet Oral BID Alyssa Ordoñez MD      sertraline  100 mg Oral HS Alyssa Ordoñez MD      sodium chloride  1 spray Each Nare Q2H PRN Alyssa Ordoñez MD      traMADol  50 mg Oral Once Alyssa Ordoñez MD      traZODone  100 mg Oral HS Alyssa Ordoñez MD          Today, Patient Was Seen By: Alyssa Ordoñez MD    ** Please Note: This note has been constructed using a voice recognition system   **

## 2020-12-09 NOTE — ASSESSMENT & PLAN NOTE
Acute hypoxic respiratory failure likely due to COVID-19 infection  Currently requiring 12 L of oxygen saturating 93%   Plan  Continue supplemental oxygen maintain O2 sats more than 92-94%  Encourage proning  See below for details of COVID 19 management  Looks euvolemic   P r n   Lasix

## 2020-12-09 NOTE — ASSESSMENT & PLAN NOTE
Acute hypoxic respiratory failure likely due to COVID-19 infection  Patient is hypoxic  Requiring 12-15 L of oxygen saturating in the high 80s and low 90s     Plan  Continue supplemental oxygen maintain O2 sats more than 92-94%  Encourage proning- patient needs continuous encouragement and reminder to prone  Pulmonary contacted due to worsening hypoxemia and oxygen requirement  See below for details of COVID 19 management  Looks euvolemic   P r n   Lasix

## 2020-12-09 NOTE — ASSESSMENT & PLAN NOTE
COVID-19 infection, tested positive on 11/29/2020  Patient be placed on moderate treatment protocol due to worsening of hypoxia  Labs:  · CRP- 7 5-->4 4-> 4 3  · D dimer 513--> 526-> 496  · D-dimer 0 8--> 0 76, procalcitonin negative x2  Plan  · Continue dexamethasone 6/10  · Completed remdesivir  12/8  · Continue vitamin-D, zinc, vitamin-C  · Antibiotic discontinued  · s/p convalescent plasma 12/7/2020  · Continue self proning   · interleukin 6 pending   · Pulmonary team on board input appreciated  · Monitor CRP, ferritin, D-dimer

## 2020-12-10 ENCOUNTER — APPOINTMENT (INPATIENT)
Dept: RADIOLOGY | Facility: HOSPITAL | Age: 65
DRG: 177 | End: 2020-12-10
Attending: INTERNAL MEDICINE
Payer: MEDICARE

## 2020-12-10 LAB
ALBUMIN SERPL BCP-MCNC: 3 G/DL (ref 3.5–5)
ALP SERPL-CCNC: 81 U/L (ref 46–116)
ALT SERPL W P-5'-P-CCNC: 30 U/L (ref 12–78)
ANION GAP SERPL CALCULATED.3IONS-SCNC: 5 MMOL/L (ref 4–13)
AST SERPL W P-5'-P-CCNC: 24 U/L (ref 5–45)
BASOPHILS # BLD AUTO: 0.01 THOUSANDS/ΜL (ref 0–0.1)
BASOPHILS NFR BLD AUTO: 0 % (ref 0–1)
BILIRUB SERPL-MCNC: 0.56 MG/DL (ref 0.2–1)
BUN SERPL-MCNC: 25 MG/DL (ref 5–25)
CALCIUM ALBUM COR SERPL-MCNC: 9.9 MG/DL (ref 8.3–10.1)
CALCIUM SERPL-MCNC: 9.1 MG/DL (ref 8.3–10.1)
CHLORIDE SERPL-SCNC: 105 MMOL/L (ref 100–108)
CO2 SERPL-SCNC: 29 MMOL/L (ref 21–32)
CREAT SERPL-MCNC: 0.8 MG/DL (ref 0.6–1.3)
CRP SERPL QL: 153 MG/L
D DIMER PPP FEU-MCNC: 1.16 UG/ML FEU
EOSINOPHIL # BLD AUTO: 0.11 THOUSAND/ΜL (ref 0–0.61)
EOSINOPHIL NFR BLD AUTO: 1 % (ref 0–6)
ERYTHROCYTE [DISTWIDTH] IN BLOOD BY AUTOMATED COUNT: 13.1 % (ref 11.6–15.1)
GFR SERPL CREATININE-BSD FRML MDRD: 77 ML/MIN/1.73SQ M
GLUCOSE SERPL-MCNC: 118 MG/DL (ref 65–140)
HCT VFR BLD AUTO: 41.2 % (ref 34.8–46.1)
HGB BLD-MCNC: 13.4 G/DL (ref 11.5–15.4)
IL6 SERPL-MCNC: 12.9 PG/ML (ref 0–13)
IMM GRANULOCYTES # BLD AUTO: 0.05 THOUSAND/UL (ref 0–0.2)
IMM GRANULOCYTES NFR BLD AUTO: 1 % (ref 0–2)
LYMPHOCYTES # BLD AUTO: 0.67 THOUSANDS/ΜL (ref 0.6–4.47)
LYMPHOCYTES NFR BLD AUTO: 8 % (ref 14–44)
MCH RBC QN AUTO: 27 PG (ref 26.8–34.3)
MCHC RBC AUTO-ENTMCNC: 32.5 G/DL (ref 31.4–37.4)
MCV RBC AUTO: 83 FL (ref 82–98)
MONOCYTES # BLD AUTO: 0.16 THOUSAND/ΜL (ref 0.17–1.22)
MONOCYTES NFR BLD AUTO: 2 % (ref 4–12)
NEUTROPHILS # BLD AUTO: 7.41 THOUSANDS/ΜL (ref 1.85–7.62)
NEUTS SEG NFR BLD AUTO: 88 % (ref 43–75)
NRBC BLD AUTO-RTO: 0 /100 WBCS
PLATELET # BLD AUTO: 236 THOUSANDS/UL (ref 149–390)
PMV BLD AUTO: 11.1 FL (ref 8.9–12.7)
POTASSIUM SERPL-SCNC: 3.9 MMOL/L (ref 3.5–5.3)
PROT SERPL-MCNC: 6.5 G/DL (ref 6.4–8.2)
RBC # BLD AUTO: 4.96 MILLION/UL (ref 3.81–5.12)
SODIUM SERPL-SCNC: 139 MMOL/L (ref 136–145)
WBC # BLD AUTO: 8.41 THOUSAND/UL (ref 4.31–10.16)

## 2020-12-10 PROCEDURE — 85379 FIBRIN DEGRADATION QUANT: CPT | Performed by: INTERNAL MEDICINE

## 2020-12-10 PROCEDURE — 99232 SBSQ HOSP IP/OBS MODERATE 35: CPT | Performed by: INTERNAL MEDICINE

## 2020-12-10 PROCEDURE — 71045 X-RAY EXAM CHEST 1 VIEW: CPT

## 2020-12-10 PROCEDURE — 80053 COMPREHEN METABOLIC PANEL: CPT | Performed by: INTERNAL MEDICINE

## 2020-12-10 PROCEDURE — 94760 N-INVAS EAR/PLS OXIMETRY 1: CPT

## 2020-12-10 PROCEDURE — 85025 COMPLETE CBC W/AUTO DIFF WBC: CPT | Performed by: INTERNAL MEDICINE

## 2020-12-10 PROCEDURE — 86140 C-REACTIVE PROTEIN: CPT | Performed by: INTERNAL MEDICINE

## 2020-12-10 RX ORDER — LIDOCAINE 50 MG/G
1 PATCH TOPICAL DAILY
Status: DISCONTINUED | OUTPATIENT
Start: 2020-12-10 | End: 2021-01-11 | Stop reason: HOSPADM

## 2020-12-10 RX ORDER — MUSCLE RUB CREAM 100; 150 MG/G; MG/G
CREAM TOPICAL 4 TIMES DAILY PRN
Status: DISCONTINUED | OUTPATIENT
Start: 2020-12-10 | End: 2021-01-11 | Stop reason: HOSPADM

## 2020-12-10 RX ORDER — FUROSEMIDE 10 MG/ML
40 INJECTION INTRAMUSCULAR; INTRAVENOUS ONCE
Status: COMPLETED | OUTPATIENT
Start: 2020-12-10 | End: 2020-12-10

## 2020-12-10 RX ADMIN — OXYCODONE HYDROCHLORIDE AND ACETAMINOPHEN 1000 MG: 500 TABLET ORAL at 21:41

## 2020-12-10 RX ADMIN — SENNOSIDES AND DOCUSATE SODIUM 1 TABLET: 8.6; 5 TABLET ORAL at 19:13

## 2020-12-10 RX ADMIN — LITHIUM CARBONATE 300 MG: 300 CAPSULE, GELATIN COATED ORAL at 21:42

## 2020-12-10 RX ADMIN — ATORVASTATIN CALCIUM 40 MG: 40 TABLET, FILM COATED ORAL at 21:41

## 2020-12-10 RX ADMIN — FLUTICASONE PROPIONATE 2 SPRAY: 50 SPRAY, METERED NASAL at 08:05

## 2020-12-10 RX ADMIN — DEXAMETHASONE SODIUM PHOSPHATE 6 MG: 4 INJECTION INTRA-ARTICULAR; INTRALESIONAL; INTRAMUSCULAR; INTRAVENOUS; SOFT TISSUE at 12:07

## 2020-12-10 RX ADMIN — MELATONIN TAB 3 MG 6 MG: 3 TAB at 21:41

## 2020-12-10 RX ADMIN — ENOXAPARIN SODIUM 30 MG: 30 INJECTION SUBCUTANEOUS at 21:42

## 2020-12-10 RX ADMIN — LIDOCAINE 5% 1 PATCH: 700 PATCH TOPICAL at 10:51

## 2020-12-10 RX ADMIN — ENOXAPARIN SODIUM 30 MG: 30 INJECTION SUBCUTANEOUS at 08:04

## 2020-12-10 RX ADMIN — ZINC SULFATE 220 MG (50 MG) CAPSULE 220 MG: CAPSULE at 08:04

## 2020-12-10 RX ADMIN — GABAPENTIN 300 MG: 300 CAPSULE ORAL at 08:05

## 2020-12-10 RX ADMIN — ACETAMINOPHEN 650 MG: 325 TABLET, FILM COATED ORAL at 19:21

## 2020-12-10 RX ADMIN — LORAZEPAM 1.5 MG: 1 TABLET ORAL at 21:41

## 2020-12-10 RX ADMIN — Medication 1 SPRAY: at 03:52

## 2020-12-10 RX ADMIN — ACETAMINOPHEN 650 MG: 325 TABLET, FILM COATED ORAL at 08:18

## 2020-12-10 RX ADMIN — TRAZODONE HYDROCHLORIDE 100 MG: 100 TABLET ORAL at 21:41

## 2020-12-10 RX ADMIN — OXYCODONE HYDROCHLORIDE AND ACETAMINOPHEN 1000 MG: 500 TABLET ORAL at 08:04

## 2020-12-10 RX ADMIN — FAMOTIDINE 20 MG: 20 TABLET ORAL at 21:41

## 2020-12-10 RX ADMIN — POLYETHYLENE GLYCOL 3350 17 G: 17 POWDER, FOR SOLUTION ORAL at 08:03

## 2020-12-10 RX ADMIN — SERTRALINE HYDROCHLORIDE 100 MG: 100 TABLET ORAL at 21:41

## 2020-12-10 RX ADMIN — FAMOTIDINE 20 MG: 20 TABLET ORAL at 08:04

## 2020-12-10 RX ADMIN — FUROSEMIDE 40 MG: 10 INJECTION, SOLUTION INTRAMUSCULAR; INTRAVENOUS at 15:23

## 2020-12-10 RX ADMIN — SENNOSIDES AND DOCUSATE SODIUM 1 TABLET: 8.6; 5 TABLET ORAL at 08:04

## 2020-12-10 RX ADMIN — Medication 2000 UNITS: at 08:04

## 2020-12-10 RX ADMIN — ALUMINUM HYDROXIDE, MAGNESIUM HYDROXIDE, AND SIMETHICONE 30 ML: 200; 200; 20 SUSPENSION ORAL at 08:03

## 2020-12-10 NOTE — PROGRESS NOTES
As long as pt remains almost on her hoa at all times her sats on 15L midflo have remained in the mid 90's, she actually tolerated some lunch as well

## 2020-12-10 NOTE — PLAN OF CARE
Problem: Potential for Falls  Goal: Patient will remain free of falls  Description: INTERVENTIONS:  - Assess patient frequently for physical needs  -  Identify cognitive and physical deficits and behaviors that affect risk of falls    -  [place] fall precautions as indicated by assessment   - Educate patient/family on patient safety including physical limitations  - Instruct patient to call for assistance with activity based on assessment  - Modify environment to reduce risk of injury  - Consider OT/PT consult to assist with strengthening/mobility  Outcome: Progressing     Problem: RESPIRATORY - ADULT  Goal: Achieves optimal ventilation and oxygenation  Description: INTERVENTIONS:  - Assess for changes in respiratory status  - Assess for changes in mentation and behavior  - Position to facilitate oxygenation and minimize respiratory effort  - Oxygen administered by appropriate delivery if ordered  - Initiate smoking cessation education as indicated  - Encourage broncho-pulmonary hygiene including cough, deep breathe, Incentive Spirometry  - Assess the need for suctioning and aspirate as needed  - Assess and instruct to report SOB or any respiratory difficulty  - Respiratory Therapy support as indicated  Outcome: Progressing     Problem: METABOLIC, FLUID AND ELECTROLYTES - ADULT  Goal: Electrolytes maintained within normal limits  Description: INTERVENTIONS:  - Monitor labs and assess patient for signs and symptoms of electrolyte imbalances  - Administer electrolyte replacement as ordered  - Monitor response to electrolyte replacements, including repeat lab results as appropriate  - Instruct patient on fluid and nutrition as appropriate  Outcome: Progressing  Goal: Fluid balance maintained  Description: INTERVENTIONS:  - Monitor labs   - Monitor I/O and WT  - Instruct patient on fluid and nutrition as appropriate  - Assess for signs & symptoms of volume excess or deficit  Outcome: Progressing     Problem: HEMATOLOGIC - ADULT  Goal: Maintains hematologic stability  Description: INTERVENTIONS  - Assess for signs and symptoms of bleeding or hemorrhage  - Monitor labs  - Administer supportive blood products/factors as ordered and appropriate  Outcome: Progressing     Problem: INFECTION - ADULT  Goal: Absence or prevention of progression during hospitalization  Description: INTERVENTIONS:  - Assess and monitor for signs and symptoms of infection  - Monitor lab/diagnostic results  - Monitor all insertion sites, i e  indwelling lines, tubes, and drains  - Monitor endotracheal if appropriate and nasal secretions for changes in amount and color  - [place] appropriate cooling/warming therapies per order  - Administer medications as ordered  - Instruct and encourage patient and family to use good hand hygiene technique  - Identify and instruct in appropriate isolation precautions for identified infection/condition  Outcome: Progressing  Goal: Absence of fever/infection during neutropenic period  Description: INTERVENTIONS:  - Monitor WBC    Outcome: Progressing     Problem: SAFETY ADULT  Goal: Patient will remain free of falls  Description: INTERVENTIONS:  - Assess patient frequently for physical needs  -  Identify cognitive and physical deficits and behaviors that affect risk of falls    -  [place] fall precautions as indicated by assessment   - Educate patient/family on patient safety including physical limitations  - Instruct patient to call for assistance with activity based on assessment  - Modify environment to reduce risk of injury  - Consider OT/PT consult to assist with strengthening/mobility  Outcome: Progressing  Goal: Maintain or return to baseline ADL function  Description: INTERVENTIONS:  -  Assess patient's ability to carry out ADLs; assess patient's baseline for ADL function and identify physical deficits which impact ability to perform ADLs (bathing, care of mouth/teeth, toileting, grooming, dressing, etc )  - Assess/evaluate cause of self-care deficits   - Assess range of motion  - Assess patient's mobility; develop plan if impaired  - Assess patient's need for assistive devices and provide as appropriate  - Encourage maximum independence but intervene and supervise when necessary  - Involve family in performance of ADLs  - Assess for home care needs following discharge   - Consider OT consult to assist with ADL evaluation and planning for discharge  - Provide patient education as appropriate  Outcome: Progressing  Goal: Maintain or return mobility status to optimal level  Description: INTERVENTIONS:  - Assess patient's baseline mobility status (ambulation, transfers, stairs, etc )    - Identify cognitive and physical deficits and behaviors that affect mobility  - Identify mobility aids required to assist with transfers and/or ambulation (gait belt, sit-to-stand, lift, walker, cane, etc )  - [place] fall precautions as indicated by assessment  - Record patient progress and toleration of activity level on Mobility SBAR; progress patient to next Phase/Stage  - Instruct patient to call for assistance with activity based on assessment  - Consider rehabilitation consult to assist with strengthening/weightbearing, etc   Outcome: Progressing     Problem: DISCHARGE PLANNING  Goal: Discharge to home or other facility with appropriate resources  Description: INTERVENTIONS:  - Identify barriers to discharge w/patient and caregiver  - Arrange for needed discharge resources and transportation as appropriate  - Identify discharge learning needs (meds, wound care, etc )  - Arrange for interpretive services to assist at discharge as needed  - Refer to Case Management Department for coordinating discharge planning if the patient needs post-hospital services based on physician/advanced practitioner order or complex needs related to functional status, cognitive ability, or social support system  Outcome: Progressing     Problem: Knowledge Deficit  Goal: Patient/family/caregiver demonstrates understanding of disease process, treatment plan, medications, and discharge instructions  Description: Complete learning assessment and assess knowledge base    Interventions:  - Provide teaching at level of understanding  - Provide teaching via preferred learning methods  Outcome: Progressing     Problem: Prexisting or High Potential for Compromised Skin Integrity  Goal: Skin integrity is maintained or improved  Description: INTERVENTIONS:  - Identify patients at risk for skin breakdown  - Assess and monitor skin integrity  - Assess and monitor nutrition and hydration status  - Monitor labs   - Assess for incontinence   - Turn and reposition patient  - Assist with mobility/ambulation  - Relieve pressure over bony prominences  - Avoid friction and shearing  - Provide appropriate hygiene as needed including keeping skin clean and dry  - Evaluate need for skin moisturizer/barrier cream  - Collaborate with interdisciplinary team   - Patient/family teaching  - Consider wound care consult   Outcome: Progressing

## 2020-12-10 NOTE — PROGRESS NOTES
Progress Note - Pulmonary   James Raring 76 y o  female MRN: [id number]  Unit/Bed#: -01 Encounter: [id number]      Assessment:  1  Acute hypoxic respiratory failure secondary to COVID-19 pneumonia  2  COVID-19 pneumonia  3  Abnormal chest x-ray with worsening bilateral airspace opacities worse on the left with pulmonary vascular congestion  4  Elevated inflammatory markers  5  GERD  6  Bipolar disorder    Plan:  - patient currently saturating low 90s on 15 L mid flow, wean as tolerated, self prone, aggressive ics, out of bed to chair, pulmonary toilet, goal SpO2 greater than 90%  -completed 5 day course of remdesivir  -continue Decadron day 7/10  -continue vitamin-C/D, zinc, statin  -monitoring off antibiotics given multiple negative procalcitonin, will repeat in a m , but currently no evidence of bacterial infection  -status post convalescent plasma 12/07/2020  -continue to trend inflammatory markers, recommend ID consultation for consideration of Actemra, although doubtful given late in course and IL 6 12 9  -position with right lung down as this appears like better lung and will improve V/Q mismatch  - given worsening pulmonary vascular congestion on today's chest x-ray, will trial 40 mg IV Lasix, monitor intake/output, daily weights attempt 500-1 L negative today  - rest of care per primary team     Subjective:   Patient seen examined at bedside  She is currently saturating low 90s on 15 L via mid flow, worsened over past 48 hours, although subjectively, patient does not complain of significant worsening shortness of breath  She actually feels that she is improving from prior days  Intermittent dry cough, no fever, chills, nausea vomiting, chest pain  Review of Systems   Constitutional: Positive for fatigue  Negative for chills, fever and unexpected weight change  HENT: Negative for congestion, rhinorrhea, sneezing and sore throat  Respiratory: Positive for cough and shortness of breath  Negative for wheezing  Cardiovascular: Negative for chest pain, palpitations and leg swelling  Gastrointestinal: Negative for abdominal pain, constipation, diarrhea, nausea and vomiting  Genitourinary: Negative for dysuria  Musculoskeletal: Negative for arthralgias  Neurological: Negative for dizziness and numbness  Objective:     Vitals:    12/10/20 0837 12/10/20 1014 12/10/20 1310 12/10/20 1349   BP:       BP Location:       Pulse: 87      Resp:       Temp:       TempSrc:       SpO2: 91% 95% 96% 92%   Weight:       Height:               Intake/Output Summary (Last 24 hours) at 12/10/2020 1508  Last data filed at 12/9/2020 2100  Gross per 24 hour   Intake --   Output 250 ml   Net -250 ml         Physical Exam  Constitutional:       General: She is not in acute distress  Appearance: She is well-developed  She is not diaphoretic  HENT:      Head: Normocephalic and atraumatic  Mouth/Throat:      Mouth: Mucous membranes are moist       Pharynx: Oropharynx is clear  No oropharyngeal exudate  Eyes:      General: No scleral icterus  Cardiovascular:      Rate and Rhythm: Normal rate and regular rhythm  Heart sounds: Normal heart sounds  No murmur  Pulmonary:      Effort: Pulmonary effort is normal  No respiratory distress  Breath sounds: No wheezing  Comments: Decreased breath sounds  Few crackles at bases  Abdominal:      General: Bowel sounds are normal  There is no distension  Palpations: Abdomen is soft  Tenderness: There is no abdominal tenderness  Musculoskeletal:      Right lower leg: No edema  Left lower leg: No edema  Neurological:      Mental Status: She is alert and oriented to person, place, and time  Labs: I have personally reviewed pertinent lab results    Results from last 7 days   Lab Units 12/10/20  0548 12/09/20  0541 12/08/20  0443  12/05/20  0537 12/04/20  0959   WBC Thousand/uL 8 41 8 58 5 11   < > 4 46 4 85   HEMOGLOBIN g/dL 13 4 14 0 13 9   < > 13 0 13 9   HEMATOCRIT % 41 2 43 9 43 2   < > 40 8 44 1   PLATELETS Thousands/uL 236 221 200   < > 116* 116*   NEUTROS PCT % 88*  --   --   --  71 72   MONOS PCT % 2*  --   --   --  5 5   MONO PCT %  --   --  3*  --   --   --     < > = values in this interval not displayed  Results from last 7 days   Lab Units 12/10/20  0549 12/09/20  0541 12/08/20  0443   POTASSIUM mmol/L 3 9 4 2 3 4*   CHLORIDE mmol/L 105 109* 110*   CO2 mmol/L 29 28 26   BUN mg/dL 25 31* 34*   CREATININE mg/dL 0 80 0 81 0 83   CALCIUM mg/dL 9 1 9 6 9 6   ALK PHOS U/L 81 91 91   ALT U/L 30 36 47   AST U/L 24 30 39     Results from last 7 days   Lab Units 12/09/20  0541 12/08/20  0443   MAGNESIUM mg/dL 2 7* 2 7*     Results from last 7 days   Lab Units 12/09/20  0541 12/08/20  0443   PHOSPHORUS mg/dL 2 9 4 1              0   Lab Value Date/Time    TROPONINI 0 14 (H) 12/05/2020 2123    TROPONINI 0 22 (H) 12/05/2020 0537    TROPONINI 0 28 (H) 12/04/2020 1836    TROPONINI 0 30 (H) 12/04/2020 1602    TROPONINI 0 32 (H) 12/04/2020 1304    TROPONINI 0 26 (H) 12/04/2020 0959    TROPONINI <0 02 10/24/2019 1048    TROPONINI <0 02 07/01/2019 1443    TROPONINI <0 02 07/01/2019 1118    TROPONINI <0 02 07/01/2019 0813       Microbiology:  COVID positive    Imaging and other studies: I have personally reviewed pertinent reports     and I have personally reviewed pertinent films in PACS  Chest x-ray 12/04/2020  Left basilar infiltrate    Chest x-ray 12/10/2020  Worsening bilateral pulmonary infiltrates with worsening pulmonary vascular congestion, especially greater on the left      Esau Paget, MD  Pulmonary & Critical Care Fellow, [facility]

## 2020-12-10 NOTE — PROGRESS NOTES
Rounded with Dr Julia Feliciano and discused pt's sats despite that she is almost laying on her belly had to titrate her mid barak o2 up to 15 L at this time and docs aware, lidoderm patch applied to pt's rt hip at her request

## 2020-12-10 NOTE — QUICK NOTE
Need for Actemra discussed with ID per Pulmonary recommendations  Given low IL 6 and Ferritin < 600, Actemra not indicated at this time per ID  Rechecking IL 6, ferritin and CRP

## 2020-12-10 NOTE — ASSESSMENT & PLAN NOTE
COVID-19 infection, tested positive on 11/29/2020  Patient be placed on moderate treatment protocol   Noted to have worsening of oxygenation requiring 15 later mid flow, with saturations in low 90s  Labs:   Inflammatory markers worsening  · CRP- 75-->44-> 43 --> 153  · Ferritin 513--> 526-> 496  · D-dimer 0 8--> 0 76--1 13  · procalcitonin negative x2  Plan  · Continue dexamethasone 7/10  · Completed remdesivir  12/8  · Continue vitamin-D, zinc, vitamin-C  · Antibiotic discontinued  · s/p convalescent plasma 12/7/2020  · Continue self proning   · interleukin 6 pending   · Pulmonary team contacted due to worsening respiratory status and hypoxemia   · Monitor CRP, ferritin, D-dimer

## 2020-12-11 LAB
ALBUMIN SERPL BCP-MCNC: 3.1 G/DL (ref 3.5–5)
ALP SERPL-CCNC: 94 U/L (ref 46–116)
ALT SERPL W P-5'-P-CCNC: 71 U/L (ref 12–78)
ANION GAP SERPL CALCULATED.3IONS-SCNC: 7 MMOL/L (ref 4–13)
AST SERPL W P-5'-P-CCNC: 65 U/L (ref 5–45)
BILIRUB SERPL-MCNC: 0.66 MG/DL (ref 0.2–1)
BUN SERPL-MCNC: 33 MG/DL (ref 5–25)
CALCIUM ALBUM COR SERPL-MCNC: 10.5 MG/DL (ref 8.3–10.1)
CALCIUM SERPL-MCNC: 9.8 MG/DL (ref 8.3–10.1)
CHLORIDE SERPL-SCNC: 103 MMOL/L (ref 100–108)
CO2 SERPL-SCNC: 28 MMOL/L (ref 21–32)
CREAT SERPL-MCNC: 0.76 MG/DL (ref 0.6–1.3)
CRP SERPL QL: 185 MG/L
D DIMER PPP FEU-MCNC: 1.11 UG/ML FEU
FERRITIN SERPL-MCNC: 559 NG/ML (ref 8–388)
GFR SERPL CREATININE-BSD FRML MDRD: 82 ML/MIN/1.73SQ M
GLUCOSE SERPL-MCNC: 136 MG/DL (ref 65–140)
POTASSIUM SERPL-SCNC: 3.6 MMOL/L (ref 3.5–5.3)
PROCALCITONIN SERPL-MCNC: <0.05 NG/ML
PROT SERPL-MCNC: 7.3 G/DL (ref 6.4–8.2)
SODIUM SERPL-SCNC: 138 MMOL/L (ref 136–145)

## 2020-12-11 PROCEDURE — 83520 IMMUNOASSAY QUANT NOS NONAB: CPT | Performed by: INTERNAL MEDICINE

## 2020-12-11 PROCEDURE — 94760 N-INVAS EAR/PLS OXIMETRY 1: CPT

## 2020-12-11 PROCEDURE — 85379 FIBRIN DEGRADATION QUANT: CPT | Performed by: INTERNAL MEDICINE

## 2020-12-11 PROCEDURE — 86140 C-REACTIVE PROTEIN: CPT | Performed by: INTERNAL MEDICINE

## 2020-12-11 PROCEDURE — 99232 SBSQ HOSP IP/OBS MODERATE 35: CPT | Performed by: INTERNAL MEDICINE

## 2020-12-11 PROCEDURE — 84145 PROCALCITONIN (PCT): CPT | Performed by: INTERNAL MEDICINE

## 2020-12-11 PROCEDURE — 82728 ASSAY OF FERRITIN: CPT | Performed by: INTERNAL MEDICINE

## 2020-12-11 PROCEDURE — 80053 COMPREHEN METABOLIC PANEL: CPT | Performed by: INTERNAL MEDICINE

## 2020-12-11 RX ORDER — POTASSIUM CHLORIDE 20 MEQ/1
40 TABLET, EXTENDED RELEASE ORAL ONCE
Status: COMPLETED | OUTPATIENT
Start: 2020-12-11 | End: 2020-12-11

## 2020-12-11 RX ORDER — FUROSEMIDE 10 MG/ML
20 INJECTION INTRAMUSCULAR; INTRAVENOUS ONCE
Status: COMPLETED | OUTPATIENT
Start: 2020-12-11 | End: 2020-12-11

## 2020-12-11 RX ADMIN — LITHIUM CARBONATE 300 MG: 300 CAPSULE, GELATIN COATED ORAL at 20:47

## 2020-12-11 RX ADMIN — GABAPENTIN 300 MG: 300 CAPSULE ORAL at 08:00

## 2020-12-11 RX ADMIN — DEXAMETHASONE SODIUM PHOSPHATE 6 MG: 4 INJECTION INTRA-ARTICULAR; INTRALESIONAL; INTRAMUSCULAR; INTRAVENOUS; SOFT TISSUE at 13:08

## 2020-12-11 RX ADMIN — FLUTICASONE PROPIONATE 2 SPRAY: 50 SPRAY, METERED NASAL at 08:02

## 2020-12-11 RX ADMIN — SERTRALINE HYDROCHLORIDE 100 MG: 100 TABLET ORAL at 20:48

## 2020-12-11 RX ADMIN — LORAZEPAM 1.5 MG: 1 TABLET ORAL at 20:47

## 2020-12-11 RX ADMIN — ENOXAPARIN SODIUM 30 MG: 30 INJECTION SUBCUTANEOUS at 20:48

## 2020-12-11 RX ADMIN — MELATONIN TAB 3 MG 6 MG: 3 TAB at 20:48

## 2020-12-11 RX ADMIN — SENNOSIDES AND DOCUSATE SODIUM 1 TABLET: 8.6; 5 TABLET ORAL at 08:00

## 2020-12-11 RX ADMIN — MULTIVITAMIN 15 ML: LIQUID ORAL at 08:02

## 2020-12-11 RX ADMIN — POTASSIUM CHLORIDE 40 MEQ: 1500 TABLET, EXTENDED RELEASE ORAL at 09:51

## 2020-12-11 RX ADMIN — LIDOCAINE 5% 1 PATCH: 700 PATCH TOPICAL at 08:01

## 2020-12-11 RX ADMIN — POLYETHYLENE GLYCOL 3350 17 G: 17 POWDER, FOR SOLUTION ORAL at 08:00

## 2020-12-11 RX ADMIN — FUROSEMIDE 20 MG: 10 INJECTION, SOLUTION INTRAMUSCULAR; INTRAVENOUS at 09:50

## 2020-12-11 RX ADMIN — Medication 2000 UNITS: at 08:00

## 2020-12-11 RX ADMIN — TRAZODONE HYDROCHLORIDE 100 MG: 100 TABLET ORAL at 20:47

## 2020-12-11 RX ADMIN — FAMOTIDINE 20 MG: 20 TABLET ORAL at 20:47

## 2020-12-11 RX ADMIN — SENNOSIDES AND DOCUSATE SODIUM 1 TABLET: 8.6; 5 TABLET ORAL at 18:27

## 2020-12-11 RX ADMIN — ENOXAPARIN SODIUM 30 MG: 30 INJECTION SUBCUTANEOUS at 08:00

## 2020-12-11 RX ADMIN — ATORVASTATIN CALCIUM 40 MG: 40 TABLET, FILM COATED ORAL at 20:48

## 2020-12-11 RX ADMIN — FAMOTIDINE 20 MG: 20 TABLET ORAL at 08:00

## 2020-12-11 NOTE — PROGRESS NOTES
Progress Note - Melvin Saenz 1955, 76 y o  female MRN: [id number]    Unit/Bed#: -01 Encounter: [id number]    Primary Care Provider: LUIS CARLOS Brunner   Date and time admitted to hospital: 12/4/2020  9:15 AM        * Acute respiratory failure with hypoxia ([address] )  Assessment & Plan  Acute hypoxic respiratory failure likely due to COVID-19 infection  Patient is hypoxic  Requiring 12 L MF oxygen saturating low 90s  ( Down from 15 L yesterday)  Given 40 mg Lasix x1 12/10-- which seem to help  Plan  Continue supplemental oxygen maintain O2 sats more than 92-94%  Encourage proning- patient needs continuous encouragement and reminder to prone  Pulmonary on board  See below for details of COVID 19 management  P r n  Lasix    COVID-19 virus infection  Assessment & Plan  COVID-19 infection, tested positive on 11/29/2020  Patient be placed on moderate treatment protocol   Noted to have worsening of oxygenation requiring 15 later mid flow, with saturations in low 90s  Labs:   Inflammatory markers worsening  · CRP- 75-->44-> 43 --> 153 -->185  · Ferritin 513--> 526-> 496 --559  · D-dimer 0 8--> 0 76-->1 13 -->1 11  · procalcitonin negative x3  Plan  · Continue dexamethasone 8/10  · Completed remdesivir  12/8  · Continue vitamin-D, zinc, vitamin-C  · Antibiotic discontinued  · s/p convalescent plasma 12/7/2020  · Continue self proning   · interleukin 6 - 12 9 on 12/8- repeat 12/10- pending   · Pulmonary team on board- consider Actemra if hypoxia worsens or IL trends up  · ( ID contacted Actemra infusion, not indicated at this time due to low IL6 and ferritin (<600)  · Monitor CRP, ferritin, D-dimer      Elevated troponin  Assessment & Plan  Elevated troponin, peak troponin 0 32- likely due to Covid-19 infection  Trended down to 0 14  ECG - T wave inversions noted  Monitor     GERD (gastroesophageal reflux disease)  Assessment & Plan  Continue Famotidine and Mylanta     Bipolar disorder (HCC)  Assessment & Plan  Continue lithium sertraline, trazodone  Patient looks less anxious  Continue bedtime Ativan          VTE Pharmacologic Prophylaxis:   Pharmacologic: Enoxaparin (Lovenox)  Mechanical VTE Prophylaxis in Place: Yes    Discussions with Specialists or Other Care Team Provider: Yes    Education and Discussions with Family / Patient: Zachariah Grief UPDATED/QUESTIONS ANSWERED    Current Length of Stay: 7 day(s)    Current Patient Status: Inpatient     Discharge Plan / Estimated Discharge Date: To be determined depending on clinical course    Code Status: Level 1 - Full Code      Subjective:   Patient is seen and examined at the bedside this morning  No respiratory distress noted  Denies shortness of breath  No abdominal pain nausea or vomiting  She has been trying to prone intermittently overnight  Right hip pain is better with Lidoderm patch  Objective:     Vitals:   Temp (24hrs), Av 9 °F (36 6 °C), Min:97 2 °F (36 2 °C), Max:98 4 °F (36 9 °C)    Temp:  [97 2 °F (36 2 °C)-98 4 °F (36 9 °C)] 97 7 °F (36 5 °C)  HR:  [74-87] 74  BP: (108-116)/(62-63) 108/63  SpO2:  [90 %-96 %] 91 %  Body mass index is 34 53 kg/m²  Input and Output Summary (last 24 hours): Intake/Output Summary (Last 24 hours) at 2020 1113  Last data filed at 12/10/2020 2001  Gross per 24 hour   Intake --   Output 1001 ml   Net -1001 ml       Physical Exam:     Physical Exam  Constitutional:       Appearance: Normal appearance  HENT:      Head: Normocephalic and atraumatic  Mouth/Throat:      Mouth: Mucous membranes are moist    Neck:      Musculoskeletal: Neck supple  Cardiovascular:      Rate and Rhythm: Normal rate and regular rhythm  Pulses: Normal pulses  Heart sounds: Normal heart sounds  Pulmonary:      Effort: Pulmonary effort is normal       Breath sounds: No wheezing or rales  Comments: Decreased breath sounds on posterior bilaterally  Abdominal:      General: There is no distension  Palpations: Abdomen is soft  Tenderness: There is no abdominal tenderness  Musculoskeletal:         General: No swelling  Skin:     General: Skin is warm  Neurological:      General: No focal deficit present  Mental Status: She is alert  Psychiatric:         Mood and Affect: Mood normal              Additional Data:     Labs:    Results from last 7 days   Lab Units 12/10/20  0548   WBC Thousand/uL 8 41   HEMOGLOBIN g/dL 13 4   HEMATOCRIT % 41 2   PLATELETS Thousands/uL 236   NEUTROS PCT % 88*   LYMPHS PCT % 8*   MONOS PCT % 2*   EOS PCT % 1     Results from last 7 days   Lab Units 12/11/20  0545   POTASSIUM mmol/L 3 6   CHLORIDE mmol/L 103   CO2 mmol/L 28   BUN mg/dL 33*   CREATININE mg/dL 0 76   CALCIUM mg/dL 9 8   ALK PHOS U/L 94   ALT U/L 71   AST U/L 65*           * I Have Reviewed All Lab Data Listed Above  * Additional Pertinent Lab Tests Reviewed:  Sahil Cesar Admission Reviewed    Imaging:    Imaging Reports Reviewed Today Include:  CXR  IMPRESSION:     Right greater than left peripherally located airspace opacities in keeping with the patient's reported history of a positive Covid 19 serology test      Streaky linear atelectasis in the right mid and lower lung zones      Recent Cultures (last 7 days):           Last 24 Hours Medication List:   Current Facility-Administered Medications   Medication Dose Route Frequency Provider Last Rate    acetaminophen  650 mg Oral Q6H PRN Missy Mcfarland MD      albuterol  2 puff Inhalation Q4H PRN Missy Mcfarland MD      aluminum-magnesium hydroxide-simethicone  30 mL Oral Q4H PRN Missy Mcfarland MD      atorvastatin  40 mg Oral HS Missy Mcfarland MD      bisacodyl  10 mg Rectal Daily PRN Missy Mcfarland MD      cholecalciferol  2,000 Units Oral Daily Missy Mcfarland MD      dexamethasone  6 mg Intravenous Q24H Missy Mcfarland MD      enoxaparin  30 mg Subcutaneous Q12H [facility] Missy Mcfarland MD      famotidine  20 mg Oral Q12H Kaylynn Page MD      fluticasone  2 spray Each Nare Daily Kaylynn Page MD      gabapentin  300 mg Oral Daily Kaylynn Page MD      lidocaine  1 patch Topical Daily Kaylynn Page MD      lithium carbonate  300 mg Oral HS Kaylynn Page MD      LORazepam  1 mg Oral BID PRN Kaylynn Page MD      LORazepam  1 5 mg Oral HS Kaylynn Page MD      melatonin  6 mg Oral HS Kaylynn Page MD      menthol-methyl salicylate   Apply externally 4x Daily PRN Katia Mahan PA-C      multivitamin with iron-minerals  15 mL Per NG Tube Daily Kaylynn Page MD      ondansetron  4 mg Intravenous Q6H PRN Kaylynn Page MD      polyethylene glycol  17 g Oral Daily Kaylynn Page MD      senna-docusate sodium  1 tablet Oral BID Kaylynn Page MD      sertraline  100 mg Oral HS Kaylynn Page MD      sodium chloride  1 spray Each Nare Q2H PRN Kaylynn Page MD      traZODone  100 mg Oral HS Kaylynn Page MD          Today, Patient Was Seen By: Kaylynn Page MD    ** Please Note: This note has been constructed using a voice recognition system   **

## 2020-12-11 NOTE — ASSESSMENT & PLAN NOTE
Acute hypoxic respiratory failure likely due to COVID-19 infection  Patient is hypoxic  Requiring 12 L MF oxygen saturating low 90s  ( Down from 15 L yesterday)  Given 40 mg Lasix x1 12/10-- which seem to help  Plan  Continue supplemental oxygen maintain O2 sats more than 92-94%  Encourage proning- patient needs continuous encouragement and reminder to prone  Pulmonary on board  See below for details of COVID 19 management  P r n   Lasix

## 2020-12-11 NOTE — PLAN OF CARE
Problem: Potential for Falls  Goal: Patient will remain free of falls  Description: INTERVENTIONS:  - Assess patient frequently for physical needs  -  Identify cognitive and physical deficits and behaviors that affect risk of falls    -  [place] fall precautions as indicated by assessment   - Educate patient/family on patient safety including physical limitations  - Instruct patient to call for assistance with activity based on assessment  - Modify environment to reduce risk of injury  - Consider OT/PT consult to assist with strengthening/mobility  Outcome: Progressing     Problem: RESPIRATORY - ADULT  Goal: Achieves optimal ventilation and oxygenation  Description: INTERVENTIONS:  - Assess for changes in respiratory status  - Assess for changes in mentation and behavior  - Position to facilitate oxygenation and minimize respiratory effort  - Oxygen administered by appropriate delivery if ordered  - Initiate smoking cessation education as indicated  - Encourage broncho-pulmonary hygiene including cough, deep breathe, Incentive Spirometry  - Assess the need for suctioning and aspirate as needed  - Assess and instruct to report SOB or any respiratory difficulty  - Respiratory Therapy support as indicated  Outcome: Progressing     Problem: METABOLIC, FLUID AND ELECTROLYTES - ADULT  Goal: Electrolytes maintained within normal limits  Description: INTERVENTIONS:  - Monitor labs and assess patient for signs and symptoms of electrolyte imbalances  - Administer electrolyte replacement as ordered  - Monitor response to electrolyte replacements, including repeat lab results as appropriate  - Instruct patient on fluid and nutrition as appropriate  Outcome: Progressing  Goal: Fluid balance maintained  Description: INTERVENTIONS:  - Monitor labs   - Monitor I/O and WT  - Instruct patient on fluid and nutrition as appropriate  - Assess for signs & symptoms of volume excess or deficit  Outcome: Progressing     Problem: HEMATOLOGIC - ADULT  Goal: Maintains hematologic stability  Description: INTERVENTIONS  - Assess for signs and symptoms of bleeding or hemorrhage  - Monitor labs  - Administer supportive blood products/factors as ordered and appropriate  Outcome: Progressing     Problem: INFECTION - ADULT  Goal: Absence or prevention of progression during hospitalization  Description: INTERVENTIONS:  - Assess and monitor for signs and symptoms of infection  - Monitor lab/diagnostic results  - Monitor all insertion sites, i e  indwelling lines, tubes, and drains  - Monitor endotracheal if appropriate and nasal secretions for changes in amount and color  - [place] appropriate cooling/warming therapies per order  - Administer medications as ordered  - Instruct and encourage patient and family to use good hand hygiene technique  - Identify and instruct in appropriate isolation precautions for identified infection/condition  Outcome: Progressing  Goal: Absence of fever/infection during neutropenic period  Description: INTERVENTIONS:  - Monitor WBC    Outcome: Progressing     Problem: SAFETY ADULT  Goal: Patient will remain free of falls  Description: INTERVENTIONS:  - Assess patient frequently for physical needs  -  Identify cognitive and physical deficits and behaviors that affect risk of falls    -  [place] fall precautions as indicated by assessment   - Educate patient/family on patient safety including physical limitations  - Instruct patient to call for assistance with activity based on assessment  - Modify environment to reduce risk of injury  - Consider OT/PT consult to assist with strengthening/mobility  Outcome: Progressing  Goal: Maintain or return to baseline ADL function  Description: INTERVENTIONS:  -  Assess patient's ability to carry out ADLs; assess patient's baseline for ADL function and identify physical deficits which impact ability to perform ADLs (bathing, care of mouth/teeth, toileting, grooming, dressing, etc )  - Assess/evaluate cause of self-care deficits   - Assess range of motion  - Assess patient's mobility; develop plan if impaired  - Assess patient's need for assistive devices and provide as appropriate  - Encourage maximum independence but intervene and supervise when necessary  - Involve family in performance of ADLs  - Assess for home care needs following discharge   - Consider OT consult to assist with ADL evaluation and planning for discharge  - Provide patient education as appropriate  Outcome: Progressing  Goal: Maintain or return mobility status to optimal level  Description: INTERVENTIONS:  - Assess patient's baseline mobility status (ambulation, transfers, stairs, etc )    - Identify cognitive and physical deficits and behaviors that affect mobility  - Identify mobility aids required to assist with transfers and/or ambulation (gait belt, sit-to-stand, lift, walker, cane, etc )  - [place] fall precautions as indicated by assessment  - Record patient progress and toleration of activity level on Mobility SBAR; progress patient to next Phase/Stage  - Instruct patient to call for assistance with activity based on assessment  - Consider rehabilitation consult to assist with strengthening/weightbearing, etc   Outcome: Progressing     Problem: DISCHARGE PLANNING  Goal: Discharge to home or other facility with appropriate resources  Description: INTERVENTIONS:  - Identify barriers to discharge w/patient and caregiver  - Arrange for needed discharge resources and transportation as appropriate  - Identify discharge learning needs (meds, wound care, etc )  - Arrange for interpretive services to assist at discharge as needed  - Refer to Case Management Department for coordinating discharge planning if the patient needs post-hospital services based on physician/advanced practitioner order or complex needs related to functional status, cognitive ability, or social support system  Outcome: Progressing     Problem: Knowledge Deficit  Goal: Patient/family/caregiver demonstrates understanding of disease process, treatment plan, medications, and discharge instructions  Description: Complete learning assessment and assess knowledge base    Interventions:  - Provide teaching at level of understanding  - Provide teaching via preferred learning methods  Outcome: Progressing     Problem: Prexisting or High Potential for Compromised Skin Integrity  Goal: Skin integrity is maintained or improved  Description: INTERVENTIONS:  - Identify patients at risk for skin breakdown  - Assess and monitor skin integrity  - Assess and monitor nutrition and hydration status  - Monitor labs   - Assess for incontinence   - Turn and reposition patient  - Assist with mobility/ambulation  - Relieve pressure over bony prominences  - Avoid friction and shearing  - Provide appropriate hygiene as needed including keeping skin clean and dry  - Evaluate need for skin moisturizer/barrier cream  - Collaborate with interdisciplinary team   - Patient/family teaching  - Consider wound care consult   Outcome: Progressing

## 2020-12-11 NOTE — ASSESSMENT & PLAN NOTE
COVID-19 infection, tested positive on 11/29/2020  Patient be placed on moderate treatment protocol   Noted to have worsening of oxygenation requiring 15 later mid flow, with saturations in low 90s  Labs:   Inflammatory markers worsening  · CRP- 75-->44-> 43 --> 153 -->185  · Ferritin 513--> 526-> 496 --559  · D-dimer 0 8--> 0 76-->1 13 -->1 11  · procalcitonin negative x3  Plan  · Continue dexamethasone 8/10  · Completed remdesivir  12/8  · Continue vitamin-D, zinc, vitamin-C  · Antibiotic discontinued  · s/p convalescent plasma 12/7/2020  · Continue self proning   · interleukin 6 - 12 9 on 12/8- repeat 12/10- pending   · Pulmonary team on board- consider Actemra if hypoxia worsens or IL trends up  · ( ID contacted Actemra infusion, not indicated at this time due to low IL6 and ferritin (<600)  · Monitor CRP, ferritin, D-dimer

## 2020-12-11 NOTE — PROGRESS NOTES
Progress Note - Pulmonary   Jaun Whalen 76 y o  female MRN: [id number]  Unit/Bed#: -01 Encounter: [id number]      Assessment:  1  Acute hypoxic respiratory failure secondary to COVID-19 pneumonia  2  COVID-19 pneumonia  3  Abnormal chest x-ray with worsening bilateral airspace opacities worse on the left with pulmonary vascular congestion  4  Elevated inflammatory markers  5  GERD  6  Bipolar disorder     Plan:  - patient currently saturating low 90s on 12 L mid flow, wean as tolerated, self prone, aggressive ics, out of bed to chair, pulmonary toilet, goal SpO2 greater than 90%  -completed 5 day course of remdesivir  -continue Decadron day 8/10  -continue vitamin-C/D, zinc, statin  -monitoring off antibiotics given multiple negative procalcitonin  -status post convalescent plasma 12/07/2020  -continue to trend inflammatory markers, no role for actemra at this point, but can consider if worsening and inflammatory markers continue to rise  -position with right lung down to improve V/Q mismatch  -good response to diuresis, will give additional 20mg IV Lasix today, goal -500, will replete K   - rest of care per primary team       Subjective:   Patient seen examined at bedside  Good response to Lasix yesterday with 1 L output  Mild improvement in breathing, continues to have intermittent cough, denies fever, chills, nausea, vomiting, chest pain  Currently on 12 L mid flow nasal cannula  Review of Systems   Constitutional: Positive for fatigue  Negative for chills, fever and unexpected weight change  HENT: Negative for congestion, rhinorrhea, sneezing and sore throat  Respiratory: Positive for cough and shortness of breath  Negative for wheezing  Cardiovascular: Negative for chest pain, palpitations and leg swelling  Gastrointestinal: Negative for abdominal pain, constipation, diarrhea, nausea and vomiting  Genitourinary: Negative for dysuria  Musculoskeletal: Negative for arthralgias  Neurological: Negative for dizziness and numbness  Objective:     Vitals:    12/10/20 2150 12/11/20 0200 12/11/20 0600 12/11/20 0810   BP: 113/62   108/63   BP Location:       Pulse: 76   74   Resp:       Temp: 98 2 °F (36 8 °C)   97 7 °F (36 5 °C)   TempSrc:       SpO2: 91% 95%  91%   Weight:   82 9 kg (182 lb 12 2 oz)    Height:               Intake/Output Summary (Last 24 hours) at 12/11/2020 1752  Last data filed at 12/10/2020 2001  Gross per 24 hour   Intake --   Output 1001 ml   Net -1001 ml         Physical Exam  Constitutional:       General: She is not in acute distress  Appearance: She is well-developed  She is not diaphoretic  HENT:      Head: Normocephalic and atraumatic  Mouth/Throat:      Mouth: Mucous membranes are moist       Pharynx: Oropharynx is clear  No oropharyngeal exudate  Eyes:      General: No scleral icterus  Cardiovascular:      Rate and Rhythm: Normal rate and regular rhythm  Heart sounds: Normal heart sounds  No murmur  Pulmonary:      Effort: Pulmonary effort is normal  No respiratory distress  Breath sounds: No wheezing  Comments: Decreased breath sounds, faint crackles   Abdominal:      General: Bowel sounds are normal  There is no distension  Palpations: Abdomen is soft  Tenderness: There is no abdominal tenderness  Musculoskeletal:      Right lower leg: No edema  Left lower leg: No edema  Neurological:      Mental Status: She is alert and oriented to person, place, and time  Labs: I have personally reviewed pertinent lab results    Results from last 7 days   Lab Units 12/10/20  0548 12/09/20  0541 12/08/20  0443  12/05/20  0537 12/04/20  0959   WBC Thousand/uL 8 41 8 58 5 11   < > 4 46 4 85   HEMOGLOBIN g/dL 13 4 14 0 13 9   < > 13 0 13 9   HEMATOCRIT % 41 2 43 9 43 2   < > 40 8 44 1   PLATELETS Thousands/uL 236 221 200   < > 116* 116*   NEUTROS PCT % 88*  --   --   --  71 72   MONOS PCT % 2*  --   --   --  5 5   MONO PCT %  --   --  3*  --   --   --     < > = values in this interval not displayed  Results from last 7 days   Lab Units 12/11/20  0545 12/10/20  0549 12/09/20  0541   POTASSIUM mmol/L 3 6 3 9 4 2   CHLORIDE mmol/L 103 105 109*   CO2 mmol/L 28 29 28   BUN mg/dL 33* 25 31*   CREATININE mg/dL 0 76 0 80 0 81   CALCIUM mg/dL 9 8 9 1 9 6   ALK PHOS U/L 94 81 91   ALT U/L 71 30 36   AST U/L 65* 24 30     Results from last 7 days   Lab Units 12/09/20  0541 12/08/20  0443   MAGNESIUM mg/dL 2 7* 2 7*     Results from last 7 days   Lab Units 12/09/20  0541 12/08/20  0443   PHOSPHORUS mg/dL 2 9 4 1              0   Lab Value Date/Time    TROPONINI 0 14 (H) 12/05/2020 2123    TROPONINI 0 22 (H) 12/05/2020 0537    TROPONINI 0 28 (H) 12/04/2020 1836    TROPONINI 0 30 (H) 12/04/2020 1602    TROPONINI 0 32 (H) 12/04/2020 1304    TROPONINI 0 26 (H) 12/04/2020 0959    TROPONINI <0 02 10/24/2019 1048    TROPONINI <0 02 07/01/2019 1443    TROPONINI <0 02 07/01/2019 1118    TROPONINI <0 02 07/01/2019 0813     Microbiology:  COVID positive     Imaging and other studies: I have personally reviewed pertinent reports     and I have personally reviewed pertinent films in PACS  Chest x-ray 12/04/2020  Left basilar infiltrate     Chest x-ray 12/10/2020  Worsening bilateral pulmonary infiltrates with worsening pulmonary vascular congestion, especially greater on the left        Terrance Carranza MD  Pulmonary & Critical Care Fellow, [facility]

## 2020-12-12 LAB
ANION GAP SERPL CALCULATED.3IONS-SCNC: 7 MMOL/L (ref 4–13)
BASOPHILS # BLD AUTO: 0.01 THOUSANDS/ΜL (ref 0–0.1)
BASOPHILS NFR BLD AUTO: 0 % (ref 0–1)
BUN SERPL-MCNC: 33 MG/DL (ref 5–25)
CALCIUM SERPL-MCNC: 9.8 MG/DL (ref 8.3–10.1)
CHLORIDE SERPL-SCNC: 102 MMOL/L (ref 100–108)
CO2 SERPL-SCNC: 29 MMOL/L (ref 21–32)
CREAT SERPL-MCNC: 0.83 MG/DL (ref 0.6–1.3)
CRP SERPL QL: 66.7 MG/L
EOSINOPHIL # BLD AUTO: 0.13 THOUSAND/ΜL (ref 0–0.61)
EOSINOPHIL NFR BLD AUTO: 1 % (ref 0–6)
ERYTHROCYTE [DISTWIDTH] IN BLOOD BY AUTOMATED COUNT: 12.9 % (ref 11.6–15.1)
FERRITIN SERPL-MCNC: 486 NG/ML (ref 8–388)
GFR SERPL CREATININE-BSD FRML MDRD: 74 ML/MIN/1.73SQ M
GLUCOSE SERPL-MCNC: 81 MG/DL (ref 65–140)
HCT VFR BLD AUTO: 40.9 % (ref 34.8–46.1)
HGB BLD-MCNC: 13.6 G/DL (ref 11.5–15.4)
IMM GRANULOCYTES # BLD AUTO: 0.07 THOUSAND/UL (ref 0–0.2)
IMM GRANULOCYTES NFR BLD AUTO: 1 % (ref 0–2)
LYMPHOCYTES # BLD AUTO: 1.51 THOUSANDS/ΜL (ref 0.6–4.47)
LYMPHOCYTES NFR BLD AUTO: 15 % (ref 14–44)
MCH RBC QN AUTO: 27 PG (ref 26.8–34.3)
MCHC RBC AUTO-ENTMCNC: 33.3 G/DL (ref 31.4–37.4)
MCV RBC AUTO: 81 FL (ref 82–98)
MONOCYTES # BLD AUTO: 0.45 THOUSAND/ΜL (ref 0.17–1.22)
MONOCYTES NFR BLD AUTO: 5 % (ref 4–12)
NEUTROPHILS # BLD AUTO: 7.87 THOUSANDS/ΜL (ref 1.85–7.62)
NEUTS SEG NFR BLD AUTO: 78 % (ref 43–75)
NRBC BLD AUTO-RTO: 0 /100 WBCS
PLATELET # BLD AUTO: 308 THOUSANDS/UL (ref 149–390)
PMV BLD AUTO: 10.8 FL (ref 8.9–12.7)
POTASSIUM SERPL-SCNC: 3.3 MMOL/L (ref 3.5–5.3)
PROCALCITONIN SERPL-MCNC: <0.05 NG/ML
RBC # BLD AUTO: 5.04 MILLION/UL (ref 3.81–5.12)
SODIUM SERPL-SCNC: 138 MMOL/L (ref 136–145)
WBC # BLD AUTO: 10.04 THOUSAND/UL (ref 4.31–10.16)

## 2020-12-12 PROCEDURE — 94760 N-INVAS EAR/PLS OXIMETRY 1: CPT

## 2020-12-12 PROCEDURE — 84145 PROCALCITONIN (PCT): CPT | Performed by: INTERNAL MEDICINE

## 2020-12-12 PROCEDURE — 85025 COMPLETE CBC W/AUTO DIFF WBC: CPT | Performed by: INTERNAL MEDICINE

## 2020-12-12 PROCEDURE — 82728 ASSAY OF FERRITIN: CPT | Performed by: INTERNAL MEDICINE

## 2020-12-12 PROCEDURE — 86140 C-REACTIVE PROTEIN: CPT | Performed by: INTERNAL MEDICINE

## 2020-12-12 PROCEDURE — 80048 BASIC METABOLIC PNL TOTAL CA: CPT | Performed by: INTERNAL MEDICINE

## 2020-12-12 PROCEDURE — 99232 SBSQ HOSP IP/OBS MODERATE 35: CPT | Performed by: PHYSICIAN ASSISTANT

## 2020-12-12 PROCEDURE — 99233 SBSQ HOSP IP/OBS HIGH 50: CPT | Performed by: INTERNAL MEDICINE

## 2020-12-12 RX ORDER — POTASSIUM CHLORIDE 20 MEQ/1
40 TABLET, EXTENDED RELEASE ORAL ONCE
Status: COMPLETED | OUTPATIENT
Start: 2020-12-12 | End: 2020-12-12

## 2020-12-12 RX ADMIN — TRAZODONE HYDROCHLORIDE 100 MG: 100 TABLET ORAL at 21:02

## 2020-12-12 RX ADMIN — SERTRALINE HYDROCHLORIDE 100 MG: 100 TABLET ORAL at 21:02

## 2020-12-12 RX ADMIN — SENNOSIDES AND DOCUSATE SODIUM 1 TABLET: 8.6; 5 TABLET ORAL at 18:19

## 2020-12-12 RX ADMIN — FAMOTIDINE 20 MG: 20 TABLET ORAL at 21:02

## 2020-12-12 RX ADMIN — POLYETHYLENE GLYCOL 3350 17 G: 17 POWDER, FOR SOLUTION ORAL at 08:22

## 2020-12-12 RX ADMIN — SENNOSIDES AND DOCUSATE SODIUM 1 TABLET: 8.6; 5 TABLET ORAL at 08:23

## 2020-12-12 RX ADMIN — FLUTICASONE PROPIONATE 2 SPRAY: 50 SPRAY, METERED NASAL at 08:25

## 2020-12-12 RX ADMIN — MULTIVITAMIN 15 ML: LIQUID ORAL at 08:26

## 2020-12-12 RX ADMIN — FAMOTIDINE 20 MG: 20 TABLET ORAL at 08:24

## 2020-12-12 RX ADMIN — ENOXAPARIN SODIUM 30 MG: 30 INJECTION SUBCUTANEOUS at 08:23

## 2020-12-12 RX ADMIN — ENOXAPARIN SODIUM 30 MG: 30 INJECTION SUBCUTANEOUS at 21:02

## 2020-12-12 RX ADMIN — ACETAMINOPHEN 650 MG: 325 TABLET, FILM COATED ORAL at 08:22

## 2020-12-12 RX ADMIN — LIDOCAINE 5% 1 PATCH: 700 PATCH TOPICAL at 08:22

## 2020-12-12 RX ADMIN — GABAPENTIN 300 MG: 300 CAPSULE ORAL at 08:25

## 2020-12-12 RX ADMIN — ALUMINUM HYDROXIDE, MAGNESIUM HYDROXIDE, AND SIMETHICONE 30 ML: 200; 200; 20 SUSPENSION ORAL at 21:01

## 2020-12-12 RX ADMIN — Medication 2000 UNITS: at 08:22

## 2020-12-12 RX ADMIN — POTASSIUM CHLORIDE 40 MEQ: 1500 TABLET, EXTENDED RELEASE ORAL at 12:53

## 2020-12-12 RX ADMIN — DEXAMETHASONE SODIUM PHOSPHATE 6 MG: 4 INJECTION INTRA-ARTICULAR; INTRALESIONAL; INTRAMUSCULAR; INTRAVENOUS; SOFT TISSUE at 12:53

## 2020-12-12 RX ADMIN — LITHIUM CARBONATE 300 MG: 300 CAPSULE, GELATIN COATED ORAL at 22:19

## 2020-12-12 RX ADMIN — LORAZEPAM 1.5 MG: 1 TABLET ORAL at 21:02

## 2020-12-12 RX ADMIN — ATORVASTATIN CALCIUM 40 MG: 40 TABLET, FILM COATED ORAL at 21:02

## 2020-12-12 NOTE — PROGRESS NOTES
Progress Note - Pulmonary   Chely Ponce 76 y o  female MRN: [id number]  Unit/Bed#: -01 Encounter: [id number]      Assessment:  1  Acute hypoxic respiratory failure secondary to COVID-19 pneumonia  2  COVID-19 pneumonia  3  Abnormal chest x-ray with worsening bilateral airspace opacities worse on the left with pulmonary vascular congestion  4  Elevated inflammatory markers  5  GERD  6  Bipolar disorder     Plan:  - patient currently saturating low 90s on 12 L mid flow, wean as tolerated, self prone, aggressive ics, out of bed to chair, pulmonary toilet, goal SpO2 greater than 90%  -completed 5 day course of remdesivir  -continue Decadron day 9/10  -continue vitamin-C/D, zinc, statin  -monitoring off antibiotics given multiple negative procalcitonin  -status post convalescent plasma 12/07/2020  -continue to trend inflammatory markers, no role for actemra at this point, but can consider if worsening and inflammatory markers continue to rise  -position with right lung down to improve V/Q mismatch  -good response to diuresis, can continue p r n , awaiting a m  BMP  - rest of care per primary team       Subjective:   Patient seen and examined  On 12 L nasal cannula saturating low 90s  Still with some shortness of breath and cough  Denies fever, chills, nausea, vomiting, chest pain  Good response to 20 mg IV Lasix yesterday without much change in oxygenation  Review of Systems   Constitutional: Positive for fatigue  Negative for chills, fever and unexpected weight change  HENT: Negative for congestion, rhinorrhea, sneezing and sore throat  Respiratory: Positive for cough and shortness of breath  Negative for wheezing  Cardiovascular: Negative for chest pain, palpitations and leg swelling  Gastrointestinal: Negative for abdominal pain, constipation, diarrhea, nausea and vomiting  Genitourinary: Negative for dysuria  Musculoskeletal: Negative for arthralgias     Neurological: Negative for dizziness and numbness  Objective:     Vitals:    12/11/20 2012 12/11/20 2147 12/12/20 0600 12/12/20 0707   BP:  (!) 132/110  107/72   Pulse:  67  68   Resp: 20      Temp:  99 7 °F (37 6 °C)  97 9 °F (36 6 °C)   TempSrc:       SpO2:  91%  92%   Weight:   80 kg (176 lb 5 9 oz)    Height:               Intake/Output Summary (Last 24 hours) at 12/12/2020 1020  Last data filed at 12/12/2020 0900  Gross per 24 hour   Intake 1200 ml   Output 1150 ml   Net 50 ml         Physical Exam  Constitutional:       General: She is not in acute distress  Appearance: She is well-developed  She is not diaphoretic  HENT:      Head: Normocephalic and atraumatic  Mouth/Throat:      Mouth: Mucous membranes are moist       Pharynx: Oropharynx is clear  No oropharyngeal exudate  Eyes:      General: No scleral icterus  Cardiovascular:      Rate and Rhythm: Normal rate and regular rhythm  Heart sounds: Normal heart sounds  No murmur  Pulmonary:      Effort: Pulmonary effort is normal  No respiratory distress  Breath sounds: No wheezing  Comments: Diminished breath sounds with faint crackles at the bases  Abdominal:      General: Bowel sounds are normal  There is no distension  Palpations: Abdomen is soft  Tenderness: There is no abdominal tenderness  Musculoskeletal:      Right lower leg: No edema  Left lower leg: No edema  Neurological:      Mental Status: She is alert and oriented to person, place, and time  Labs: I have personally reviewed pertinent lab results    Results from last 7 days   Lab Units 12/10/20  0548 12/09/20  0541 12/08/20  0443   WBC Thousand/uL 8 41 8 58 5 11   HEMOGLOBIN g/dL 13 4 14 0 13 9   HEMATOCRIT % 41 2 43 9 43 2   PLATELETS Thousands/uL 236 221 200   NEUTROS PCT % 88*  --   --    MONOS PCT % 2*  --   --    MONO PCT %  --   --  3*      Results from last 7 days   Lab Units 12/11/20  0545 12/10/20  0549 12/09/20  0541   POTASSIUM mmol/L 3 6 3 9 4 2   CHLORIDE mmol/L 103 105 109*   CO2 mmol/L 28 29 28   BUN mg/dL 33* 25 31*   CREATININE mg/dL 0 76 0 80 0 81   CALCIUM mg/dL 9 8 9 1 9 6   ALK PHOS U/L 94 81 91   ALT U/L 71 30 36   AST U/L 65* 24 30     Results from last 7 days   Lab Units 12/09/20  0541 12/08/20  0443   MAGNESIUM mg/dL 2 7* 2 7*     Results from last 7 days   Lab Units 12/09/20  0541 12/08/20  0443   PHOSPHORUS mg/dL 2 9 4 1              0   Lab Value Date/Time    TROPONINI 0 14 (H) 12/05/2020 2123    TROPONINI 0 22 (H) 12/05/2020 0537    TROPONINI 0 28 (H) 12/04/2020 1836    TROPONINI 0 30 (H) 12/04/2020 1602    TROPONINI 0 32 (H) 12/04/2020 1304    TROPONINI 0 26 (H) 12/04/2020 0959    TROPONINI <0 02 10/24/2019 1048    TROPONINI <0 02 07/01/2019 1443    TROPONINI <0 02 07/01/2019 1118    TROPONINI <0 02 07/01/2019 0813       Microbiology:  COVID positive     Imaging and other studies: I have personally reviewed pertinent reports    and I have personally reviewed pertinent films in PACS  Chest x-ray 12/04/2020  Left basilar infiltrate     Chest x-ray 12/10/2020  Worsening bilateral pulmonary infiltrates with worsening pulmonary vascular congestion, especially greater on the left        Berneice Pallas, MD  Pulmonary & Critical Care Fellow, [facility]

## 2020-12-12 NOTE — PROGRESS NOTES
Pt increased to 14 L this am mid barak when sats briefly while on back eating late breakfast into the 80's while repitratory on floor since then sats have been in the 90's, she has not been proning as much today and states she is feeling much better today as well

## 2020-12-12 NOTE — PROGRESS NOTES
[facility]  Progress Note - Sriram Flatten 1955, 76 y o  female MRN: [id number]    Unit/Bed#: -01 Encounter: [id number]    Primary Care Provider: LUIS CARLOS Carney   Date and time admitted to hospital: 12/4/2020  9:15 AM        * Acute respiratory failure with hypoxia ([facility] )  Assessment & Plan  · Acute hypoxic respiratory failure likely due to COVID-19 infection  · Patient is hypoxic  On 15 L midflow today   · Given 40 mg Lasix x1 12/10-- which seemed to help  · Continue supplemental oxygen maintain O2 sats more than 92-94%  · Encourage proning- patient needs continuous encouragement and reminder to prone  · Pulmonary on board  · See below for details of COVID 19 management   · P r n  Lasix    Elevated troponin  Assessment & Plan  Elevated troponin, peak troponin 0 32- likely due to Covid-19 infection  Trended down to 0 14  ECG - T wave inversions noted  Monitor     COVID-19 virus infection  Assessment & Plan  COVID-19 infection, tested positive on 11/29/2020  Patient be placed on moderate treatment protocol   Noted to have worsening of oxygenation requiring 15 L mid flow, with saturations in low 90s  · Continue dexamethasone 9/10  · Completed remdesivir 12/8  · Continue vitamin-D, zinc, vitamin-C  · Antibiotic discontinued  · s/p convalescent plasma 12/7/2020  · Continue self proning   · interleukin 6 - 12 9 on 12/8- repeat 12/10- pending   · Pulmonary team on board- consider Actemra if hypoxia worsens or IL trends up  · Monitor CRP, ferritin, D-dimer  · Pulm following    GERD (gastroesophageal reflux disease)  Assessment & Plan  Continue Famotidine and Mylanta     Bipolar disorder ([facility] )  Assessment & Plan  Continue lithium sertraline, trazodone  Patient looks less anxious    Continue bedtime Ativan        VTE Pharmacologic Prophylaxis:   Pharmacologic: Enoxaparin (Lovenox)  Mechanical VTE Prophylaxis in Place: Yes    Patient Centered Rounds: I have performed bedside rounds with nursing staff today  Discussions with Specialists or Other Care Team Provider: RN    Education and Discussions with Family / Patient: patient,   LAKSHMI Pino    Time Spent for Care: 20 minutes  More than 50% of total time spent on counseling and coordination of care as described above  Current Length of Stay: 8 day(s)    Current Patient Status: Inpatient   Certification Statement: The patient will continue to require additional inpatient hospital stay due to COVID pneumonia    Discharge Plan: when O2 improves    Code Status: Level 1 - Full Code      Subjective: The patient states she is feeling better today     Objective:     Vitals:   Temp (24hrs), Av 7 °F (37 1 °C), Min:97 9 °F (36 6 °C), Max:99 7 °F (37 6 °C)    Temp:  [97 9 °F (36 6 °C)-99 7 °F (37 6 °C)] 98 4 °F (36 9 °C)  HR:  [67-75] 75  Resp:  [20] 20  BP: ()/() 98/59  SpO2:  [90 %-92 %] 90 %  Body mass index is 33 32 kg/m²  Input and Output Summary (last 24 hours): Intake/Output Summary (Last 24 hours) at 2020 1712  Last data filed at 2020 1454  Gross per 24 hour   Intake 1080 ml   Output 600 ml   Net 480 ml       Physical Exam:     Physical Exam  Vitals signs reviewed  Constitutional:       General: She is not in acute distress  Appearance: She is not toxic-appearing  HENT:      Head: Normocephalic and atraumatic  Eyes:      General: No scleral icterus  Extraocular Movements: Extraocular movements intact  Cardiovascular:      Rate and Rhythm: Normal rate and regular rhythm  Pulmonary:      Effort: Pulmonary effort is normal  No respiratory distress  Comments: Diminished b/l  Abdominal:      General: Bowel sounds are normal  There is no distension  Palpations: Abdomen is soft  Tenderness: There is no abdominal tenderness  Musculoskeletal: Normal range of motion  General: No swelling  Skin:     General: Skin is warm and dry     Neurological:      General: No focal deficit present  Mental Status: She is alert and oriented to person, place, and time  Psychiatric:         Mood and Affect: Mood normal          Behavior: Behavior normal          Thought Content: Thought content normal          Additional Data:     Labs:    Results from last 7 days   Lab Units 12/12/20  1057   WBC Thousand/uL 10 04   HEMOGLOBIN g/dL 13 6   HEMATOCRIT % 40 9   PLATELETS Thousands/uL 308   NEUTROS PCT % 78*   LYMPHS PCT % 15   MONOS PCT % 5   EOS PCT % 1     Results from last 7 days   Lab Units 12/12/20  1057 12/11/20  0545   SODIUM mmol/L 138 138   POTASSIUM mmol/L 3 3* 3 6   CHLORIDE mmol/L 102 103   CO2 mmol/L 29 28   BUN mg/dL 33* 33*   CREATININE mg/dL 0 83 0 76   ANION GAP mmol/L 7 7   CALCIUM mg/dL 9 8 9 8   ALBUMIN g/dL  --  3 1*   TOTAL BILIRUBIN mg/dL  --  0 66   ALK PHOS U/L  --  94   ALT U/L  --  71   AST U/L  --  65*   GLUCOSE RANDOM mg/dL 81 136                 Results from last 7 days   Lab Units 12/12/20  0652 12/11/20  0545 12/07/20  0624 12/06/20  0558   PROCALCITONIN ng/ml <0 05 <0 05 <0 05 <0 05           * I Have Reviewed All Lab Data Listed Above  * Additional Pertinent Lab Tests Reviewed:  All Labs Within Last 24 Hours Reviewed    Imaging:    Imaging Reports Reviewed Today Include: none  Imaging Personally Reviewed by Myself Includes:  none    Recent Cultures (last 7 days):           Last 24 Hours Medication List:   Current Facility-Administered Medications   Medication Dose Route Frequency Provider Last Rate    acetaminophen  650 mg Oral Q6H PRN Missy Wang MD      albuterol  2 puff Inhalation Q4H PRN Missy Wang MD      aluminum-magnesium hydroxide-simethicone  30 mL Oral Q4H PRN Missy Wang MD      atorvastatin  40 mg Oral HS Missy Wang MD      bisacodyl  10 mg Rectal Daily PRN Missy Wang MD      cholecalciferol  2,000 Units Oral Daily Missy Wang MD      dexamethasone  6 mg Intravenous Q24H Missy Wang MD      enoxaparin  30 mg Subcutaneous Q12H [facility] Maria Elenaderek Alvarez MD      famotidine  20 mg Oral Q12H Maria Elenaderek Alvarez MD      fluticasone  2 spray Each Nare Daily Maria Elenaderek Alvarez MD      gabapentin  300 mg Oral Daily Maria Elenaderek Alvarez MD      lidocaine  1 patch Topical Daily Maria Elenaderek Alvarez MD      lithium carbonate  300 mg Oral HS Maria Elenaderek Alvarez MD      LORazepam  1 mg Oral BID PRN Maria Elena Alvarez MD      LORazepam  1 5 mg Oral HS Maria Elenaemeterio Alvarez MD      melatonin  6 mg Oral HS Maria Elenaderek Alvarez MD      menthol-methyl salicylate   Apply externally 4x Daily PRN Ashley Little PA-C      multivitamin with iron-minerals  15 mL Per NG Tube Daily Maria Elenaderek Alvarez MD      ondansetron  4 mg Intravenous Q6H PRN Maria Elenaemeterio Alvarez MD      polyethylene glycol  17 g Oral Daily Maria Elenaemeterio Alvarez MD      senna-docusate sodium  1 tablet Oral BID Maria Elenaemeterio Alvarez MD      sertraline  100 mg Oral HS Maria Elenaemeterio Alvarez MD      sodium chloride  1 spray Each Nare Q2H PRN Maria Elena Alvarez MD      traZODone  100 mg Oral HS Maria Elenaemeterio Alvarez MD          Today, Patient Was Seen By: Aurora Lomeli PA-C    ** Please Note: Dictation voice to text software may have been used in the creation of this document   **

## 2020-12-12 NOTE — ASSESSMENT & PLAN NOTE
COVID-19 infection, tested positive on 11/29/2020  Patient be placed on moderate treatment protocol   Noted to have worsening of oxygenation requiring 15 L mid flow, with saturations in low 90s  · Continue dexamethasone 9/10  · Completed remdesivir 12/8  · Continue vitamin-D, zinc, vitamin-C  · Antibiotic discontinued  · s/p convalescent plasma 12/7/2020  · Continue self proning   · interleukin 6 - 12 9 on 12/8- repeat 12/10- pending   · Pulmonary team on board- consider Actemra if hypoxia worsens or IL trends up  · Monitor CRP, ferritin, D-dimer  · Pulm following

## 2020-12-12 NOTE — PROGRESS NOTES
Pt titrated down to 12L midflo o2 at pt request she did desat into the 70's when she lays on her left side toward belly, as soon as she rolls to rt side toward belly her sats come up into the 80's

## 2020-12-12 NOTE — ASSESSMENT & PLAN NOTE
· Acute hypoxic respiratory failure likely due to COVID-19 infection  · Patient is hypoxic  On 15 L midflow today   · Given 40 mg Lasix x1 12/10-- which seemed to help  · Continue supplemental oxygen maintain O2 sats more than 92-94%  · Encourage proning- patient needs continuous encouragement and reminder to prone  · Pulmonary on board  · See below for details of COVID 19 management   · P r n   Lasix

## 2020-12-12 NOTE — PLAN OF CARE
Problem: Potential for Falls  Goal: Patient will remain free of falls  Description: INTERVENTIONS:  - Assess patient frequently for physical needs  -  Identify cognitive and physical deficits and behaviors that affect risk of falls    -  [place] fall precautions as indicated by assessment   - Educate patient/family on patient safety including physical limitations  - Instruct patient to call for assistance with activity based on assessment  - Modify environment to reduce risk of injury  - Consider OT/PT consult to assist with strengthening/mobility  12/11/2020 1938 by Benoit Durant  Outcome: Progressing  12/11/2020 1938 by Benoit Durant  Outcome: Progressing     Problem: RESPIRATORY - ADULT  Goal: Achieves optimal ventilation and oxygenation  Description: INTERVENTIONS:  - Assess for changes in respiratory status  - Assess for changes in mentation and behavior  - Position to facilitate oxygenation and minimize respiratory effort  - Oxygen administered by appropriate delivery if ordered  - Initiate smoking cessation education as indicated  - Encourage broncho-pulmonary hygiene including cough, deep breathe, Incentive Spirometry  - Assess the need for suctioning and aspirate as needed  - Assess and instruct to report SOB or any respiratory difficulty  - Respiratory Therapy support as indicated  12/11/2020 1938 by Benoit Durant  Outcome: Progressing  12/11/2020 1938 by Benoit Durant  Outcome: Progressing     Problem: METABOLIC, FLUID AND ELECTROLYTES - ADULT  Goal: Electrolytes maintained within normal limits  Description: INTERVENTIONS:  - Monitor labs and assess patient for signs and symptoms of electrolyte imbalances  - Administer electrolyte replacement as ordered  - Monitor response to electrolyte replacements, including repeat lab results as appropriate  - Instruct patient on fluid and nutrition as appropriate  12/11/2020 1938 by Benoit Durant  Outcome: Progressing  12/11/2020 1938 by Benoit Durant  Outcome: Progressing  Goal: Fluid balance maintained  Description: INTERVENTIONS:  - Monitor labs   - Monitor I/O and WT  - Instruct patient on fluid and nutrition as appropriate  - Assess for signs & symptoms of volume excess or deficit  12/11/2020 1938 by Ritesh Hester  Outcome: Progressing  12/11/2020 1938 by Ritesh Hester  Outcome: Progressing     Problem: HEMATOLOGIC - ADULT  Goal: Maintains hematologic stability  Description: INTERVENTIONS  - Assess for signs and symptoms of bleeding or hemorrhage  - Monitor labs  - Administer supportive blood products/factors as ordered and appropriate  12/11/2020 1938 by Ritesh Hester  Outcome: Progressing  12/11/2020 1938 by Ritesh Hester  Outcome: Progressing     Problem: INFECTION - ADULT  Goal: Absence or prevention of progression during hospitalization  Description: INTERVENTIONS:  - Assess and monitor for signs and symptoms of infection  - Monitor lab/diagnostic results  - Monitor all insertion sites, i e  indwelling lines, tubes, and drains  - Monitor endotracheal if appropriate and nasal secretions for changes in amount and color  - [place] appropriate cooling/warming therapies per order  - Administer medications as ordered  - Instruct and encourage patient and family to use good hand hygiene technique  - Identify and instruct in appropriate isolation precautions for identified infection/condition  12/11/2020 1938 by Ritesh Hester  Outcome: Progressing  12/11/2020 1938 by Ritesh Hester  Outcome: Progressing  Goal: Absence of fever/infection during neutropenic period  Description: INTERVENTIONS:  - Monitor WBC    12/11/2020 1938 by Ritesh Hester  Outcome: Progressing  12/11/2020 1938 by Ritesh Hester  Outcome: Progressing     Problem: SAFETY ADULT  Goal: Patient will remain free of falls  Description: INTERVENTIONS:  - Assess patient frequently for physical needs  -  Identify cognitive and physical deficits and behaviors that affect risk of falls    -  [place] fall precautions as indicated by assessment   - Educate patient/family on patient safety including physical limitations  - Instruct patient to call for assistance with activity based on assessment  - Modify environment to reduce risk of injury  - Consider OT/PT consult to assist with strengthening/mobility  12/11/2020 1938 by Perla Yo  Outcome: Progressing  12/11/2020 1938 by Perla Yo  Outcome: Progressing  Goal: Maintain or return to baseline ADL function  Description: INTERVENTIONS:  -  Assess patient's ability to carry out ADLs; assess patient's baseline for ADL function and identify physical deficits which impact ability to perform ADLs (bathing, care of mouth/teeth, toileting, grooming, dressing, etc )  - Assess/evaluate cause of self-care deficits   - Assess range of motion  - Assess patient's mobility; develop plan if impaired  - Assess patient's need for assistive devices and provide as appropriate  - Encourage maximum independence but intervene and supervise when necessary  - Involve family in performance of ADLs  - Assess for home care needs following discharge   - Consider OT consult to assist with ADL evaluation and planning for discharge  - Provide patient education as appropriate  12/11/2020 1938 by Perla Yo  Outcome: Progressing  12/11/2020 1938 by Perla Yo  Outcome: Progressing  Goal: Maintain or return mobility status to optimal level  Description: INTERVENTIONS:  - Assess patient's baseline mobility status (ambulation, transfers, stairs, etc )    - Identify cognitive and physical deficits and behaviors that affect mobility  - Identify mobility aids required to assist with transfers and/or ambulation (gait belt, sit-to-stand, lift, walker, cane, etc )  - [place] fall precautions as indicated by assessment  - Record patient progress and toleration of activity level on Mobility SBAR; progress patient to next Phase/Stage  - Instruct patient to call for assistance with activity based on assessment  - Consider rehabilitation consult to assist with strengthening/weightbearing, etc   12/11/2020 1938 by Epimenio Champ  Outcome: Progressing  12/11/2020 1938 by Epimenio Champ  Outcome: Progressing     Problem: DISCHARGE PLANNING  Goal: Discharge to home or other facility with appropriate resources  Description: INTERVENTIONS:  - Identify barriers to discharge w/patient and caregiver  - Arrange for needed discharge resources and transportation as appropriate  - Identify discharge learning needs (meds, wound care, etc )  - Arrange for interpretive services to assist at discharge as needed  - Refer to Case Management Department for coordinating discharge planning if the patient needs post-hospital services based on physician/advanced practitioner order or complex needs related to functional status, cognitive ability, or social support system  12/11/2020 1938 by Epimenio Champ  Outcome: Progressing  12/11/2020 1938 by Epimenio Champ  Outcome: Progressing     Problem: Knowledge Deficit  Goal: Patient/family/caregiver demonstrates understanding of disease process, treatment plan, medications, and discharge instructions  Description: Complete learning assessment and assess knowledge base    Interventions:  - Provide teaching at level of understanding  - Provide teaching via preferred learning methods  12/11/2020 1938 by Epimenio Champ  Outcome: Progressing  12/11/2020 1938 by Epimenio Champ  Outcome: Progressing     Problem: Prexisting or High Potential for Compromised Skin Integrity  Goal: Skin integrity is maintained or improved  Description: INTERVENTIONS:  - Identify patients at risk for skin breakdown  - Assess and monitor skin integrity  - Assess and monitor nutrition and hydration status  - Monitor labs   - Assess for incontinence   - Turn and reposition patient  - Assist with mobility/ambulation  - Relieve pressure over bony prominences  - Avoid friction and shearing  - Provide appropriate hygiene as needed including keeping skin clean and dry  - Evaluate need for skin moisturizer/barrier cream  - Collaborate with interdisciplinary team   - Patient/family teaching  - Consider wound care consult   12/11/2020 1938 by Adrianna Cazares  Outcome: Progressing  12/11/2020 1938 by Adrianna Cazares  Outcome: Progressing

## 2020-12-12 NOTE — PLAN OF CARE
Problem: Potential for Falls  Goal: Patient will remain free of falls  Description: INTERVENTIONS:  - Assess patient frequently for physical needs  -  Identify cognitive and physical deficits and behaviors that affect risk of falls    -  [place] fall precautions as indicated by assessment   - Educate patient/family on patient safety including physical limitations  - Instruct patient to call for assistance with activity based on assessment  - Modify environment to reduce risk of injury  - Consider OT/PT consult to assist with strengthening/mobility  Outcome: Progressing     Problem: RESPIRATORY - ADULT  Goal: Achieves optimal ventilation and oxygenation  Description: INTERVENTIONS:  - Assess for changes in respiratory status  - Assess for changes in mentation and behavior  - Position to facilitate oxygenation and minimize respiratory effort  - Oxygen administered by appropriate delivery if ordered  - Initiate smoking cessation education as indicated  - Encourage broncho-pulmonary hygiene including cough, deep breathe, Incentive Spirometry  - Assess the need for suctioning and aspirate as needed  - Assess and instruct to report SOB or any respiratory difficulty  - Respiratory Therapy support as indicated  Outcome: Progressing     Problem: METABOLIC, FLUID AND ELECTROLYTES - ADULT  Goal: Electrolytes maintained within normal limits  Description: INTERVENTIONS:  - Monitor labs and assess patient for signs and symptoms of electrolyte imbalances  - Administer electrolyte replacement as ordered  - Monitor response to electrolyte replacements, including repeat lab results as appropriate  - Instruct patient on fluid and nutrition as appropriate  Outcome: Progressing  Goal: Fluid balance maintained  Description: INTERVENTIONS:  - Monitor labs   - Monitor I/O and WT  - Instruct patient on fluid and nutrition as appropriate  - Assess for signs & symptoms of volume excess or deficit  Outcome: Progressing     Problem: HEMATOLOGIC - ADULT  Goal: Maintains hematologic stability  Description: INTERVENTIONS  - Assess for signs and symptoms of bleeding or hemorrhage  - Monitor labs  - Administer supportive blood products/factors as ordered and appropriate  Outcome: Progressing     Problem: INFECTION - ADULT  Goal: Absence or prevention of progression during hospitalization  Description: INTERVENTIONS:  - Assess and monitor for signs and symptoms of infection  - Monitor lab/diagnostic results  - Monitor all insertion sites, i e  indwelling lines, tubes, and drains  - Monitor endotracheal if appropriate and nasal secretions for changes in amount and color  - [place] appropriate cooling/warming therapies per order  - Administer medications as ordered  - Instruct and encourage patient and family to use good hand hygiene technique  - Identify and instruct in appropriate isolation precautions for identified infection/condition  Outcome: Progressing  Goal: Absence of fever/infection during neutropenic period  Description: INTERVENTIONS:  - Monitor WBC    Outcome: Progressing     Problem: SAFETY ADULT  Goal: Patient will remain free of falls  Description: INTERVENTIONS:  - Assess patient frequently for physical needs  -  Identify cognitive and physical deficits and behaviors that affect risk of falls    -  [place] fall precautions as indicated by assessment   - Educate patient/family on patient safety including physical limitations  - Instruct patient to call for assistance with activity based on assessment  - Modify environment to reduce risk of injury  - Consider OT/PT consult to assist with strengthening/mobility  Outcome: Progressing  Goal: Maintain or return to baseline ADL function  Description: INTERVENTIONS:  -  Assess patient's ability to carry out ADLs; assess patient's baseline for ADL function and identify physical deficits which impact ability to perform ADLs (bathing, care of mouth/teeth, toileting, grooming, dressing, etc )  - Assess/evaluate cause of self-care deficits   - Assess range of motion  - Assess patient's mobility; develop plan if impaired  - Assess patient's need for assistive devices and provide as appropriate  - Encourage maximum independence but intervene and supervise when necessary  - Involve family in performance of ADLs  - Assess for home care needs following discharge   - Consider OT consult to assist with ADL evaluation and planning for discharge  - Provide patient education as appropriate  Outcome: Progressing  Goal: Maintain or return mobility status to optimal level  Description: INTERVENTIONS:  - Assess patient's baseline mobility status (ambulation, transfers, stairs, etc )    - Identify cognitive and physical deficits and behaviors that affect mobility  - Identify mobility aids required to assist with transfers and/or ambulation (gait belt, sit-to-stand, lift, walker, cane, etc )  - [place] fall precautions as indicated by assessment  - Record patient progress and toleration of activity level on Mobility SBAR; progress patient to next Phase/Stage  - Instruct patient to call for assistance with activity based on assessment  - Consider rehabilitation consult to assist with strengthening/weightbearing, etc   Outcome: Progressing     Problem: DISCHARGE PLANNING  Goal: Discharge to home or other facility with appropriate resources  Description: INTERVENTIONS:  - Identify barriers to discharge w/patient and caregiver  - Arrange for needed discharge resources and transportation as appropriate  - Identify discharge learning needs (meds, wound care, etc )  - Arrange for interpretive services to assist at discharge as needed  - Refer to Case Management Department for coordinating discharge planning if the patient needs post-hospital services based on physician/advanced practitioner order or complex needs related to functional status, cognitive ability, or social support system  Outcome: Progressing     Problem: Knowledge Deficit  Goal: Patient/family/caregiver demonstrates understanding of disease process, treatment plan, medications, and discharge instructions  Description: Complete learning assessment and assess knowledge base    Interventions:  - Provide teaching at level of understanding  - Provide teaching via preferred learning methods  Outcome: Progressing     Problem: Prexisting or High Potential for Compromised Skin Integrity  Goal: Skin integrity is maintained or improved  Description: INTERVENTIONS:  - Identify patients at risk for skin breakdown  - Assess and monitor skin integrity  - Assess and monitor nutrition and hydration status  - Monitor labs   - Assess for incontinence   - Turn and reposition patient  - Assist with mobility/ambulation  - Relieve pressure over bony prominences  - Avoid friction and shearing  - Provide appropriate hygiene as needed including keeping skin clean and dry  - Evaluate need for skin moisturizer/barrier cream  - Collaborate with interdisciplinary team   - Patient/family teaching  - Consider wound care consult   Outcome: Progressing

## 2020-12-13 LAB
ALBUMIN SERPL BCP-MCNC: 2.9 G/DL (ref 3.5–5)
ALP SERPL-CCNC: 102 U/L (ref 46–116)
ALT SERPL W P-5'-P-CCNC: 160 U/L (ref 12–78)
ANION GAP SERPL CALCULATED.3IONS-SCNC: 8 MMOL/L (ref 4–13)
AST SERPL W P-5'-P-CCNC: 59 U/L (ref 5–45)
BASOPHILS # BLD AUTO: 0.02 THOUSANDS/ΜL (ref 0–0.1)
BASOPHILS NFR BLD AUTO: 0 % (ref 0–1)
BILIRUB SERPL-MCNC: 0.55 MG/DL (ref 0.2–1)
BUN SERPL-MCNC: 33 MG/DL (ref 5–25)
CALCIUM ALBUM COR SERPL-MCNC: 10.6 MG/DL (ref 8.3–10.1)
CALCIUM SERPL-MCNC: 9.7 MG/DL (ref 8.3–10.1)
CHLORIDE SERPL-SCNC: 105 MMOL/L (ref 100–108)
CO2 SERPL-SCNC: 27 MMOL/L (ref 21–32)
CREAT SERPL-MCNC: 0.88 MG/DL (ref 0.6–1.3)
CRP SERPL QL: 35.6 MG/L
D DIMER PPP FEU-MCNC: 0.67 UG/ML FEU
EOSINOPHIL # BLD AUTO: 0.06 THOUSAND/ΜL (ref 0–0.61)
EOSINOPHIL NFR BLD AUTO: 1 % (ref 0–6)
ERYTHROCYTE [DISTWIDTH] IN BLOOD BY AUTOMATED COUNT: 12.7 % (ref 11.6–15.1)
FERRITIN SERPL-MCNC: 412 NG/ML (ref 8–388)
GFR SERPL CREATININE-BSD FRML MDRD: 69 ML/MIN/1.73SQ M
GLUCOSE SERPL-MCNC: 108 MG/DL (ref 65–140)
HCT VFR BLD AUTO: 43.4 % (ref 34.8–46.1)
HGB BLD-MCNC: 14 G/DL (ref 11.5–15.4)
IMM GRANULOCYTES # BLD AUTO: 0.03 THOUSAND/UL (ref 0–0.2)
IMM GRANULOCYTES NFR BLD AUTO: 0 % (ref 0–2)
LYMPHOCYTES # BLD AUTO: 1.38 THOUSANDS/ΜL (ref 0.6–4.47)
LYMPHOCYTES NFR BLD AUTO: 19 % (ref 14–44)
MCH RBC QN AUTO: 26.7 PG (ref 26.8–34.3)
MCHC RBC AUTO-ENTMCNC: 32.3 G/DL (ref 31.4–37.4)
MCV RBC AUTO: 83 FL (ref 82–98)
MONOCYTES # BLD AUTO: 0.3 THOUSAND/ΜL (ref 0.17–1.22)
MONOCYTES NFR BLD AUTO: 4 % (ref 4–12)
NEUTROPHILS # BLD AUTO: 5.44 THOUSANDS/ΜL (ref 1.85–7.62)
NEUTS SEG NFR BLD AUTO: 76 % (ref 43–75)
NRBC BLD AUTO-RTO: 0 /100 WBCS
PLATELET # BLD AUTO: 323 THOUSANDS/UL (ref 149–390)
PMV BLD AUTO: 11.6 FL (ref 8.9–12.7)
POTASSIUM SERPL-SCNC: 4.2 MMOL/L (ref 3.5–5.3)
PROT SERPL-MCNC: 6.6 G/DL (ref 6.4–8.2)
RBC # BLD AUTO: 5.24 MILLION/UL (ref 3.81–5.12)
SODIUM SERPL-SCNC: 140 MMOL/L (ref 136–145)
WBC # BLD AUTO: 7.23 THOUSAND/UL (ref 4.31–10.16)

## 2020-12-13 PROCEDURE — 99232 SBSQ HOSP IP/OBS MODERATE 35: CPT | Performed by: PHYSICIAN ASSISTANT

## 2020-12-13 PROCEDURE — 94664 DEMO&/EVAL PT USE INHALER: CPT

## 2020-12-13 PROCEDURE — 86140 C-REACTIVE PROTEIN: CPT | Performed by: INTERNAL MEDICINE

## 2020-12-13 PROCEDURE — 80053 COMPREHEN METABOLIC PANEL: CPT | Performed by: INTERNAL MEDICINE

## 2020-12-13 PROCEDURE — 99232 SBSQ HOSP IP/OBS MODERATE 35: CPT | Performed by: INTERNAL MEDICINE

## 2020-12-13 PROCEDURE — 94760 N-INVAS EAR/PLS OXIMETRY 1: CPT

## 2020-12-13 PROCEDURE — 85025 COMPLETE CBC W/AUTO DIFF WBC: CPT | Performed by: INTERNAL MEDICINE

## 2020-12-13 PROCEDURE — 82728 ASSAY OF FERRITIN: CPT | Performed by: INTERNAL MEDICINE

## 2020-12-13 PROCEDURE — 85379 FIBRIN DEGRADATION QUANT: CPT | Performed by: INTERNAL MEDICINE

## 2020-12-13 RX ORDER — ALBUTEROL SULFATE 90 UG/1
2 AEROSOL, METERED RESPIRATORY (INHALATION) 3 TIMES DAILY
Status: DISCONTINUED | OUTPATIENT
Start: 2020-12-13 | End: 2021-01-11 | Stop reason: HOSPADM

## 2020-12-13 RX ADMIN — FAMOTIDINE 20 MG: 20 TABLET ORAL at 09:51

## 2020-12-13 RX ADMIN — LITHIUM CARBONATE 300 MG: 300 CAPSULE, GELATIN COATED ORAL at 21:15

## 2020-12-13 RX ADMIN — ENOXAPARIN SODIUM 30 MG: 30 INJECTION SUBCUTANEOUS at 09:51

## 2020-12-13 RX ADMIN — SENNOSIDES AND DOCUSATE SODIUM 1 TABLET: 8.6; 5 TABLET ORAL at 16:58

## 2020-12-13 RX ADMIN — ALBUTEROL SULFATE 2 PUFF: 90 AEROSOL, METERED RESPIRATORY (INHALATION) at 08:45

## 2020-12-13 RX ADMIN — GABAPENTIN 300 MG: 300 CAPSULE ORAL at 09:51

## 2020-12-13 RX ADMIN — Medication 2000 UNITS: at 09:51

## 2020-12-13 RX ADMIN — ALBUTEROL SULFATE 2 PUFF: 90 AEROSOL, METERED RESPIRATORY (INHALATION) at 21:01

## 2020-12-13 RX ADMIN — ENOXAPARIN SODIUM 30 MG: 30 INJECTION SUBCUTANEOUS at 21:01

## 2020-12-13 RX ADMIN — ALBUTEROL SULFATE 2 PUFF: 90 AEROSOL, METERED RESPIRATORY (INHALATION) at 16:58

## 2020-12-13 RX ADMIN — ALUMINUM HYDROXIDE, MAGNESIUM HYDROXIDE, AND SIMETHICONE 30 ML: 200; 200; 20 SUSPENSION ORAL at 09:51

## 2020-12-13 RX ADMIN — FAMOTIDINE 20 MG: 20 TABLET ORAL at 21:00

## 2020-12-13 RX ADMIN — SERTRALINE HYDROCHLORIDE 100 MG: 100 TABLET ORAL at 21:00

## 2020-12-13 RX ADMIN — POLYETHYLENE GLYCOL 3350 17 G: 17 POWDER, FOR SOLUTION ORAL at 09:49

## 2020-12-13 RX ADMIN — LORAZEPAM 1.5 MG: 1 TABLET ORAL at 21:00

## 2020-12-13 RX ADMIN — LIDOCAINE 5% 1 PATCH: 700 PATCH TOPICAL at 09:50

## 2020-12-13 RX ADMIN — FLUTICASONE PROPIONATE 2 SPRAY: 50 SPRAY, METERED NASAL at 09:52

## 2020-12-13 RX ADMIN — DEXAMETHASONE SODIUM PHOSPHATE 6 MG: 4 INJECTION INTRA-ARTICULAR; INTRALESIONAL; INTRAMUSCULAR; INTRAVENOUS; SOFT TISSUE at 13:38

## 2020-12-13 RX ADMIN — TRAZODONE HYDROCHLORIDE 100 MG: 100 TABLET ORAL at 21:00

## 2020-12-13 RX ADMIN — SENNOSIDES AND DOCUSATE SODIUM 1 TABLET: 8.6; 5 TABLET ORAL at 09:51

## 2020-12-13 RX ADMIN — MULTIVITAMIN 15 ML: LIQUID ORAL at 09:52

## 2020-12-13 RX ADMIN — ATORVASTATIN CALCIUM 40 MG: 40 TABLET, FILM COATED ORAL at 21:00

## 2020-12-13 RX ADMIN — ALUMINUM HYDROXIDE, MAGNESIUM HYDROXIDE, AND SIMETHICONE 30 ML: 200; 200; 20 SUSPENSION ORAL at 18:44

## 2020-12-13 NOTE — ASSESSMENT & PLAN NOTE
COVID-19 infection, tested positive on 11/29/2020  Patient be placed on moderate treatment protocol   Noted to have worsening of oxygenation requiring 15 L mid flow, with saturations in low 90s  · Continue dexamethasone 10/10  · Completed remdesivir 12/8  · Continue vitamin-D, zinc, vitamin-C  · Antibiotic discontinued  · s/p convalescent plasma 12/7/2020  · Continue self proning   · Monitor CRP, ferritin, D-dimer  · Pulm following, now signing off as respiratory status improving, down from 15 L to 8 L  · Will need ambulatory pulse ox prior to discharge

## 2020-12-13 NOTE — PROGRESS NOTES
Pulmonary docs turned pt down to 6L and her sats have been ok but once on her back for bedpan dropped into 80's will continue to monitor once back on her side

## 2020-12-13 NOTE — PROGRESS NOTES
[address] [facility]  Progress Note - Alem Rivera 1955, 76 y o  female MRN: [id number]    Unit/Bed#: -01 Encounter: [id number]    Primary Care Provider: LUIS CARLOS Whitney   Date and time admitted to hospital: 12/4/2020  9:15 AM      * Acute respiratory failure with hypoxia ([facility] )  Assessment & Plan  · Acute hypoxic respiratory failure likely due to COVID-19 infection  · Patient is hypoxic, improving, weaned from 15 L midflow to 8 L this AM  · Given 40 mg Lasix x1 12/10-- which seemed to help  · Continue supplemental oxygen maintain O2 sats more than 92-94%  · Encourage proning- patient needs continuous encouragement and reminder to prone  · Pulmonary on board, now signing off   · See below for details of COVID 19 management   · Will need ambulatory pulse ox prior to discharge  · P r n  Lasix    Elevated troponin  Assessment & Plan  Elevated troponin, peak troponin 0 32- likely due to Covid-19 infection  Trended down to 0 14  ECG - T wave inversions noted  Monitor     COVID-19 virus infection  Assessment & Plan  COVID-19 infection, tested positive on 11/29/2020  Patient be placed on moderate treatment protocol   Noted to have worsening of oxygenation requiring 15 L mid flow, with saturations in low 90s  · Continue dexamethasone 10/10  · Completed remdesivir 12/8  · Continue vitamin-D, zinc, vitamin-C  · Antibiotic discontinued  · s/p convalescent plasma 12/7/2020  · Continue self proning   · Monitor CRP, ferritin, D-dimer  · Pulm following, now signing off as respiratory status improving, down from 15 L to 8 L  · Will need ambulatory pulse ox prior to discharge    GERD (gastroesophageal reflux disease)  Assessment & Plan  Continue Famotidine and Mylanta     Bipolar disorder ([facility] )  Assessment & Plan  Continue lithium sertraline, trazodone  Patient looks less anxious    Continue bedtime Ativan        VTE Pharmacologic Prophylaxis:   Pharmacologic: Enoxaparin (Lovenox)  Mechanical VTE Prophylaxis in Place: Yes    Patient Centered Rounds: I have performed bedside rounds with nursing staff today  Discussions with Specialists or Other Care Team Provider: RN    Education and Discussions with Family / Patient: patient  Declined call to family  Time Spent for Care: 20 minutes  More than 50% of total time spent on counseling and coordination of care as described above  Current Length of Stay: 9 day(s)    Current Patient Status: Inpatient   Certification Statement: The patient will continue to require additional inpatient hospital stay due to COVID pneumonia, weaning oxygen    Discharge Plan: when oxygen levels improve  48-72 hrs? Code Status: Level 1 - Full Code      Subjective: The patient reports she is feeling much better today  Her oxygen requirement is decreasing   Objective:     Vitals:   Temp (24hrs), Av 5 °F (36 4 °C), Min:95 7 °F (35 4 °C), Max:98 4 °F (36 9 °C)    Temp:  [95 7 °F (35 4 °C)-98 4 °F (36 9 °C)] 98 1 °F (36 7 °C)  HR:  [71-84] 71  BP: ()/(59-69) 114/65  SpO2:  [89 %-95 %] 92 %  Body mass index is 34 16 kg/m²  Input and Output Summary (last 24 hours): Intake/Output Summary (Last 24 hours) at 2020 1142  Last data filed at 2020 1454  Gross per 24 hour   Intake 360 ml   Output --   Net 360 ml       Physical Exam:     Physical Exam  Vitals signs reviewed  Constitutional:       General: She is not in acute distress  Appearance: She is not toxic-appearing  HENT:      Head: Normocephalic and atraumatic  Eyes:      General: No scleral icterus  Extraocular Movements: Extraocular movements intact  Neck:      Musculoskeletal: Normal range of motion  Cardiovascular:      Rate and Rhythm: Normal rate and regular rhythm  Pulmonary:      Effort: Pulmonary effort is normal  No respiratory distress  Breath sounds: Rhonchi present  Abdominal:      General: Bowel sounds are normal  There is no distension        Palpations: Abdomen is soft       Tenderness: There is no abdominal tenderness  Musculoskeletal: Normal range of motion  General: No swelling  Skin:     General: Skin is warm and dry  Neurological:      General: No focal deficit present  Mental Status: She is alert and oriented to person, place, and time  Psychiatric:         Mood and Affect: Mood normal          Behavior: Behavior normal          Thought Content: Thought content normal          Additional Data:     Labs:    Results from last 7 days   Lab Units 12/13/20  0501   WBC Thousand/uL 7 23   HEMOGLOBIN g/dL 14 0   HEMATOCRIT % 43 4   PLATELETS Thousands/uL 323   NEUTROS PCT % 76*   LYMPHS PCT % 19   MONOS PCT % 4   EOS PCT % 1     Results from last 7 days   Lab Units 12/13/20  0501   SODIUM mmol/L 140   POTASSIUM mmol/L 4 2   CHLORIDE mmol/L 105   CO2 mmol/L 27   BUN mg/dL 33*   CREATININE mg/dL 0 88   ANION GAP mmol/L 8   CALCIUM mg/dL 9 7   ALBUMIN g/dL 2 9*   TOTAL BILIRUBIN mg/dL 0 55   ALK PHOS U/L 102   ALT U/L 160*   AST U/L 59*   GLUCOSE RANDOM mg/dL 108                 Results from last 7 days   Lab Units 12/12/20  0652 12/11/20  0545 12/07/20  0624   PROCALCITONIN ng/ml <0 05 <0 05 <0 05           * I Have Reviewed All Lab Data Listed Above  * Additional Pertinent Lab Tests Reviewed:  All Labs Within Last 24 Hours Reviewed    Imaging:    Imaging Reports Reviewed Today Include: none  Imaging Personally Reviewed by Myself Includes:  none    Recent Cultures (last 7 days):           Last 24 Hours Medication List:   Current Facility-Administered Medications   Medication Dose Route Frequency Provider Last Rate    acetaminophen  650 mg Oral Q6H PRN Vanessa Sheehan MD      albuterol  2 puff Inhalation Q4H PRN Vanessa Sheehan MD      albuterol  2 puff Inhalation TID Jose Ramon Duron MD      aluminum-magnesium hydroxide-simethicone  30 mL Oral Q4H PRN Vanessa Sheehan MD      atorvastatin  40 mg Oral HS Vanessa Sheehan MD      bisacodyl  10 mg Rectal Daily PRN Jordon Kwon MD      cholecalciferol  2,000 Units Oral Daily Jordon Kwon MD      dexamethasone  6 mg Intravenous Q24H Jordon Kwon MD      enoxaparin  30 mg Subcutaneous Q12H [address] Jordon Kwon MD      famotidine  20 mg Oral Q12H Jordon Kwon MD      fluticasone  2 spray Each Nare Daily Jordon Kwon MD      gabapentin  300 mg Oral Daily Jordon Kwon MD      lidocaine  1 patch Topical Daily Jordon Kwon MD      lithium carbonate  300 mg Oral HS Jordon Kwon MD      LORazepam  1 mg Oral BID PRN Jordon Kwon MD      LORazepam  1 5 mg Oral HS Jordon Kwon MD      melatonin  6 mg Oral HS Jordon Kwon MD      menthol-methyl salicylate   Apply externally 4x Daily PRN Vini Bowen PA-C      multivitamin with iron-minerals  15 mL Per NG Tube Daily Jordon Kwon MD      ondansetron  4 mg Intravenous Q6H PRN Jordon Kwon MD      polyethylene glycol  17 g Oral Daily Jordon Kwon MD      senna-docusate sodium  1 tablet Oral BID Jordon Kwon MD      sertraline  100 mg Oral HS Jordon Kwon MD      sodium chloride  1 spray Each Nare Q2H PRN Jordon Kwon MD      traZODone  100 mg Oral HS Jordon Kwon MD          Today, Patient Was Seen By: Heather Day PA-C    ** Please Note: Dictation voice to text software may have been used in the creation of this document   **

## 2020-12-13 NOTE — PLAN OF CARE
Problem: Potential for Falls  Goal: Patient will remain free of falls  Description: INTERVENTIONS:  - Assess patient frequently for physical needs  -  Identify cognitive and physical deficits and behaviors that affect risk of falls    -  [place] fall precautions as indicated by assessment   - Educate patient/family on patient safety including physical limitations  - Instruct patient to call for assistance with activity based on assessment  - Modify environment to reduce risk of injury  - Consider OT/PT consult to assist with strengthening/mobility  Outcome: Progressing     Problem: RESPIRATORY - ADULT  Goal: Achieves optimal ventilation and oxygenation  Description: INTERVENTIONS:  - Assess for changes in respiratory status  - Assess for changes in mentation and behavior  - Position to facilitate oxygenation and minimize respiratory effort  - Oxygen administered by appropriate delivery if ordered  - Initiate smoking cessation education as indicated  - Encourage broncho-pulmonary hygiene including cough, deep breathe, Incentive Spirometry  - Assess the need for suctioning and aspirate as needed  - Assess and instruct to report SOB or any respiratory difficulty  - Respiratory Therapy support as indicated  Outcome: Progressing     Problem: METABOLIC, FLUID AND ELECTROLYTES - ADULT  Goal: Electrolytes maintained within normal limits  Description: INTERVENTIONS:  - Monitor labs and assess patient for signs and symptoms of electrolyte imbalances  - Administer electrolyte replacement as ordered  - Monitor response to electrolyte replacements, including repeat lab results as appropriate  - Instruct patient on fluid and nutrition as appropriate  Outcome: Progressing  Goal: Fluid balance maintained  Description: INTERVENTIONS:  - Monitor labs   - Monitor I/O and WT  - Instruct patient on fluid and nutrition as appropriate  - Assess for signs & symptoms of volume excess or deficit  Outcome: Progressing     Problem: HEMATOLOGIC - ADULT  Goal: Maintains hematologic stability  Description: INTERVENTIONS  - Assess for signs and symptoms of bleeding or hemorrhage  - Monitor labs  - Administer supportive blood products/factors as ordered and appropriate  Outcome: Progressing     Problem: INFECTION - ADULT  Goal: Absence or prevention of progression during hospitalization  Description: INTERVENTIONS:  - Assess and monitor for signs and symptoms of infection  - Monitor lab/diagnostic results  - Monitor all insertion sites, i e  indwelling lines, tubes, and drains  - Monitor endotracheal if appropriate and nasal secretions for changes in amount and color  - [place] appropriate cooling/warming therapies per order  - Administer medications as ordered  - Instruct and encourage patient and family to use good hand hygiene technique  - Identify and instruct in appropriate isolation precautions for identified infection/condition  Outcome: Progressing  Goal: Absence of fever/infection during neutropenic period  Description: INTERVENTIONS:  - Monitor WBC    Outcome: Progressing     Problem: SAFETY ADULT  Goal: Patient will remain free of falls  Description: INTERVENTIONS:  - Assess patient frequently for physical needs  -  Identify cognitive and physical deficits and behaviors that affect risk of falls    -  [place] fall precautions as indicated by assessment   - Educate patient/family on patient safety including physical limitations  - Instruct patient to call for assistance with activity based on assessment  - Modify environment to reduce risk of injury  - Consider OT/PT consult to assist with strengthening/mobility  Outcome: Progressing  Goal: Maintain or return to baseline ADL function  Description: INTERVENTIONS:  -  Assess patient's ability to carry out ADLs; assess patient's baseline for ADL function and identify physical deficits which impact ability to perform ADLs (bathing, care of mouth/teeth, toileting, grooming, dressing, etc )  - Assess/evaluate cause of self-care deficits   - Assess range of motion  - Assess patient's mobility; develop plan if impaired  - Assess patient's need for assistive devices and provide as appropriate  - Encourage maximum independence but intervene and supervise when necessary  - Involve family in performance of ADLs  - Assess for home care needs following discharge   - Consider OT consult to assist with ADL evaluation and planning for discharge  - Provide patient education as appropriate  Outcome: Progressing  Goal: Maintain or return mobility status to optimal level  Description: INTERVENTIONS:  - Assess patient's baseline mobility status (ambulation, transfers, stairs, etc )    - Identify cognitive and physical deficits and behaviors that affect mobility  - Identify mobility aids required to assist with transfers and/or ambulation (gait belt, sit-to-stand, lift, walker, cane, etc )  - [place] fall precautions as indicated by assessment  - Record patient progress and toleration of activity level on Mobility SBAR; progress patient to next Phase/Stage  - Instruct patient to call for assistance with activity based on assessment  - Consider rehabilitation consult to assist with strengthening/weightbearing, etc   Outcome: Progressing     Problem: DISCHARGE PLANNING  Goal: Discharge to home or other facility with appropriate resources  Description: INTERVENTIONS:  - Identify barriers to discharge w/patient and caregiver  - Arrange for needed discharge resources and transportation as appropriate  - Identify discharge learning needs (meds, wound care, etc )  - Arrange for interpretive services to assist at discharge as needed  - Refer to Case Management Department for coordinating discharge planning if the patient needs post-hospital services based on physician/advanced practitioner order or complex needs related to functional status, cognitive ability, or social support system  Outcome: Progressing     Problem: Knowledge Deficit  Goal: Patient/family/caregiver demonstrates understanding of disease process, treatment plan, medications, and discharge instructions  Description: Complete learning assessment and assess knowledge base    Interventions:  - Provide teaching at level of understanding  - Provide teaching via preferred learning methods  Outcome: Progressing     Problem: Prexisting or High Potential for Compromised Skin Integrity  Goal: Skin integrity is maintained or improved  Description: INTERVENTIONS:  - Identify patients at risk for skin breakdown  - Assess and monitor skin integrity  - Assess and monitor nutrition and hydration status  - Monitor labs   - Assess for incontinence   - Turn and reposition patient  - Assist with mobility/ambulation  - Relieve pressure over bony prominences  - Avoid friction and shearing  - Provide appropriate hygiene as needed including keeping skin clean and dry  - Evaluate need for skin moisturizer/barrier cream  - Collaborate with interdisciplinary team   - Patient/family teaching  - Consider wound care consult   Outcome: Progressing

## 2020-12-13 NOTE — PROGRESS NOTES
Progress Note - Pulmonary   Gerduran Sifuentes 76 y o  female MRN: [id number]  Unit/Bed#: -01 Encounter: [id number]      Assessment:  1  Acute hypoxic respiratory failure secondary to COVID-19 pneumonia  2  COVID-19 pneumonia  3  Abnormal chest x-ray with worsening bilateral airspace opacities worse on the left with pulmonary vascular congestion  4  Elevated inflammatory markers  5  GERD  6  Bipolar disorder     Plan:  - patient currently saturating low 90s on 8 L mid flow, wean as tolerated, self prone, aggressive ics, out of bed to chair, pulmonary toilet, goal SpO2 greater than 90%  -will need formal ambulatory pulse ox prior to discharge  -completed 5 day course of remdesivir  -continue Decadron day 10/10  -continue vitamin-C/D, zinc, statin  -monitoring off antibiotics given multiple negative procalcitonin  -status post convalescent plasma 12/07/2020  -continue to trend inflammatory markers, no role for actemra at this point, but can consider if worsening and inflammatory markers continue to rise  -no need for anticoagulation D-dimer less than 2 5  - continue diuresis p r n     * no further recommendations at this time from pulmonary standpoint  Continue to follow COVID protocol  Will sign off, Please call with questions/re-consult as needed if high-flow nasal cannula as required  Subjective:   Patient seen examined at bedside  Down to 8 L mid flow nasal cannula  She is feeling improved, mild shortness of breath and cough  No fever, chills, nausea, vomiting, chest pain  Did not receive diuretics yesterday  Review of Systems   Constitutional: Positive for fatigue  Negative for chills, fever and unexpected weight change  HENT: Negative for congestion, rhinorrhea, sneezing and sore throat  Respiratory: Positive for cough and shortness of breath  Negative for wheezing  Cardiovascular: Negative for chest pain, palpitations and leg swelling     Gastrointestinal: Negative for abdominal pain, constipation, diarrhea, nausea and vomiting  Genitourinary: Negative for dysuria  Musculoskeletal: Negative for arthralgias  Neurological: Negative for dizziness and numbness  Objective:     Vitals:    12/12/20 2237 12/13/20 0600 12/13/20 0737 12/13/20 0845   BP: 118/69  114/65    Pulse: 80  71    Resp:       Temp: (!) 95 7 °F (35 4 °C)  98 1 °F (36 7 °C)    TempSrc:       SpO2: 95%  93% 92%   Weight:  82 kg (180 lb 12 4 oz)     Height:               Intake/Output Summary (Last 24 hours) at 12/13/2020 1025  Last data filed at 12/12/2020 1454  Gross per 24 hour   Intake 360 ml   Output --   Net 360 ml         Physical Exam  Constitutional:       General: She is not in acute distress  Appearance: She is well-developed  She is not diaphoretic  HENT:      Head: Normocephalic and atraumatic  Mouth/Throat:      Mouth: Mucous membranes are moist       Pharynx: Oropharynx is clear  No oropharyngeal exudate  Eyes:      General: No scleral icterus  Cardiovascular:      Rate and Rhythm: Normal rate and regular rhythm  Heart sounds: Normal heart sounds  No murmur  Pulmonary:      Effort: Pulmonary effort is normal  No respiratory distress  Breath sounds: No wheezing  Comments: Diminished breath sounds  Abdominal:      General: Bowel sounds are normal  There is no distension  Palpations: Abdomen is soft  Tenderness: There is no abdominal tenderness  Musculoskeletal:      Right lower leg: No edema  Left lower leg: No edema  Neurological:      Mental Status: She is alert and oriented to person, place, and time  Labs: I have personally reviewed pertinent lab results    Results from last 7 days   Lab Units 12/13/20  0501 12/12/20  1057 12/10/20  0548   WBC Thousand/uL 7 23 10 04 8 41   HEMOGLOBIN g/dL 14 0 13 6 13 4   HEMATOCRIT % 43 4 40 9 41 2   PLATELETS Thousands/uL 323 308 236   NEUTROS PCT % 76* 78* 88*   MONOS PCT % 4 5 2*      Results from last 7 days   Lab Units 12/13/20  0501 12/12/20  1057 12/11/20  0545 12/10/20  0549   POTASSIUM mmol/L 4 2 3 3* 3 6 3 9   CHLORIDE mmol/L 105 102 103 105   CO2 mmol/L 27 29 28 29   BUN mg/dL 33* 33* 33* 25   CREATININE mg/dL 0 88 0 83 0 76 0 80   CALCIUM mg/dL 9 7 9 8 9 8 9 1   ALK PHOS U/L 102  --  94 81   ALT U/L 160*  --  71 30   AST U/L 59*  --  65* 24     Results from last 7 days   Lab Units 12/09/20  0541 12/08/20  0443   MAGNESIUM mg/dL 2 7* 2 7*     Results from last 7 days   Lab Units 12/09/20  0541 12/08/20  0443   PHOSPHORUS mg/dL 2 9 4 1              0   Lab Value Date/Time    TROPONINI 0 14 (H) 12/05/2020 2123    TROPONINI 0 22 (H) 12/05/2020 0537    TROPONINI 0 28 (H) 12/04/2020 1836    TROPONINI 0 30 (H) 12/04/2020 1602    TROPONINI 0 32 (H) 12/04/2020 1304    TROPONINI 0 26 (H) 12/04/2020 0959    TROPONINI <0 02 10/24/2019 1048    TROPONINI <0 02 07/01/2019 1443    TROPONINI <0 02 07/01/2019 1118    [address] <0 02 07/01/2019 0813     Microbiology:  COVID positive     Imaging and other studies: I have personally reviewed pertinent reports    and I have personally reviewed pertinent films in PACS  Chest x-ray 12/04/2020  Left basilar infiltrate     Chest x-ray 12/10/2020  Worsening bilateral pulmonary infiltrates with worsening pulmonary vascular congestion, especially greater on the left        Opal Bonilla MD  Pulmonary & Critical Care Fellow, [facility]

## 2020-12-13 NOTE — ASSESSMENT & PLAN NOTE
· Acute hypoxic respiratory failure likely due to COVID-19 infection  · Patient is hypoxic, improving, weaned from 15 L midflow to 8 L this AM  · Given 40 mg Lasix x1 12/10-- which seemed to help  · Continue supplemental oxygen maintain O2 sats more than 92-94%  · Encourage proning- patient needs continuous encouragement and reminder to prone  · Pulmonary on board, now signing off   · See below for details of COVID 19 management   · Will need ambulatory pulse ox prior to discharge  · P r n   Lasix

## 2020-12-14 PROCEDURE — 94760 N-INVAS EAR/PLS OXIMETRY 1: CPT

## 2020-12-14 PROCEDURE — 99232 SBSQ HOSP IP/OBS MODERATE 35: CPT | Performed by: PHYSICIAN ASSISTANT

## 2020-12-14 RX ADMIN — LIDOCAINE 5% 1 PATCH: 700 PATCH TOPICAL at 09:14

## 2020-12-14 RX ADMIN — SERTRALINE HYDROCHLORIDE 100 MG: 100 TABLET ORAL at 22:20

## 2020-12-14 RX ADMIN — LORAZEPAM 1.5 MG: 1 TABLET ORAL at 22:20

## 2020-12-14 RX ADMIN — ALBUTEROL SULFATE 2 PUFF: 90 AEROSOL, METERED RESPIRATORY (INHALATION) at 09:10

## 2020-12-14 RX ADMIN — LITHIUM CARBONATE 300 MG: 300 CAPSULE, GELATIN COATED ORAL at 22:22

## 2020-12-14 RX ADMIN — SENNOSIDES AND DOCUSATE SODIUM 1 TABLET: 8.6; 5 TABLET ORAL at 09:16

## 2020-12-14 RX ADMIN — GABAPENTIN 300 MG: 300 CAPSULE ORAL at 09:14

## 2020-12-14 RX ADMIN — MULTIVITAMIN 15 ML: LIQUID ORAL at 09:16

## 2020-12-14 RX ADMIN — FAMOTIDINE 20 MG: 20 TABLET ORAL at 09:13

## 2020-12-14 RX ADMIN — Medication 2000 UNITS: at 09:12

## 2020-12-14 RX ADMIN — ENOXAPARIN SODIUM 30 MG: 30 INJECTION SUBCUTANEOUS at 20:11

## 2020-12-14 RX ADMIN — ALBUTEROL SULFATE 2 PUFF: 90 AEROSOL, METERED RESPIRATORY (INHALATION) at 20:12

## 2020-12-14 RX ADMIN — ENOXAPARIN SODIUM 30 MG: 30 INJECTION SUBCUTANEOUS at 09:12

## 2020-12-14 RX ADMIN — ATORVASTATIN CALCIUM 40 MG: 40 TABLET, FILM COATED ORAL at 22:20

## 2020-12-14 RX ADMIN — FAMOTIDINE 20 MG: 20 TABLET ORAL at 20:11

## 2020-12-14 RX ADMIN — FLUTICASONE PROPIONATE 2 SPRAY: 50 SPRAY, METERED NASAL at 09:13

## 2020-12-14 RX ADMIN — TRAZODONE HYDROCHLORIDE 100 MG: 100 TABLET ORAL at 22:20

## 2020-12-14 RX ADMIN — POLYETHYLENE GLYCOL 3350 17 G: 17 POWDER, FOR SOLUTION ORAL at 09:16

## 2020-12-14 RX ADMIN — ALBUTEROL SULFATE 2 PUFF: 90 AEROSOL, METERED RESPIRATORY (INHALATION) at 16:30

## 2020-12-14 RX ADMIN — ALUMINUM HYDROXIDE, MAGNESIUM HYDROXIDE, AND SIMETHICONE 30 ML: 200; 200; 20 SUSPENSION ORAL at 20:10

## 2020-12-14 RX ADMIN — SENNOSIDES AND DOCUSATE SODIUM 1 TABLET: 8.6; 5 TABLET ORAL at 16:31

## 2020-12-14 NOTE — PROGRESS NOTES
[address] Internal Medicine  Progress Note - Marguerite Moore 1955, 76 y o  female MRN: [id number]    Unit/Bed#: -01 Encounter: [id number]    Primary Care Provider: LUIS CARLOS Logan   Date and time admitted to hospital: 12/4/2020  9:15 AM        * Acute respiratory failure with hypoxia ([facility] )  Assessment & Plan  · Acute hypoxic respiratory failure likely due to COVID-19 infection  · Patient is hypoxic, improving, weaned from 15 L midflow to 6-8 L   · Given 40 mg Lasix x1 12/10-- which seemed to help  · Continue supplemental oxygen maintain O2 sats more than 92-94%  · Encourage proning- patient needs continuous encouragement and reminder to prone  · Pulmonary on board, now signing off   · See below for details of COVID 19 management   · Will need ambulatory pulse ox prior to discharge  · P r n  Lasix    Elevated troponin  Assessment & Plan  Elevated troponin, peak troponin 0 32- likely due to Covid-19 infection  Trended down to 0 14  ECG - T wave inversions noted  Monitor     COVID-19 virus infection  Assessment & Plan  COVID-19 infection, tested positive on 11/29/2020  Patient be placed on moderate treatment protocol   Noted to have worsening of oxygenation requiring 15 L mid flow, with saturations in low 90s  · Continue dexamethasone 10/10  · Completed remdesivir 12/8  · Continue vitamin-D, zinc, vitamin-C  · Antibiotic discontinued  · s/p convalescent plasma 12/7/2020  · Continue self proning   · Monitor CRP, ferritin, D-dimer  · Pulm following, now signing off as respiratory status improving, down from 15 L to 8 L  · Will need ambulatory pulse ox prior to discharge    GERD (gastroesophageal reflux disease)  Assessment & Plan  Continue Famotidine and Mylanta     Bipolar disorder ([facility] )  Assessment & Plan  Continue lithium sertraline, trazodone  Patient looks less anxious    Continue bedtime Ativan        VTE Pharmacologic Prophylaxis:   Pharmacologic: Enoxaparin (Lovenox)  Mechanical VTE Prophylaxis in Place: Yes    Patient Centered Rounds: I have performed bedside rounds with nursing staff today  Discussions with Specialists or Other Care Team Provider: RN    Education and Discussions with Family / Patient: patient, declined call  Time Spent for Care: 20 minutes  More than 50% of total time spent on counseling and coordination of care as described above  Current Length of Stay: 10 day(s)    Current Patient Status: Inpatient   Certification Statement: The patient will continue to require additional inpatient hospital stay due to hypoxia, need O2 improveent prior to d/c    Discharge Plan: when O2 levels consistently improved to 6 L or less NC  Will need home O2 eval prior to d/c  Likely 48-72 hrs    Code Status: Level 1 - Full Code      Subjective: The patient has no acute complaints, feels well  Objective:     Vitals:   Temp (24hrs), Av 8 °F (36 6 °C), Min:96 5 °F (35 8 °C), Max:98 4 °F (36 9 °C)    Temp:  [96 5 °F (35 8 °C)-98 4 °F (36 9 °C)] 98 4 °F (36 9 °C)  HR:  [79-96] 82  BP: (111-124)/(55-95) 113/62  SpO2:  [86 %-94 %] 86 %  Body mass index is 35 03 kg/m²  Input and Output Summary (last 24 hours): Intake/Output Summary (Last 24 hours) at 2020 1519  Last data filed at 2020 0730  Gross per 24 hour   Intake 180 ml   Output --   Net 180 ml       Physical Exam:     Physical Exam  Vitals signs reviewed  Constitutional:       General: She is not in acute distress  Appearance: She is not toxic-appearing  HENT:      Head: Normocephalic and atraumatic  Eyes:      General: No scleral icterus  Extraocular Movements: Extraocular movements intact  Neck:      Musculoskeletal: Normal range of motion and neck supple  Cardiovascular:      Rate and Rhythm: Normal rate and regular rhythm  Pulmonary:      Effort: Pulmonary effort is normal       Comments: Diminished b/l  Abdominal:      General: Bowel sounds are normal  There is no distension        Palpations: Abdomen is soft  Musculoskeletal: Normal range of motion  General: No swelling  Skin:     General: Skin is warm and dry  Neurological:      General: No focal deficit present  Mental Status: She is alert and oriented to person, place, and time  Psychiatric:         Mood and Affect: Mood normal          Behavior: Behavior normal          Thought Content: Thought content normal            Additional Data:     Labs:    Results from last 7 days   Lab Units 12/13/20  0501   WBC Thousand/uL 7 23   HEMOGLOBIN g/dL 14 0   HEMATOCRIT % 43 4   PLATELETS Thousands/uL 323   NEUTROS PCT % 76*   LYMPHS PCT % 19   MONOS PCT % 4   EOS PCT % 1     Results from last 7 days   Lab Units 12/13/20  0501   SODIUM mmol/L 140   POTASSIUM mmol/L 4 2   CHLORIDE mmol/L 105   CO2 mmol/L 27   BUN mg/dL 33*   CREATININE mg/dL 0 88   ANION GAP mmol/L 8   CALCIUM mg/dL 9 7   ALBUMIN g/dL 2 9*   TOTAL BILIRUBIN mg/dL 0 55   ALK PHOS U/L 102   ALT U/L 160*   AST U/L 59*   GLUCOSE RANDOM mg/dL 108                 Results from last 7 days   Lab Units 12/12/20  0652 12/11/20  0545   PROCALCITONIN ng/ml <0 05 <0 05           * I Have Reviewed All Lab Data Listed Above  * Additional Pertinent Lab Tests Reviewed:  All Labs Within Last 24 Hours Reviewed    Imaging:    Imaging Reports Reviewed Today Include: none  Imaging Personally Reviewed by Myself Includes:  none    Recent Cultures (last 7 days):           Last 24 Hours Medication List:   Current Facility-Administered Medications   Medication Dose Route Frequency Provider Last Rate    acetaminophen  650 mg Oral Q6H PRN Kai Calderon MD      albuterol  2 puff Inhalation Q4H PRN Kai Calderon MD      albuterol  2 puff Inhalation TID Veto Rai MD      aluminum-magnesium hydroxide-simethicone  30 mL Oral Q4H PRN Kai Calderon MD      atorvastatin  40 mg Oral HS Kai Calderon MD      bisacodyl  10 mg Rectal Daily PRN Kai Calderon MD      cholecalciferol  2,000 Units Oral Daily Siomara Blair, MD      enoxaparin  30 mg Subcutaneous Q12H [address] Siomara Blair, MD      famotidine  20 mg Oral Q12H Siomara Blair, MD      fluticasone  2 spray Each Nare Daily Siomara Blair, MD      gabapentin  300 mg Oral Daily Siomara Blair, MD      lidocaine  1 patch Topical Daily Siomara Blair, MD      lithium carbonate  300 mg Oral HS Siomara Blair, MD      LORazepam  1 mg Oral BID PRN Siomara Blair, MD      LORazepam  1 5 mg Oral HS Siomara Foil, MD      melatonin  6 mg Oral HS Siomara Blair, MD      menthol-methyl salicylate   Apply externally 4x Daily PRN Janice Roy PA-C      multivitamin with iron-minerals  15 mL Per NG Tube Daily Siomara Blair, MD      ondansetron  4 mg Intravenous Q6H PRN Siomara Blair, MD      polyethylene glycol  17 g Oral Daily Siomara Blair, MD      senna-docusate sodium  1 tablet Oral BID Siomara Blair, MD      sertraline  100 mg Oral HS Siomara Blair, MD      sodium chloride  1 spray Each Nare Q2H PRN Siomara Blair, MD      traZODone  100 mg Oral HS Siomara Blair, MD          Today, Patient Was Seen By: Devin Nolan PA-C    ** Please Note: Dictation voice to text software may have been used in the creation of this document   **

## 2020-12-14 NOTE — ASSESSMENT & PLAN NOTE
· Acute hypoxic respiratory failure likely due to COVID-19 infection  · Patient is hypoxic, improving, weaned from 15 L midflow to 6-8 L   · Given 40 mg Lasix x1 12/10-- which seemed to help  · Continue supplemental oxygen maintain O2 sats more than 92-94%  · Encourage proning- patient needs continuous encouragement and reminder to prone  · Pulmonary on board, now signing off   · See below for details of COVID 19 management   · Will need ambulatory pulse ox prior to discharge  · P r n   Lasix

## 2020-12-14 NOTE — PLAN OF CARE
Problem: Potential for Falls  Goal: Patient will remain free of falls  Description: INTERVENTIONS:  - Assess patient frequently for physical needs  -  Identify cognitive and physical deficits and behaviors that affect risk of falls    -  [place] fall precautions as indicated by assessment   - Educate patient/family on patient safety including physical limitations  - Instruct patient to call for assistance with activity based on assessment  - Modify environment to reduce risk of injury  - Consider OT/PT consult to assist with strengthening/mobility  Outcome: Progressing     Problem: RESPIRATORY - ADULT  Goal: Achieves optimal ventilation and oxygenation  Description: INTERVENTIONS:  - Assess for changes in respiratory status  - Assess for changes in mentation and behavior  - Position to facilitate oxygenation and minimize respiratory effort  - Oxygen administered by appropriate delivery if ordered  - Initiate smoking cessation education as indicated  - Encourage broncho-pulmonary hygiene including cough, deep breathe, Incentive Spirometry  - Assess the need for suctioning and aspirate as needed  - Assess and instruct to report SOB or any respiratory difficulty  - Respiratory Therapy support as indicated  Outcome: Progressing     Problem: METABOLIC, FLUID AND ELECTROLYTES - ADULT  Goal: Electrolytes maintained within normal limits  Description: INTERVENTIONS:  - Monitor labs and assess patient for signs and symptoms of electrolyte imbalances  - Administer electrolyte replacement as ordered  - Monitor response to electrolyte replacements, including repeat lab results as appropriate  - Instruct patient on fluid and nutrition as appropriate  Outcome: Progressing  Goal: Fluid balance maintained  Description: INTERVENTIONS:  - Monitor labs   - Monitor I/O and WT  - Instruct patient on fluid and nutrition as appropriate  - Assess for signs & symptoms of volume excess or deficit  Outcome: Progressing     Problem: HEMATOLOGIC - ADULT  Goal: Maintains hematologic stability  Description: INTERVENTIONS  - Assess for signs and symptoms of bleeding or hemorrhage  - Monitor labs  - Administer supportive blood products/factors as ordered and appropriate  Outcome: Progressing     Problem: INFECTION - ADULT  Goal: Absence or prevention of progression during hospitalization  Description: INTERVENTIONS:  - Assess and monitor for signs and symptoms of infection  - Monitor lab/diagnostic results  - Monitor all insertion sites, i e  indwelling lines, tubes, and drains  - Monitor endotracheal if appropriate and nasal secretions for changes in amount and color  - [place] appropriate cooling/warming therapies per order  - Administer medications as ordered  - Instruct and encourage patient and family to use good hand hygiene technique  - Identify and instruct in appropriate isolation precautions for identified infection/condition  Outcome: Progressing  Goal: Absence of fever/infection during neutropenic period  Description: INTERVENTIONS:  - Monitor WBC    Outcome: Progressing     Problem: SAFETY ADULT  Goal: Patient will remain free of falls  Description: INTERVENTIONS:  - Assess patient frequently for physical needs  -  Identify cognitive and physical deficits and behaviors that affect risk of falls    -  [place] fall precautions as indicated by assessment   - Educate patient/family on patient safety including physical limitations  - Instruct patient to call for assistance with activity based on assessment  - Modify environment to reduce risk of injury  - Consider OT/PT consult to assist with strengthening/mobility  Outcome: Progressing  Goal: Maintain or return to baseline ADL function  Description: INTERVENTIONS:  -  Assess patient's ability to carry out ADLs; assess patient's baseline for ADL function and identify physical deficits which impact ability to perform ADLs (bathing, care of mouth/teeth, toileting, grooming, dressing, etc )  - Assess/evaluate cause of self-care deficits   - Assess range of motion  - Assess patient's mobility; develop plan if impaired  - Assess patient's need for assistive devices and provide as appropriate  - Encourage maximum independence but intervene and supervise when necessary  - Involve family in performance of ADLs  - Assess for home care needs following discharge   - Consider OT consult to assist with ADL evaluation and planning for discharge  - Provide patient education as appropriate  Outcome: Progressing  Goal: Maintain or return mobility status to optimal level  Description: INTERVENTIONS:  - Assess patient's baseline mobility status (ambulation, transfers, stairs, etc )    - Identify cognitive and physical deficits and behaviors that affect mobility  - Identify mobility aids required to assist with transfers and/or ambulation (gait belt, sit-to-stand, lift, walker, cane, etc )  - [place] fall precautions as indicated by assessment  - Record patient progress and toleration of activity level on Mobility SBAR; progress patient to next Phase/Stage  - Instruct patient to call for assistance with activity based on assessment  - Consider rehabilitation consult to assist with strengthening/weightbearing, etc   Outcome: Progressing     Problem: DISCHARGE PLANNING  Goal: Discharge to home or other facility with appropriate resources  Description: INTERVENTIONS:  - Identify barriers to discharge w/patient and caregiver  - Arrange for needed discharge resources and transportation as appropriate  - Identify discharge learning needs (meds, wound care, etc )  - Arrange for interpretive services to assist at discharge as needed  - Refer to Case Management Department for coordinating discharge planning if the patient needs post-hospital services based on physician/advanced practitioner order or complex needs related to functional status, cognitive ability, or social support system  Outcome: Progressing     Problem: Knowledge Deficit  Goal: Patient/family/caregiver demonstrates understanding of disease process, treatment plan, medications, and discharge instructions  Description: Complete learning assessment and assess knowledge base    Interventions:  - Provide teaching at level of understanding  - Provide teaching via preferred learning methods  Outcome: Progressing     Problem: Prexisting or High Potential for Compromised Skin Integrity  Goal: Skin integrity is maintained or improved  Description: INTERVENTIONS:  - Identify patients at risk for skin breakdown  - Assess and monitor skin integrity  - Assess and monitor nutrition and hydration status  - Monitor labs   - Assess for incontinence   - Turn and reposition patient  - Assist with mobility/ambulation  - Relieve pressure over bony prominences  - Avoid friction and shearing  - Provide appropriate hygiene as needed including keeping skin clean and dry  - Evaluate need for skin moisturizer/barrier cream  - Collaborate with interdisciplinary team   - Patient/family teaching  - Consider wound care consult   Outcome: Progressing

## 2020-12-15 LAB — IL6 SERPL-MCNC: 9.1 PG/ML (ref 0–13)

## 2020-12-15 PROCEDURE — 99233 SBSQ HOSP IP/OBS HIGH 50: CPT | Performed by: INTERNAL MEDICINE

## 2020-12-15 PROCEDURE — 97163 PT EVAL HIGH COMPLEX 45 MIN: CPT

## 2020-12-15 PROCEDURE — 97167 OT EVAL HIGH COMPLEX 60 MIN: CPT

## 2020-12-15 PROCEDURE — 94760 N-INVAS EAR/PLS OXIMETRY 1: CPT

## 2020-12-15 RX ORDER — LACTULOSE 20 G/30ML
20 SOLUTION ORAL ONCE
Status: COMPLETED | OUTPATIENT
Start: 2020-12-15 | End: 2020-12-15

## 2020-12-15 RX ADMIN — ALBUTEROL SULFATE 2 PUFF: 90 AEROSOL, METERED RESPIRATORY (INHALATION) at 16:42

## 2020-12-15 RX ADMIN — ATORVASTATIN CALCIUM 40 MG: 40 TABLET, FILM COATED ORAL at 21:52

## 2020-12-15 RX ADMIN — FAMOTIDINE 20 MG: 20 TABLET ORAL at 21:52

## 2020-12-15 RX ADMIN — ALBUTEROL SULFATE 2 PUFF: 90 AEROSOL, METERED RESPIRATORY (INHALATION) at 21:54

## 2020-12-15 RX ADMIN — LIDOCAINE 5% 1 PATCH: 700 PATCH TOPICAL at 09:28

## 2020-12-15 RX ADMIN — LITHIUM CARBONATE 300 MG: 300 CAPSULE, GELATIN COATED ORAL at 21:54

## 2020-12-15 RX ADMIN — LACTULOSE 20 G: 10 SOLUTION ORAL at 09:27

## 2020-12-15 RX ADMIN — SERTRALINE HYDROCHLORIDE 100 MG: 100 TABLET ORAL at 21:52

## 2020-12-15 RX ADMIN — MULTIVITAMIN 15 ML: LIQUID ORAL at 09:28

## 2020-12-15 RX ADMIN — ALBUTEROL SULFATE 2 PUFF: 90 AEROSOL, METERED RESPIRATORY (INHALATION) at 09:28

## 2020-12-15 RX ADMIN — SENNOSIDES AND DOCUSATE SODIUM 1 TABLET: 8.6; 5 TABLET ORAL at 09:27

## 2020-12-15 RX ADMIN — POLYETHYLENE GLYCOL 3350 17 G: 17 POWDER, FOR SOLUTION ORAL at 09:26

## 2020-12-15 RX ADMIN — SENNOSIDES AND DOCUSATE SODIUM 1 TABLET: 8.6; 5 TABLET ORAL at 16:40

## 2020-12-15 RX ADMIN — FLUTICASONE PROPIONATE 2 SPRAY: 50 SPRAY, METERED NASAL at 09:29

## 2020-12-15 RX ADMIN — FAMOTIDINE 20 MG: 20 TABLET ORAL at 09:27

## 2020-12-15 RX ADMIN — Medication 2000 UNITS: at 09:27

## 2020-12-15 RX ADMIN — GABAPENTIN 300 MG: 300 CAPSULE ORAL at 09:27

## 2020-12-15 RX ADMIN — TRAZODONE HYDROCHLORIDE 100 MG: 100 TABLET ORAL at 21:52

## 2020-12-15 RX ADMIN — LORAZEPAM 1.5 MG: 1 TABLET ORAL at 21:52

## 2020-12-15 RX ADMIN — MELATONIN TAB 3 MG 6 MG: 3 TAB at 21:52

## 2020-12-15 RX ADMIN — ALUMINUM HYDROXIDE, MAGNESIUM HYDROXIDE, AND SIMETHICONE 30 ML: 200; 200; 20 SUSPENSION ORAL at 21:51

## 2020-12-15 RX ADMIN — ENOXAPARIN SODIUM 30 MG: 30 INJECTION SUBCUTANEOUS at 21:53

## 2020-12-15 RX ADMIN — ENOXAPARIN SODIUM 30 MG: 30 INJECTION SUBCUTANEOUS at 09:27

## 2020-12-15 NOTE — OCCUPATIONAL THERAPY NOTE
Occupational Therapy Evaluation     Patient Name: Delfina SANTIAGO'S Date: 12/15/2020  Problem List  Principal Problem:    Acute respiratory failure with hypoxia [facility])  Active Problems:    Bipolar disorder (HCC)    GERD (gastroesophageal reflux disease)    COVID-19 virus infection    Elevated troponin    Transaminitis    Past Medical History  Past Medical History:   Diagnosis Date    Asthma     Bipolar disorder ([address] )     GERD (gastroesophageal reflux disease)      Past Surgical History  Past Surgical History:   Procedure Laterality Date     SECTION      x3    CHOLECYSTECTOMY      HYSTERECTOMY        12/15/20 1035   OT Last Visit   OT Visit Date 12/15/20   Note Type   Note type Evaluation   Restrictions/Precautions   Other Precautions Contact/isolation; Airborne/isolation;O2  (8L NC)   Pain Assessment   Pain Assessment Tool Pain Assessment not indicated - pt denies pain   Pain Score No Pain   Home Living   Type of Home Apartment   Home Layout One level;Stairs to enter with rails  (2 YESSENIA)   Bathroom Shower/Tub Tub/shower unit   Bathroom Toilet Standard   Prior Function   Level of [place] Independent with ADLs and functional mobility   Lives With Alone   Receives Help From Family   ADL Assistance Independent   IADLs Independent   Falls in the last 6 months 0   Vocational Full time employment   Lifestyle   Autonomy pta pt peports I in ADLS/IADLs/funcitonal mobility   Reciprocal Relationships supportive boyfriend LOS   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   [address] enjoys watching tv and spending time w/ her boyfriend   Psychosocial   Psychosocial (WDL) WDL   Subjective   Subjective "I would rather hold your hand than use the walker, i don't trust that thing"   ADL   Where Assessed Chair   Eating Assistance 5  Supervision/Setup   Grooming Assistance 5  Supervision/Setup   UB Bathing Assistance 5  Supervision/Setup   LB Bathing Assistance 4  Minimal Assistance   UB Dressing Assistance 5 Supervision/Setup   LB Dressing Assistance 4  Minimal Assistance   Toileting Assistance  4  Minimal Assistance   Transfers   Sit to Stand 4  Minimal assistance   Additional items Assist x 1   Stand to Sit 4  Minimal assistance   Additional items Assist x 1   Functional Mobility   Functional Mobility 4  Minimal assistance   Additional Comments a x 2   Additional items Hand hold assistance   Balance   Static Sitting Fair   Dynamic Sitting Fair -   Static Standing Poor +   Dynamic Standing Poor +   Ambulatory Poor +   Activity Tolerance   Activity Tolerance Treatment limited secondary to medical complications (Comment)  (some SOB, able to recover w/ deep breathing)   Medical Staff Made Aware PT Yoly   Nurse Made Aware okay to see per RN, RN updated post session   RUE Assessment   RUE Assessment WFL   LUE Assessment   LUE Assessment WFL   Hand Function   Gross Motor Coordination Functional   Fine Motor Coordination Functional   Cognition   Overall Cognitive Status WFL   Arousal/Participation Cooperative   Attention Within functional limits   Orientation Level Oriented X4   Memory Within functional limits   Following Commands Follows all commands and directions without difficulty   Comments pt pleasant and cooperative   Assessment   Limitation Decreased ADL status; Decreased endurance;Decreased self-care trans;Decreased high-level ADLs   Prognosis Good   Assessment Pt is a 76 y o  YO  female admitted to [facility] on 12/4/2020 w/ ACUTE RESPIRATORY FAILURE W/ HYPOXIA LIKELY DUE TO COVID INFECTION  Pt  has a past medical history of Asthma, Bipolar disorder ([facility] [address] ), and GERD (gastroesophageal reflux disease)  Pt with active OT orders and ambulate  orders  Pt resides in a FIRST FLOOR APT ALONE with LOCAL BOYFRIEND  Pt was I w/  ADLS and IADLS, (+) drove, & required no use of DME PTA  Currently pt is SUPERVISION FOR UB ADLS, MIN A FOR LB ADLS AND FUNCTIONAL MOBILITY   Pt is limited at this time 2*: endurance, activity tolerance, functional mobility, decreased I w/ ADLS/IADLS, decreased safety awareness and decreased insight into deficits  The following Occupational Performance Areas to address include: toilet hygiene, dressing, functional mobility, community mobility, household maintenance and job performance/volunteering  Based on the aforementioned OT evaluation, functional performance deficits, and assessments, pt has been identified as a high complexity evaluation  From OT standpoint, anticipate d/c home with family support  Pt to continue to benefit from acute immediate OT services to address the following goals 2-3x/week to  w/in 10-14 days: [place] Goals   Patient Goals go home   LTG Time Frame 10-   Plan   Treatment Interventions ADL retraining;Functional transfer training; Endurance training;Energy conservation;Continued evaluation;Patient/family training   Goal Expiration Date 20   OT Frequency 2-3x/wk   Recommendation   OT Discharge Recommendation Return to previous environment with social support   Equipment Recommended Other (comment)  (TUB TRANSFER BENCH)   Modified [place] Scale   Modified [place] Scale 4     GOALS    1) Pt will increase activity tolerance to G for 30 min txment sessions    2) Pt will complete UB/LB dressing/self care w/ mod I using adaptive device and DME as needed    3) Pt will complete bathing w/ Mod I w/ use of AE and DME as needed    4) Pt will complete toileting w/ mod I w/ G hygiene/thoroughness using DME as needed    5) Pt will improve functional transfers to Mod I on/off all surfaces using DME as needed w/ G balance/safety     6) Pt will improve functional mobility during ADL/IADL/leisure tasks to Mod I using DME as needed w/ G balance/safety     7) Pt will participate in simulated IADL management task to increase independence to  w/ G safety and endurance    8) Pt will demonstrate G carryover of pt/caregiver education and training as appropriate      9) Pt will demonstrate 100% carryover of energy conservation techniques t/o functional I/ADL/leisure tasks w/o cues s/p skilled education    10) Pt will independently identify and utilize 2-3 coping strategies to increase positive affect and promote overall well-being      Fco Yepez MS, OTR/L

## 2020-12-15 NOTE — PLAN OF CARE
Problem: OCCUPATIONAL THERAPY ADULT  Goal: Performs self-care activities at highest level of function for planned discharge setting  See evaluation for individualized goals  Description: Treatment Interventions: ADL retraining, Functional transfer training, Endurance training, Energy conservation, Continued evaluation, Patient/family training  Equipment Recommended: (S) Other (comment)(TUB TRANSFER BENCH)       See flowsheet documentation for full assessment, interventions and recommendations  Note: Limitation: Decreased ADL status, Decreased endurance, Decreased self-care trans, Decreased high-level ADLs  Prognosis: Good  Assessment: Pt is a 76 y o  YO  female admitted to [facility] on 2020 w/ ACUTE RESPIRATORY FAILURE W/ HYPOXIA LIKELY DUE TO COVID INFECTION  Pt  has a past medical history of Asthma, Bipolar disorder ([facility] [address] ), and GERD (gastroesophageal reflux disease)  Pt with active OT orders and ambulate  orders  Pt resides in a FIRST FLOOR APT ALONE with LOCAL BOYFRIEND  Pt was I w/  ADLS and IADLS, (+) drove, & required no use of DME PTA  Currently pt is SUPERVISION FOR UB ADLS, MIN A FOR LB ADLS AND FUNCTIONAL MOBILITY  Pt is limited at this time 2*: endurance, activity tolerance, functional mobility, decreased I w/ ADLS/IADLS, decreased safety awareness and decreased insight into deficits  The following Occupational Performance Areas to address include: toilet hygiene, dressing, functional mobility, community mobility, household maintenance and job performance/volunteering  Based on the aforementioned OT evaluation, functional performance deficits, and assessments, pt has been identified as a high complexity evaluation  From OT standpoint, anticipate d/c home with family support  Pt to continue to benefit from acute immediate OT services to address the following goals 2-3x/week to  w/in 10-14 days:       OT Discharge Recommendation: Return to previous environment with social support

## 2020-12-15 NOTE — PLAN OF CARE
Problem: Potential for Falls  Goal: Patient will remain free of falls  Description: INTERVENTIONS:  - Assess patient frequently for physical needs  -  Identify cognitive and physical deficits and behaviors that affect risk of falls    -  [place] fall precautions as indicated by assessment   - Educate patient/family on patient safety including physical limitations  - Instruct patient to call for assistance with activity based on assessment  - Modify environment to reduce risk of injury  - Consider OT/PT consult to assist with strengthening/mobility  Outcome: Progressing     Problem: RESPIRATORY - ADULT  Goal: Achieves optimal ventilation and oxygenation  Description: INTERVENTIONS:  - Assess for changes in respiratory status  - Assess for changes in mentation and behavior  - Position to facilitate oxygenation and minimize respiratory effort  - Oxygen administered by appropriate delivery if ordered  - Initiate smoking cessation education as indicated  - Encourage broncho-pulmonary hygiene including cough, deep breathe, Incentive Spirometry  - Assess the need for suctioning and aspirate as needed  - Assess and instruct to report SOB or any respiratory difficulty  - Respiratory Therapy support as indicated  Outcome: Progressing     Problem: METABOLIC, FLUID AND ELECTROLYTES - ADULT  Goal: Electrolytes maintained within normal limits  Description: INTERVENTIONS:  - Monitor labs and assess patient for signs and symptoms of electrolyte imbalances  - Administer electrolyte replacement as ordered  - Monitor response to electrolyte replacements, including repeat lab results as appropriate  - Instruct patient on fluid and nutrition as appropriate  Outcome: Progressing  Goal: Fluid balance maintained  Description: INTERVENTIONS:  - Monitor labs   - Monitor I/O and WT  - Instruct patient on fluid and nutrition as appropriate  - Assess for signs & symptoms of volume excess or deficit  Outcome: Progressing     Problem: HEMATOLOGIC - ADULT  Goal: Maintains hematologic stability  Description: INTERVENTIONS  - Assess for signs and symptoms of bleeding or hemorrhage  - Monitor labs  - Administer supportive blood products/factors as ordered and appropriate  Outcome: Progressing     Problem: INFECTION - ADULT  Goal: Absence or prevention of progression during hospitalization  Description: INTERVENTIONS:  - Assess and monitor for signs and symptoms of infection  - Monitor lab/diagnostic results  - Monitor all insertion sites, i e  indwelling lines, tubes, and drains  - Monitor endotracheal if appropriate and nasal secretions for changes in amount and color  - [place] appropriate cooling/warming therapies per order  - Administer medications as ordered  - Instruct and encourage patient and family to use good hand hygiene technique  - Identify and instruct in appropriate isolation precautions for identified infection/condition  Outcome: Progressing  Goal: Absence of fever/infection during neutropenic period  Description: INTERVENTIONS:  - Monitor WBC    Outcome: Progressing     Problem: SAFETY ADULT  Goal: Patient will remain free of falls  Description: INTERVENTIONS:  - Assess patient frequently for physical needs  -  Identify cognitive and physical deficits and behaviors that affect risk of falls    -  [place] fall precautions as indicated by assessment   - Educate patient/family on patient safety including physical limitations  - Instruct patient to call for assistance with activity based on assessment  - Modify environment to reduce risk of injury  - Consider OT/PT consult to assist with strengthening/mobility  Outcome: Progressing  Goal: Maintain or return to baseline ADL function  Description: INTERVENTIONS:  -  Assess patient's ability to carry out ADLs; assess patient's baseline for ADL function and identify physical deficits which impact ability to perform ADLs (bathing, care of mouth/teeth, toileting, grooming, dressing, etc )  - Assess/evaluate cause of self-care deficits   - Assess range of motion  - Assess patient's mobility; develop plan if impaired  - Assess patient's need for assistive devices and provide as appropriate  - Encourage maximum independence but intervene and supervise when necessary  - Involve family in performance of ADLs  - Assess for home care needs following discharge   - Consider OT consult to assist with ADL evaluation and planning for discharge  - Provide patient education as appropriate  Outcome: Progressing  Goal: Maintain or return mobility status to optimal level  Description: INTERVENTIONS:  - Assess patient's baseline mobility status (ambulation, transfers, stairs, etc )    - Identify cognitive and physical deficits and behaviors that affect mobility  - Identify mobility aids required to assist with transfers and/or ambulation (gait belt, sit-to-stand, lift, walker, cane, etc )  - [place] fall precautions as indicated by assessment  - Record patient progress and toleration of activity level on Mobility SBAR; progress patient to next Phase/Stage  - Instruct patient to call for assistance with activity based on assessment  - Consider rehabilitation consult to assist with strengthening/weightbearing, etc   Outcome: Progressing     Problem: DISCHARGE PLANNING  Goal: Discharge to home or other facility with appropriate resources  Description: INTERVENTIONS:  - Identify barriers to discharge w/patient and caregiver  - Arrange for needed discharge resources and transportation as appropriate  - Identify discharge learning needs (meds, wound care, etc )  - Arrange for interpretive services to assist at discharge as needed  - Refer to Case Management Department for coordinating discharge planning if the patient needs post-hospital services based on physician/advanced practitioner order or complex needs related to functional status, cognitive ability, or social support system  Outcome: Progressing     Problem: Knowledge Deficit  Goal: Patient/family/caregiver demonstrates understanding of disease process, treatment plan, medications, and discharge instructions  Description: Complete learning assessment and assess knowledge base  Interventions:  - Provide teaching at level of understanding  - Provide teaching via preferred learning methods  Outcome: Progressing     Problem: Prexisting or High Potential for Compromised Skin Integrity  Goal: Skin integrity is maintained or improved  Description: INTERVENTIONS:  - Identify patients at risk for skin breakdown  - Assess and monitor skin integrity  - Assess and monitor nutrition and hydration status  - Monitor labs   - Assess for incontinence   - Turn and reposition patient  - Assist with mobility/ambulation  - Relieve pressure over bony prominences  - Avoid friction and shearing  - Provide appropriate hygiene as needed including keeping skin clean and dry  - Evaluate need for skin moisturizer/barrier cream  - Collaborate with interdisciplinary team   - Patient/family teaching  - Consider wound care consult   Outcome: Progressing     Problem: Nutrition/Hydration-ADULT  Goal: Nutrient/Hydration intake appropriate for improving, restoring or maintaining nutritional needs  Description: Monitor and assess patient's nutrition/hydration status for malnutrition  Collaborate with interdisciplinary team and initiate plan and interventions as ordered  Monitor patient's weight and dietary intake as ordered or per policy  Utilize nutrition screening tool and intervene as necessary  Determine patient's food preferences and provide high-protein, high-caloric foods as appropriate       INTERVENTIONS:  - Monitor oral intake, urinary output, labs, and treatment plans  - Assess nutrition and hydration status and recommend course of action  - Evaluate amount of meals eaten  - Assist patient with eating if necessary   - Allow adequate time for meals  - Recommend/ encourage appropriate diets, oral nutritional supplements, and vitamin/mineral supplements  - Order, calculate, and assess calorie counts as needed  - Recommend, monitor, and adjust tube feedings and TPN/PPN based on assessed needs  - Assess need for intravenous fluids  - Provide specific nutrition/hydration education as appropriate  - Include patient/family/caregiver in decisions related to nutrition  Outcome: Progressing

## 2020-12-15 NOTE — PLAN OF CARE
Problem: PHYSICAL THERAPY ADULT  Goal: Performs mobility at highest level of function for planned discharge setting  See evaluation for individualized goals  Description: Treatment/Interventions: Functional transfer training, LE strengthening/ROM, Elevations, Therapeutic exercise, Endurance training, Patient/family training, Equipment eval/education, Bed mobility, Gait training, Spoke to nursing, OT  Equipment Recommended: Other (Comment)(tbd, likely RW pending progress)       See flowsheet documentation for full assessment, interventions and recommendations  Note: Prognosis: Good  Problem List: Decreased strength, Decreased endurance, Impaired balance, Decreased mobility  Assessment: Pt is a 76 y o  female seen for PT evaluation s/p admit to [facility] on 12/4/2020  Pt was admitted with a primary dx of: acute respiratory failure with hypoxia and COVID  PT now consulted for assessment of mobility and d/c needs  Pt with Activity as tolerated, Up with assistance, Ambulate orders  Pts current comorbidities effecting treatment include: asthma, bipolar disorder, GERD  Pts current clinical presentation is Unstable/ Unpredictable (high complexity) due to Ongoing medical management for primary dx, Increased reliance on more restrictive AD compared to baseline, Decreased activity tolerance compared to baseline, Fall risk, Increased assistance needed from caregiver at current time, Increased O2 via NC from pts baseline  Prior to admission, pt was independent with ADLs and ambulating w/o an AD  Pt lives alone in a 1st floor apt with 2 YESSENIA, but reports that she has local friends/family that could provide assistance if needed  Upon evaluation, pt currently is requiring supervision for bed mobility; Kodi for transfers and minAx2 for ambulation 10 ft w/ HHA   Pt presents at PT eval functioning below baseline and currently w/ overall mobility deficits 2* to: BLE weakness, impaired balance, decreased endurance, gait deviations, decreased activity tolerance compared to baseline, decreased functional mobility tolerance compared to baseline, fall risk, SOB upon exertion  Pt currently at a fall risk 2* to impairments listed above  Pt will continue to benefit from skilled acute PT interventions to address stated impairments; to maximize functional mobility; for ongoing pt/ family training; and DME needs  At conclusion of PT session pt returned back in chair and RN notified of session findings/recommendations with phone and call bell within reach  Pt denies any further questions at this time  Recommend home with HHPT upon hospital D/C  PT Discharge Recommendation: Home with skilled therapy          See flowsheet documentation for full assessment

## 2020-12-15 NOTE — PROGRESS NOTES
Progress Note - Vinny Malone 1955, 76 y o  female MRN: [id number]    Unit/Bed#: -01 Encounter: [id number]    Primary Care Provider: LUIS CARLOS Ann   Date and time admitted to hospital: 12/4/2020  9:15 AM        COVID-19 virus infection  Assessment & Plan  COVID-19 infection, tested positive on 11/29/2020  Patient be placed on moderate treatment protocol   Noted to have worsening of oxygenation requiring 15 L mid flow, with saturations in low 90s  · Completed dexamethasone 10/10  · Completed remdesivir 12/8  · Continue vitamin-D, zinc, vitamin-C  · Antibiotic discontinued  · s/p convalescent plasma 12/7/2020  · Continue self proning   · Monitor CRP, ferritin, D-dimer  · Pulm following, now signing off as respiratory status improving, down from 15 L to 8 L  · Will need ambulatory pulse ox prior to discharge    * Acute respiratory failure with hypoxia ([facility] [address] )  Assessment & Plan  · Acute hypoxic respiratory failure likely due to COVID-19 infection  · Patient is hypoxic, improving, weaned from 15 L midflow to 6-8 L   · Given 40 mg Lasix x1 12/10-- which seemed to help  · Continue supplemental oxygen maintain O2 sats more than 92-94%  · Encourage proning- patient needs continuous encouragement and reminder to prone  · Pulmonary on board, now signing off   · See below for details of COVID 19 management   · Will need ambulatory pulse ox prior to discharge  · P r n  Lasix    Transaminitis  Assessment & Plan  Monitor    GERD (gastroesophageal reflux disease)  Assessment & Plan  Continue Famotidine and Mylanta     Bipolar disorder (HCC)  Assessment & Plan  Continue lithium sertraline, trazodone  Patient looks less anxious  Continue bedtime Ativan         VTE Pharmacologic Prophylaxis:   Pharmacologic: Enoxaparin (Lovenox)  Mechanical VTE Prophylaxis in Place: Yes    Patient Centered Rounds: I have performed bedside rounds with nursing staff today      Discussions with Specialists or Other Care Team Provider: Education and Discussions with Family / Patient:  Patient    Time Spent for Care: 45 minutes  More than 50% of total time spent on counseling and coordination of care as described above  Current Length of Stay: 11 day(s)    Current Patient Status: Inpatient   Certification Statement: The patient will continue to require additional inpatient hospital stay due to Awaiting oxygenation improvement    Discharge Plan / Estimated Discharge Date:  Likely home 48 hours    Code Status: Level 1 - Full Code      Subjective:   Patient seen examined  Comfortable in bed  No chest pain or shortness of breath  Continue to require 6-8 L of oxygen  No nausea vomiting or diarrhea    Objective:     Vitals:   Temp (24hrs), Av °F (36 7 °C), Min:97 4 °F (36 3 °C), Max:98 5 °F (36 9 °C)    Temp:  [97 4 °F (36 3 °C)-98 5 °F (36 9 °C)] 97 4 °F (36 3 °C)  HR:  [73-93] 93  BP: (102-108)/(55-57) 102/55  SpO2:  [90 %-92 %] 92 %  Body mass index is 35 07 kg/m²  Input and Output Summary (last 24 hours):     No intake or output data in the 24 hours ending 12/15/20 1328    Physical Exam:     Physical Exam  Patient is awake alert oriented no acute distress  Lung clear to auscultation bilateral  Heart positive S1-S2 no murmur  Abdomen soft nontender  Lower extremities no edema    Additional Data:     Labs:    Results from last 7 days   Lab Units 20  0501   WBC Thousand/uL 7 23   HEMOGLOBIN g/dL 14 0   HEMATOCRIT % 43 4   PLATELETS Thousands/uL 323   NEUTROS PCT % 76*   LYMPHS PCT % 19   MONOS PCT % 4   EOS PCT % 1     Results from last 7 days   Lab Units 20  0501   POTASSIUM mmol/L 4 2   CHLORIDE mmol/L 105   CO2 mmol/L 27   BUN mg/dL 33*   CREATININE mg/dL 0 88   CALCIUM mg/dL 9 7   ALK PHOS U/L 102   ALT U/L 160*   AST U/L 59*           * I Have Reviewed All Lab Data Listed Above  * Additional Pertinent Lab Tests Reviewed:  [facility] Admission Reviewed    Imaging:    Imaging Reports Reviewed Today Include:   Imaging Personally Reviewed by Myself Includes:     Recent Cultures (last 7 days):           Last 24 Hours Medication List:   Current Facility-Administered Medications   Medication Dose Route Frequency Provider Last Rate    acetaminophen  650 mg Oral Q6H PRN Brandon Scott MD      albuterol  2 puff Inhalation Q4H PRN Brandon Scott MD      albuterol  2 puff Inhalation TID Berneice Pallas, MD      aluminum-magnesium hydroxide-simethicone  30 mL Oral Q4H PRN Brandon Scott MD      atorvastatin  40 mg Oral HS Brandon Scott MD      bisacodyl  10 mg Rectal Daily PRN Brandon Scott MD      cholecalciferol  2,000 Units Oral Daily Brandon Scott MD      enoxaparin  30 mg Subcutaneous Q12H [address] Brandon Scott MD      famotidine  20 mg Oral Q12H Brandon Scott MD      fluticasone  2 spray Each Nare Daily Brandon Scott MD      gabapentin  300 mg Oral Daily Brandon Scott MD      lidocaine  1 patch Topical Daily Brandon Scott MD      lithium carbonate  300 mg Oral HS Brandon Scott MD      LORazepam  1 mg Oral BID PRN Brandon Scott MD      LORazepam  1 5 mg Oral HS Brandon Scott MD      melatonin  6 mg Oral HS Brandon Scott MD      menthol-methyl salicylate   Apply externally 4x Daily PRN Marrianne Bernheim, PA-C      multivitamin with iron-minerals  15 mL Per NG Tube Daily Brandon Scott MD      ondansetron  4 mg Intravenous Q6H PRN Brandon Scott MD      polyethylene glycol  17 g Oral Daily Brandon Scott MD      senna-docusate sodium  1 tablet Oral BID Brandon Scott MD      sertraline  100 mg Oral HS Brandon Scott MD      sodium chloride  1 spray Each Nare Q2H PRN Brandon Scott MD      traZODone  100 mg Oral HS Brandon Scott MD          Today, Patient Was Seen By: Uday Tubbs DO    ** Please Note: This note has been constructed using a voice recognition system   **

## 2020-12-15 NOTE — PHYSICAL THERAPY NOTE
Physical Therapy Evaluation    Patient's Name: Marguerite Moore    Admitting Diagnosis  COPD (chronic obstructive pulmonary disease) ([place] [address] ) [J44 9]  Pneumonia [J18 9]  SOB (shortness of breath) [R06 02]  Hypoxia [R09 02]  Elevated troponin [R77 8]  COVID-19 [U07 1]    Problem List  Patient Active Problem List   Diagnosis    Chest pain    Bipolar disorder ([place] [address] )    GERD (gastroesophageal reflux disease)    Insomnia    Acute respiratory failure with hypoxia ([place] [address] )    COVID-19 virus infection    Elevated troponin    Transaminitis       Past Medical History  Past Medical History:   Diagnosis Date    Asthma     Bipolar disorder ([place] [address] )     GERD (gastroesophageal reflux disease)        Past Surgical History  Past Surgical History:   Procedure Laterality Date     SECTION      x3    CHOLECYSTECTOMY      HYSTERECTOMY          12/15/20 1036   PT Last Visit   PT Visit Date 12/15/20   Note Type   Note type Evaluation   Pain Assessment   Pain Assessment Tool Pain Assessment not indicated - pt denies pain   Home Living   Type of [address] One level;Stairs to enter with rails   Bathroom Shower/Tub Tub/shower unit   [employer] Standard   Additional Comments Pt lives in a 1st floor apt with 2 YESSENIA  Denies owning or using any DME PTA   Prior Function   Level of [place] Independent with ADLs and functional mobility   Lives With Alone   Receives Help From Family   ADL Assistance Independent   IADLs Independent   Falls in the last 6 months 1 to 4  (1)   Vocational Full time employment   Comments Pt lives alone, but has local friends and family that could assist prn   Restrictions/Precautions   Other Precautions Contact/isolation; Airborne/isolation;O2;Fall Risk  (COVID, 8L O2)   Cognition   Overall Cognitive Status WFL   Arousal/Participation Cooperative   Attention Within functional limits   Orientation Level Oriented X4   Memory Within functional limits   Following Commands Follows all commands and directions without difficulty   RLE Assessment   RLE Assessment WFL   LLE Assessment   LLE Assessment WFL   Transfers   Sit to Stand 4  Minimal assistance   Additional items Assist x 1; Increased time required   Stand to Sit 4  Minimal assistance   Additional items Assist x 1; Increased time required   Additional Comments HHAx1   Ambulation/Elevation   Gait pattern Short stride; Excessively slow;Narrow DONNY   Gait Assistance 4  Minimal assist   Additional items Assist x 2;Verbal cues   Assistive Device Other (Comment)  (HHAx2)   Distance 10ft with VCs for widening her base of support   Balance   Static Sitting Fair   Dynamic Sitting Fair -   Static Standing Poor +   Dynamic Standing Poor +   Ambulatory Poor +   Endurance Deficit   Endurance Deficit Yes   Endurance Deficit Description Pt SpO2 ranged from 88-90% throughout functional mobility while on 8LO2  Required frequent rest breaks   Activity Tolerance   Activity Tolerance Patient limited by fatigue   Medical Staff Made Aware [facility]   Nurse Made Aware okay to see per RN   Assessment   Prognosis Good   Problem List Decreased strength;Decreased endurance; Impaired balance;Decreased mobility   Assessment Pt is a 76 y o  female seen for PT evaluation s/p admit to [facility] on 12/4/2020  Pt was admitted with a primary dx of: acute respiratory failure with hypoxia and COVID  PT now consulted for assessment of mobility and d/c needs  Pt with Activity as tolerated, Up with assistance, Ambulate orders  Pts current comorbidities effecting treatment include: asthma, bipolar disorder, GERD  Pts current clinical presentation is Unstable/ Unpredictable (high complexity) due to Ongoing medical management for primary dx, Increased reliance on more restrictive AD compared to baseline, Decreased activity tolerance compared to baseline, Fall risk, Increased assistance needed from caregiver at current time, Increased O2 via NC from pts baseline   Prior to admission, pt was independent with ADLs and ambulating w/o an AD  Pt lives alone in a 1st floor apt with 2 YESSENIA, but reports that she has local friends/family that could provide assistance if needed  Upon evaluation, pt currently is requiring supervision for bed mobility; Kodi for transfers and minAx2 for ambulation 10 ft w/ HHA  Pt presents at PT eval functioning below baseline and currently w/ overall mobility deficits 2* to: BLE weakness, impaired balance, decreased endurance, gait deviations, decreased activity tolerance compared to baseline, decreased functional mobility tolerance compared to baseline, fall risk, SOB upon exertion  Pt currently at a fall risk 2* to impairments listed above  Pt will continue to benefit from skilled acute PT interventions to address stated impairments; to maximize functional mobility; for ongoing pt/ family training; and DME needs  At conclusion of PT session pt returned back in chair and RN notified of session findings/recommendations with phone and call bell within reach  Pt denies any further questions at this time  Recommend home with HHPT upon hospital D/C  Goals   Patient Goals to go home   STG Expiration Date 12/29/20   Short Term Goal #1 In 10-14 days pt will be able to: 1  Demonstrate ability to perform all aspects of bed mobility independently to increase functional independence  2  Perform functional transfers independently to facilitate safe return to previous living environment  3   Ambulate 250 ft with LRAD vs  No AD independently with stable vitals to improve safety with household distances and reduce fall risk  4  Climb 2 steps independently to simulate entrance to home  5  Improve LE strength grades by 1 to increase ease of functional mobility with transfers and gait  6  Pt will demonstrate improved balance by one grade order to decrease risk of falls      PT Treatment Day 0   Plan   Treatment/Interventions Functional transfer training;LE strengthening/ROM; Elevations; Therapeutic exercise; Endurance training;Patient/family training;Equipment eval/education; Bed mobility;Gait training;Spoke to nursing;OT   PT Frequency Other (Comment)  (3-5x/wk)   Recommendation   PT Discharge Recommendation Home with skilled therapy   Equipment Recommended Other (Comment)  (tbd, likely RW pending progress)   Modified Allen Scale   Modified Esmeralda Scale 4       Reagan Kurtz, PT, DPT

## 2020-12-16 LAB
ABO GROUP BLD: NORMAL
ALBUMIN SERPL BCP-MCNC: 2.9 G/DL (ref 3.5–5)
ALP SERPL-CCNC: 95 U/L (ref 46–116)
ALT SERPL W P-5'-P-CCNC: 83 U/L (ref 12–78)
ANION GAP SERPL CALCULATED.3IONS-SCNC: 4 MMOL/L (ref 4–13)
AST SERPL W P-5'-P-CCNC: 41 U/L (ref 5–45)
BASOPHILS # BLD AUTO: 0.02 THOUSANDS/ΜL (ref 0–0.1)
BASOPHILS NFR BLD AUTO: 0 % (ref 0–1)
BILIRUB SERPL-MCNC: 0.47 MG/DL (ref 0.2–1)
BLD GP AB SCN SERPL QL: NEGATIVE
BUN SERPL-MCNC: 14 MG/DL (ref 5–25)
CALCIUM ALBUM COR SERPL-MCNC: 9.9 MG/DL (ref 8.3–10.1)
CALCIUM SERPL-MCNC: 9 MG/DL (ref 8.3–10.1)
CHLORIDE SERPL-SCNC: 105 MMOL/L (ref 100–108)
CO2 SERPL-SCNC: 30 MMOL/L (ref 21–32)
CREAT SERPL-MCNC: 0.84 MG/DL (ref 0.6–1.3)
CRP SERPL QL: 103 MG/L
D DIMER PPP FEU-MCNC: 0.95 UG/ML FEU
EOSINOPHIL # BLD AUTO: 0.1 THOUSAND/ΜL (ref 0–0.61)
EOSINOPHIL NFR BLD AUTO: 1 % (ref 0–6)
ERYTHROCYTE [DISTWIDTH] IN BLOOD BY AUTOMATED COUNT: 13.2 % (ref 11.6–15.1)
FERRITIN SERPL-MCNC: 346 NG/ML (ref 8–388)
GFR SERPL CREATININE-BSD FRML MDRD: 73 ML/MIN/1.73SQ M
GLUCOSE SERPL-MCNC: 112 MG/DL (ref 65–140)
HCT VFR BLD AUTO: 38.6 % (ref 34.8–46.1)
HGB BLD-MCNC: 12.5 G/DL (ref 11.5–15.4)
IMM GRANULOCYTES # BLD AUTO: 0.05 THOUSAND/UL (ref 0–0.2)
IMM GRANULOCYTES NFR BLD AUTO: 1 % (ref 0–2)
LYMPHOCYTES # BLD AUTO: 1.5 THOUSANDS/ΜL (ref 0.6–4.47)
LYMPHOCYTES NFR BLD AUTO: 18 % (ref 14–44)
MCH RBC QN AUTO: 27.6 PG (ref 26.8–34.3)
MCHC RBC AUTO-ENTMCNC: 32.4 G/DL (ref 31.4–37.4)
MCV RBC AUTO: 85 FL (ref 82–98)
MONOCYTES # BLD AUTO: 0.29 THOUSAND/ΜL (ref 0.17–1.22)
MONOCYTES NFR BLD AUTO: 3 % (ref 4–12)
NEUTROPHILS # BLD AUTO: 6.59 THOUSANDS/ΜL (ref 1.85–7.62)
NEUTS SEG NFR BLD AUTO: 77 % (ref 43–75)
NRBC BLD AUTO-RTO: 0 /100 WBCS
PLATELET # BLD AUTO: 252 THOUSANDS/UL (ref 149–390)
PMV BLD AUTO: 10.4 FL (ref 8.9–12.7)
POTASSIUM SERPL-SCNC: 3.6 MMOL/L (ref 3.5–5.3)
PROT SERPL-MCNC: 6.6 G/DL (ref 6.4–8.2)
RBC # BLD AUTO: 4.53 MILLION/UL (ref 3.81–5.12)
RH BLD: POSITIVE
SODIUM SERPL-SCNC: 139 MMOL/L (ref 136–145)
SPECIMEN EXPIRATION DATE: NORMAL
WBC # BLD AUTO: 8.55 THOUSAND/UL (ref 4.31–10.16)

## 2020-12-16 PROCEDURE — 86140 C-REACTIVE PROTEIN: CPT | Performed by: INTERNAL MEDICINE

## 2020-12-16 PROCEDURE — 85025 COMPLETE CBC W/AUTO DIFF WBC: CPT | Performed by: INTERNAL MEDICINE

## 2020-12-16 PROCEDURE — 86901 BLOOD TYPING SEROLOGIC RH(D): CPT | Performed by: INTERNAL MEDICINE

## 2020-12-16 PROCEDURE — 86850 RBC ANTIBODY SCREEN: CPT | Performed by: INTERNAL MEDICINE

## 2020-12-16 PROCEDURE — 80053 COMPREHEN METABOLIC PANEL: CPT | Performed by: INTERNAL MEDICINE

## 2020-12-16 PROCEDURE — 82728 ASSAY OF FERRITIN: CPT | Performed by: INTERNAL MEDICINE

## 2020-12-16 PROCEDURE — 94760 N-INVAS EAR/PLS OXIMETRY 1: CPT

## 2020-12-16 PROCEDURE — 99233 SBSQ HOSP IP/OBS HIGH 50: CPT | Performed by: INTERNAL MEDICINE

## 2020-12-16 PROCEDURE — 85379 FIBRIN DEGRADATION QUANT: CPT | Performed by: INTERNAL MEDICINE

## 2020-12-16 PROCEDURE — 86900 BLOOD TYPING SEROLOGIC ABO: CPT | Performed by: INTERNAL MEDICINE

## 2020-12-16 RX ORDER — FUROSEMIDE 10 MG/ML
20 INJECTION INTRAMUSCULAR; INTRAVENOUS ONCE
Status: DISCONTINUED | OUTPATIENT
Start: 2020-12-16 | End: 2020-12-17

## 2020-12-16 RX ORDER — GUAIFENESIN 100 MG/5ML
200 SOLUTION ORAL EVERY 4 HOURS PRN
Status: DISCONTINUED | OUTPATIENT
Start: 2020-12-16 | End: 2020-12-18

## 2020-12-16 RX ORDER — BENZONATATE 100 MG/1
100 CAPSULE ORAL 3 TIMES DAILY PRN
Status: DISCONTINUED | OUTPATIENT
Start: 2020-12-16 | End: 2021-01-11 | Stop reason: HOSPADM

## 2020-12-16 RX ADMIN — SENNOSIDES AND DOCUSATE SODIUM 1 TABLET: 8.6; 5 TABLET ORAL at 16:43

## 2020-12-16 RX ADMIN — LITHIUM CARBONATE 300 MG: 300 CAPSULE, GELATIN COATED ORAL at 21:12

## 2020-12-16 RX ADMIN — SERTRALINE HYDROCHLORIDE 100 MG: 100 TABLET ORAL at 21:10

## 2020-12-16 RX ADMIN — SENNOSIDES AND DOCUSATE SODIUM 1 TABLET: 8.6; 5 TABLET ORAL at 08:49

## 2020-12-16 RX ADMIN — ENOXAPARIN SODIUM 30 MG: 30 INJECTION SUBCUTANEOUS at 21:10

## 2020-12-16 RX ADMIN — FAMOTIDINE 20 MG: 20 TABLET ORAL at 21:10

## 2020-12-16 RX ADMIN — FAMOTIDINE 20 MG: 20 TABLET ORAL at 08:49

## 2020-12-16 RX ADMIN — POLYETHYLENE GLYCOL 3350 17 G: 17 POWDER, FOR SOLUTION ORAL at 08:49

## 2020-12-16 RX ADMIN — MULTIVITAMIN 15 ML: LIQUID ORAL at 08:50

## 2020-12-16 RX ADMIN — GABAPENTIN 300 MG: 300 CAPSULE ORAL at 08:49

## 2020-12-16 RX ADMIN — LORAZEPAM 1.5 MG: 1 TABLET ORAL at 21:10

## 2020-12-16 RX ADMIN — Medication 2000 UNITS: at 08:49

## 2020-12-16 RX ADMIN — ACETAMINOPHEN 650 MG: 325 TABLET, FILM COATED ORAL at 21:21

## 2020-12-16 RX ADMIN — LIDOCAINE 5% 1 PATCH: 700 PATCH TOPICAL at 08:48

## 2020-12-16 RX ADMIN — ENOXAPARIN SODIUM 30 MG: 30 INJECTION SUBCUTANEOUS at 08:49

## 2020-12-16 RX ADMIN — ALBUTEROL SULFATE 2 PUFF: 90 AEROSOL, METERED RESPIRATORY (INHALATION) at 08:49

## 2020-12-16 RX ADMIN — ALBUTEROL SULFATE 2 PUFF: 90 AEROSOL, METERED RESPIRATORY (INHALATION) at 16:43

## 2020-12-16 RX ADMIN — ALBUTEROL SULFATE 2 PUFF: 90 AEROSOL, METERED RESPIRATORY (INHALATION) at 21:12

## 2020-12-16 RX ADMIN — TRAZODONE HYDROCHLORIDE 100 MG: 100 TABLET ORAL at 21:13

## 2020-12-16 RX ADMIN — ATORVASTATIN CALCIUM 40 MG: 40 TABLET, FILM COATED ORAL at 21:11

## 2020-12-16 NOTE — RESTORATIVE TECHNICIAN NOTE
Restorative Specialist Mobility Note              Attempted to see pt for mobility, pt already OOB to bathroom with nursing and desat  Pt not agreeable to further mobility at this time  Assisted by Tamia Dumont

## 2020-12-16 NOTE — ASSESSMENT & PLAN NOTE
Continue lithium sertraline, trazodone  Continue bedtime Ativan  Patient is calm and cooperative today

## 2020-12-16 NOTE — PROGRESS NOTES
Progress Note - Slade Courser 1955, 76 y o  female MRN: [id number]    Unit/Bed#: -01 Encounter: [id number]    Primary Care Provider: LUIS CARLOS Levy   Date and time admitted to hospital: 12/4/2020  9:15 AM        COVID-19 virus infection  Assessment & Plan  COVID-19 infection, tested positive on 11/29/2020  Patient be placed on moderate treatment protocol   Noted to have worsening of oxygenation requiring 15 L mid flow, with saturations in low 90s  · Completed dexamethasone 10/10  · Completed remdesivir 12/8  · Continue vitamin-D, zinc, vitamin-C  · Antibiotic discontinued  · s/p convalescent plasma 12/7/2020  · Continue self proning   · Monitor CRP, ferritin, D-dimer  · Pulm following, now signing off as respiratory status improving, down from 15 L to 8 L  · Will need ambulatory pulse ox prior to discharge  · 12/16:  Desaturated to low 70s when ambulating to bathroom with nurse  Acute respiratory distress  Improved on 13 L mid flow  Currently saturating well while in rest   Will keep on bedrest for now and try to go down on oxygen as possible  Crackles noted on chest exam   Will give trial of 20 mg IV Lasix and monitor response  Labs ordered but not yet collected  Will follow and monitor creatinine and electrolytes  * Acute respiratory failure with hypoxia (HCC)  Assessment & Plan  · Acute hypoxic respiratory failure likely due to COVID-19 infection  · Patient is hypoxic, improving, weaned from 15 L midflow to 6-8 L   · Given 40 mg Lasix x1 12/10-- which seemed to help  · Continue supplemental oxygen maintain O2 sats more than 92-94%  · Encourage proning- patient needs continuous encouragement and reminder to prone  · Pulmonary on board, now signing off   · See below for details of COVID 19 management   · Will need ambulatory pulse ox prior to discharge  · P r n  Lasix --> crackles noted on exam today  Will give 20 mg IV Lasix and monitor response       Transaminitis  Assessment & Plan  Likely related to COVID-19 infection  Monitor    Elevated troponin  Assessment & Plan  Elevated troponin, peak troponin 0 32- likely due to Covid-19 infection  Trended down to 0 14  ECG - T wave inversions noted  Monitor     GERD (gastroesophageal reflux disease)  Assessment & Plan  Continue Famotidine and Mylanta     Bipolar disorder (HCC)  Assessment & Plan  Continue lithium sertraline, trazodone  Continue bedtime Ativan  Patient is calm and cooperative today  VTE Pharmacologic Prophylaxis:   Pharmacologic: Enoxaparin (Lovenox)  Mechanical VTE Prophylaxis in Place: No    Discussions with Specialists or Other Care Team Provider:     Education and Discussions with Family / Patient: Discussed with patient  Patient requested to not up-to-date family/friends, she would like to communicate with them herself  Current Length of Stay: 12 day(s)    Current Patient Status: Inpatient     Discharge Plan / Estimated Discharge Date: To be determined  Code Status: Level 1 - Full Code      Subjective:   Patient was seen and examined by me this morning at bedside  She became short of breath while walking to the bathroom with the nurse this morning  Oxygen flow was increased from 8 to13 L with improvement in saturation  Currently feeling better while laying in the bed  States her shortness or breath seems to same as yesterday  Also reports a chronic cough which is stable but gives her pain in her back  Also complains of dryness in her nose  Nasal spray does not really help with this  Has been constipated and taking senna and MiraLax without results  Would like to try suppository today  Is passing gas and has good appetite  Otherwise denies fever/chills, headache, chest pain, abdominal pain, nausea/vomiting, urinary symptoms      Objective:     Vitals:   Temp (24hrs), Av 6 °F (37 °C), Min:98 5 °F (36 9 °C), Max:98 7 °F (37 1 °C)    Temp:  [98 5 °F (36 9 °C)-98 7 °F (37 1 °C)] 98 7 °F (37 1 °C)  HR: [77-93] 86  Resp:  [20] 20  BP: ()/(53-64) 126/64  SpO2:  [88 %-97 %] 91 %  Body mass index is 35 62 kg/m²  Input and Output Summary (last 24 hours): Intake/Output Summary (Last 24 hours) at 12/16/2020 1200  Last data filed at 12/16/2020 0600  Gross per 24 hour   Intake 480 ml   Output 800 ml   Net -320 ml       Physical Exam:     Physical Exam  Constitutional:       General: She is not in acute distress  Appearance: She is obese  She is ill-appearing  HENT:      Nose: No congestion or rhinorrhea  Mouth/Throat:      Mouth: Mucous membranes are moist    Eyes:      General: No scleral icterus  Cardiovascular:      Rate and Rhythm: Normal rate and regular rhythm  Heart sounds: Normal heart sounds  No murmur  No friction rub  No gallop  Pulmonary:      Effort: Pulmonary effort is normal       Breath sounds: No stridor or decreased air movement  Examination of the right-middle field reveals rales  Examination of the left-middle field reveals rales  Examination of the right-lower field reveals rales  Examination of the left-lower field reveals rales  Rales present  Abdominal:      General: Abdomen is flat  Bowel sounds are normal       Palpations: Abdomen is soft  Tenderness: There is no abdominal tenderness  Musculoskeletal:      Right lower leg: No edema  Left lower leg: No edema  Skin:     General: Skin is warm and dry  Neurological:      General: No focal deficit present  Mental Status: She is alert and oriented to person, place, and time  Psychiatric:         Mood and Affect: Mood normal          Thought Content:  Thought content normal          Additional Data:     Labs:    Results from last 7 days   Lab Units 12/13/20  0501   WBC Thousand/uL 7 23   HEMOGLOBIN g/dL 14 0   HEMATOCRIT % 43 4   PLATELETS Thousands/uL 323   NEUTROS PCT % 76*   LYMPHS PCT % 19   MONOS PCT % 4   EOS PCT % 1     Results from last 7 days   Lab Units 12/13/20  0501   POTASSIUM mmol/L 4 2   CHLORIDE mmol/L 105   CO2 mmol/L 27   BUN mg/dL 33*   CREATININE mg/dL 0 88   CALCIUM mg/dL 9 7   ALK PHOS U/L 102   ALT U/L 160*   AST U/L 59*           * I Have Reviewed All Lab Data Listed Above  * Additional Pertinent Lab Tests Reviewed: All Labs For Current Hospital Admission Reviewed    Imaging:    Imaging Reports Reviewed Today Include:  No new imaging    Imaging Personally Reviewed by Myself Includes:  None    Recent Cultures (last 7 days):           Last 24 Hours Medication List:   Current Facility-Administered Medications   Medication Dose Route Frequency Provider Last Rate    acetaminophen  650 mg Oral Q6H PRN Vanessa Sheehan MD      albuterol  2 puff Inhalation Q4H PRN Vanessa Sheehan MD      albuterol  2 puff Inhalation TID Jose Ramon Duron MD      aluminum-magnesium hydroxide-simethicone  30 mL Oral Q4H PRN Vanessa Sheehan MD      atorvastatin  40 mg Oral HS Vanessa Sheehan MD      benzonatate  100 mg Oral TID PRN Kailyn Mckeon MD      bisacodyl  10 mg Rectal Daily PRN Vanessa Sheehan MD      cholecalciferol  2,000 Units Oral Daily Vanessa Sheehan MD      enoxaparin  30 mg Subcutaneous Q12H [address] Vanessa Sheehan MD      famotidine  20 mg Oral Q12H Vanessa Sheehan MD      fluticasone  2 spray Each Nare Daily Vanessa Sheehan MD      furosemide  20 mg Intravenous Once Kailyn Mckeon MD      gabapentin  300 mg Oral Daily Vanessa Sheehan MD      guaiFENesin  200 mg Oral Q4H PRN Kailyn Mckeon MD      lidocaine  1 patch Topical Daily Vanessa Sheehan MD      lithium carbonate  300 mg Oral HS Vanessa Sheehan MD      LORazepam  1 mg Oral BID PRN Vanessa Sheehan MD      LORazepam  1 5 mg Oral HS Vanessa Sheehan MD      melatonin  6 mg Oral HS Vanessa Sheehan MD      menthol-methyl salicylate   Apply externally 4x Daily PRN Estuardo Mckay PA-C      multivitamin with iron-minerals  15 mL Per NG Tube Daily Vanessa Sheehan MD      ondansetron  4 mg Intravenous Q6H PRN Vanessa Sheehan MD  polyethylene glycol  17 g Oral Daily Vi Matthews MD      senna-docusate sodium  1 tablet Oral BID Vi Matthews MD      sertraline  100 mg Oral HS Vi Matthews MD      sodium chloride  1 spray Each Nare Q2H PRN Vi Matthews MD      traZODone  100 mg Oral HS Vi Matthews MD          Today, Patient Was Seen By: Jeaneth Gotti MD    ** Please Note: This note has been constructed using a voice recognition system   **

## 2020-12-16 NOTE — PLAN OF CARE
Problem: Potential for Falls  Goal: Patient will remain free of falls  Description: INTERVENTIONS:  - Assess patient frequently for physical needs  -  Identify cognitive and physical deficits and behaviors that affect risk of falls    -  [place] fall precautions as indicated by assessment   - Educate patient/family on patient safety including physical limitations  - Instruct patient to call for assistance with activity based on assessment  - Modify environment to reduce risk of injury  - Consider OT/PT consult to assist with strengthening/mobility  Outcome: Progressing     Problem: RESPIRATORY - ADULT  Goal: Achieves optimal ventilation and oxygenation  Description: INTERVENTIONS:  - Assess for changes in respiratory status  - Assess for changes in mentation and behavior  - Position to facilitate oxygenation and minimize respiratory effort  - Oxygen administered by appropriate delivery if ordered  - Initiate smoking cessation education as indicated  - Encourage broncho-pulmonary hygiene including cough, deep breathe, Incentive Spirometry  - Assess the need for suctioning and aspirate as needed  - Assess and instruct to report SOB or any respiratory difficulty  - Respiratory Therapy support as indicated  Outcome: Progressing     Problem: METABOLIC, FLUID AND ELECTROLYTES - ADULT  Goal: Electrolytes maintained within normal limits  Description: INTERVENTIONS:  - Monitor labs and assess patient for signs and symptoms of electrolyte imbalances  - Administer electrolyte replacement as ordered  - Monitor response to electrolyte replacements, including repeat lab results as appropriate  - Instruct patient on fluid and nutrition as appropriate  Outcome: Progressing  Goal: Fluid balance maintained  Description: INTERVENTIONS:  - Monitor labs   - Monitor I/O and WT  - Instruct patient on fluid and nutrition as appropriate  - Assess for signs & symptoms of volume excess or deficit  Outcome: Progressing     Problem: HEMATOLOGIC - ADULT  Goal: Maintains hematologic stability  Description: INTERVENTIONS  - Assess for signs and symptoms of bleeding or hemorrhage  - Monitor labs  - Administer supportive blood products/factors as ordered and appropriate  Outcome: Progressing     Problem: INFECTION - ADULT  Goal: Absence or prevention of progression during hospitalization  Description: INTERVENTIONS:  - Assess and monitor for signs and symptoms of infection  - Monitor lab/diagnostic results  - Monitor all insertion sites, i e  indwelling lines, tubes, and drains  - Monitor endotracheal if appropriate and nasal secretions for changes in amount and color  - [place] appropriate cooling/warming therapies per order  - Administer medications as ordered  - Instruct and encourage patient and family to use good hand hygiene technique  - Identify and instruct in appropriate isolation precautions for identified infection/condition  Outcome: Progressing  Goal: Absence of fever/infection during neutropenic period  Description: INTERVENTIONS:  - Monitor WBC    Outcome: Progressing     Problem: SAFETY ADULT  Goal: Patient will remain free of falls  Description: INTERVENTIONS:  - Assess patient frequently for physical needs  -  Identify cognitive and physical deficits and behaviors that affect risk of falls    -  [place] fall precautions as indicated by assessment   - Educate patient/family on patient safety including physical limitations  - Instruct patient to call for assistance with activity based on assessment  - Modify environment to reduce risk of injury  - Consider OT/PT consult to assist with strengthening/mobility  Outcome: Progressing  Goal: Maintain or return to baseline ADL function  Description: INTERVENTIONS:  -  Assess patient's ability to carry out ADLs; assess patient's baseline for ADL function and identify physical deficits which impact ability to perform ADLs (bathing, care of mouth/teeth, toileting, grooming, dressing, etc )  - Assess/evaluate cause of self-care deficits   - Assess range of motion  - Assess patient's mobility; develop plan if impaired  - Assess patient's need for assistive devices and provide as appropriate  - Encourage maximum independence but intervene and supervise when necessary  - Involve family in performance of ADLs  - Assess for home care needs following discharge   - Consider OT consult to assist with ADL evaluation and planning for discharge  - Provide patient education as appropriate  Outcome: Progressing  Goal: Maintain or return mobility status to optimal level  Description: INTERVENTIONS:  - Assess patient's baseline mobility status (ambulation, transfers, stairs, etc )    - Identify cognitive and physical deficits and behaviors that affect mobility  - Identify mobility aids required to assist with transfers and/or ambulation (gait belt, sit-to-stand, lift, walker, cane, etc )  - [place] fall precautions as indicated by assessment  - Record patient progress and toleration of activity level on Mobility SBAR; progress patient to next Phase/Stage  - Instruct patient to call for assistance with activity based on assessment  - Consider rehabilitation consult to assist with strengthening/weightbearing, etc   Outcome: Progressing     Problem: DISCHARGE PLANNING  Goal: Discharge to home or other facility with appropriate resources  Description: INTERVENTIONS:  - Identify barriers to discharge w/patient and caregiver  - Arrange for needed discharge resources and transportation as appropriate  - Identify discharge learning needs (meds, wound care, etc )  - Arrange for interpretive services to assist at discharge as needed  - Refer to Case Management Department for coordinating discharge planning if the patient needs post-hospital services based on physician/advanced practitioner order or complex needs related to functional status, cognitive ability, or social support system  Outcome: Progressing     Problem: Knowledge Deficit  Goal: Patient/family/caregiver demonstrates understanding of disease process, treatment plan, medications, and discharge instructions  Description: Complete learning assessment and assess knowledge base  Interventions:  - Provide teaching at level of understanding  - Provide teaching via preferred learning methods  Outcome: Progressing     Problem: Prexisting or High Potential for Compromised Skin Integrity  Goal: Skin integrity is maintained or improved  Description: INTERVENTIONS:  - Identify patients at risk for skin breakdown  - Assess and monitor skin integrity  - Assess and monitor nutrition and hydration status  - Monitor labs   - Assess for incontinence   - Turn and reposition patient  - Assist with mobility/ambulation  - Relieve pressure over bony prominences  - Avoid friction and shearing  - Provide appropriate hygiene as needed including keeping skin clean and dry  - Evaluate need for skin moisturizer/barrier cream  - Collaborate with interdisciplinary team   - Patient/family teaching  - Consider wound care consult   Outcome: Progressing     Problem: Nutrition/Hydration-ADULT  Goal: Nutrient/Hydration intake appropriate for improving, restoring or maintaining nutritional needs  Description: Monitor and assess patient's nutrition/hydration status for malnutrition  Collaborate with interdisciplinary team and initiate plan and interventions as ordered  Monitor patient's weight and dietary intake as ordered or per policy  Utilize nutrition screening tool and intervene as necessary  Determine patient's food preferences and provide high-protein, high-caloric foods as appropriate       INTERVENTIONS:  - Monitor oral intake, urinary output, labs, and treatment plans  - Assess nutrition and hydration status and recommend course of action  - Evaluate amount of meals eaten  - Assist patient with eating if necessary   - Allow adequate time for meals  - Recommend/ encourage appropriate diets, oral nutritional supplements, and vitamin/mineral supplements  - Order, calculate, and assess calorie counts as needed  - Recommend, monitor, and adjust tube feedings and TPN/PPN based on assessed needs  - Assess need for intravenous fluids  - Provide specific nutrition/hydration education as appropriate  - Include patient/family/caregiver in decisions related to nutrition  Outcome: Progressing

## 2020-12-16 NOTE — NURSING NOTE
Pt states she is worried about BP trending on the lower side  RN explained that her BP has been running in the 619'F systolic since she has arrived and this is nothing new  PT states she googled that lidocaine patches cause irregular heart rates and would not like to have the lidocaine path as she thinks it is causing her low BP  Patch removed at this time

## 2020-12-16 NOTE — ASSESSMENT & PLAN NOTE
COVID-19 infection, tested positive on 11/29/2020  Patient be placed on moderate treatment protocol   Noted to have worsening of oxygenation requiring 15 L mid flow, with saturations in low 90s  · Completed dexamethasone 10/10  · Completed remdesivir 12/8  · Continue vitamin-D, zinc, vitamin-C  · Antibiotic discontinued  · s/p convalescent plasma 12/7/2020  · Continue self proning   · Monitor CRP, ferritin, D-dimer  · Pulm following, now signing off as respiratory status improving, down from 15 L to 8 L  · Will need ambulatory pulse ox prior to discharge  · 12/16:  Desaturated to low 70s when ambulating to bathroom with nurse  Acute respiratory distress  Improved on 13 L mid flow  Currently saturating well while in rest   Will keep on bedrest for now and try to go down on oxygen as possible  Crackles noted on chest exam   Will give trial of 20 mg IV Lasix and monitor response  Labs ordered but not yet collected  Will follow and monitor creatinine and electrolytes

## 2020-12-17 ENCOUNTER — APPOINTMENT (INPATIENT)
Dept: RADIOLOGY | Facility: HOSPITAL | Age: 65
DRG: 177 | End: 2020-12-17
Attending: INTERNAL MEDICINE
Payer: MEDICARE

## 2020-12-17 LAB
ALBUMIN SERPL BCP-MCNC: 2.5 G/DL (ref 3.5–5)
ALP SERPL-CCNC: 84 U/L (ref 46–116)
ALT SERPL W P-5'-P-CCNC: 86 U/L (ref 12–78)
ANION GAP SERPL CALCULATED.3IONS-SCNC: 8 MMOL/L (ref 4–13)
AST SERPL W P-5'-P-CCNC: 49 U/L (ref 5–45)
BASOPHILS # BLD AUTO: 0.02 THOUSANDS/ΜL (ref 0–0.1)
BASOPHILS NFR BLD AUTO: 0 % (ref 0–1)
BILIRUB SERPL-MCNC: 0.42 MG/DL (ref 0.2–1)
BUN SERPL-MCNC: 13 MG/DL (ref 5–25)
CALCIUM ALBUM COR SERPL-MCNC: 10.1 MG/DL (ref 8.3–10.1)
CALCIUM SERPL-MCNC: 8.9 MG/DL (ref 8.3–10.1)
CHLORIDE SERPL-SCNC: 107 MMOL/L (ref 100–108)
CO2 SERPL-SCNC: 25 MMOL/L (ref 21–32)
CREAT SERPL-MCNC: 0.73 MG/DL (ref 0.6–1.3)
CRP SERPL QL: 127 MG/L
EOSINOPHIL # BLD AUTO: 0.13 THOUSAND/ΜL (ref 0–0.61)
EOSINOPHIL NFR BLD AUTO: 2 % (ref 0–6)
ERYTHROCYTE [DISTWIDTH] IN BLOOD BY AUTOMATED COUNT: 13 % (ref 11.6–15.1)
FERRITIN SERPL-MCNC: 402 NG/ML (ref 8–388)
GFR SERPL CREATININE-BSD FRML MDRD: 86 ML/MIN/1.73SQ M
GLUCOSE SERPL-MCNC: 110 MG/DL (ref 65–140)
HCT VFR BLD AUTO: 34.7 % (ref 34.8–46.1)
HGB BLD-MCNC: 11.1 G/DL (ref 11.5–15.4)
IMM GRANULOCYTES # BLD AUTO: 0.06 THOUSAND/UL (ref 0–0.2)
IMM GRANULOCYTES NFR BLD AUTO: 1 % (ref 0–2)
LYMPHOCYTES # BLD AUTO: 1.24 THOUSANDS/ΜL (ref 0.6–4.47)
LYMPHOCYTES NFR BLD AUTO: 16 % (ref 14–44)
MCH RBC QN AUTO: 27.1 PG (ref 26.8–34.3)
MCHC RBC AUTO-ENTMCNC: 32 G/DL (ref 31.4–37.4)
MCV RBC AUTO: 85 FL (ref 82–98)
MONOCYTES # BLD AUTO: 0.35 THOUSAND/ΜL (ref 0.17–1.22)
MONOCYTES NFR BLD AUTO: 5 % (ref 4–12)
NEUTROPHILS # BLD AUTO: 5.95 THOUSANDS/ΜL (ref 1.85–7.62)
NEUTS SEG NFR BLD AUTO: 76 % (ref 43–75)
NRBC BLD AUTO-RTO: 0 /100 WBCS
PLATELET # BLD AUTO: 214 THOUSANDS/UL (ref 149–390)
PMV BLD AUTO: 10.6 FL (ref 8.9–12.7)
POTASSIUM SERPL-SCNC: 4 MMOL/L (ref 3.5–5.3)
PROT SERPL-MCNC: 6.2 G/DL (ref 6.4–8.2)
RBC # BLD AUTO: 4.09 MILLION/UL (ref 3.81–5.12)
SODIUM SERPL-SCNC: 140 MMOL/L (ref 136–145)
WBC # BLD AUTO: 7.75 THOUSAND/UL (ref 4.31–10.16)

## 2020-12-17 PROCEDURE — 82728 ASSAY OF FERRITIN: CPT | Performed by: INTERNAL MEDICINE

## 2020-12-17 PROCEDURE — 94762 N-INVAS EAR/PLS OXIMTRY CONT: CPT

## 2020-12-17 PROCEDURE — 85025 COMPLETE CBC W/AUTO DIFF WBC: CPT | Performed by: INTERNAL MEDICINE

## 2020-12-17 PROCEDURE — 86140 C-REACTIVE PROTEIN: CPT | Performed by: INTERNAL MEDICINE

## 2020-12-17 PROCEDURE — 97530 THERAPEUTIC ACTIVITIES: CPT

## 2020-12-17 PROCEDURE — 94760 N-INVAS EAR/PLS OXIMETRY 1: CPT

## 2020-12-17 PROCEDURE — 71045 X-RAY EXAM CHEST 1 VIEW: CPT

## 2020-12-17 PROCEDURE — 99233 SBSQ HOSP IP/OBS HIGH 50: CPT | Performed by: INTERNAL MEDICINE

## 2020-12-17 PROCEDURE — 80053 COMPREHEN METABOLIC PANEL: CPT | Performed by: INTERNAL MEDICINE

## 2020-12-17 RX ORDER — ECHINACEA PURPUREA EXTRACT 125 MG
1 TABLET ORAL
Status: DISCONTINUED | OUTPATIENT
Start: 2020-12-17 | End: 2020-12-17 | Stop reason: SDUPTHER

## 2020-12-17 RX ORDER — FUROSEMIDE 10 MG/ML
20 INJECTION INTRAMUSCULAR; INTRAVENOUS ONCE
Status: COMPLETED | OUTPATIENT
Start: 2020-12-17 | End: 2020-12-17

## 2020-12-17 RX ADMIN — GABAPENTIN 300 MG: 300 CAPSULE ORAL at 08:54

## 2020-12-17 RX ADMIN — FAMOTIDINE 20 MG: 20 TABLET ORAL at 08:53

## 2020-12-17 RX ADMIN — TRAZODONE HYDROCHLORIDE 100 MG: 100 TABLET ORAL at 21:15

## 2020-12-17 RX ADMIN — ALBUTEROL SULFATE 2 PUFF: 90 AEROSOL, METERED RESPIRATORY (INHALATION) at 17:13

## 2020-12-17 RX ADMIN — FAMOTIDINE 20 MG: 20 TABLET ORAL at 21:15

## 2020-12-17 RX ADMIN — SENNOSIDES AND DOCUSATE SODIUM 1 TABLET: 8.6; 5 TABLET ORAL at 08:54

## 2020-12-17 RX ADMIN — ALBUTEROL SULFATE 2 PUFF: 90 AEROSOL, METERED RESPIRATORY (INHALATION) at 21:16

## 2020-12-17 RX ADMIN — FUROSEMIDE 20 MG: 10 INJECTION, SOLUTION INTRAMUSCULAR; INTRAVENOUS at 14:01

## 2020-12-17 RX ADMIN — ATORVASTATIN CALCIUM 40 MG: 40 TABLET, FILM COATED ORAL at 21:15

## 2020-12-17 RX ADMIN — Medication 1 SPRAY: at 13:52

## 2020-12-17 RX ADMIN — ACETAMINOPHEN 650 MG: 325 TABLET, FILM COATED ORAL at 13:52

## 2020-12-17 RX ADMIN — MULTIVITAMIN 15 ML: LIQUID ORAL at 08:54

## 2020-12-17 RX ADMIN — ALBUTEROL SULFATE 2 PUFF: 90 AEROSOL, METERED RESPIRATORY (INHALATION) at 08:54

## 2020-12-17 RX ADMIN — LORAZEPAM 1.5 MG: 1 TABLET ORAL at 21:15

## 2020-12-17 RX ADMIN — ENOXAPARIN SODIUM 30 MG: 30 INJECTION SUBCUTANEOUS at 08:54

## 2020-12-17 RX ADMIN — ENOXAPARIN SODIUM 30 MG: 30 INJECTION SUBCUTANEOUS at 21:15

## 2020-12-17 RX ADMIN — ACETAMINOPHEN 650 MG: 325 TABLET, FILM COATED ORAL at 21:15

## 2020-12-17 RX ADMIN — POLYETHYLENE GLYCOL 3350 17 G: 17 POWDER, FOR SOLUTION ORAL at 08:53

## 2020-12-17 RX ADMIN — FLUTICASONE PROPIONATE 2 SPRAY: 50 SPRAY, METERED NASAL at 08:54

## 2020-12-17 RX ADMIN — LITHIUM CARBONATE 300 MG: 300 CAPSULE, GELATIN COATED ORAL at 21:17

## 2020-12-17 RX ADMIN — SERTRALINE HYDROCHLORIDE 100 MG: 100 TABLET ORAL at 21:15

## 2020-12-17 RX ADMIN — Medication 2000 UNITS: at 08:54

## 2020-12-17 RX ADMIN — SENNOSIDES AND DOCUSATE SODIUM 1 TABLET: 8.6; 5 TABLET ORAL at 17:14

## 2020-12-17 NOTE — PLAN OF CARE
Problem: PHYSICAL THERAPY ADULT  Goal: Performs mobility at highest level of function for planned discharge setting  See evaluation for individualized goals  Description: Treatment/Interventions: Functional transfer training, LE strengthening/ROM, Elevations, Therapeutic exercise, Endurance training, Patient/family training, Equipment eval/education, Bed mobility, Gait training, Spoke to nursing, OT  Equipment Recommended: Other (Comment)(tbd, likely RW pending progress)       See flowsheet documentation for full assessment, interventions and recommendations  Outcome: Progressing  Note: Prognosis: Fair  Problem List: Decreased strength, Decreased endurance, Impaired balance, Decreased mobility  Assessment: Treatment session limited 2* oxygen desaturation with activity  Only tolerated sitting EOB for approx ~45 seconds, before desat to 74% on 13L midflow  Took approx ~5 minutes for oxygen level to return to >90% on 13L midflow, DAHLIA Lewis notified  Pt to continue to benefit from inpatient PT  Recommend home with home PT pending progress once medically stable  PT Discharge Recommendation: Home with skilled therapy(Home PT)          See flowsheet documentation for full assessment

## 2020-12-17 NOTE — PROGRESS NOTES
Pastoral Care Progress Note    2020  Patient: Magalys Zapata : 1955  Admission Date & Time: 2020 0915  MRN: [id number] [facility]: [id number]       spoke with patient by phone to offer emotional and spiritual support  Patient states that she is "almost grateful for the opportunity to relax and not work, but I'd like to breathe better"  Patient seems to be in good spirits and says that she is well supported by her Significant Other and friends       [address] Involvement Patient active with [employer]   Spiritual Beliefs/Perceptions   Support Systems Spouse/significant other

## 2020-12-17 NOTE — PLAN OF CARE
Problem: Potential for Falls  Goal: Patient will remain free of falls  Description: INTERVENTIONS:  - Assess patient frequently for physical needs  -  Identify cognitive and physical deficits and behaviors that affect risk of falls    -  [place] fall precautions as indicated by assessment   - Educate patient/family on patient safety including physical limitations  - Instruct patient to call for assistance with activity based on assessment  - Modify environment to reduce risk of injury  - Consider OT/PT consult to assist with strengthening/mobility  Outcome: Progressing     Problem: RESPIRATORY - ADULT  Goal: Achieves optimal ventilation and oxygenation  Description: INTERVENTIONS:  - Assess for changes in respiratory status  - Assess for changes in mentation and behavior  - Position to facilitate oxygenation and minimize respiratory effort  - Oxygen administered by appropriate delivery if ordered  - Initiate smoking cessation education as indicated  - Encourage broncho-pulmonary hygiene including cough, deep breathe, Incentive Spirometry  - Assess the need for suctioning and aspirate as needed  - Assess and instruct to report SOB or any respiratory difficulty  - Respiratory Therapy support as indicated  Outcome: Progressing     Problem: METABOLIC, FLUID AND ELECTROLYTES - ADULT  Goal: Electrolytes maintained within normal limits  Description: INTERVENTIONS:  - Monitor labs and assess patient for signs and symptoms of electrolyte imbalances  - Administer electrolyte replacement as ordered  - Monitor response to electrolyte replacements, including repeat lab results as appropriate  - Instruct patient on fluid and nutrition as appropriate  Outcome: Progressing  Goal: Fluid balance maintained  Description: INTERVENTIONS:  - Monitor labs   - Monitor I/O and WT  - Instruct patient on fluid and nutrition as appropriate  - Assess for signs & symptoms of volume excess or deficit  Outcome: Progressing     Problem: HEMATOLOGIC - ADULT  Goal: Maintains hematologic stability  Description: INTERVENTIONS  - Assess for signs and symptoms of bleeding or hemorrhage  - Monitor labs  - Administer supportive blood products/factors as ordered and appropriate  Outcome: Progressing     Problem: INFECTION - ADULT  Goal: Absence or prevention of progression during hospitalization  Description: INTERVENTIONS:  - Assess and monitor for signs and symptoms of infection  - Monitor lab/diagnostic results  - Monitor all insertion sites, i e  indwelling lines, tubes, and drains  - Monitor endotracheal if appropriate and nasal secretions for changes in amount and color  - [place] appropriate cooling/warming therapies per order  - Administer medications as ordered  - Instruct and encourage patient and family to use good hand hygiene technique  - Identify and instruct in appropriate isolation precautions for identified infection/condition  Outcome: Progressing  Goal: Absence of fever/infection during neutropenic period  Description: INTERVENTIONS:  - Monitor WBC    Outcome: Progressing     Problem: SAFETY ADULT  Goal: Patient will remain free of falls  Description: INTERVENTIONS:  - Assess patient frequently for physical needs  -  Identify cognitive and physical deficits and behaviors that affect risk of falls    -  [place] fall precautions as indicated by assessment   - Educate patient/family on patient safety including physical limitations  - Instruct patient to call for assistance with activity based on assessment  - Modify environment to reduce risk of injury  - Consider OT/PT consult to assist with strengthening/mobility  Outcome: Progressing  Goal: Maintain or return to baseline ADL function  Description: INTERVENTIONS:  -  Assess patient's ability to carry out ADLs; assess patient's baseline for ADL function and identify physical deficits which impact ability to perform ADLs (bathing, care of mouth/teeth, toileting, grooming, dressing, etc )  - Assess/evaluate cause of self-care deficits   - Assess range of motion  - Assess patient's mobility; develop plan if impaired  - Assess patient's need for assistive devices and provide as appropriate  - Encourage maximum independence but intervene and supervise when necessary  - Involve family in performance of ADLs  - Assess for home care needs following discharge   - Consider OT consult to assist with ADL evaluation and planning for discharge  - Provide patient education as appropriate  Outcome: Progressing  Goal: Maintain or return mobility status to optimal level  Description: INTERVENTIONS:  - Assess patient's baseline mobility status (ambulation, transfers, stairs, etc )    - Identify cognitive and physical deficits and behaviors that affect mobility  - Identify mobility aids required to assist with transfers and/or ambulation (gait belt, sit-to-stand, lift, walker, cane, etc )  - [place] fall precautions as indicated by assessment  - Record patient progress and toleration of activity level on Mobility SBAR; progress patient to next Phase/Stage  - Instruct patient to call for assistance with activity based on assessment  - Consider rehabilitation consult to assist with strengthening/weightbearing, etc   Outcome: Progressing     Problem: DISCHARGE PLANNING  Goal: Discharge to home or other facility with appropriate resources  Description: INTERVENTIONS:  - Identify barriers to discharge w/patient and caregiver  - Arrange for needed discharge resources and transportation as appropriate  - Identify discharge learning needs (meds, wound care, etc )  - Arrange for interpretive services to assist at discharge as needed  - Refer to Case Management Department for coordinating discharge planning if the patient needs post-hospital services based on physician/advanced practitioner order or complex needs related to functional status, cognitive ability, or social support system  Outcome: Progressing     Problem: Knowledge Deficit  Goal: Patient/family/caregiver demonstrates understanding of disease process, treatment plan, medications, and discharge instructions  Description: Complete learning assessment and assess knowledge base  Interventions:  - Provide teaching at level of understanding  - Provide teaching via preferred learning methods  Outcome: Progressing     Problem: Prexisting or High Potential for Compromised Skin Integrity  Goal: Skin integrity is maintained or improved  Description: INTERVENTIONS:  - Identify patients at risk for skin breakdown  - Assess and monitor skin integrity  - Assess and monitor nutrition and hydration status  - Monitor labs   - Assess for incontinence   - Turn and reposition patient  - Assist with mobility/ambulation  - Relieve pressure over bony prominences  - Avoid friction and shearing  - Provide appropriate hygiene as needed including keeping skin clean and dry  - Evaluate need for skin moisturizer/barrier cream  - Collaborate with interdisciplinary team   - Patient/family teaching  - Consider wound care consult   Outcome: Progressing     Problem: Nutrition/Hydration-ADULT  Goal: Nutrient/Hydration intake appropriate for improving, restoring or maintaining nutritional needs  Description: Monitor and assess patient's nutrition/hydration status for malnutrition  Collaborate with interdisciplinary team and initiate plan and interventions as ordered  Monitor patient's weight and dietary intake as ordered or per policy  Utilize nutrition screening tool and intervene as necessary  Determine patient's food preferences and provide high-protein, high-caloric foods as appropriate       INTERVENTIONS:  - Monitor oral intake, urinary output, labs, and treatment plans  - Assess nutrition and hydration status and recommend course of action  - Evaluate amount of meals eaten  - Assist patient with eating if necessary   - Allow adequate time for meals  - Recommend/ encourage appropriate diets, oral nutritional supplements, and vitamin/mineral supplements  - Order, calculate, and assess calorie counts as needed  - Recommend, monitor, and adjust tube feedings and TPN/PPN based on assessed needs  - Assess need for intravenous fluids  - Provide specific nutrition/hydration education as appropriate  - Include patient/family/caregiver in decisions related to nutrition  Outcome: Progressing

## 2020-12-17 NOTE — PROGRESS NOTES
Progress Note - Slade Courser 1955, 76 y o  female MRN: [id number]    Unit/Bed#: -01 Encounter: [id number]    Primary Care Provider: LUIS CARLOS Levy   Date and time admitted to hospital: 12/4/2020  9:15 AM        COVID-19 virus infection  Assessment & Plan  COVID-19 infection, tested positive on 11/29/2020  Patient be placed on moderate treatment protocol   Noted to have worsening of oxygenation requiring 15 L mid flow, with saturations in low 90s  · Completed dexamethasone 10/10  · Completed remdesivir 12/8  · Continue vitamin-D, zinc, vitamin-C  · Antibiotic discontinued  · s/p convalescent plasma 12/7/2020  · Continue self proning   · Monitor CRP, ferritin, D-dimer   · Pulm following, now signing off as respiratory status improving, down from 15 L to 8 L  · Will need ambulatory pulse ox prior to discharge  · 12/17: Continues to be on 13L [address]  Stable saturations in rest, but desaturates with minimal exertion  Will try to come down on the oxygen as tolerated  * Acute respiratory failure with hypoxia (HCC)  Assessment & Plan  · Acute hypoxic respiratory failure likely due to COVID-19 infection  · Patient is hypoxic, improving, weaned from 15 L midflow to 6-8 L   · Given 40 mg Lasix x1 12/10-- which seemed to help  · Continue supplemental oxygen maintain O2 sats more than 92-94%  · Encourage proning- patient needs continuous encouragement and reminder to prone  · Pulmonary on board, now signing off   · See below for details of COVID 19 management   · Will need ambulatory pulse ox prior to discharge  · P r n  Lasix --> crackles noted on exam today  Will give 20 mg IV Lasix and monitor response  --> Not able to get dose of Lasix yesterday because of SBP continuously in low 90s  Transaminitis  Assessment & Plan  Likely related to COVID-19 infection    Monitor --> down trending    Elevated troponin  Assessment & Plan  Elevated troponin, peak troponin 0 32- likely due to Covid-19 infection  Trended down to 0 14  ECG - T wave inversions noted  Monitor     GERD (gastroesophageal reflux disease)  Assessment & Plan  Continue Famotidine and Mylanta     Bipolar disorder (HCC)  Assessment & Plan  Continue lithium sertraline, trazodone  Continue bedtime Ativan  Patient is calm and cooperative today  VTE Pharmacologic Prophylaxis:   Pharmacologic: Enoxaparin (Lovenox)  Mechanical VTE Prophylaxis in Place: No    Discussions with Specialists or Other Care Team Provider:     Education and Discussions with Family / Patient: Patient updated and all questions answered  She prefers to update her significant others herself  Current Length of Stay: 13 day(s)    Current Patient Status: Inpatient     Discharge Plan / Estimated Discharge Date: To be determined  Code Status: Level 1 - Full Code      Subjective:   Patient was seen examined by me this morning at bedside  She reports that she still gets significantly short of breath with minimal exertion  She needs to take stings very slow and felt rushed this morning while using the bed pan and then gets very short of breath  Currently laying on her side in bed in no respiratory distress  Did not yet have a bowel movement but is planning to try a suppository tonight if she does not have a bowel movement during the day  Objective:     Vitals:   Temp (24hrs), Av 7 °F (37 1 °C), Min:98 7 °F (37 1 °C), Max:98 7 °F (37 1 °C)    Temp:  [98 7 °F (37 1 °C)] 98 7 °F (37 1 °C)  HR:  [79-92] 79  BP: (80-93)/(47-51) 80/47  SpO2:  [90 %-97 %] 94 %  Body mass index is 35 62 kg/m²  Input and Output Summary (last 24 hours):     No intake or output data in the 24 hours ending 20 1016    Physical Exam:     Physical Exam  Constitutional:       General: She is not in acute distress  Appearance: Normal appearance  She is ill-appearing     HENT:      Mouth/Throat:      Mouth: Mucous membranes are moist    Cardiovascular:      Rate and Rhythm: Normal rate and regular rhythm  Pulses: Normal pulses  Heart sounds: Normal heart sounds  Pulmonary:      Effort: Pulmonary effort is normal  No respiratory distress  Breath sounds: Rales (Bilateral bases) present  No wheezing  Abdominal:      General: Abdomen is flat  Palpations: Abdomen is soft  Skin:     General: Skin is warm and dry  Neurological:      General: No focal deficit present  Mental Status: She is alert and oriented to person, place, and time  Additional Data:     Labs:    Results from last 7 days   Lab Units 12/16/20  1201   WBC Thousand/uL 8 55   HEMOGLOBIN g/dL 12 5   HEMATOCRIT % 38 6   PLATELETS Thousands/uL 252   NEUTROS PCT % 77*   LYMPHS PCT % 18   MONOS PCT % 3*   EOS PCT % 1     Results from last 7 days   Lab Units 12/16/20  1201   POTASSIUM mmol/L 3 6   CHLORIDE mmol/L 105   CO2 mmol/L 30   BUN mg/dL 14   CREATININE mg/dL 0 84   CALCIUM mg/dL 9 0   ALK PHOS U/L 95   ALT U/L 83*   AST U/L 41           * I Have Reviewed All Lab Data Listed Above  * Additional Pertinent Lab Tests Reviewed:  Sahil [facility] Admission Reviewed    Imaging:    Imaging Reports Reviewed Today Include:  No new imaging  Imaging Personally Reviewed by Myself Includes:  None    Recent Cultures (last 7 days):           Last 24 Hours Medication List:   Current Facility-Administered Medications   Medication Dose Route Frequency Provider Last Rate    acetaminophen  650 mg Oral Q6H PRN Ric Smith MD      albuterol  2 puff Inhalation Q4H PRN Ric Smith MD      albuterol  2 puff Inhalation TID Tete Beltrán MD      aluminum-magnesium hydroxide-simethicone  30 mL Oral Q4H PRN Ric Smith MD      atorvastatin  40 mg Oral HS Ric Smith MD      benzonatate  100 mg Oral TID PRN Chico Bellamy MD      bisacodyl  10 mg Rectal Daily PRN Ric Smith MD      cholecalciferol  2,000 Units Oral Daily Ric Smith MD      enoxaparin  30 mg Subcutaneous Q12H [address] Nandini Cheema MD      famotidine  20 mg Oral Q12H Nandini Cheema MD      fluticasone  2 spray Each Nare Daily Nandini Cheema MD      furosemide  20 mg Intravenous Once Irene Diaz MD      gabapentin  300 mg Oral Daily Nandini Cheema MD      guaiFENesin  200 mg Oral Q4H PRN Irene Diaz MD      lidocaine  1 patch Topical Daily Nandini Cheema MD      lithium carbonate  300 mg Oral HS Nandini Cheema MD      LORazepam  1 mg Oral BID PRN Nandini Cheema MD      LORazepam  1 5 mg Oral HS Nandini Cheema MD      melatonin  6 mg Oral HS Nandini Cheema MD      menthol-methyl salicylate   Apply externally 4x Daily PRN Alon Orlando PA-C      multivitamin with iron-minerals  15 mL Per NG Tube Daily Nandini Cheema MD      ondansetron  4 mg Intravenous Q6H PRN Nandini Cheema MD      polyethylene glycol  17 g Oral Daily Nandini Cheema MD      senna-docusate sodium  1 tablet Oral BID Nandini Cheema MD      sertraline  100 mg Oral HS Nandini Cheema MD      sodium chloride  1 spray Each Nare Q2H PRN Nandini Cheema MD      sodium chloride  1 spray Each Nare Q1H PRN Irene Diaz MD      traZODone  100 mg Oral HS Nandini Cheema MD          Today, Patient Was Seen By: Irene Diaz MD    ** Please Note: This note has been constructed using a voice recognition system   **

## 2020-12-17 NOTE — PHYSICAL THERAPY NOTE
Physical Therapy Treatment Note   Patient Name: Adarsh CONTRERAS Date: 2020     Problem List  Principal Problem:    Acute respiratory failure with hypoxia ([facility] [address] )  Active Problems:    Bipolar disorder ([facility] [address] )    GERD (gastroesophageal reflux disease)    COVID-19 virus infection    Elevated troponin    Transaminitis       Past Medical History  Past Medical History:   Diagnosis Date    Asthma     Bipolar disorder ([facility] [address] )     GERD (gastroesophageal reflux disease)         Past Surgical History  Past Surgical History:   Procedure Laterality Date     SECTION      x3    CHOLECYSTECTOMY      HYSTERECTOMY        20 0810   PT Last Visit   PT Visit Date 20   Note Type   Note Type Treatment   Pain Assessment   Pain Assessment Tool 0-10   Pain Score No Pain   Restrictions/Precautions   Weight Bearing Precautions Per Order No   Other Precautions Contact/isolation; Airborne/isolation;O2  (COVID, 13L midflow NC)   General   Chart Reviewed Yes   Cognition   Overall Cognitive Status WFL   Arousal/Participation Cooperative   Attention Within functional limits   Orientation Level Oriented X4   Memory Within functional limits   Following Commands Follows all commands and directions without difficulty   Bed Mobility   Supine to Sit 4  Minimal assistance   Additional items Assist x 1; Increased time required   Sit to Supine 4  Minimal assistance   Additional items Assist x 1; Increased time required   Additional Comments Pt only tolerated sitting EOB for approx ~45 seconds 2* oxygen desaturation and GUZMAN  Pt with increased SOB and coughing in seated posistion  SpO2 dropped to 74% on 13L midflow  Returned pt back to bed, took approx ~5 minutes for pt to recover back to 90% on 13L NC  Required verbal cues for deep breathing  Updated DAHLIA Salguero of pt's oxygen desat      Balance   Static Sitting Fair -   Endurance Deficit   Endurance Deficit Yes   Endurance Deficit Description SpO2 to desat to 74% on 13L midflow  DAHLIA Salguero made aware   Activity Tolerance   Activity Tolerance Treatment limited secondary to medical complications (Comment)  (SOB, hypoxia)   Nurse Made Aware Yes, DAHLIA Lewis   Assessment   Prognosis Fair   Problem List Decreased strength;Decreased endurance; Impaired balance;Decreased mobility   Assessment Treatment session limited 2* oxygen desaturation with activity  Only tolerated sitting EOB for approx ~45 seconds, before desat to 74% on 13L midflow  Took approx ~5 minutes for oxygen level to return to >90% on 13L midflow, DAHLIA Lewis notified  Pt to continue to benefit from inpatient PT  Recommend home with home PT pending progress once medically stable  Goals   Patient Goals To get better   STG Expiration Date 12/29/20   PT Treatment Day 1   Plan   Treatment/Interventions LE strengthening/ROM; Functional transfer training; Therapeutic exercise; Endurance training;Cognitive reorientation;Patient/family training;Bed mobility;Gait training   Progress Slow progress, medical status limitations   PT Frequency   (3-5x/wk)   Recommendation   PT Discharge Recommendation Home with skilled therapy  (Home PT)   Equipment Recommended   (TBD)       Rosie Aranda PT, DPT

## 2020-12-17 NOTE — ASSESSMENT & PLAN NOTE
COVID-19 infection, tested positive on 11/29/2020  Patient be placed on moderate treatment protocol   Noted to have worsening of oxygenation requiring 15 L mid flow, with saturations in low 90s  · Completed dexamethasone 10/10  · Completed remdesivir 12/8  · Continue vitamin-D, zinc, vitamin-C  · Antibiotic discontinued  · s/p convalescent plasma 12/7/2020  · Continue self proning   · Monitor CRP, ferritin, D-dimer   · Pulm following, now signing off as respiratory status improving, down from 15 L to 8 L  · Will need ambulatory pulse ox prior to discharge  · 12/17: Continues to be on 13L [address]  Stable saturations in rest, but desaturates with minimal exertion  Will try to come down on the oxygen as tolerated

## 2020-12-17 NOTE — ASSESSMENT & PLAN NOTE
· Acute hypoxic respiratory failure likely due to COVID-19 infection  · Patient is hypoxic, improving, weaned from 15 L midflow to 6-8 L   · Given 40 mg Lasix x1 12/10-- which seemed to help  · Continue supplemental oxygen maintain O2 sats more than 92-94%  · Encourage proning- patient needs continuous encouragement and reminder to prone  · Pulmonary on board, now signing off   · See below for details of COVID 19 management   · Will need ambulatory pulse ox prior to discharge  · P r n  Lasix --> crackles noted on exam today  Will give 20 mg IV Lasix and monitor response  --> Not able to get dose of Lasix yesterday because of SBP continuously in low 90s

## 2020-12-18 LAB
ALBUMIN SERPL BCP-MCNC: 2.4 G/DL (ref 3.5–5)
ALP SERPL-CCNC: 89 U/L (ref 46–116)
ALT SERPL W P-5'-P-CCNC: 98 U/L (ref 12–78)
ANION GAP SERPL CALCULATED.3IONS-SCNC: 6 MMOL/L (ref 4–13)
AST SERPL W P-5'-P-CCNC: 56 U/L (ref 5–45)
BASOPHILS # BLD AUTO: 0.03 THOUSANDS/ΜL (ref 0–0.1)
BASOPHILS NFR BLD AUTO: 0 % (ref 0–1)
BILIRUB SERPL-MCNC: 0.61 MG/DL (ref 0.2–1)
BUN SERPL-MCNC: 15 MG/DL (ref 5–25)
CALCIUM ALBUM COR SERPL-MCNC: 10.6 MG/DL (ref 8.3–10.1)
CALCIUM SERPL-MCNC: 9.3 MG/DL (ref 8.3–10.1)
CHLORIDE SERPL-SCNC: 104 MMOL/L (ref 100–108)
CO2 SERPL-SCNC: 29 MMOL/L (ref 21–32)
CREAT SERPL-MCNC: 0.77 MG/DL (ref 0.6–1.3)
EOSINOPHIL # BLD AUTO: 0.14 THOUSAND/ΜL (ref 0–0.61)
EOSINOPHIL NFR BLD AUTO: 2 % (ref 0–6)
ERYTHROCYTE [DISTWIDTH] IN BLOOD BY AUTOMATED COUNT: 13.2 % (ref 11.6–15.1)
GFR SERPL CREATININE-BSD FRML MDRD: 81 ML/MIN/1.73SQ M
GLUCOSE SERPL-MCNC: 116 MG/DL (ref 65–140)
HCT VFR BLD AUTO: 36.6 % (ref 34.8–46.1)
HGB BLD-MCNC: 11.6 G/DL (ref 11.5–15.4)
IMM GRANULOCYTES # BLD AUTO: 0.03 THOUSAND/UL (ref 0–0.2)
IMM GRANULOCYTES NFR BLD AUTO: 0 % (ref 0–2)
LYMPHOCYTES # BLD AUTO: 1.13 THOUSANDS/ΜL (ref 0.6–4.47)
LYMPHOCYTES NFR BLD AUTO: 15 % (ref 14–44)
MCH RBC QN AUTO: 27 PG (ref 26.8–34.3)
MCHC RBC AUTO-ENTMCNC: 31.7 G/DL (ref 31.4–37.4)
MCV RBC AUTO: 85 FL (ref 82–98)
MONOCYTES # BLD AUTO: 0.44 THOUSAND/ΜL (ref 0.17–1.22)
MONOCYTES NFR BLD AUTO: 6 % (ref 4–12)
NEUTROPHILS # BLD AUTO: 5.83 THOUSANDS/ΜL (ref 1.85–7.62)
NEUTS SEG NFR BLD AUTO: 77 % (ref 43–75)
NRBC BLD AUTO-RTO: 0 /100 WBCS
NT-PROBNP SERPL-MCNC: 254 PG/ML
PLATELET # BLD AUTO: 206 THOUSANDS/UL (ref 149–390)
PMV BLD AUTO: 11.2 FL (ref 8.9–12.7)
POTASSIUM SERPL-SCNC: 4 MMOL/L (ref 3.5–5.3)
PROCALCITONIN SERPL-MCNC: 0.08 NG/ML
PROT SERPL-MCNC: 6.3 G/DL (ref 6.4–8.2)
RBC # BLD AUTO: 4.3 MILLION/UL (ref 3.81–5.12)
SODIUM SERPL-SCNC: 139 MMOL/L (ref 136–145)
WBC # BLD AUTO: 7.6 THOUSAND/UL (ref 4.31–10.16)

## 2020-12-18 PROCEDURE — 99233 SBSQ HOSP IP/OBS HIGH 50: CPT | Performed by: INTERNAL MEDICINE

## 2020-12-18 PROCEDURE — 99232 SBSQ HOSP IP/OBS MODERATE 35: CPT | Performed by: INTERNAL MEDICINE

## 2020-12-18 PROCEDURE — 81001 URINALYSIS AUTO W/SCOPE: CPT | Performed by: INTERNAL MEDICINE

## 2020-12-18 PROCEDURE — 87086 URINE CULTURE/COLONY COUNT: CPT | Performed by: INTERNAL MEDICINE

## 2020-12-18 PROCEDURE — 84145 PROCALCITONIN (PCT): CPT | Performed by: INTERNAL MEDICINE

## 2020-12-18 PROCEDURE — 83880 ASSAY OF NATRIURETIC PEPTIDE: CPT | Performed by: INTERNAL MEDICINE

## 2020-12-18 PROCEDURE — 85025 COMPLETE CBC W/AUTO DIFF WBC: CPT | Performed by: INTERNAL MEDICINE

## 2020-12-18 PROCEDURE — 80053 COMPREHEN METABOLIC PANEL: CPT | Performed by: INTERNAL MEDICINE

## 2020-12-18 PROCEDURE — 94760 N-INVAS EAR/PLS OXIMETRY 1: CPT

## 2020-12-18 PROCEDURE — 87040 BLOOD CULTURE FOR BACTERIA: CPT | Performed by: INTERNAL MEDICINE

## 2020-12-18 RX ORDER — LORATADINE 10 MG/1
10 TABLET ORAL DAILY
Status: DISCONTINUED | OUTPATIENT
Start: 2020-12-18 | End: 2021-01-11 | Stop reason: HOSPADM

## 2020-12-18 RX ORDER — OXYMETAZOLINE HYDROCHLORIDE 0.05 G/100ML
2 SPRAY NASAL EVERY 12 HOURS PRN
Status: DISCONTINUED | OUTPATIENT
Start: 2020-12-18 | End: 2020-12-20

## 2020-12-18 RX ORDER — ALBUMIN (HUMAN) 12.5 G/50ML
25 SOLUTION INTRAVENOUS ONCE
Status: COMPLETED | OUTPATIENT
Start: 2020-12-18 | End: 2020-12-19

## 2020-12-18 RX ORDER — ACETAMINOPHEN 325 MG/1
975 TABLET ORAL ONCE
Status: COMPLETED | OUTPATIENT
Start: 2020-12-18 | End: 2020-12-18

## 2020-12-18 RX ORDER — GUAIFENESIN 600 MG
600 TABLET, EXTENDED RELEASE 12 HR ORAL EVERY 12 HOURS SCHEDULED
Status: DISCONTINUED | OUTPATIENT
Start: 2020-12-18 | End: 2020-12-20

## 2020-12-18 RX ORDER — FUROSEMIDE 10 MG/ML
40 INJECTION INTRAMUSCULAR; INTRAVENOUS ONCE
Status: COMPLETED | OUTPATIENT
Start: 2020-12-18 | End: 2020-12-18

## 2020-12-18 RX ORDER — MINERAL OIL 100 G/100G
1 OIL RECTAL ONCE
Status: COMPLETED | OUTPATIENT
Start: 2020-12-18 | End: 2020-12-18

## 2020-12-18 RX ORDER — OXYMETAZOLINE HYDROCHLORIDE 0.05 G/100ML
2 SPRAY NASAL EVERY 12 HOURS SCHEDULED
Status: DISCONTINUED | OUTPATIENT
Start: 2020-12-18 | End: 2020-12-20

## 2020-12-18 RX ADMIN — FAMOTIDINE 20 MG: 20 TABLET ORAL at 08:18

## 2020-12-18 RX ADMIN — Medication 2000 UNITS: at 08:18

## 2020-12-18 RX ADMIN — ALBUTEROL SULFATE 2 PUFF: 90 AEROSOL, METERED RESPIRATORY (INHALATION) at 08:19

## 2020-12-18 RX ADMIN — ENOXAPARIN SODIUM 30 MG: 30 INJECTION SUBCUTANEOUS at 08:18

## 2020-12-18 RX ADMIN — FLUTICASONE PROPIONATE 2 SPRAY: 50 SPRAY, METERED NASAL at 05:17

## 2020-12-18 RX ADMIN — BISACODYL 10 MG: 10 SUPPOSITORY RECTAL at 08:19

## 2020-12-18 RX ADMIN — ALBUTEROL SULFATE 2 PUFF: 90 AEROSOL, METERED RESPIRATORY (INHALATION) at 15:27

## 2020-12-18 RX ADMIN — POLYETHYLENE GLYCOL 3350 17 G: 17 POWDER, FOR SOLUTION ORAL at 08:18

## 2020-12-18 RX ADMIN — ACETAMINOPHEN 975 MG: 325 TABLET, FILM COATED ORAL at 23:37

## 2020-12-18 RX ADMIN — SENNOSIDES AND DOCUSATE SODIUM 1 TABLET: 8.6; 5 TABLET ORAL at 17:54

## 2020-12-18 RX ADMIN — MINERAL OIL 1 ENEMA: 100 ENEMA RECTAL at 11:00

## 2020-12-18 RX ADMIN — GUAIFENESIN 600 MG: 600 TABLET, EXTENDED RELEASE ORAL at 09:41

## 2020-12-18 RX ADMIN — GABAPENTIN 300 MG: 300 CAPSULE ORAL at 08:18

## 2020-12-18 RX ADMIN — LIDOCAINE 5% 1 PATCH: 700 PATCH TOPICAL at 08:18

## 2020-12-18 RX ADMIN — ACETAMINOPHEN 650 MG: 325 TABLET, FILM COATED ORAL at 15:26

## 2020-12-18 RX ADMIN — TRAZODONE HYDROCHLORIDE 100 MG: 100 TABLET ORAL at 22:02

## 2020-12-18 RX ADMIN — SENNOSIDES AND DOCUSATE SODIUM 1 TABLET: 8.6; 5 TABLET ORAL at 08:18

## 2020-12-18 RX ADMIN — ATORVASTATIN CALCIUM 40 MG: 40 TABLET, FILM COATED ORAL at 22:02

## 2020-12-18 RX ADMIN — MELATONIN TAB 3 MG 6 MG: 3 TAB at 22:02

## 2020-12-18 RX ADMIN — OXYMETAZOLINE HYDROCHLORIDE 2 SPRAY: 0.05 SPRAY NASAL at 21:00

## 2020-12-18 RX ADMIN — ALBUMIN (HUMAN) 25 G: 0.25 INJECTION, SOLUTION INTRAVENOUS at 13:43

## 2020-12-18 RX ADMIN — LORATADINE 10 MG: 10 TABLET ORAL at 09:41

## 2020-12-18 RX ADMIN — GUAIFENESIN 600 MG: 600 TABLET, EXTENDED RELEASE ORAL at 20:31

## 2020-12-18 RX ADMIN — SERTRALINE HYDROCHLORIDE 100 MG: 100 TABLET ORAL at 22:01

## 2020-12-18 RX ADMIN — ENOXAPARIN SODIUM 30 MG: 30 INJECTION SUBCUTANEOUS at 20:32

## 2020-12-18 RX ADMIN — FUROSEMIDE 40 MG: 10 INJECTION, SOLUTION INTRAMUSCULAR; INTRAVENOUS at 13:39

## 2020-12-18 RX ADMIN — LORAZEPAM 1.5 MG: 1 TABLET ORAL at 22:02

## 2020-12-18 RX ADMIN — LITHIUM CARBONATE 300 MG: 300 CAPSULE, GELATIN COATED ORAL at 22:03

## 2020-12-18 RX ADMIN — OXYMETAZOLINE HYDROCHLORIDE 2 SPRAY: 0.05 SPRAY NASAL at 11:00

## 2020-12-18 RX ADMIN — MULTIVITAMIN 15 ML: LIQUID ORAL at 08:19

## 2020-12-18 RX ADMIN — FAMOTIDINE 20 MG: 20 TABLET ORAL at 19:59

## 2020-12-18 RX ADMIN — ALBUTEROL SULFATE 2 PUFF: 90 AEROSOL, METERED RESPIRATORY (INHALATION) at 20:33

## 2020-12-18 NOTE — ASSESSMENT & PLAN NOTE
· Acute hypoxic respiratory failure likely due to COVID-19 infection  · Patient is hypoxic, improving, weaned from 15 L midflow to 6-8 L   · Given 40 mg Lasix x1 12/10-- which seemed to help  · Continue supplemental oxygen maintain O2 sats more than 92-94%  · Encourage proning- patient needs continuous encouragement and reminder to prone  · Pulmonary on board, now signing off   · See below for details of COVID 19 management   · Will need ambulatory pulse ox prior to discharge  · P r n  Lasix --> crackles noted on exam today  Will give 20 mg IV Lasix and monitor response  --> Not able to get dose of Lasix yesterday because of SBP continuously in low 90s  · Chest x-ray 12/17 shows worsening ground-glass opacities concerning for worsening COVID-19 pneumonia  Patient continues to require 13 L [address] [facility]  · Pulmonology advise requested and they rounded on patient this morning  He started patient on Claritin and Mucinex for nasal congestion and postnasal drip  They advised 40 mg IV Lasix today with albumin which was ordered  Will monitor response to diuresis and consider Lasix daily on p r n  basis

## 2020-12-18 NOTE — PROGRESS NOTES
Progress Note - Pulmonary   Dolphus Mater 76 y o  female MRN: [id number]  Unit/Bed#: -01 Encounter: [id number]      Assessment and Plan:    Acute hypoxic respiratory failure secondary to COVID-19   - continue to wean as tolerated for SPo2 90-92%   - continue to prone    - s/p remdesivir, dexamethasone, vitamins   - currently on 13lpm midflow   - recommend lasix 40mg x 1 with albumin again today - baseline BP is 90s so okay to give   - PCT negative, CRP increased   - D-dimer 0 95, would check again once over the weekend, if increasing >2 5, then would change to therapeutic lovenox  - check sputum cx     Sinus congestion   - start claritin and mucinex po bid    - c/w saline rinses, warm water baths     Asthma   - not in exacerbation     Subjective:   Patient seen/examined this AM  She was complaining of nasal stuffiness and reports nobody has been listening to her  She has been blowing out thick brown and bloody mucus  She does feel SOB on exertion  She did feel better after the lasix yesterday  Denies fever, chills, wheezing  She has been proning as directed  Review of Systems   HENT: Positive for congestion and sinus pressure  Respiratory: Positive for chest tightness and shortness of breath  All other systems reviewed and are negative  Objective:     Vitals:    12/17/20 1952 12/17/20 2129 12/18/20 0524 12/18/20 0739   BP:  (!) 96/49 95/52 95/53   BP Location:  Left arm     Pulse:  92 80 75   Resp:       Temp:  99 9 °F (37 7 °C) 97 9 °F (36 6 °C) 99 3 °F (37 4 °C)   TempSrc:  Axillary     SpO2: 94% 94% 92% (!) 87%   Weight:       Height:               Intake/Output Summary (Last 24 hours) at 12/18/2020 0854  Last data filed at 12/17/2020 1701  Gross per 24 hour   Intake 240 ml   Output 750 ml   Net -510 ml         Physical Exam  Vitals signs reviewed  HENT:      Head: Normocephalic and atraumatic  Eyes:      Pupils: Pupils are equal, round, and reactive to light     Neck:      Musculoskeletal: Normal range of motion  Cardiovascular:      Rate and Rhythm: Normal rate and regular rhythm  Pulses: Normal pulses  Pulmonary:      Effort: Pulmonary effort is normal       Breath sounds: Normal breath sounds  Abdominal:      General: Abdomen is flat  Palpations: Abdomen is soft  Musculoskeletal: Normal range of motion  Skin:     General: Skin is warm and dry  Neurological:      General: No focal deficit present  Mental Status: She is alert and oriented to person, place, and time  Labs: I have personally reviewed pertinent lab results  Results from last 7 days   Lab Units 12/18/20  0538 12/17/20  1045 12/16/20  1201   WBC Thousand/uL 7 60 7 75 8 55   HEMOGLOBIN g/dL 11 6 11 1* 12 5   HEMATOCRIT % 36 6 34 7* 38 6   PLATELETS Thousands/uL 206 214 252   NEUTROS PCT % 77* 76* 77*   MONOS PCT % 6 5 3*      Results from last 7 days   Lab Units 12/18/20  0538 12/17/20  1045 12/16/20  1201   POTASSIUM mmol/L 4 0 4 0 3 6   CHLORIDE mmol/L 104 107 105   CO2 mmol/L 29 25 30   BUN mg/dL 15 13 14   CREATININE mg/dL 0 77 0 73 0 84   CALCIUM mg/dL 9 3 8 9 9 0   ALK PHOS U/L 89 84 95   ALT U/L 98* 86* 83*   AST U/L 56* 49* 41                      0   Lab Value Date/Time    TROPONINI 0 14 (H) 12/05/2020 2123    TROPONINI 0 22 (H) 12/05/2020 0537    TROPONINI 0 28 (H) 12/04/2020 1836    TROPONINI 0 30 (H) 12/04/2020 1602    TROPONINI 0 32 (H) 12/04/2020 1304    TROPONINI 0 26 (H) 12/04/2020 0959    TROPONINI <0 02 10/24/2019 1048    TROPONINI <0 02 07/01/2019 1443    TROPONINI <0 02 07/01/2019 1118    TROPONINI <0 02 07/01/2019 0813       Microbiology:      Imaging and other studies: I have personally reviewed pertinent reports     and I have personally reviewed pertinent films in PACS        Anabel Ring MD   Pulmonary & Critical Care Fellow  [facility]

## 2020-12-18 NOTE — PLAN OF CARE
Problem: Potential for Falls  Goal: Patient will remain free of falls  Description: INTERVENTIONS:  - Assess patient frequently for physical needs  -  Identify cognitive and physical deficits and behaviors that affect risk of falls    -  [place] fall precautions as indicated by assessment   - Educate patient/family on patient safety including physical limitations  - Instruct patient to call for assistance with activity based on assessment  - Modify environment to reduce risk of injury  - Consider OT/PT consult to assist with strengthening/mobility  Outcome: Progressing     Problem: RESPIRATORY - ADULT  Goal: Achieves optimal ventilation and oxygenation  Description: INTERVENTIONS:  - Assess for changes in respiratory status  - Assess for changes in mentation and behavior  - Position to facilitate oxygenation and minimize respiratory effort  - Oxygen administered by appropriate delivery if ordered  - Initiate smoking cessation education as indicated  - Encourage broncho-pulmonary hygiene including cough, deep breathe, Incentive Spirometry  - Assess the need for suctioning and aspirate as needed  - Assess and instruct to report SOB or any respiratory difficulty  - Respiratory Therapy support as indicated  Outcome: Progressing     Problem: METABOLIC, FLUID AND ELECTROLYTES - ADULT  Goal: Electrolytes maintained within normal limits  Description: INTERVENTIONS:  - Monitor labs and assess patient for signs and symptoms of electrolyte imbalances  - Administer electrolyte replacement as ordered  - Monitor response to electrolyte replacements, including repeat lab results as appropriate  - Instruct patient on fluid and nutrition as appropriate  Outcome: Progressing  Goal: Fluid balance maintained  Description: INTERVENTIONS:  - Monitor labs   - Monitor I/O and WT  - Instruct patient on fluid and nutrition as appropriate  - Assess for signs & symptoms of volume excess or deficit  Outcome: Progressing     Problem: HEMATOLOGIC - ADULT  Goal: Maintains hematologic stability  Description: INTERVENTIONS  - Assess for signs and symptoms of bleeding or hemorrhage  - Monitor labs  - Administer supportive blood products/factors as ordered and appropriate  Outcome: Progressing     Problem: INFECTION - ADULT  Goal: Absence or prevention of progression during hospitalization  Description: INTERVENTIONS:  - Assess and monitor for signs and symptoms of infection  - Monitor lab/diagnostic results  - Monitor all insertion sites, i e  indwelling lines, tubes, and drains  - Monitor endotracheal if appropriate and nasal secretions for changes in amount and color  - [place] appropriate cooling/warming therapies per order  - Administer medications as ordered  - Instruct and encourage patient and family to use good hand hygiene technique  - Identify and instruct in appropriate isolation precautions for identified infection/condition  Outcome: Progressing  Goal: Absence of fever/infection during neutropenic period  Description: INTERVENTIONS:  - Monitor WBC    Outcome: Progressing     Problem: SAFETY ADULT  Goal: Patient will remain free of falls  Description: INTERVENTIONS:  - Assess patient frequently for physical needs  -  Identify cognitive and physical deficits and behaviors that affect risk of falls  -  [place] fall precautions as indicated by assessment   - Educate patient/family on patient safety including physical limitations  - Instruct patient to call for assistance with activity based on assessment  - Modify environment to reduce risk of injury  - Consider OT/PT consult to assist with strengthening/mobility  Outcome: Progressing  Goal: Maintain or return to baseline ADL function  Description: INTERVENTIONS:  - Assess patient frequently for physical needs  -  Identify cognitive and physical deficits and behaviors that affect risk of falls    -  [place] fall precautions as indicated by assessment   - Educate patient/family on patient safety including physical limitations  - Instruct patient to call for assistance with activity based on assessment  - Modify environment to reduce risk of injury  - Consider OT/PT consult to assist with strengthening/mobility  Outcome: Progressing  Goal: Maintain or return mobility status to optimal level  Description: INTERVENTIONS:  -  Assess patient's ability to carry out ADLs; assess patient's baseline for ADL function and identify physical deficits which impact ability to perform ADLs (bathing, care of mouth/teeth, toileting, grooming, dressing, etc )  - Assess/evaluate cause of self-care deficits   - Assess range of motion  - Assess patient's mobility; develop plan if impaired  - Assess patient's need for assistive devices and provide as appropriate  - Encourage maximum independence but intervene and supervise when necessary  - Involve family in performance of ADLs  - Assess for home care needs following discharge   - Consider OT consult to assist with ADL evaluation and planning for discharge  - Provide patient education as appropriate  Outcome: Progressing     Problem: DISCHARGE PLANNING  Goal: Discharge to home or other facility with appropriate resources  Description: INTERVENTIONS:  - Identify barriers to discharge w/patient and caregiver  - Arrange for needed discharge resources and transportation as appropriate  - Identify discharge learning needs (meds, wound care, etc )  - Arrange for interpretive services to assist at discharge as needed  - Refer to Case Management Department for coordinating discharge planning if the patient needs post-hospital services based on physician/advanced practitioner order or complex needs related to functional status, cognitive ability, or social support system  Outcome: Progressing     Problem: Knowledge Deficit  Goal: Patient/family/caregiver demonstrates understanding of disease process, treatment plan, medications, and discharge instructions  Description: Complete learning assessment and assess knowledge base  Interventions:  - Provide teaching at level of understanding  - Provide teaching via preferred learning methods  Outcome: Progressing     Problem: Prexisting or High Potential for Compromised Skin Integrity  Goal: Skin integrity is maintained or improved  Description: INTERVENTIONS:  - Identify patients at risk for skin breakdown  - Assess and monitor skin integrity  - Assess and monitor nutrition and hydration status  - Monitor labs   - Assess for incontinence   - Turn and reposition patient  - Assist with mobility/ambulation  - Relieve pressure over bony prominences  - Avoid friction and shearing  - Provide appropriate hygiene as needed including keeping skin clean and dry  - Evaluate need for skin moisturizer/barrier cream  - Collaborate with interdisciplinary team   - Patient/family teaching  - Consider wound care consult   Outcome: Progressing     Problem: Nutrition/Hydration-ADULT  Goal: Nutrient/Hydration intake appropriate for improving, restoring or maintaining nutritional needs  Description: Monitor and assess patient's nutrition/hydration status for malnutrition  Collaborate with interdisciplinary team and initiate plan and interventions as ordered  Monitor patient's weight and dietary intake as ordered or per policy  Utilize nutrition screening tool and intervene as necessary  Determine patient's food preferences and provide high-protein, high-caloric foods as appropriate       INTERVENTIONS:  - Monitor oral intake, urinary output, labs, and treatment plans  - Assess nutrition and hydration status and recommend course of action  - Evaluate amount of meals eaten  - Assist patient with eating if necessary   - Allow adequate time for meals  - Recommend/ encourage appropriate diets, oral nutritional supplements, and vitamin/mineral supplements  - Order, calculate, and assess calorie counts as needed  - Recommend, monitor, and adjust tube feedings and TPN/PPN based on assessed needs  - Assess need for intravenous fluids  - Provide specific nutrition/hydration education as appropriate  - Include patient/family/caregiver in decisions related to nutrition  Outcome: Progressing

## 2020-12-18 NOTE — CASE MANAGEMENT
PT/OT recs for home therapy  TC to pt room  Pt agreeable and requested [facility] for PT/OT  Referral in [facility]  A post acute care recommendation was made by your care team for [facility]  Discussed [place] of Choice with patient  List of facilities offered   Pt declined list

## 2020-12-18 NOTE — OCCUPATIONAL THERAPY NOTE
Occupational Therapy         Patient Name: Miguel MOTAKAE'W Date: 12/18/2020 12/18/20 1543   OT Last Visit   OT Visit Date 12/18/20   Note Type   Cancel Reasons Medical status       PT NOT MEDICALLY APPROPRIATE FOR PARTICIPATION IN THERAPY SESSION AT THIS TIME 2' O2 DESATURATION  WILL CONT TO FOLLOW AS APPROPRIATE      Chisé Diacik, MOT, OTR/L

## 2020-12-18 NOTE — PROGRESS NOTES
Progress Note - Chely Ponce 1955, 76 y o  female MRN: [id number]    Unit/Bed#: -01 Encounter: [id number]    Primary Care Provider: Thomasenia Cheadle, CRNP   Date and time admitted to hospital: 12/4/2020  9:15 AM        COVID-19 virus infection  Assessment & Plan  COVID-19 infection, tested positive on 11/29/2020  Patient be placed on moderate treatment protocol   Noted to have worsening of oxygenation requiring 15 L mid flow, with saturations in low 90s  · Completed dexamethasone 10/10  · Completed remdesivir 12/8  · Continue vitamin-D, zinc, vitamin-C  · Antibiotic discontinued  · s/p convalescent plasma 12/7/2020  · Continue self proning   · Monitor CRP, ferritin, D-dimer   · Pulm following, now signing off as respiratory status improving, down from 15 L to 8 L  · Will need ambulatory pulse ox prior to discharge  · 12/17-18: Continues to be on 13L [address]  Stable saturations in rest, but desaturates with minimal exertion  Will try to come down on the oxygen as tolerated  * Acute respiratory failure with hypoxia (HCC)  Assessment & Plan  · Acute hypoxic respiratory failure likely due to COVID-19 infection  · Patient is hypoxic, improving, weaned from 15 L midflow to 6-8 L   · Given 40 mg Lasix x1 12/10-- which seemed to help  · Continue supplemental oxygen maintain O2 sats more than 92-94%  · Encourage proning- patient needs continuous encouragement and reminder to prone  · Pulmonary on board, now signing off   · See below for details of COVID 19 management   · Will need ambulatory pulse ox prior to discharge  · P r n  Lasix --> crackles noted on exam today  Will give 20 mg IV Lasix and monitor response  --> Not able to get dose of Lasix yesterday because of SBP continuously in low 90s  · Chest x-ray 12/17 shows worsening ground-glass opacities concerning for worsening COVID-19 pneumonia  Patient continues to require 13 L [address]     · Pulmonology advise requested and they rounded on patient this morning  He started patient on Claritin and Mucinex for nasal congestion and postnasal drip  They advised 40 mg IV Lasix today with albumin which was ordered  Will monitor response to diuresis and consider Lasix daily on p r n  basis  Transaminitis  Assessment & Plan  Likely related to COVID-19 infection  Monitor     Elevated troponin  Assessment & Plan  Elevated troponin, peak troponin 0 32- likely due to Covid-19 infection  Trended down to 0 14  ECG - T wave inversions noted  Monitor     GERD (gastroesophageal reflux disease)  Assessment & Plan  Continue Famotidine and Mylanta     Bipolar disorder (HCC)  Assessment & Plan  Continue lithium sertraline, trazodone  Continue bedtime Ativan  Patient is calm and cooperative today  VTE Pharmacologic Prophylaxis:   Pharmacologic: Enoxaparin (Lovenox)  Mechanical VTE Prophylaxis in Place: No    Discussions with Specialists or Other Care Team Provider: pulmonology    Education and Discussions with Family / Patient:  Patient was updated all questions were answered  Patient prefers to update her significant other's herself  Current Length of Stay: 14 day(s)    Current Patient Status: Inpatient     Discharge Plan / Estimated Discharge Date:  Depending on clinical course  Code Status: Level 1 - Full Code      Subjective:   I found the patient laying in bed on her side with good saturations on 13 L mid flow oxygen and in no respiratory distress  She is alert and oriented  She complains of nasal congestion and postnasal drip and feels like her oxygen by nasal cannula is not effective due to this  Denies dizziness, chest pain, urinary symptoms  Did not yet have a bowel movement and will try enema today  She denies abdominal pain      Objective:     Vitals:   Temp (24hrs), Av °F (37 2 °C), Min:97 9 °F (36 6 °C), Max:99 9 °F (37 7 °C)    Temp:  [97 9 °F (36 6 °C)-99 9 °F (37 7 °C)] 99 3 °F (37 4 °C)  HR:  [] 100  BP: ()/(49-82) 100/82  SpO2: [87 %-97 %] 88 %  Body mass index is 35 62 kg/m²  Input and Output Summary (last 24 hours): Intake/Output Summary (Last 24 hours) at 12/18/2020 1352  Last data filed at 12/17/2020 1701  Gross per 24 hour   Intake --   Output 750 ml   Net -750 ml       Physical Exam:     Physical Exam  Constitutional:       General: She is not in acute distress  Appearance: She is obese  She is ill-appearing  HENT:      Mouth/Throat:      Mouth: Mucous membranes are moist    Neck:      Musculoskeletal: Normal range of motion and neck supple  Cardiovascular:      Rate and Rhythm: Normal rate and regular rhythm  Pulses: Normal pulses  Heart sounds: Normal heart sounds  No murmur  No friction rub  No gallop  Pulmonary:      Effort: Pulmonary effort is normal       Breath sounds: Normal breath sounds  Abdominal:      General: Abdomen is flat  Bowel sounds are normal       Palpations: Abdomen is soft  Musculoskeletal:      Right lower leg: No edema  Left lower leg: No edema  Neurological:      General: No focal deficit present  Mental Status: She is alert and oriented to person, place, and time  Psychiatric:         Mood and Affect: Mood normal          Additional Data:     Labs:    Results from last 7 days   Lab Units 12/18/20  0538   WBC Thousand/uL 7 60   HEMOGLOBIN g/dL 11 6   HEMATOCRIT % 36 6   PLATELETS Thousands/uL 206   NEUTROS PCT % 77*   LYMPHS PCT % 15   MONOS PCT % 6   EOS PCT % 2     Results from last 7 days   Lab Units 12/18/20  0538   POTASSIUM mmol/L 4 0   CHLORIDE mmol/L 104   CO2 mmol/L 29   BUN mg/dL 15   CREATININE mg/dL 0 77   CALCIUM mg/dL 9 3   ALK PHOS U/L 89   ALT U/L 98*   AST U/L 56*           * I Have Reviewed All Lab Data Listed Above  * Additional Pertinent Lab Tests Reviewed:  Sahil 65 Admission Reviewed    Imaging:    Imaging Reports Reviewed Today Include:  Chest x-ray 12/17  Imaging Personally Reviewed by Myself Includes: None    Recent Cultures (last 7 days):           Last 24 Hours Medication List:   Current Facility-Administered Medications   Medication Dose Route Frequency Provider Last Rate    acetaminophen  650 mg Oral Q6H PRN Dave Bolden MD      albuterol  2 puff Inhalation Q4H PRN Dave Bolden MD      albuterol  2 puff Inhalation TID Jumana Jalloh MD      aluminum-magnesium hydroxide-simethicone  30 mL Oral Q4H PRN Dave Bolden MD      atorvastatin  40 mg Oral HS Dave Bolden MD      benzonatate  100 mg Oral TID PRN Jhony Noe MD      bisacodyl  10 mg Rectal Daily PRN Dave Bolden MD      cholecalciferol  2,000 Units Oral Daily Dave Bolden MD      enoxaparin  30 mg Subcutaneous Q12H [facility] Dave Bolden MD      famotidine  20 mg Oral Q12H Dave Bolden MD      fluticasone  2 spray Each Nare Daily Dave Bolden MD      gabapentin  300 mg Oral Daily Dave Bolden MD      guaiFENesin  600 mg Oral Q12H [facility] Trang Seaman MD      lidocaine  1 patch Topical Daily Dave Bolden MD      lithium carbonate  300 mg Oral HS Dave Bolden MD      loratadine  10 mg Oral Daily Trang Seaman MD      LORazepam  1 mg Oral BID PRN Dave Bolden MD      LORazepam  1 5 mg Oral HS Dave Bolden MD      melatonin  6 mg Oral HS Dave Bolden MD      menthol-methyl salicylate   Apply externally 4x Daily PRN Rozena Cogan, PA-C      multivitamin with iron-minerals  15 mL Per NG Tube Daily Dave Bolden MD      ondansetron  4 mg Intravenous Q6H PRN Dave Bolden MD      oxymetazoline  2 spray Each Nare Q12H PRN Lorin Cage PA-C      oxymetazoline  2 spray Each Nare Q12H [facility] Jhony Noe MD      polyethylene glycol  17 g Oral Daily Dave Bolden MD      senna-docusate sodium  1 tablet Oral BID Dave Bolden MD      sertraline  100 mg Oral HS Dave Bolden MD      sodium chloride  1 spray Each Nare Q2H PRN Dave Bolden MD      traZODone  100 mg Oral HS Dave Bolden MD Today, Patient Was Seen By: Tahira Rivera MD    ** Please Note: This note has been constructed using a voice recognition system   **

## 2020-12-18 NOTE — RESTORATIVE TECHNICIAN NOTE
Restorative Specialist Mobility Note           Pt currently not appropriate for OOB activity due to O2 desaturation during movement per RN  Will continue to follow

## 2020-12-18 NOTE — ASSESSMENT & PLAN NOTE
COVID-19 infection, tested positive on 11/29/2020  Patient be placed on moderate treatment protocol   Noted to have worsening of oxygenation requiring 15 L mid flow, with saturations in low 90s  · Completed dexamethasone 10/10  · Completed remdesivir 12/8  · Continue vitamin-D, zinc, vitamin-C  · Antibiotic discontinued  · s/p convalescent plasma 12/7/2020  · Continue self proning   · Monitor CRP, ferritin, D-dimer   · Pulm following, now signing off as respiratory status improving, down from 15 L to 8 L  · Will need ambulatory pulse ox prior to discharge  · 12/17-18: Continues to be on 13L [address]  Stable saturations in rest, but desaturates with minimal exertion  Will try to come down on the oxygen as tolerated

## 2020-12-19 LAB
ALBUMIN SERPL BCP-MCNC: 3 G/DL (ref 3.5–5)
ALP SERPL-CCNC: 94 U/L (ref 46–116)
ALT SERPL W P-5'-P-CCNC: 123 U/L (ref 12–78)
ANION GAP SERPL CALCULATED.3IONS-SCNC: 7 MMOL/L (ref 4–13)
AST SERPL W P-5'-P-CCNC: 80 U/L (ref 5–45)
BACTERIA UR QL AUTO: ABNORMAL /HPF
BASOPHILS # BLD AUTO: 0.03 THOUSANDS/ΜL (ref 0–0.1)
BASOPHILS NFR BLD AUTO: 0 % (ref 0–1)
BILIRUB SERPL-MCNC: 0.77 MG/DL (ref 0.2–1)
BILIRUB UR QL STRIP: NEGATIVE
BUN SERPL-MCNC: 17 MG/DL (ref 5–25)
CALCIUM ALBUM COR SERPL-MCNC: 10.9 MG/DL (ref 8.3–10.1)
CALCIUM SERPL-MCNC: 10.1 MG/DL (ref 8.3–10.1)
CHLORIDE SERPL-SCNC: 101 MMOL/L (ref 100–108)
CLARITY UR: CLEAR
CO2 SERPL-SCNC: 30 MMOL/L (ref 21–32)
COLOR UR: YELLOW
CREAT SERPL-MCNC: 0.8 MG/DL (ref 0.6–1.3)
CRP SERPL QL: 266 MG/L
D DIMER PPP FEU-MCNC: 1.33 UG/ML FEU
EOSINOPHIL # BLD AUTO: 0.2 THOUSAND/ΜL (ref 0–0.61)
EOSINOPHIL NFR BLD AUTO: 2 % (ref 0–6)
ERYTHROCYTE [DISTWIDTH] IN BLOOD BY AUTOMATED COUNT: 12.9 % (ref 11.6–15.1)
FERRITIN SERPL-MCNC: 623 NG/ML (ref 8–388)
GFR SERPL CREATININE-BSD FRML MDRD: 77 ML/MIN/1.73SQ M
GLUCOSE SERPL-MCNC: 100 MG/DL (ref 65–140)
GLUCOSE UR STRIP-MCNC: NEGATIVE MG/DL
HCT VFR BLD AUTO: 35.5 % (ref 34.8–46.1)
HGB BLD-MCNC: 11.3 G/DL (ref 11.5–15.4)
HGB UR QL STRIP.AUTO: NEGATIVE
HYALINE CASTS #/AREA URNS LPF: ABNORMAL /LPF
IMM GRANULOCYTES # BLD AUTO: 0.03 THOUSAND/UL (ref 0–0.2)
IMM GRANULOCYTES NFR BLD AUTO: 0 % (ref 0–2)
KETONES UR STRIP-MCNC: NEGATIVE MG/DL
LEUKOCYTE ESTERASE UR QL STRIP: ABNORMAL
LYMPHOCYTES # BLD AUTO: 1.37 THOUSANDS/ΜL (ref 0.6–4.47)
LYMPHOCYTES NFR BLD AUTO: 16 % (ref 14–44)
MCH RBC QN AUTO: 26.9 PG (ref 26.8–34.3)
MCHC RBC AUTO-ENTMCNC: 31.8 G/DL (ref 31.4–37.4)
MCV RBC AUTO: 85 FL (ref 82–98)
MONOCYTES # BLD AUTO: 0.53 THOUSAND/ΜL (ref 0.17–1.22)
MONOCYTES NFR BLD AUTO: 6 % (ref 4–12)
NEUTROPHILS # BLD AUTO: 6.33 THOUSANDS/ΜL (ref 1.85–7.62)
NEUTS SEG NFR BLD AUTO: 76 % (ref 43–75)
NITRITE UR QL STRIP: NEGATIVE
NON-SQ EPI CELLS URNS QL MICRO: ABNORMAL /HPF
NRBC BLD AUTO-RTO: 0 /100 WBCS
PH UR STRIP.AUTO: 6 [PH]
PLATELET # BLD AUTO: 219 THOUSANDS/UL (ref 149–390)
PMV BLD AUTO: 11.4 FL (ref 8.9–12.7)
POTASSIUM SERPL-SCNC: 3.7 MMOL/L (ref 3.5–5.3)
PROCALCITONIN SERPL-MCNC: 0.16 NG/ML
PROT SERPL-MCNC: 7.1 G/DL (ref 6.4–8.2)
PROT UR STRIP-MCNC: NEGATIVE MG/DL
RBC # BLD AUTO: 4.2 MILLION/UL (ref 3.81–5.12)
RBC #/AREA URNS AUTO: ABNORMAL /HPF
SODIUM SERPL-SCNC: 138 MMOL/L (ref 136–145)
SP GR UR STRIP.AUTO: 1.02 (ref 1–1.03)
UROBILINOGEN UR QL STRIP.AUTO: 1 E.U./DL
WBC # BLD AUTO: 8.49 THOUSAND/UL (ref 4.31–10.16)
WBC #/AREA URNS AUTO: ABNORMAL /HPF

## 2020-12-19 PROCEDURE — 86140 C-REACTIVE PROTEIN: CPT | Performed by: INTERNAL MEDICINE

## 2020-12-19 PROCEDURE — 85025 COMPLETE CBC W/AUTO DIFF WBC: CPT | Performed by: INTERNAL MEDICINE

## 2020-12-19 PROCEDURE — 99232 SBSQ HOSP IP/OBS MODERATE 35: CPT | Performed by: INTERNAL MEDICINE

## 2020-12-19 PROCEDURE — 94762 N-INVAS EAR/PLS OXIMTRY CONT: CPT

## 2020-12-19 PROCEDURE — 82728 ASSAY OF FERRITIN: CPT | Performed by: INTERNAL MEDICINE

## 2020-12-19 PROCEDURE — 85379 FIBRIN DEGRADATION QUANT: CPT | Performed by: INTERNAL MEDICINE

## 2020-12-19 PROCEDURE — 80053 COMPREHEN METABOLIC PANEL: CPT | Performed by: INTERNAL MEDICINE

## 2020-12-19 PROCEDURE — 84145 PROCALCITONIN (PCT): CPT | Performed by: INTERNAL MEDICINE

## 2020-12-19 PROCEDURE — 94760 N-INVAS EAR/PLS OXIMETRY 1: CPT

## 2020-12-19 PROCEDURE — 99233 SBSQ HOSP IP/OBS HIGH 50: CPT | Performed by: INTERNAL MEDICINE

## 2020-12-19 RX ORDER — DEXAMETHASONE SODIUM PHOSPHATE 4 MG/ML
6 INJECTION, SOLUTION INTRA-ARTICULAR; INTRALESIONAL; INTRAMUSCULAR; INTRAVENOUS; SOFT TISSUE
Status: DISCONTINUED | OUTPATIENT
Start: 2020-12-19 | End: 2020-12-23

## 2020-12-19 RX ADMIN — GUAIFENESIN 600 MG: 600 TABLET, EXTENDED RELEASE ORAL at 22:14

## 2020-12-19 RX ADMIN — ATORVASTATIN CALCIUM 40 MG: 40 TABLET, FILM COATED ORAL at 21:26

## 2020-12-19 RX ADMIN — GABAPENTIN 300 MG: 300 CAPSULE ORAL at 10:01

## 2020-12-19 RX ADMIN — TRAZODONE HYDROCHLORIDE 100 MG: 100 TABLET ORAL at 21:26

## 2020-12-19 RX ADMIN — ALBUTEROL SULFATE 2 PUFF: 90 AEROSOL, METERED RESPIRATORY (INHALATION) at 21:26

## 2020-12-19 RX ADMIN — SENNOSIDES AND DOCUSATE SODIUM 1 TABLET: 8.6; 5 TABLET ORAL at 10:00

## 2020-12-19 RX ADMIN — POLYETHYLENE GLYCOL 3350 17 G: 17 POWDER, FOR SOLUTION ORAL at 09:59

## 2020-12-19 RX ADMIN — ENOXAPARIN SODIUM 30 MG: 30 INJECTION SUBCUTANEOUS at 10:00

## 2020-12-19 RX ADMIN — ENOXAPARIN SODIUM 30 MG: 30 INJECTION SUBCUTANEOUS at 21:26

## 2020-12-19 RX ADMIN — FAMOTIDINE 20 MG: 20 TABLET ORAL at 10:01

## 2020-12-19 RX ADMIN — LORATADINE 10 MG: 10 TABLET ORAL at 10:01

## 2020-12-19 RX ADMIN — MULTIVITAMIN 15 ML: LIQUID ORAL at 10:00

## 2020-12-19 RX ADMIN — DEXAMETHASONE SODIUM PHOSPHATE 6 MG: 4 INJECTION, SOLUTION INTRA-ARTICULAR; INTRALESIONAL; INTRAMUSCULAR; INTRAVENOUS; SOFT TISSUE at 10:25

## 2020-12-19 RX ADMIN — BENZONATATE 100 MG: 100 CAPSULE ORAL at 10:01

## 2020-12-19 RX ADMIN — FLUTICASONE PROPIONATE 2 SPRAY: 50 SPRAY, METERED NASAL at 10:30

## 2020-12-19 RX ADMIN — FAMOTIDINE 20 MG: 20 TABLET ORAL at 21:26

## 2020-12-19 RX ADMIN — SERTRALINE HYDROCHLORIDE 100 MG: 100 TABLET ORAL at 21:26

## 2020-12-19 RX ADMIN — GUAIFENESIN 600 MG: 600 TABLET, EXTENDED RELEASE ORAL at 10:01

## 2020-12-19 RX ADMIN — MELATONIN TAB 3 MG 6 MG: 3 TAB at 21:26

## 2020-12-19 RX ADMIN — SENNOSIDES AND DOCUSATE SODIUM 1 TABLET: 8.6; 5 TABLET ORAL at 17:28

## 2020-12-19 RX ADMIN — Medication 2000 UNITS: at 10:01

## 2020-12-19 RX ADMIN — OXYMETAZOLINE HYDROCHLORIDE 2 SPRAY: 0.05 SPRAY NASAL at 21:27

## 2020-12-19 RX ADMIN — LIDOCAINE 5% 1 PATCH: 700 PATCH TOPICAL at 10:00

## 2020-12-19 RX ADMIN — ALBUTEROL SULFATE 2 PUFF: 90 AEROSOL, METERED RESPIRATORY (INHALATION) at 10:30

## 2020-12-19 RX ADMIN — LORAZEPAM 1 MG: 1 TABLET ORAL at 10:02

## 2020-12-19 RX ADMIN — LORAZEPAM 1.5 MG: 1 TABLET ORAL at 21:26

## 2020-12-19 RX ADMIN — LITHIUM CARBONATE 300 MG: 300 CAPSULE, GELATIN COATED ORAL at 23:30

## 2020-12-19 RX ADMIN — OXYMETAZOLINE HYDROCHLORIDE 2 SPRAY: 0.05 SPRAY NASAL at 10:30

## 2020-12-19 NOTE — ASSESSMENT & PLAN NOTE
COVID-19 infection, tested positive on 11/29/2020  Patient be placed on moderate treatment protocol   Noted to have worsening of oxygenation requiring 15 L mid flow, with saturations in low 90s  · Completed dexamethasone 10/10  · Completed remdesivir 12/8  · Continue vitamin-D, zinc, vitamin-C  · Antibiotic discontinued  · s/p convalescent plasma 12/7/2020  · Continue self proning   · Monitor CRP, ferritin, D-dimer   · Will need ambulatory pulse ox prior to discharge  · 12/17-18: Continues to be on 13L [address]  Stable saturations in rest, but desaturates with minimal exertion    · Restarted on Decadron by pulmonology for worsening pneumonitis  ·  Continue with supportive care

## 2020-12-19 NOTE — ASSESSMENT & PLAN NOTE
· Acute hypoxic respiratory failure likely due to COVID-19 infection  · Patient is hypoxic, improving, weaned from 15 L midflow to 6-8 L   · Given 40 mg Lasix x1 12/10-- which seemed to help  · Encourage proning- patient needs continuous encouragement and reminder to prone  · Pulmonary on board, now signing off   · See below for details of COVID 19 management   · Will need ambulatory pulse ox prior to discharge  · Chest x-ray 12/17 shows worsening ground-glass opacities concerning for worsening COVID-19 pneumonia  Patient continues to require 13 L [address]    · Currently oxygenation is better  · Clarity and use NX for nasal congestion and postnasal drip were added by pulmonology  · Received Lasix IV on 12/18   · Possibly worsening hypoxia secondary to mechanical obstruction from nasal congestion and sinuses  · Restarted on Decadron by pulmonology due to worsening pneumonitis  · Continue with supportive measures

## 2020-12-19 NOTE — PROGRESS NOTES
PULMONOLOGY PROGRESS NOTE     Name: Brandon Deluna   Age & Sex: 76 y o  female   MRN: [id number]  Unit/Bed#: -01   Encounter: [id number]    PATIENT INFORMATION     Name: Brandon Deluna   Age & Sex: 76 y o  female   MRN: [id number]  Hospital Stay Days: 15    ASSESSMENT/PLAN     Principal Problem:    Acute respiratory failure with hypoxia [facility])  Active Problems:    Bipolar disorder ([address] )    GERD (gastroesophageal reflux disease)    COVID-19 virus infection    Elevated troponin    Transaminitis    Assessment  1  Acute hypoxic respiratory failure - secondary to COVID-19 pneumonia  2  Moderate COVID-19 pneumonia  3  Asthma - unknown severity  4  Sinus condition    Plan  -currently saturating over 90% on 12 L mid flow  -continue supplemental oxygen and titrate as tolerated; goal SpO2 > 88%  -suspect patient is in the fibrotic phase versus worsening pneumonitis  She is greater than 2 weeks since initial diagnosis  Repeat chest x-ray from 2020 showing worsening infiltrates  -status post COVID-19 pathway with remdesivir, dexamethasone, vitamins  -restart IV Decadron 6 mg daily  -encourage prone positioning and lateral recumbent positioning  -continue Claritin/Mucinex, Flonase, and saline rinses  -continue Lovenox 30 mg q 12 hours  -continue albuterol meter dose inhaler    SUBJECTIVE     Patient seen and examined  No acute events overnight  Patient reports fatigue and mild chest tightness while taking a deep breath  Reports shortness of breath mildly improved  No other major complaints      OBJECTIVE     Vitals:    20 1515 20 1517 20 1521 20 1526   BP:    100/65   BP Location:       Pulse: 95 90 88 88   Resp:       Temp:    99 °F (37 2 °C)   TempSrc:       SpO2: (!) 86% (!) 88% 93% 91%   Weight:       Height:          Temperature:   Temp (24hrs), Av 9 °F (37 7 °C), Min:98 5 °F (36 9 °C), Max:102 5 °F (39 2 °C)    Temperature: 99 °F (37 2 °C)  Intake & Output:  I/O       701 - 12/18 0700 12/18 0701 - 12/19 0700 12/19 0701 - 12/20 0700    P  O  240  450    Total Intake(mL/kg) 240 (2 8)  450 (5 5)    Urine (mL/kg/hr) 750 (0 4) 800 (0 4)     Total Output 750 800     Net -510 -800 +450           Unmeasured Urine Occurrence 1 x 2 x 1 x    Unmeasured Stool Occurrence 0 x          Weights:   IBW: 47 8 kg    Body mass index is 33 95 kg/m²  Weight (last 2 days)     Date/Time   Weight    12/19/20 0600   81 5 (179 68)            Physical Exam  Constitutional:       General: She is not in acute distress  Appearance: She is obese  She is not ill-appearing, toxic-appearing or diaphoretic  Interventions: She is not intubated  HENT:      Head: Normocephalic and atraumatic  Eyes:      Pupils: Pupils are equal, round, and reactive to light  Neck:      Vascular: No JVD  Trachea: No tracheal deviation  Cardiovascular:      Rate and Rhythm: Normal rate and regular rhythm  No extrasystoles are present  Pulses: Normal pulses  No decreased pulses  Heart sounds: Normal heart sounds  No murmur  No friction rub  No gallop  Pulmonary:      Effort: Accessory muscle usage present  No tachypnea, bradypnea or respiratory distress  She is not intubated  Breath sounds: No stridor  Examination of the right-middle field reveals decreased breath sounds  Examination of the left-middle field reveals decreased breath sounds  Examination of the right-lower field reveals decreased breath sounds  Examination of the left-lower field reveals decreased breath sounds  Decreased breath sounds present  No wheezing, rhonchi or rales  Chest:      Chest wall: No mass, deformity, tenderness, crepitus or edema  Musculoskeletal:      Right lower leg: She exhibits no tenderness  No edema  Left lower leg: She exhibits no tenderness  No edema  Skin:     General: Skin is warm and dry  Neurological:      General: No focal deficit present        Mental Status: She is alert and oriented to person, place, and time  Cranial Nerves: No cranial nerve deficit  Motor: No weakness  LABORATORY DATA     Labs: I have personally reviewed pertinent reports  Results from last 7 days   Lab Units 12/19/20  0610 12/18/20  0538 12/17/20  1045   WBC Thousand/uL 8 49 7 60 7 75   HEMOGLOBIN g/dL 11 3* 11 6 11 1*   HEMATOCRIT % 35 5 36 6 34 7*   PLATELETS Thousands/uL 219 206 214   NEUTROS PCT % 76* 77* 76*   MONOS PCT % 6 6 5      Results from last 7 days   Lab Units 12/19/20  0610 12/18/20  0538 12/17/20  1045   POTASSIUM mmol/L 3 7 4 0 4 0   CHLORIDE mmol/L 101 104 107   CO2 mmol/L 30 29 25   BUN mg/dL 17 15 13   CREATININE mg/dL 0 80 0 77 0 73   CALCIUM mg/dL 10 1 9 3 8 9   ALK PHOS U/L 94 89 84   ALT U/L 123* 98* 86*   AST U/L 80* 56* 49*                              ABG:       IMAGING & DIAGNOSTIC TESTING     Radiology Results: I have personally reviewed pertinent reports  Xr Chest 1 View Portable    Result Date: 12/4/2020  Impression: Left lung base infiltrate  In the setting of clinically suspected/proven COVID-19, this plain film appearance does not contain findings that raise concern for viral pneumonia such as COVID-19, but does not rule out this diagnosis  The study was marked in EPIC for significant notification  Workstation performed: [id number]     Other Diagnostic Testing: I have personally reviewed pertinent reports      ACTIVE MEDICATIONS     Current Facility-Administered Medications   Medication Dose Route Frequency    acetaminophen (TYLENOL) tablet 650 mg  650 mg Oral Q6H PRN    albuterol (PROVENTIL HFA,VENTOLIN HFA) inhaler 2 puff  2 puff Inhalation Q4H PRN    albuterol (PROVENTIL HFA,VENTOLIN HFA) inhaler 2 puff  2 puff Inhalation TID    aluminum-magnesium hydroxide-simethicone (MYLANTA) oral suspension 30 mL  30 mL Oral Q4H PRN    atorvastatin (LIPITOR) tablet 40 mg  40 mg Oral HS    benzonatate (TESSALON PERLES) capsule 100 mg  100 mg Oral TID PRN    bisacodyl (DULCOLAX) rectal suppository 10 mg  10 mg Rectal Daily PRN    cholecalciferol (VITAMIN D3) tablet 2,000 Units  2,000 Units Oral Daily    dexamethasone (DECADRON) injection 6 mg  6 mg Intravenous Q24H [facility]    enoxaparin (LOVENOX) subcutaneous injection 30 mg  30 mg Subcutaneous Q12H [facility]    famotidine (PEPCID) tablet 20 mg  20 mg Oral Q12H    fluticasone (FLONASE) 50 mcg/act nasal spray 2 spray  2 spray Each Nare Daily    gabapentin (NEURONTIN) capsule 300 mg  300 mg Oral Daily    guaiFENesin (MUCINEX) 12 hr tablet 600 mg  600 mg Oral Q12H LEAH    lidocaine (LIDODERM) 5 % patch 1 patch  1 patch Topical Daily    lithium carbonate capsule 300 mg  300 mg Oral HS    loratadine (CLARITIN) tablet 10 mg  10 mg Oral Daily    LORazepam (ATIVAN) tablet 1 mg  1 mg Oral BID PRN    LORazepam (ATIVAN) tablet 1 5 mg  1 5 mg Oral HS    melatonin tablet 6 mg  6 mg Oral HS    menthol-methyl salicylate (BENGAY) 24-43 % cream   Apply externally 4x Daily PRN    multivitamin with iron-minerals liquid 15 mL  15 mL Per NG Tube Daily    ondansetron (ZOFRAN) injection 4 mg  4 mg Intravenous Q6H PRN    oxymetazoline (AFRIN) 0 05 % nasal spray 2 spray  2 spray Each Nare Q12H PRN    oxymetazoline (AFRIN) 0 05 % nasal spray 2 spray  2 spray Each Nare Q12H LEAH    polyethylene glycol (MIRALAX) packet 17 g  17 g Oral Daily    senna-docusate sodium (SENOKOT S) 8 6-50 mg per tablet 1 tablet  1 tablet Oral BID    sertraline (ZOLOFT) tablet 100 mg  100 mg Oral HS    sodium chloride (OCEAN) 0 65 % nasal spray 1 spray  1 spray Each Nare Q2H PRN    traZODone (DESYREL) tablet 100 mg  100 mg Oral HS       Portions of the record may have been created with voice recognition software  Occasional wrong word or "sound a like" substitutions may have occurred due to the inherent limitations of voice recognition software    Read the chart carefully and recognize, using context, where substitutions have occurred   ==  Gaurang Bond, DO [facility]  Pulmonary/Critical Care Fellowship PGY-4

## 2020-12-19 NOTE — PROGRESS NOTES
Progress Note - Marguerite Moore 1955, 76 y o  female MRN: [id number]    Unit/Bed#: -01 Encounter: [id number]    Primary Care Provider: LUIS CARLOS Logan   Date and time admitted to hospital: 12/4/2020  9:15 AM        COVID-19 virus infection  Assessment & Plan  COVID-19 infection, tested positive on 11/29/2020  Patient be placed on moderate treatment protocol   Noted to have worsening of oxygenation requiring 15 L mid flow, with saturations in low 90s  · Completed dexamethasone 10/10  · Completed remdesivir 12/8  · Continue vitamin-D, zinc, vitamin-C  · Antibiotic discontinued  · s/p convalescent plasma 12/7/2020  · Continue self proning   · Monitor CRP, ferritin, D-dimer   · Will need ambulatory pulse ox prior to discharge  · 12/17-18: Continues to be on 13L [address]  Stable saturations in rest, but desaturates with minimal exertion  · Restarted on Decadron by pulmonology for worsening pneumonitis  ·  Continue with supportive care     * Acute respiratory failure with hypoxia ([facility] [address] )  Assessment & Plan  · Acute hypoxic respiratory failure likely due to COVID-19 infection  · Patient is hypoxic, improving, weaned from 15 L midflow to 6-8 L   · Given 40 mg Lasix x1 12/10-- which seemed to help  · Encourage proning- patient needs continuous encouragement and reminder to prone  · Pulmonary on board, now signing off   · See below for details of COVID 19 management   · Will need ambulatory pulse ox prior to discharge  · Chest x-ray 12/17 shows worsening ground-glass opacities concerning for worsening COVID-19 pneumonia  Patient continues to require 13 L [address]    · Currently oxygenation is better  · Clarity and use NX for nasal congestion and postnasal drip were added by pulmonology  · Received Lasix IV on 12/18   · Possibly worsening hypoxia secondary to mechanical obstruction from nasal congestion and sinuses  · Restarted on Decadron by pulmonology due to worsening pneumonitis  · Continue with supportive measures    Transaminitis  Assessment & Plan  Likely related to COVID-19 infection  Monitor     GERD (gastroesophageal reflux disease)  Assessment & Plan  Continue Famotidine and Mylanta     Bipolar disorder (HCC)  Assessment & Plan  Continue lithium sertraline, trazodone  Continue bedtime Ativan         VTE Pharmacologic Prophylaxis:   Pharmacologic: Enoxaparin (Lovenox)  Mechanical VTE Prophylaxis in Place: Yes    Patient Centered Rounds: I have performed bedside rounds with nursing staff today  Discussions with Specialists or Other Care Team Provider:     Education and Discussions with Family / Patient:  Patient    Time Spent for Care: 45 minutes  More than 50% of total time spent on counseling and coordination of care as described above  Current Length of Stay: 15 day(s)    Current Patient Status: Inpatient   Certification Statement: The patient will continue to require additional inpatient hospital stay due to Above    Discharge Plan / Estimated Discharge Date: TBD    Code Status: Level 1 - Full Code      Subjective:   Patient seen and examined  Comfortable in bed  Still with dyspnea on exertion  Oxygenation improving after placing face mask  Patient complaining of nasal congestion and blockage    Objective:     Vitals:   Temp (24hrs), Av 6 °F (38 1 °C), Min:98 5 °F (36 9 °C), Max:102 9 °F (39 4 °C)    Temp:  [98 5 °F (36 9 °C)-102 9 °F (39 4 °C)] 98 8 °F (37 1 °C)  HR:  [] 87  Resp:  [20] 20  BP: ()/(57-82) 100/64  SpO2:  [84 %-94 %] 90 %  Body mass index is 33 95 kg/m²  Input and Output Summary (last 24 hours):        Intake/Output Summary (Last 24 hours) at 2020 1241  Last data filed at 2020 1143  Gross per 24 hour   Intake 450 ml   Output 800 ml   Net -350 ml       Physical Exam:     Physical Exam  Patient is awake alert oriented no acute distress  Lung with intermittent rhonchi bilateral  Heart positive S1-S2 no murmur  Abdomen soft nontender  Lower extremities no edema    Additional Data:     Labs:    Results from last 7 days   Lab Units 12/19/20  0610   WBC Thousand/uL 8 49   HEMOGLOBIN g/dL 11 3*   HEMATOCRIT % 35 5   PLATELETS Thousands/uL 219   NEUTROS PCT % 76*   LYMPHS PCT % 16   MONOS PCT % 6   EOS PCT % 2     Results from last 7 days   Lab Units 12/19/20  0610   POTASSIUM mmol/L 3 7   CHLORIDE mmol/L 101   CO2 mmol/L 30   BUN mg/dL 17   CREATININE mg/dL 0 80   CALCIUM mg/dL 10 1   ALK PHOS U/L 94   ALT U/L 123*   AST U/L 80*           * I Have Reviewed All Lab Data Listed Above  * Additional Pertinent Lab Tests Reviewed: [address] Admission Reviewed    Imaging:    Imaging Reports Reviewed Today Include:   Imaging Personally Reviewed by Myself Includes:    Recent Cultures (last 7 days):     Results from last 7 days   Lab Units 12/18/20  1637   BLOOD CULTURE  Received in Microbiology Lab  Culture in Progress  Received in Microbiology Lab  Culture in Progress         Last 24 Hours Medication List:   Current Facility-Administered Medications   Medication Dose Route Frequency Provider Last Rate    acetaminophen  650 mg Oral Q6H PRN Rosalinda Barron MD      albuterol  2 puff Inhalation Q4H PRN Rosalinda Barron MD      albuterol  2 puff Inhalation TID Ann Murdock MD      aluminum-magnesium hydroxide-simethicone  30 mL Oral Q4H PRN Rosalinda Barron MD      atorvastatin  40 mg Oral HS Rosalinda Barron MD      benzonatate  100 mg Oral TID PRN Evin Hatch MD      bisacodyl  10 mg Rectal Daily PRN Rosalinda Barron MD      cholecalciferol  2,000 Units Oral Daily Rosalinda Barron MD      dexamethasone  6 mg Intravenous Q24H [address] Cinthya Wolfe DO      enoxaparin  30 mg Subcutaneous Q12H [address] Rosalinda Barron MD      famotidine  20 mg Oral Q12H Rosalinda Barron MD      fluticasone  2 spray Each Nare Daily Rosalinda Barron MD      gabapentin  300 mg Oral Daily Rosalinda Barron MD      guaiFENesin  600 mg Oral Q12H [address] Garry Mann MD      lidocaine  1 patch Topical Daily Diamond Martines MD      lithium carbonate  300 mg Oral HS Diamond Martines MD      loratadine  10 mg Oral Daily Anabel Ring MD      LORazepam  1 mg Oral BID PRN Diamond Martines MD      LORazepam  1 5 mg Oral HS Diamond Martines MD      melatonin  6 mg Oral HS Diamond Martines MD      menthol-methyl salicylate   Apply externally 4x Daily PRN Janny Kaye PA-C      multivitamin with iron-minerals  15 mL Per NG Tube Daily Diamond Martines MD      ondansetron  4 mg Intravenous Q6H PRN Diamond Martines MD      oxymetazoline  2 spray Each Nare Q12H PRN Lorin Cage PA-C      oxymetazoline  2 spray Each Nare Q12H [address] Ana Madrigal MD      polyethylene glycol  17 g Oral Daily Diamond Martines MD      senna-docusate sodium  1 tablet Oral BID Diamond Martines MD      sertraline  100 mg Oral HS Diamond Martines MD      sodium chloride  1 spray Each Nare Q2H PRN Diamond Martines MD      traZODone  100 mg Oral HS Diamond Martines MD          Today, Patient Was Seen By: Sweta Domingo DO    ** Please Note: This note has been constructed using a voice recognition system   **

## 2020-12-20 LAB — BACTERIA UR CULT: ABNORMAL

## 2020-12-20 PROCEDURE — 99233 SBSQ HOSP IP/OBS HIGH 50: CPT | Performed by: INTERNAL MEDICINE

## 2020-12-20 PROCEDURE — 94760 N-INVAS EAR/PLS OXIMETRY 1: CPT

## 2020-12-20 PROCEDURE — 99232 SBSQ HOSP IP/OBS MODERATE 35: CPT | Performed by: INTERNAL MEDICINE

## 2020-12-20 RX ORDER — ECHINACEA PURPUREA EXTRACT 125 MG
2 TABLET ORAL EVERY 2 HOUR PRN
Status: DISCONTINUED | OUTPATIENT
Start: 2020-12-20 | End: 2021-01-11 | Stop reason: HOSPADM

## 2020-12-20 RX ORDER — OXYMETAZOLINE HYDROCHLORIDE 0.05 G/100ML
2 SPRAY NASAL
Status: COMPLETED | OUTPATIENT
Start: 2020-12-20 | End: 2020-12-21

## 2020-12-20 RX ORDER — FUROSEMIDE 10 MG/ML
40 INJECTION INTRAMUSCULAR; INTRAVENOUS ONCE
Status: COMPLETED | OUTPATIENT
Start: 2020-12-20 | End: 2020-12-20

## 2020-12-20 RX ORDER — PSEUDOEPHEDRINE HCL 120 MG/1
120 TABLET, FILM COATED, EXTENDED RELEASE ORAL DAILY
Status: COMPLETED | OUTPATIENT
Start: 2020-12-20 | End: 2020-12-22

## 2020-12-20 RX ORDER — AZELASTINE 1 MG/ML
1 SPRAY, METERED NASAL 2 TIMES DAILY
Status: DISCONTINUED | OUTPATIENT
Start: 2020-12-20 | End: 2021-01-11 | Stop reason: HOSPADM

## 2020-12-20 RX ORDER — GUAIFENESIN 600 MG
1200 TABLET, EXTENDED RELEASE 12 HR ORAL EVERY 12 HOURS SCHEDULED
Status: DISCONTINUED | OUTPATIENT
Start: 2020-12-20 | End: 2020-12-24

## 2020-12-20 RX ADMIN — BENZONATATE 100 MG: 100 CAPSULE ORAL at 09:19

## 2020-12-20 RX ADMIN — PSEUDOEPHEDRINE HCL 120 MG: 120 TABLET, FILM COATED, EXTENDED RELEASE ORAL at 04:42

## 2020-12-20 RX ADMIN — LORAZEPAM 1 MG: 1 TABLET ORAL at 02:46

## 2020-12-20 RX ADMIN — FUROSEMIDE 40 MG: 10 INJECTION, SOLUTION INTRAMUSCULAR; INTRAVENOUS at 09:18

## 2020-12-20 RX ADMIN — LIDOCAINE 5% 1 PATCH: 700 PATCH TOPICAL at 09:10

## 2020-12-20 RX ADMIN — TRAZODONE HYDROCHLORIDE 100 MG: 100 TABLET ORAL at 21:08

## 2020-12-20 RX ADMIN — POLYETHYLENE GLYCOL 3350 17 G: 17 POWDER, FOR SOLUTION ORAL at 09:22

## 2020-12-20 RX ADMIN — ALBUTEROL SULFATE 2 PUFF: 90 AEROSOL, METERED RESPIRATORY (INHALATION) at 16:30

## 2020-12-20 RX ADMIN — DEXAMETHASONE SODIUM PHOSPHATE 6 MG: 4 INJECTION, SOLUTION INTRA-ARTICULAR; INTRALESIONAL; INTRAMUSCULAR; INTRAVENOUS; SOFT TISSUE at 09:11

## 2020-12-20 RX ADMIN — LORATADINE 10 MG: 10 TABLET ORAL at 09:19

## 2020-12-20 RX ADMIN — LORAZEPAM 1 MG: 1 TABLET ORAL at 09:19

## 2020-12-20 RX ADMIN — GUAIFENESIN 1200 MG: 600 TABLET, EXTENDED RELEASE ORAL at 21:07

## 2020-12-20 RX ADMIN — AZELASTINE HYDROCHLORIDE 1 SPRAY: 137 SPRAY, METERED NASAL at 09:55

## 2020-12-20 RX ADMIN — FAMOTIDINE 20 MG: 20 TABLET ORAL at 09:19

## 2020-12-20 RX ADMIN — SENNOSIDES AND DOCUSATE SODIUM 1 TABLET: 8.6; 5 TABLET ORAL at 17:34

## 2020-12-20 RX ADMIN — MELATONIN TAB 3 MG 6 MG: 3 TAB at 21:07

## 2020-12-20 RX ADMIN — ATORVASTATIN CALCIUM 40 MG: 40 TABLET, FILM COATED ORAL at 21:07

## 2020-12-20 RX ADMIN — ALBUTEROL SULFATE 2 PUFF: 90 AEROSOL, METERED RESPIRATORY (INHALATION) at 21:29

## 2020-12-20 RX ADMIN — LITHIUM CARBONATE 300 MG: 300 CAPSULE, GELATIN COATED ORAL at 21:09

## 2020-12-20 RX ADMIN — ENOXAPARIN SODIUM 30 MG: 30 INJECTION SUBCUTANEOUS at 21:08

## 2020-12-20 RX ADMIN — MULTIVITAMIN 15 ML: LIQUID ORAL at 09:25

## 2020-12-20 RX ADMIN — GABAPENTIN 300 MG: 300 CAPSULE ORAL at 09:19

## 2020-12-20 RX ADMIN — SERTRALINE HYDROCHLORIDE 100 MG: 100 TABLET ORAL at 21:08

## 2020-12-20 RX ADMIN — ENOXAPARIN SODIUM 30 MG: 30 INJECTION SUBCUTANEOUS at 09:22

## 2020-12-20 RX ADMIN — LORAZEPAM 1.5 MG: 1 TABLET ORAL at 21:07

## 2020-12-20 RX ADMIN — GUAIFENESIN 1200 MG: 600 TABLET, EXTENDED RELEASE ORAL at 04:30

## 2020-12-20 RX ADMIN — OXYMETAZOLINE HYDROCHLORIDE 2 SPRAY: 0.05 SPRAY NASAL at 21:28

## 2020-12-20 RX ADMIN — AZELASTINE HYDROCHLORIDE 1 SPRAY: 137 SPRAY, METERED NASAL at 17:34

## 2020-12-20 RX ADMIN — Medication 2000 UNITS: at 09:20

## 2020-12-20 RX ADMIN — ALBUTEROL SULFATE 2 PUFF: 90 AEROSOL, METERED RESPIRATORY (INHALATION) at 09:21

## 2020-12-20 RX ADMIN — FLUTICASONE PROPIONATE 2 SPRAY: 50 SPRAY, METERED NASAL at 09:23

## 2020-12-20 RX ADMIN — FAMOTIDINE 20 MG: 20 TABLET ORAL at 21:07

## 2020-12-20 RX ADMIN — SENNOSIDES AND DOCUSATE SODIUM 1 TABLET: 8.6; 5 TABLET ORAL at 09:19

## 2020-12-20 NOTE — QUICK NOTE
Increased O2 12 ->15L midflow for frequent desats  Pt on lateral side/proning with improvement  States she feels significantly congested even with regimen  States she does better at home with oral decongestant  Will trial pseudophedrine for congestion

## 2020-12-20 NOTE — PROGRESS NOTES
PULMONOLOGY PROGRESS NOTE     Name: Tammi Moss   Age & Sex: 76 y o  female   MRN: [id number]  Unit/Bed#: -01   Encounter: [id number]    PATIENT INFORMATION     Name: Tammi Moss   Age & Sex: 76 y o  female   MRN: [id number]  Hospital Stay Days: 16    ASSESSMENT/PLAN     Principal Problem:    Acute respiratory failure with hypoxia [facility])  Active Problems:    Bipolar disorder ([address] )    GERD (gastroesophageal reflux disease)    COVID-19 virus infection    Elevated troponin    Transaminitis      Assessment  1  Acute hypoxic respiratory failure - secondary to COVID-19 pneumonia  2  Moderate COVID-19 pneumonia  3  Asthma - unknown severity  4  Sinus condition     Plan  -currently saturating over 90% on 12-15 L mid flow; patient desaturates with minimal exertion  -continue supplemental oxygen and titrate as tolerated; goal SpO2 > 88%  -suspect patient is in the fibrotic phase versus worsening pneumonitis  She is greater than 2 weeks since initial diagnosis  Repeat chest x-ray from 12/17/2020 showing worsening infiltrates  -status post COVID-19 pathway with remdesivir, dexamethasone, vitamins  -continue IV Decadron 6 mg daily  -administer 40 mg IV Lasix p r n   -encourage prone positioning and lateral recumbent positioning  -continue Claritin/Mucinex, Flonase, Afrin, Sudafed, azelastine and saline rinses  -continue Lovenox 30 mg q 12 hours  -continue albuterol meter dose inhaler  -patient may require oxygen concentrator device on discharge for higher levels of oxygen requirements    SUBJECTIVE     Patient seen and examined  No acute events overnight  Patient resting in no acute distress  Reports nasal congestion  States overall shortness of breath has remained largely unchanged      OBJECTIVE     Vitals:    12/20/20 1558 12/20/20 1610 12/20/20 1629 12/20/20 1634   BP:       BP Location:       Pulse: 79 75 77 78   Resp:       Temp:       TempSrc:       SpO2: (!) 84% 91% 97% 96%   Weight:       Height: Temperature:   Temp (24hrs), Av 9 °F (36 6 °C), Min:97 9 °F (36 6 °C), Max:97 9 °F (36 6 °C)    Temperature: 97 9 °F (36 6 °C)  Intake & Output:  I/O        0701 -  0700  07 -  0700  07 -  0700    P  O   568     Total Intake(mL/kg)  568 (7)     Urine (mL/kg/hr) 800 (0 4)  775 (0 9)    Total Output 800  775    Net -800 +568 -775           Unmeasured Urine Occurrence 2 x 1 x         Weights:   IBW: 47 8 kg    Body mass index is 33 99 kg/m²  Weight (last 2 days)     Date/Time   Weight    20 0557   81 6 (179 9)    20 0600   81 5 (179 68)            Physical Exam  Vitals signs reviewed  Constitutional:       General: She is not in acute distress  Appearance: She is obese  She is ill-appearing  She is not toxic-appearing or diaphoretic  Interventions: She is not intubated  HENT:      Head: Normocephalic and atraumatic  Eyes:      Extraocular Movements: Extraocular movements intact  Neck:      Trachea: No tracheal deviation  Cardiovascular:      Rate and Rhythm: Normal rate and regular rhythm  No extrasystoles are present  Pulses: Normal pulses  No decreased pulses  Heart sounds: Normal heart sounds  No murmur  No friction rub  No gallop  Pulmonary:      Effort: Accessory muscle usage present  No bradypnea or respiratory distress  She is not intubated  Breath sounds: No stridor  Examination of the right-middle field reveals decreased breath sounds  Examination of the left-middle field reveals decreased breath sounds  Examination of the right-lower field reveals decreased breath sounds  Examination of the left-lower field reveals decreased breath sounds  Decreased breath sounds present  No wheezing, rhonchi or rales  Chest:      Chest wall: No mass, deformity, tenderness, crepitus or edema  Abdominal:      Tenderness: There is no abdominal tenderness  There is no guarding or rebound  Musculoskeletal:      Right lower leg: No edema  Left lower leg: No edema  Skin:     General: Skin is warm and dry  Neurological:      Mental Status: She is alert and oriented to person, place, and time  LABORATORY DATA     Labs: I have personally reviewed pertinent reports  Results from last 7 days   Lab Units 12/19/20  0610 12/18/20  0538 12/17/20  1045   WBC Thousand/uL 8 49 7 60 7 75   HEMOGLOBIN g/dL 11 3* 11 6 11 1*   HEMATOCRIT % 35 5 36 6 34 7*   PLATELETS Thousands/uL 219 206 214   NEUTROS PCT % 76* 77* 76*   MONOS PCT % 6 6 5      Results from last 7 days   Lab Units 12/19/20  0610 12/18/20  0538 12/17/20  1045   POTASSIUM mmol/L 3 7 4 0 4 0   CHLORIDE mmol/L 101 104 107   CO2 mmol/L 30 29 25   BUN mg/dL 17 15 13   CREATININE mg/dL 0 80 0 77 0 73   CALCIUM mg/dL 10 1 9 3 8 9   ALK PHOS U/L 94 89 84   ALT U/L 123* 98* 86*   AST U/L 80* 56* 49*         ABG:       IMAGING & DIAGNOSTIC TESTING     Radiology Results: I have personally reviewed pertinent reports  Xr Chest 1 View Portable    Result Date: 12/4/2020  Impression: Left lung base infiltrate  In the setting of clinically suspected/proven COVID-19, this plain film appearance does not contain findings that raise concern for viral pneumonia such as COVID-19, but does not rule out this diagnosis  The study was marked in EPIC for significant notification  Workstation performed: [id number]     Other Diagnostic Testing: I have personally reviewed pertinent reports      ACTIVE MEDICATIONS     Current Facility-Administered Medications   Medication Dose Route Frequency    acetaminophen (TYLENOL) tablet 650 mg  650 mg Oral Q6H PRN    albuterol (PROVENTIL HFA,VENTOLIN HFA) inhaler 2 puff  2 puff Inhalation Q4H PRN    albuterol (PROVENTIL HFA,VENTOLIN HFA) inhaler 2 puff  2 puff Inhalation TID    aluminum-magnesium hydroxide-simethicone (MYLANTA) oral suspension 30 mL  30 mL Oral Q4H PRN    atorvastatin (LIPITOR) tablet 40 mg  40 mg Oral HS    azelastine (ASTELIN) 0 1 % nasal spray 1 spray  1 spray Each Nare BID    benzonatate (TESSALON PERLES) capsule 100 mg  100 mg Oral TID PRN    bisacodyl (DULCOLAX) rectal suppository 10 mg  10 mg Rectal Daily PRN    cholecalciferol (VITAMIN D3) tablet 2,000 Units  2,000 Units Oral Daily    dexamethasone (DECADRON) injection 6 mg  6 mg Intravenous Q24H [facility]    enoxaparin (LOVENOX) subcutaneous injection 30 mg  30 mg Subcutaneous Q12H [address]    famotidine (PEPCID) tablet 20 mg  20 mg Oral Q12H    fluticasone (FLONASE) 50 mcg/act nasal spray 2 spray  2 spray Each Nare Daily    gabapentin (NEURONTIN) capsule 300 mg  300 mg Oral Daily    guaiFENesin (MUCINEX) 12 hr tablet 1,200 mg  1,200 mg Oral Q12H [facility]    lidocaine (LIDODERM) 5 % patch 1 patch  1 patch Topical Daily    lithium carbonate capsule 300 mg  300 mg Oral HS    loratadine (CLARITIN) tablet 10 mg  10 mg Oral Daily    LORazepam (ATIVAN) tablet 1 mg  1 mg Oral BID PRN    LORazepam (ATIVAN) tablet 1 5 mg  1 5 mg Oral HS    melatonin tablet 6 mg  6 mg Oral HS    menthol-methyl salicylate (BENGAY) 85-71 % cream   Apply externally 4x Daily PRN    multivitamin with iron-minerals liquid 15 mL  15 mL Per NG Tube Daily    ondansetron (ZOFRAN) injection 4 mg  4 mg Intravenous Q6H PRN    oxymetazoline (AFRIN) 0 05 % nasal spray 2 spray  2 spray Each Nare HS    polyethylene glycol (MIRALAX) packet 17 g  17 g Oral Daily    pseudoephedrine (SUDAFED) 12 hr tablet 120 mg  120 mg Oral Daily    senna-docusate sodium (SENOKOT S) 8 6-50 mg per tablet 1 tablet  1 tablet Oral BID    sertraline (ZOLOFT) tablet 100 mg  100 mg Oral HS    sodium chloride (OCEAN) 0 65 % nasal spray 2 spray  2 spray Each Nare Q2H PRN    traZODone (DESYREL) tablet 100 mg  100 mg Oral HS           Portions of the record may have been created with voice recognition software  Occasional wrong word or "sound a like" substitutions may have occurred due to the inherent limitations of voice recognition software    Read the chart carefully and recognize, using context, where substitutions have occurred   ==  Cinthya Morales, [address] [place] [address]  Pulmonary/Critical Care Fellowship PGY-4

## 2020-12-20 NOTE — PROGRESS NOTES
Progress Note - Alem Rivera 1955, 76 y o  female MRN: [id number]    Unit/Bed#: -01 Encounter: [id number]    Primary Care Provider: LUIS CARLOS Whitney   Date and time admitted to hospital: 12/4/2020  9:15 AM        COVID-19 virus infection  Assessment & Plan  COVID-19 infection, tested positive on 11/29/2020  Patient be placed on moderate treatment protocol   Noted to have worsening of oxygenation requiring 15 L mid flow, with saturations in low 90s  · Completed dexamethasone 10/10  · Completed remdesivir 12/8  · Continue vitamin-D, zinc, vitamin-C  · Antibiotic discontinued  · s/p convalescent plasma 12/7/2020  · Continue self proning   · Will need ambulatory pulse ox prior to discharge  · 12/17-18: Continues to be on 13L [address]  Stable saturations in rest, but desaturates with minimal exertion  · Restarted on Decadron by pulmonology for worsening pneumonitis  · Worsening inflammatory markers  ·  Pulmonology is following, Continue with supportive care     * Acute respiratory failure with hypoxia ([facility] [address] )  Assessment & Plan  · Acute hypoxic respiratory failure likely due to COVID-19 infection  · Patient is hypoxic, improving, weaned from 15 L midflow to 6-8 L   · Given 40 mg Lasix x1 12/10-- which seemed to help  · Encourage proning- patient needs continuous encouragement and reminder to prone  · Pulmonary on board, now signing off   · See below for details of COVID 19 management   · Will need ambulatory pulse ox prior to discharge  · Chest x-ray 12/17 shows worsening ground-glass opacities concerning for worsening COVID-19 pneumonia  Patient continues to require 13 L [address]    · Currently oxygenation is better  · Claritin and Mucinex for nasal congestion and postnasal drip were added by pulmonology  · Received Lasix IV on 12/18   · Restarted on Decadron by pulmonology due to worsening pneumonitis  · Now with worsening hypoxia up and worsening inflammatory markers  · Pulmonology is following, for IV Lasix today  · Sudafed was added    Transaminitis  Assessment & Plan  Likely related to COVID-19 infection  Monitor     GERD (gastroesophageal reflux disease)  Assessment & Plan  Continue Famotidine     Bipolar disorder (HCC)  Assessment & Plan  Continue lithium sertraline, trazodone  Continue bedtime Ativan         VTE Pharmacologic Prophylaxis:   Pharmacologic: Enoxaparin (Lovenox)  Mechanical VTE Prophylaxis in Place: Yes    Patient Centered Rounds: I have performed bedside rounds with nursing staff today  Discussions with Specialists or Other Care Team Provider:     Education and Discussions with Family / Patient:  Patient    Time Spent for Care: 45 minutes  More than 50% of total time spent on counseling and coordination of care as described above  Current Length of Stay: 16 day(s)    Current Patient Status: Inpatient   Certification Statement: The patient will continue to require additional inpatient hospital stay due to Above    Discharge Plan / Estimated Discharge Date: TBD    Code Status: Level 1 - Full Code      Subjective:   Patient seen and examined  O2 was increased to 15 L med flow for frequent desats  Continue to have facial congestion and dyspnea on exertion    Objective:     Vitals:   Temp (24hrs), Av 3 °F (36 8 °C), Min:97 9 °F (36 6 °C), Max:99 °F (37 2 °C)    Temp:  [97 9 °F (36 6 °C)-99 °F (37 2 °C)] 97 9 °F (36 6 °C)  HR:  [77-95] 77  BP: (100-113)/(65) 113/65  SpO2:  [84 %-93 %] 84 %  Body mass index is 33 99 kg/m²  Input and Output Summary (last 24 hours):        Intake/Output Summary (Last 24 hours) at 2020 1012  Last data filed at 2020 1700  Gross per 24 hour   Intake 568 ml   Output --   Net 568 ml       Physical Exam:     Physical Exam  Patient is awake alert oriented no acute distress  Lung with intermittent rhonchi bilateral  Heart positive S1-S2 no murmur  Abdomen soft nontender  Lower extremities no edema    Additional Data:     Labs:    Results from last 7 days Lab Units 12/19/20  0610   WBC Thousand/uL 8 49   HEMOGLOBIN g/dL 11 3*   HEMATOCRIT % 35 5   PLATELETS Thousands/uL 219   NEUTROS PCT % 76*   LYMPHS PCT % 16   MONOS PCT % 6   EOS PCT % 2     Results from last 7 days   Lab Units 12/19/20  0610   POTASSIUM mmol/L 3 7   CHLORIDE mmol/L 101   CO2 mmol/L 30   BUN mg/dL 17   CREATININE mg/dL 0 80   CALCIUM mg/dL 10 1   ALK PHOS U/L 94   ALT U/L 123*   AST U/L 80*           * I Have Reviewed All Lab Data Listed Above  * Additional Pertinent Lab Tests Reviewed: Sahil [facility] Admission Reviewed    Imaging:    Imaging Reports Reviewed Today Include:   Imaging Personally Reviewed by Myself Includes:     Recent Cultures (last 7 days):     Results from last 7 days   Lab Units 12/18/20 2002 12/18/20  1637   BLOOD CULTURE   --  No Growth at 24 hrs  No Growth at 24 hrs     URINE CULTURE  <10,000 cfu/ml Gram Negative Slava Enteric Like*  --        Last 24 Hours Medication List:   Current Facility-Administered Medications   Medication Dose Route Frequency Provider Last Rate    acetaminophen  650 mg Oral Q6H PRN Becki Boogie MD      albuterol  2 puff Inhalation Q4H PRN Becki Boogie MD      albuterol  2 puff Inhalation TID Therese Zaman MD      aluminum-magnesium hydroxide-simethicone  30 mL Oral Q4H PRN Becki Boogie MD      atorvastatin  40 mg Oral HS Becki Boogie MD      azelastine  1 spray Each Nare BID Dilcia Walters DO      benzonatate  100 mg Oral TID PRN Angela Lincoln MD      bisacodyl  10 mg Rectal Daily PRN Becki Boogie MD      cholecalciferol  2,000 Units Oral Daily Becki Boogie MD      dexamethasone  6 mg Intravenous Q24H [facility] Cinthya Wolfe DO      enoxaparin  30 mg Subcutaneous Q12H [facility] Becki Boogie MD      famotidine  20 mg Oral Q12H Becki Boogie MD      fluticasone  2 spray Each Nare Daily Becki Boogie MD      gabapentin  300 mg Oral Daily Becki Boogie MD      guaiFENesin  1,200 mg Oral Q12H [address] [facility] [address] [place]      lidocaine  1 patch Topical Daily Brandon Scott MD      lithium carbonate  300 mg Oral HS Brandon Scott MD      loratadine  10 mg Oral Daily Courtney Pinto MD      LORazepam  1 mg Oral BID PRN Brandon Scott MD      LORazepam  1 5 mg Oral HS Brandon Scott MD      melatonin  6 mg Oral HS Brandon Scott MD      menthol-methyl salicylate   Apply externally 4x Daily PRN Marrianne Bernheim, PA-C      multivitamin with iron-minerals  15 mL Per NG Tube Daily Brandon Scott MD      ondansetron  4 mg Intravenous Q6H PRN Brandon Scott MD      oxymetazoline  2 spray Each Nare HS Lorin Cage PA-C      polyethylene glycol  17 g Oral Daily Brandon Scott MD      pseudoephedrine  120 mg Oral Daily Lorin Cage PA-C      senna-docusate sodium  1 tablet Oral BID Brandon Scott MD      sertraline  100 mg Oral HS Brandon Scott MD      sodium chloride  2 spray Each Nare Q2H PRN Lorin Cage PA-C      traZODone  100 mg Oral HS Brandon Scott MD          Today, Patient Was Seen By: Uday Tubbs DO    ** Please Note: This note has been constructed using a voice recognition system   **

## 2020-12-20 NOTE — ASSESSMENT & PLAN NOTE
COVID-19 infection, tested positive on 11/29/2020  Patient be placed on moderate treatment protocol   Noted to have worsening of oxygenation requiring 15 L mid flow, with saturations in low 90s  · Completed dexamethasone 10/10  · Completed remdesivir 12/8  · Continue vitamin-D, zinc, vitamin-C  · Antibiotic discontinued  · s/p convalescent plasma 12/7/2020  · Continue self proning   · Will need ambulatory pulse ox prior to discharge  · 12/17-18: Continues to be on 13L [address]  Stable saturations in rest, but desaturates with minimal exertion    · Restarted on Decadron by pulmonology for worsening pneumonitis  · Worsening inflammatory markers  ·  Pulmonology is following, Continue with supportive care

## 2020-12-20 NOTE — ASSESSMENT & PLAN NOTE
· Acute hypoxic respiratory failure likely due to COVID-19 infection  · Patient is hypoxic, improving, weaned from 15 L midflow to 6-8 L   · Given 40 mg Lasix x1 12/10-- which seemed to help  · Encourage proning- patient needs continuous encouragement and reminder to prone  · Pulmonary on board, now signing off   · See below for details of COVID 19 management   · Will need ambulatory pulse ox prior to discharge  · Chest x-ray 12/17 shows worsening ground-glass opacities concerning for worsening COVID-19 pneumonia  Patient continues to require 13 L [address]    · Currently oxygenation is better  · Claritin and Mucinex for nasal congestion and postnasal drip were added by pulmonology  · Received Lasix IV on 12/18   · Restarted on Decadron by pulmonology due to worsening pneumonitis  · Now with worsening hypoxia up and worsening inflammatory markers  · Pulmonology is following, for IV Lasix today  · Sudafed was added

## 2020-12-21 ENCOUNTER — APPOINTMENT (INPATIENT)
Dept: RADIOLOGY | Facility: HOSPITAL | Age: 65
DRG: 177 | End: 2020-12-21
Payer: MEDICARE

## 2020-12-21 LAB
ALBUMIN SERPL BCP-MCNC: 2.8 G/DL (ref 3.5–5)
ALP SERPL-CCNC: 132 U/L (ref 46–116)
ALT SERPL W P-5'-P-CCNC: 284 U/L (ref 12–78)
ANION GAP SERPL CALCULATED.3IONS-SCNC: 6 MMOL/L (ref 4–13)
AST SERPL W P-5'-P-CCNC: 171 U/L (ref 5–45)
BILIRUB SERPL-MCNC: 0.24 MG/DL (ref 0.2–1)
BUN SERPL-MCNC: 34 MG/DL (ref 5–25)
CALCIUM ALBUM COR SERPL-MCNC: 11.1 MG/DL (ref 8.3–10.1)
CALCIUM SERPL-MCNC: 10.1 MG/DL (ref 8.3–10.1)
CHLORIDE SERPL-SCNC: 102 MMOL/L (ref 100–108)
CO2 SERPL-SCNC: 30 MMOL/L (ref 21–32)
CREAT SERPL-MCNC: 0.9 MG/DL (ref 0.6–1.3)
GFR SERPL CREATININE-BSD FRML MDRD: 67 ML/MIN/1.73SQ M
GLUCOSE SERPL-MCNC: 92 MG/DL (ref 65–140)
MAGNESIUM SERPL-MCNC: 2.8 MG/DL (ref 1.6–2.6)
PHOSPHATE SERPL-MCNC: 4.2 MG/DL (ref 2.3–4.1)
POTASSIUM SERPL-SCNC: 3.4 MMOL/L (ref 3.5–5.3)
PROT SERPL-MCNC: 7.3 G/DL (ref 6.4–8.2)
SODIUM SERPL-SCNC: 138 MMOL/L (ref 136–145)

## 2020-12-21 PROCEDURE — 84100 ASSAY OF PHOSPHORUS: CPT | Performed by: INTERNAL MEDICINE

## 2020-12-21 PROCEDURE — 71250 CT THORAX DX C-: CPT

## 2020-12-21 PROCEDURE — 80053 COMPREHEN METABOLIC PANEL: CPT | Performed by: INTERNAL MEDICINE

## 2020-12-21 PROCEDURE — 70486 CT MAXILLOFACIAL W/O DYE: CPT

## 2020-12-21 PROCEDURE — 99232 SBSQ HOSP IP/OBS MODERATE 35: CPT | Performed by: INTERNAL MEDICINE

## 2020-12-21 PROCEDURE — 83735 ASSAY OF MAGNESIUM: CPT | Performed by: INTERNAL MEDICINE

## 2020-12-21 PROCEDURE — 99233 SBSQ HOSP IP/OBS HIGH 50: CPT | Performed by: INTERNAL MEDICINE

## 2020-12-21 PROCEDURE — 94760 N-INVAS EAR/PLS OXIMETRY 1: CPT

## 2020-12-21 RX ORDER — AMOXICILLIN AND CLAVULANATE POTASSIUM 875; 125 MG/1; MG/1
1 TABLET, FILM COATED ORAL EVERY 12 HOURS SCHEDULED
Status: DISCONTINUED | OUTPATIENT
Start: 2020-12-21 | End: 2020-12-24

## 2020-12-21 RX ORDER — POTASSIUM CHLORIDE 20 MEQ/1
40 TABLET, EXTENDED RELEASE ORAL ONCE
Status: COMPLETED | OUTPATIENT
Start: 2020-12-21 | End: 2020-12-21

## 2020-12-21 RX ADMIN — GUAIFENESIN 1200 MG: 600 TABLET, EXTENDED RELEASE ORAL at 08:32

## 2020-12-21 RX ADMIN — ENOXAPARIN SODIUM 30 MG: 30 INJECTION SUBCUTANEOUS at 08:33

## 2020-12-21 RX ADMIN — GABAPENTIN 300 MG: 300 CAPSULE ORAL at 08:32

## 2020-12-21 RX ADMIN — Medication 2000 UNITS: at 08:32

## 2020-12-21 RX ADMIN — LORATADINE 10 MG: 10 TABLET ORAL at 08:32

## 2020-12-21 RX ADMIN — LIDOCAINE 5% 1 PATCH: 700 PATCH TOPICAL at 08:31

## 2020-12-21 RX ADMIN — FAMOTIDINE 20 MG: 20 TABLET ORAL at 08:32

## 2020-12-21 RX ADMIN — FLUTICASONE PROPIONATE 2 SPRAY: 50 SPRAY, METERED NASAL at 08:34

## 2020-12-21 RX ADMIN — DEXAMETHASONE SODIUM PHOSPHATE 6 MG: 4 INJECTION, SOLUTION INTRA-ARTICULAR; INTRALESIONAL; INTRAMUSCULAR; INTRAVENOUS; SOFT TISSUE at 08:35

## 2020-12-21 RX ADMIN — LITHIUM CARBONATE 300 MG: 300 CAPSULE, GELATIN COATED ORAL at 21:13

## 2020-12-21 RX ADMIN — OXYMETAZOLINE HYDROCHLORIDE 2 SPRAY: 0.05 SPRAY NASAL at 21:16

## 2020-12-21 RX ADMIN — TRAZODONE HYDROCHLORIDE 100 MG: 100 TABLET ORAL at 21:11

## 2020-12-21 RX ADMIN — AZELASTINE HYDROCHLORIDE 1 SPRAY: 137 SPRAY, METERED NASAL at 09:02

## 2020-12-21 RX ADMIN — POLYETHYLENE GLYCOL 3350 17 G: 17 POWDER, FOR SOLUTION ORAL at 08:31

## 2020-12-21 RX ADMIN — FAMOTIDINE 20 MG: 20 TABLET ORAL at 21:11

## 2020-12-21 RX ADMIN — SENNOSIDES AND DOCUSATE SODIUM 1 TABLET: 8.6; 5 TABLET ORAL at 17:34

## 2020-12-21 RX ADMIN — AZELASTINE HYDROCHLORIDE 1 SPRAY: 137 SPRAY, METERED NASAL at 17:58

## 2020-12-21 RX ADMIN — SENNOSIDES AND DOCUSATE SODIUM 1 TABLET: 8.6; 5 TABLET ORAL at 08:32

## 2020-12-21 RX ADMIN — NYSTATIN 500000 UNITS: 100000 SUSPENSION ORAL at 13:11

## 2020-12-21 RX ADMIN — ALBUTEROL SULFATE 2 PUFF: 90 AEROSOL, METERED RESPIRATORY (INHALATION) at 21:17

## 2020-12-21 RX ADMIN — ALBUTEROL SULFATE 2 PUFF: 90 AEROSOL, METERED RESPIRATORY (INHALATION) at 08:34

## 2020-12-21 RX ADMIN — NYSTATIN 500000 UNITS: 100000 SUSPENSION ORAL at 17:34

## 2020-12-21 RX ADMIN — SERTRALINE HYDROCHLORIDE 100 MG: 100 TABLET ORAL at 21:12

## 2020-12-21 RX ADMIN — AMOXICILLIN AND CLAVULANATE POTASSIUM 1 TABLET: 875; 125 TABLET, FILM COATED ORAL at 21:13

## 2020-12-21 RX ADMIN — NYSTATIN 500000 UNITS: 100000 SUSPENSION ORAL at 21:11

## 2020-12-21 RX ADMIN — PSEUDOEPHEDRINE HCL 120 MG: 120 TABLET, FILM COATED, EXTENDED RELEASE ORAL at 08:59

## 2020-12-21 RX ADMIN — GUAIFENESIN 1200 MG: 600 TABLET, EXTENDED RELEASE ORAL at 21:12

## 2020-12-21 RX ADMIN — LORAZEPAM 1.5 MG: 1 TABLET ORAL at 21:12

## 2020-12-21 RX ADMIN — ENOXAPARIN SODIUM 30 MG: 30 INJECTION SUBCUTANEOUS at 21:11

## 2020-12-21 RX ADMIN — POTASSIUM CHLORIDE 40 MEQ: 1500 TABLET, EXTENDED RELEASE ORAL at 08:32

## 2020-12-21 RX ADMIN — ALBUTEROL SULFATE 2 PUFF: 90 AEROSOL, METERED RESPIRATORY (INHALATION) at 17:57

## 2020-12-21 RX ADMIN — MULTIVITAMIN 15 ML: LIQUID ORAL at 08:59

## 2020-12-21 NOTE — PROGRESS NOTES
Progress Note - Delfina Lies 1955, 76 y o  female MRN: [id number]    Unit/Bed#: -01 Encounter: [id number]    Primary Care Provider: LUIS CARLOS Gonzalez   Date and time admitted to hospital: 12/4/2020  9:15 AM        COVID-19 virus infection  Assessment & Plan  COVID-19 infection, tested positive on 11/29/2020  Patient be placed on moderate treatment protocol   Noted to have worsening of oxygenation requiring 15 L mid flow, with saturations in low 90s  · Completed dexamethasone 10/10  · Completed remdesivir 12/8  · Continue vitamin-D, zinc, vitamin-C  · Antibiotic discontinued  · s/p convalescent plasma 12/7/2020  · Continue self proning   · Will need ambulatory pulse ox prior to discharge  · 12/17-18: Continues to be on 13L [address]  Stable saturations in rest, but desaturates with minimal exertion  · Restarted on Decadron by pulmonology for worsening pneumonitis  · Worsening inflammatory markers  · Pulmonology is following, Continue with supportive care     * Acute respiratory failure with hypoxia ([address] )  Assessment & Plan  · Acute hypoxic respiratory failure likely due to COVID-19 infection  · Patient is hypoxic, improving, weaned from 15 L midflow to 6-8 L   · Given 40 mg Lasix x1 12/10-- which seemed to help  · Encourage proning- patient needs continuous encouragement and reminder to prone  · Pulmonary on board, now signing off   · See below for details of COVID 19 management   · Will need ambulatory pulse ox prior to discharge  · Chest x-ray 12/17 shows worsening ground-glass opacities concerning for worsening COVID-19 pneumonia  Patient continues to require 13 L [address]    · Currently oxygenation is better  · Claritin and Mucinex for nasal congestion and postnasal drip were added by pulmonology  · Received Lasix IV on 12/18 and 12/20  · Restarted on Decadron by pulmonology due to worsening pneumonitis  · Now with worsening hypoxia and worsening inflammatory markers  · Pulmonology is following  · Sudafed and nasal drops were added for nasal congestion/dryness  · Started on Augmentin  · Follow on sinuses and chest CT scan    Transaminitis  Assessment & Plan  Likely related to COVID-19 infection  Worsening LFTs and inflammatory markers  Monitor     GERD (gastroesophageal reflux disease)  Assessment & Plan  Continue Famotidine     Bipolar disorder (HCC)  Assessment & Plan  Continue lithium sertraline, trazodone  Continue bedtime Ativan      VTE Pharmacologic Prophylaxis:   Pharmacologic: Enoxaparin (Lovenox)  Mechanical VTE Prophylaxis in Place: Yes    Patient Centered Rounds: I have performed bedside rounds with nursing staff today  Discussions with Specialists or Other Care Team Provider:     Education and Discussions with Family / Patient:  Patient, I offered to call family she declined    Time Spent for Care: 35 minutes  More than 50% of total time spent on counseling and coordination of care as described above  Current Length of Stay: 17 day(s)    Current Patient Status: Inpatient   Certification Statement: The patient will continue to require additional inpatient hospital stay due to Above    Discharge Plan / Estimated Discharge Date: TBD    Code Status: Level 1 - Full Code      Subjective:   Patient seen examined  Still complaining of dyspnea on exertion and nasal congestion    Objective:     Vitals:   Temp (24hrs), Av °F (36 7 °C), Min:97 9 °F (36 6 °C), Max:98 °F (36 7 °C)    Temp:  [97 9 °F (36 6 °C)-98 °F (36 7 °C)] 98 °F (36 7 °C)  HR:  [71-97] 78  BP: ()/(59-60) 102/59  SpO2:  [74 %-97 %] 90 %  Body mass index is 34 53 kg/m²  Input and Output Summary (last 24 hours):        Intake/Output Summary (Last 24 hours) at 2020 1447  Last data filed at 2020 0805  Gross per 24 hour   Intake 120 ml   Output 1100 ml   Net -980 ml       Physical Exam:     Physical Exam  Patient is awake alert oriented no acute distress  Lung with intermittent rhonchi bilateral  Heart positive S1-S2 no murmur  Abdomen soft nontender positive bowel sounds  Lower extremities no edema    Additional Data:     Labs:    Results from last 7 days   Lab Units 12/19/20  0610   WBC Thousand/uL 8 49   HEMOGLOBIN g/dL 11 3*   HEMATOCRIT % 35 5   PLATELETS Thousands/uL 219   NEUTROS PCT % 76*   LYMPHS PCT % 16   MONOS PCT % 6   EOS PCT % 2     Results from last 7 days   Lab Units 12/21/20  0553   POTASSIUM mmol/L 3 4*   CHLORIDE mmol/L 102   CO2 mmol/L 30   BUN mg/dL 34*   CREATININE mg/dL 0 90   CALCIUM mg/dL 10 1   ALK PHOS U/L 132*   ALT U/L 284*   AST U/L 171*           * I Have Reviewed All Lab Data Listed Above  * Additional Pertinent Lab Tests Reviewed: Sahil 66 Admission Reviewed    Imaging:    Imaging Reports Reviewed Today Include:   Imaging Personally Reviewed by Myself Includes:      Recent Cultures (last 7 days):     Results from last 7 days   Lab Units 12/18/20 2002 12/18/20  1637   BLOOD CULTURE   --  No Growth at 48 hrs  No Growth at 48 hrs     URINE CULTURE  <10,000 cfu/ml Gram Negative Slava Enteric Like*  --        Last 24 Hours Medication List:   Current Facility-Administered Medications   Medication Dose Route Frequency Provider Last Rate    acetaminophen  650 mg Oral Q6H PRN Shima Landeros MD      albuterol  2 puff Inhalation Q4H PRN Shima Landeros MD      albuterol  2 puff Inhalation TID Fabian Trammell MD      aluminum-magnesium hydroxide-simethicone  30 mL Oral Q4H PRN Shima Landeros MD      amoxicillin-clavulanate  1 tablet Oral Q12H [address] Shara Mckeon MD      azelastine  1 spray Each Nare BID Cinthya Wolfe DO      benzonatate  100 mg Oral TID PRN Mike Stringer MD      bisacodyl  10 mg Rectal Daily PRN Shima Landeros MD      cholecalciferol  2,000 Units Oral Daily Shima Landeros MD      dexamethasone  6 mg Intravenous Q24H [address] Cinthya Wolfe DO      enoxaparin  30 mg Subcutaneous Q12H [address] Shima Landeros MD      famotidine  20 mg Oral Q12H Ann-Marie Adam MD      fluticasone  2 spray Each Nare Daily Ann-Marie Adam MD      gabapentin  300 mg Oral Daily Ann-Marie Adam MD      guaiFENesin  1,200 mg Oral Q12H [facility] Lorin Cage PA-C      lidocaine  1 patch Topical Daily Ann-Marie Adam MD      lithium carbonate  300 mg Oral HS Ann-Marie Adam, MD      loratadine  10 mg Oral Daily Billy Bruce MD      LORazepam  1 mg Oral BID PRN Ann-Marie Adam MD      LORazepam  1 5 mg Oral HS Ann-Marie Adam MD      melatonin  6 mg Oral HS Ann-Marie Adam MD      menthol-methyl salicylate   Apply externally 4x Daily PRN Dong Galicia PA-C      multivitamin with iron-minerals  15 mL Per NG Tube Daily Ann-Marie Adam MD      nystatin  500,000 Units Swish & Swallow 4x Daily Slava Carballo DO      ondansetron  4 mg Intravenous Q6H PRN Ann-Marie Adam MD      oxymetazoline  2 spray Each Nare HS Lorin Cage PA-C      polyethylene glycol  17 g Oral Daily Ann-Marie Adam MD      pseudoephedrine  120 mg Oral Daily Lorin Cage PA-C      senna-docusate sodium  1 tablet Oral BID Ann-Marie Adam MD      sertraline  100 mg Oral HS Ann-Marie Adam MD      sodium chloride  2 spray Each Nare Q2H PRN Lorin Cage PA-C      traZODone  100 mg Oral HS Ann-Marie Adam MD          Today, Patient Was Seen By: Slava Carballo DO    ** Please Note: This note has been constructed using a voice recognition system   **

## 2020-12-21 NOTE — PROGRESS NOTES
Progress Note - Pulmonary   Chely Ponce 76 y o  female MRN: [id number]  Unit/Bed#: -01 Encounter: [id number]      Assessment and Plan:     Acute hypoxic respiratory failure secondary to COVID-19   - continue to wean as tolerated for SPo2 90-92%   - continue to prone    - s/p remdesivir, dexamethasone, vitamins   -restarted Dexamethasone 6mg IV daily on 12/19  - increased to 15lpm midflow over the weekend  - PCT negative, CRP increased to 266 (increased), Ferritin 623 (increased)  - D-dimer 1 33  - CXR on 12/17 showed worsening opacity L>R   - initially positive on 11/29  - hold off on diuresis today     - CT chest w/o c to assess for early fibrosis     Sinus congestion - likely sinusitis  - c/w claritin and mucinex po bid  - Flonase, Afrin, Sudafed    - c/w saline rinses, warm water baths   - CT sinus  - will discuss with RT about OxyMask   - start Augmentin 875-125mg po daily x 5 days (12/21----)    Transaminitis   - closely monitor, COVID-related vs  Medication-induced     Asthma   - not in exacerbation     Subjective:   Patient seen/examined this AM   She still feels SOB when doing short activities  She is able to prone and turn to the side  She is still having trouble breathing through her nose due to severe congestion  Review of Systems   HENT: Positive for congestion  Respiratory: Positive for shortness of breath  All other systems reviewed and are negative  Objective:     Vitals:    12/20/20 2200 12/20/20 2245 12/21/20 0100 12/21/20 0600   BP:       BP Location:       Pulse: 81 71     Resp:       Temp:       TempSrc:       SpO2: 93% 97% 95%    Weight:    82 9 kg (182 lb 12 2 oz)   Height:               Intake/Output Summary (Last 24 hours) at 12/21/2020 0746  Last data filed at 12/21/2020 0532  Gross per 24 hour   Intake --   Output 1100 ml   Net -1100 ml         Physical Exam  Constitutional:       Appearance: Normal appearance  HENT:      Head: Normocephalic        Nose: Congestion present  Eyes:      Pupils: Pupils are equal, round, and reactive to light  Cardiovascular:      Rate and Rhythm: Normal rate and regular rhythm  Pulmonary:      Breath sounds: Rales present  Abdominal:      General: Abdomen is flat  Palpations: Abdomen is soft  Musculoskeletal: Normal range of motion  Skin:     General: Skin is warm and dry  Neurological:      General: No focal deficit present  Mental Status: She is alert and oriented to person, place, and time  Labs: I have personally reviewed pertinent lab results  Results from last 7 days   Lab Units 12/19/20  0610 12/18/20  0538 12/17/20  1045   WBC Thousand/uL 8 49 7 60 7 75   HEMOGLOBIN g/dL 11 3* 11 6 11 1*   HEMATOCRIT % 35 5 36 6 34 7*   PLATELETS Thousands/uL 219 206 214   NEUTROS PCT % 76* 77* 76*   MONOS PCT % 6 6 5      Results from last 7 days   Lab Units 12/21/20  0553 12/19/20  0610 12/18/20  0538   POTASSIUM mmol/L 3 4* 3 7 4 0   CHLORIDE mmol/L 102 101 104   CO2 mmol/L 30 30 29   BUN mg/dL 34* 17 15   CREATININE mg/dL 0 90 0 80 0 77   CALCIUM mg/dL 10 1 10 1 9 3   ALK PHOS U/L 132* 94 89   ALT U/L 284* 123* 98*   AST U/L 171* 80* 56*     Results from last 7 days   Lab Units 12/21/20  0553   MAGNESIUM mg/dL 2 8*     Results from last 7 days   Lab Units 12/21/20  0553   PHOSPHORUS mg/dL 4 2*              0   Lab Value Date/Time    TROPONINI 0 14 (H) 12/05/2020 2123    TROPONINI 0 22 (H) 12/05/2020 0537    TROPONINI 0 28 (H) 12/04/2020 1836    TROPONINI 0 30 (H) 12/04/2020 1602    TROPONINI 0 32 (H) 12/04/2020 1304    TROPONINI 0 26 (H) 12/04/2020 0959    TROPONINI <0 02 10/24/2019 1048    TROPONINI <0 02 07/01/2019 1443    TROPONINI <0 02 07/01/2019 1118    TROPONINI <0 02 07/01/2019 0813       Imaging and other studies: I have personally reviewed pertinent reports     and I have personally reviewed pertinent films in PACS        Cesar Ward MD   Pulmonary & Critical Care Fellow  [facility]

## 2020-12-21 NOTE — ASSESSMENT & PLAN NOTE
· Acute hypoxic respiratory failure likely due to COVID-19 infection  · Patient is hypoxic, improving, weaned from 15 L midflow to 6-8 L   · Given 40 mg Lasix x1 12/10-- which seemed to help  · Encourage proning- patient needs continuous encouragement and reminder to prone  · Pulmonary on board, now signing off   · See below for details of COVID 19 management   · Will need ambulatory pulse ox prior to discharge  · Chest x-ray 12/17 shows worsening ground-glass opacities concerning for worsening COVID-19 pneumonia  Patient continues to require 13 L [address]    · Currently oxygenation is better  · Claritin and Mucinex for nasal congestion and postnasal drip were added by pulmonology  · Received Lasix IV on 12/18 and 12/20  · Restarted on Decadron by pulmonology due to worsening pneumonitis  · Now with worsening hypoxia and worsening inflammatory markers  · Pulmonology is following  · Sudafed and nasal drops were added for nasal congestion/dryness  · Started on Augmentin  · Follow on sinuses and chest CT scan

## 2020-12-21 NOTE — ASSESSMENT & PLAN NOTE
COVID-19 infection, tested positive on 11/29/2020  Patient be placed on moderate treatment protocol   Noted to have worsening of oxygenation requiring 15 L mid flow, with saturations in low 90s  · Completed dexamethasone 10/10  · Completed remdesivir 12/8  · Continue vitamin-D, zinc, vitamin-C  · Antibiotic discontinued  · s/p convalescent plasma 12/7/2020  · Continue self proning   · Will need ambulatory pulse ox prior to discharge  · 12/17-18: Continues to be on 13L [address]  Stable saturations in rest, but desaturates with minimal exertion    · Restarted on Decadron by pulmonology for worsening pneumonitis  · Worsening inflammatory markers  · Pulmonology is following, Continue with supportive care

## 2020-12-21 NOTE — PLAN OF CARE
Problem: Potential for Falls  Goal: Patient will remain free of falls  Description: INTERVENTIONS:  - Assess patient frequently for physical needs  -  Identify cognitive and physical deficits and behaviors that affect risk of falls    -  [place] fall precautions as indicated by assessment   - Educate patient/family on patient safety including physical limitations  - Instruct patient to call for assistance with activity based on assessment  - Modify environment to reduce risk of injury  - Consider OT/PT consult to assist with strengthening/mobility  Outcome: Progressing     Problem: RESPIRATORY - ADULT  Goal: Achieves optimal ventilation and oxygenation  Description: INTERVENTIONS:  - Assess for changes in respiratory status  - Assess for changes in mentation and behavior  - Position to facilitate oxygenation and minimize respiratory effort  - Oxygen administered by appropriate delivery if ordered  - Initiate smoking cessation education as indicated  - Encourage broncho-pulmonary hygiene including cough, deep breathe, Incentive Spirometry  - Assess the need for suctioning and aspirate as needed  - Assess and instruct to report SOB or any respiratory difficulty  - Respiratory Therapy support as indicated  Outcome: Progressing     Problem: METABOLIC, FLUID AND ELECTROLYTES - ADULT  Goal: Electrolytes maintained within normal limits  Description: INTERVENTIONS:  - Monitor labs and assess patient for signs and symptoms of electrolyte imbalances  - Administer electrolyte replacement as ordered  - Monitor response to electrolyte replacements, including repeat lab results as appropriate  - Instruct patient on fluid and nutrition as appropriate  Outcome: Progressing  Goal: Fluid balance maintained  Description: INTERVENTIONS:  - Monitor labs   - Monitor I/O and WT  - Instruct patient on fluid and nutrition as appropriate  - Assess for signs & symptoms of volume excess or deficit  Outcome: Progressing     Problem: HEMATOLOGIC - ADULT  Goal: Maintains hematologic stability  Description: INTERVENTIONS  - Assess for signs and symptoms of bleeding or hemorrhage  - Monitor labs  - Administer supportive blood products/factors as ordered and appropriate  Outcome: Progressing     Problem: INFECTION - ADULT  Goal: Absence or prevention of progression during hospitalization  Description: INTERVENTIONS:  - Assess and monitor for signs and symptoms of infection  - Monitor lab/diagnostic results  - Monitor all insertion sites, i e  indwelling lines, tubes, and drains  - Monitor endotracheal if appropriate and nasal secretions for changes in amount and color  - [place] appropriate cooling/warming therapies per order  - Administer medications as ordered  - Instruct and encourage patient and family to use good hand hygiene technique  - Identify and instruct in appropriate isolation precautions for identified infection/condition  Outcome: Progressing  Goal: Absence of fever/infection during neutropenic period  Description: INTERVENTIONS:  - Monitor WBC    Outcome: Progressing     Problem: SAFETY ADULT  Goal: Patient will remain free of falls  Description: INTERVENTIONS:  - Assess patient frequently for physical needs  -  Identify cognitive and physical deficits and behaviors that affect risk of falls    -  [place] fall precautions as indicated by assessment   - Educate patient/family on patient safety including physical limitations  - Instruct patient to call for assistance with activity based on assessment  - Modify environment to reduce risk of injury  - Consider OT/PT consult to assist with strengthening/mobility  Outcome: Progressing  Goal: Maintain or return to baseline ADL function  Description: INTERVENTIONS:  -  Assess patient's ability to carry out ADLs; assess patient's baseline for ADL function and identify physical deficits which impact ability to perform ADLs (bathing, care of mouth/teeth, toileting, grooming, dressing, etc )  - Assess/evaluate cause of self-care deficits   - Assess range of motion  - Assess patient's mobility; develop plan if impaired  - Assess patient's need for assistive devices and provide as appropriate  - Encourage maximum independence but intervene and supervise when necessary  - Involve family in performance of ADLs  - Assess for home care needs following discharge   - Consider OT consult to assist with ADL evaluation and planning for discharge  - Provide patient education as appropriate  Outcome: Progressing  Goal: Maintain or return mobility status to optimal level  Description: INTERVENTIONS:  - Assess patient's baseline mobility status (ambulation, transfers, stairs, etc )    - Identify cognitive and physical deficits and behaviors that affect mobility  - Identify mobility aids required to assist with transfers and/or ambulation (gait belt, sit-to-stand, lift, walker, cane, etc )  - [place] fall precautions as indicated by assessment  - Record patient progress and toleration of activity level on Mobility SBAR; progress patient to next Phase/Stage  - Instruct patient to call for assistance with activity based on assessment  - Consider rehabilitation consult to assist with strengthening/weightbearing, etc   Outcome: Progressing     Problem: DISCHARGE PLANNING  Goal: Discharge to home or other facility with appropriate resources  Description: INTERVENTIONS:  - Identify barriers to discharge w/patient and caregiver  - Arrange for needed discharge resources and transportation as appropriate  - Identify discharge learning needs (meds, wound care, etc )  - Arrange for interpretive services to assist at discharge as needed  - Refer to Case Management Department for coordinating discharge planning if the patient needs post-hospital services based on physician/advanced practitioner order or complex needs related to functional status, cognitive ability, or social support system  Outcome: Progressing     Problem: Knowledge Deficit  Goal: Patient/family/caregiver demonstrates understanding of disease process, treatment plan, medications, and discharge instructions  Description: Complete learning assessment and assess knowledge base  Interventions:  - Provide teaching at level of understanding  - Provide teaching via preferred learning methods  Outcome: Progressing     Problem: Prexisting or High Potential for Compromised Skin Integrity  Goal: Skin integrity is maintained or improved  Description: INTERVENTIONS:  - Identify patients at risk for skin breakdown  - Assess and monitor skin integrity  - Assess and monitor nutrition and hydration status  - Monitor labs   - Assess for incontinence   - Turn and reposition patient  - Assist with mobility/ambulation  - Relieve pressure over bony prominences  - Avoid friction and shearing  - Provide appropriate hygiene as needed including keeping skin clean and dry  - Evaluate need for skin moisturizer/barrier cream  - Collaborate with interdisciplinary team   - Patient/family teaching  - Consider wound care consult   Outcome: Progressing     Problem: Nutrition/Hydration-ADULT  Goal: Nutrient/Hydration intake appropriate for improving, restoring or maintaining nutritional needs  Description: Monitor and assess patient's nutrition/hydration status for malnutrition  Collaborate with interdisciplinary team and initiate plan and interventions as ordered  Monitor patient's weight and dietary intake as ordered or per policy  Utilize nutrition screening tool and intervene as necessary  Determine patient's food preferences and provide high-protein, high-caloric foods as appropriate       INTERVENTIONS:  - Monitor oral intake, urinary output, labs, and treatment plans  - Assess nutrition and hydration status and recommend course of action  - Evaluate amount of meals eaten  - Assist patient with eating if necessary   - Allow adequate time for meals  - Recommend/ encourage appropriate diets, oral nutritional supplements, and vitamin/mineral supplements  - Order, calculate, and assess calorie counts as needed  - Recommend, monitor, and adjust tube feedings and TPN/PPN based on assessed needs  - Assess need for intravenous fluids  - Provide specific nutrition/hydration education as appropriate  - Include patient/family/caregiver in decisions related to nutrition  Outcome: Progressing

## 2020-12-21 NOTE — PROGRESS NOTES
Pastoral Care Progress Note    2020  Patient: Juan Carlos Fuentes : 1955  Admission Date & Time: 2020 0915  MRN: [id number] [facility]: [id number]       spoke with patient by telephone call as follow up to previous phone visit  Patient stated that her strong Claudean Sprout haseeb provides her with hope if she lives and hope if she dies, but that she is planning on hospice care if her condition worsens, and has accepted that her life is probably coming to an end     20 1042   Clinical Encounter Type   Visited With Patient   Routine Visit Follow-up   Latter day Encounters   Latter day Needs Prayer      asked if medical team has discussed this issue with her, and patient stated that medical team is more optimistic than she is at this time

## 2020-12-22 PROBLEM — J32.9 SINUSITIS: Status: ACTIVE | Noted: 2020-12-22

## 2020-12-22 PROBLEM — J45.909 ASTHMA: Status: ACTIVE | Noted: 2019-10-24

## 2020-12-22 LAB
ALBUMIN SERPL BCP-MCNC: 2.7 G/DL (ref 3.5–5)
ALP SERPL-CCNC: 115 U/L (ref 46–116)
ALT SERPL W P-5'-P-CCNC: 261 U/L (ref 12–78)
ANION GAP SERPL CALCULATED.3IONS-SCNC: 7 MMOL/L (ref 4–13)
AST SERPL W P-5'-P-CCNC: 111 U/L (ref 5–45)
BASOPHILS # BLD AUTO: 0.04 THOUSANDS/ΜL (ref 0–0.1)
BASOPHILS NFR BLD AUTO: 0 % (ref 0–1)
BILIRUB SERPL-MCNC: 0.39 MG/DL (ref 0.2–1)
BUN SERPL-MCNC: 29 MG/DL (ref 5–25)
CALCIUM ALBUM COR SERPL-MCNC: 10.7 MG/DL (ref 8.3–10.1)
CALCIUM SERPL-MCNC: 9.7 MG/DL (ref 8.3–10.1)
CHLORIDE SERPL-SCNC: 105 MMOL/L (ref 100–108)
CO2 SERPL-SCNC: 28 MMOL/L (ref 21–32)
CREAT SERPL-MCNC: 0.9 MG/DL (ref 0.6–1.3)
EOSINOPHIL # BLD AUTO: 0.21 THOUSAND/ΜL (ref 0–0.61)
EOSINOPHIL NFR BLD AUTO: 2 % (ref 0–6)
ERYTHROCYTE [DISTWIDTH] IN BLOOD BY AUTOMATED COUNT: 12.7 % (ref 11.6–15.1)
GFR SERPL CREATININE-BSD FRML MDRD: 67 ML/MIN/1.73SQ M
GLUCOSE SERPL-MCNC: 103 MG/DL (ref 65–140)
HCT VFR BLD AUTO: 35.7 % (ref 34.8–46.1)
HGB BLD-MCNC: 11.2 G/DL (ref 11.5–15.4)
IMM GRANULOCYTES # BLD AUTO: 0.07 THOUSAND/UL (ref 0–0.2)
IMM GRANULOCYTES NFR BLD AUTO: 1 % (ref 0–2)
LYMPHOCYTES # BLD AUTO: 1.79 THOUSANDS/ΜL (ref 0.6–4.47)
LYMPHOCYTES NFR BLD AUTO: 19 % (ref 14–44)
MCH RBC QN AUTO: 26.4 PG (ref 26.8–34.3)
MCHC RBC AUTO-ENTMCNC: 31.4 G/DL (ref 31.4–37.4)
MCV RBC AUTO: 84 FL (ref 82–98)
MONOCYTES # BLD AUTO: 0.67 THOUSAND/ΜL (ref 0.17–1.22)
MONOCYTES NFR BLD AUTO: 7 % (ref 4–12)
NEUTROPHILS # BLD AUTO: 6.77 THOUSANDS/ΜL (ref 1.85–7.62)
NEUTS SEG NFR BLD AUTO: 71 % (ref 43–75)
NRBC BLD AUTO-RTO: 0 /100 WBCS
PLATELET # BLD AUTO: 252 THOUSANDS/UL (ref 149–390)
PMV BLD AUTO: 10.9 FL (ref 8.9–12.7)
POTASSIUM SERPL-SCNC: 3.6 MMOL/L (ref 3.5–5.3)
PROT SERPL-MCNC: 6.9 G/DL (ref 6.4–8.2)
RBC # BLD AUTO: 4.25 MILLION/UL (ref 3.81–5.12)
SODIUM SERPL-SCNC: 140 MMOL/L (ref 136–145)
WBC # BLD AUTO: 9.55 THOUSAND/UL (ref 4.31–10.16)

## 2020-12-22 PROCEDURE — 85025 COMPLETE CBC W/AUTO DIFF WBC: CPT | Performed by: INTERNAL MEDICINE

## 2020-12-22 PROCEDURE — 99232 SBSQ HOSP IP/OBS MODERATE 35: CPT | Performed by: INTERNAL MEDICINE

## 2020-12-22 PROCEDURE — 94760 N-INVAS EAR/PLS OXIMETRY 1: CPT

## 2020-12-22 PROCEDURE — 94002 VENT MGMT INPAT INIT DAY: CPT

## 2020-12-22 PROCEDURE — 80053 COMPREHEN METABOLIC PANEL: CPT | Performed by: INTERNAL MEDICINE

## 2020-12-22 PROCEDURE — 94762 N-INVAS EAR/PLS OXIMTRY CONT: CPT

## 2020-12-22 PROCEDURE — 94003 VENT MGMT INPAT SUBQ DAY: CPT

## 2020-12-22 RX ORDER — FUROSEMIDE 10 MG/ML
40 INJECTION INTRAMUSCULAR; INTRAVENOUS ONCE
Status: DISCONTINUED | OUTPATIENT
Start: 2020-12-22 | End: 2020-12-22

## 2020-12-22 RX ORDER — ATORVASTATIN CALCIUM 40 MG/1
40 TABLET, FILM COATED ORAL
Status: DISCONTINUED | OUTPATIENT
Start: 2020-12-22 | End: 2020-12-24

## 2020-12-22 RX ORDER — FUROSEMIDE 10 MG/ML
40 INJECTION INTRAMUSCULAR; INTRAVENOUS ONCE
Status: COMPLETED | OUTPATIENT
Start: 2020-12-22 | End: 2020-12-22

## 2020-12-22 RX ADMIN — AZELASTINE HYDROCHLORIDE 1 SPRAY: 137 SPRAY, METERED NASAL at 17:01

## 2020-12-22 RX ADMIN — SENNOSIDES AND DOCUSATE SODIUM 1 TABLET: 8.6; 5 TABLET ORAL at 17:00

## 2020-12-22 RX ADMIN — ATORVASTATIN CALCIUM 40 MG: 40 TABLET, FILM COATED ORAL at 21:17

## 2020-12-22 RX ADMIN — GUAIFENESIN 1200 MG: 600 TABLET, EXTENDED RELEASE ORAL at 08:47

## 2020-12-22 RX ADMIN — DEXAMETHASONE SODIUM PHOSPHATE 6 MG: 4 INJECTION, SOLUTION INTRA-ARTICULAR; INTRALESIONAL; INTRAMUSCULAR; INTRAVENOUS; SOFT TISSUE at 08:48

## 2020-12-22 RX ADMIN — SENNOSIDES AND DOCUSATE SODIUM 1 TABLET: 8.6; 5 TABLET ORAL at 08:48

## 2020-12-22 RX ADMIN — SERTRALINE HYDROCHLORIDE 100 MG: 100 TABLET ORAL at 21:17

## 2020-12-22 RX ADMIN — ENOXAPARIN SODIUM 30 MG: 30 INJECTION SUBCUTANEOUS at 21:17

## 2020-12-22 RX ADMIN — FAMOTIDINE 20 MG: 20 TABLET ORAL at 21:17

## 2020-12-22 RX ADMIN — AZELASTINE HYDROCHLORIDE 1 SPRAY: 137 SPRAY, METERED NASAL at 08:51

## 2020-12-22 RX ADMIN — NYSTATIN 500000 UNITS: 100000 SUSPENSION ORAL at 08:48

## 2020-12-22 RX ADMIN — MELATONIN TAB 3 MG 6 MG: 3 TAB at 21:17

## 2020-12-22 RX ADMIN — FUROSEMIDE 40 MG: 10 INJECTION, SOLUTION INTRAMUSCULAR; INTRAVENOUS at 11:09

## 2020-12-22 RX ADMIN — FAMOTIDINE 20 MG: 20 TABLET ORAL at 08:48

## 2020-12-22 RX ADMIN — ALBUTEROL SULFATE 2 PUFF: 90 AEROSOL, METERED RESPIRATORY (INHALATION) at 08:52

## 2020-12-22 RX ADMIN — AMOXICILLIN AND CLAVULANATE POTASSIUM 1 TABLET: 875; 125 TABLET, FILM COATED ORAL at 21:18

## 2020-12-22 RX ADMIN — LORAZEPAM 1.5 MG: 1 TABLET ORAL at 21:17

## 2020-12-22 RX ADMIN — LITHIUM CARBONATE 300 MG: 300 CAPSULE, GELATIN COATED ORAL at 21:18

## 2020-12-22 RX ADMIN — NYSTATIN 500000 UNITS: 100000 SUSPENSION ORAL at 11:09

## 2020-12-22 RX ADMIN — NYSTATIN 500000 UNITS: 100000 SUSPENSION ORAL at 17:00

## 2020-12-22 RX ADMIN — ALBUTEROL SULFATE 2 PUFF: 90 AEROSOL, METERED RESPIRATORY (INHALATION) at 21:30

## 2020-12-22 RX ADMIN — PSEUDOEPHEDRINE HCL 120 MG: 120 TABLET, FILM COATED, EXTENDED RELEASE ORAL at 08:51

## 2020-12-22 RX ADMIN — TRAZODONE HYDROCHLORIDE 100 MG: 100 TABLET ORAL at 21:17

## 2020-12-22 RX ADMIN — Medication 2000 UNITS: at 08:48

## 2020-12-22 RX ADMIN — FLUTICASONE PROPIONATE 2 SPRAY: 50 SPRAY, METERED NASAL at 08:51

## 2020-12-22 RX ADMIN — GABAPENTIN 300 MG: 300 CAPSULE ORAL at 08:47

## 2020-12-22 RX ADMIN — LORATADINE 10 MG: 10 TABLET ORAL at 08:48

## 2020-12-22 RX ADMIN — GUAIFENESIN 1200 MG: 600 TABLET, EXTENDED RELEASE ORAL at 21:17

## 2020-12-22 RX ADMIN — ALBUTEROL SULFATE 2 PUFF: 90 AEROSOL, METERED RESPIRATORY (INHALATION) at 17:01

## 2020-12-22 RX ADMIN — AMOXICILLIN AND CLAVULANATE POTASSIUM 1 TABLET: 875; 125 TABLET, FILM COATED ORAL at 08:51

## 2020-12-22 RX ADMIN — NYSTATIN 500000 UNITS: 100000 SUSPENSION ORAL at 21:17

## 2020-12-22 RX ADMIN — ENOXAPARIN SODIUM 30 MG: 30 INJECTION SUBCUTANEOUS at 08:48

## 2020-12-22 NOTE — PHYSICIAN ADVISOR
Current patient class: Inpatient  The patient is currently on Hospital Day: 23      The patient was admitted to the hospital at 1108 on 12/4/20 for the following diagnosis:  COPD (chronic obstructive pulmonary disease) ([facility] [address] ) [J44 9]  Pneumonia [J18 9]  SOB (shortness of breath) [R06 02]  Hypoxia [R09 02]  Elevated troponin [R77 8]  COVID-19 [U07 1]     CMS OUTLIER STAY REVIEW    After review of the relevant documentation, labs, vital signs and test results, the patient is appropriate for CONTINUED INPATIENT ADMISSION  The patient continues to remain hospitalized receiving acute medical care  The patient has surpassed the expected duration of stay, however given the clinical condition, need for further acute care management, the patient is appropriate to remain in an inpatient status  The patient still being actively managed, and does have unresolved medical issues requiring further hospitalization  This review is conducted at 20 days, to help satisfy the requirements for significant outlier stay review as per CMS  Given the current condition of this patient, the patient satisfies this review was determination for continued inpatient stay  Rationale is as follows: The patient is a 76 yrs old Female who presented to the ED at 12/4/2020  9:15 AM with a chief complaint of Shortness of Breath (Patient reports that starting last Sunday she wasn't feeling well and slowly felt worse; tested positive for COVID-19 a few days ago was started on steroids but last yesterday felt like she was getting worse again and started with more SOB)  During the hospitalization the patient required supplemental oxygen  She did get remdesivir as well as IV dexamethasone and vitamins  Patient's dexamethasone was restarted on 12/19 secondary to patient's persistent hypoxemia  Patient over this past weekend did require 15 L mid flow  Patient's CRP was also increasing  Patient's going to receive IV Lasix today as well  Given the persistent hypoxia with need for further steroids and Lasix and still requiring mid flow oxygen the patient still remains inpatient appropriate      The patients vitals on arrival were   ED Triage Vitals   Temperature Pulse Respirations Blood Pressure SpO2   20 0921 20 0921 20 0921 20 0921 20 0921   97 9 °F (36 6 °C) 79 18 132/86 94 %      Temp Source Heart Rate Source Patient Position - Orthostatic VS BP Location FiO2 (%)   20 0921 20 0921 20 0921 20 0921 20 1200   Oral Monitor Lying Right arm 100      Pain Score       20 09       No Pain           Past Medical History:   Diagnosis Date    Asthma     Bipolar disorder ([address] )     GERD (gastroesophageal reflux disease)      Past Surgical History:   Procedure Laterality Date     SECTION      x3    CHOLECYSTECTOMY      HYSTERECTOMY             Consults have been placed to:   IP CONSULT TO PULMONOLOGY    Vitals:    20 0730 20 0737 20 1115 20 1200   BP:   93/52    BP Location:       Pulse:   85    Resp:       Temp:       TempSrc:       SpO2: 90% 90% (!) 86% 90%   Weight:       Height:           Most recent labs:    Recent Labs     20  0442   WBC 9 55   HGB 11 2*   HCT 35 7      K 3 6   CALCIUM 9 7   BUN 29*   CREATININE 0 90   *   *   ALKPHOS 115       Scheduled Meds:  Current Facility-Administered Medications   Medication Dose Route Frequency Provider Last Rate    acetaminophen  650 mg Oral Q6H PRN Viktoria Boo MD      albuterol  2 puff Inhalation Q4H PRN Viktoria Boo MD      albuterol  2 puff Inhalation TID Opal Bonilla MD      aluminum-magnesium hydroxide-simethicone  30 mL Oral Q4H PRN Viktoria Boo MD      amoxicillin-clavulanate  1 tablet Oral Q12H [facility] Rodney Saldana MD      azelastine  1 spray Each Nare BID Cinthya Wolfe DO      benzonatate  100 mg Oral TID PRN Zella Leyden, MD      bisacodyl  10 mg Rectal Daily PRN Diamond Martines MD      cholecalciferol  2,000 Units Oral Daily Diamond Martines MD      dexamethasone  6 mg Intravenous Q24H [facility] Mary Carmen Lacey,       enoxaparin  30 mg Subcutaneous Q12H [facility] Diamond Martines MD      famotidine  20 mg Oral Q12H Diamond Martines MD      fluticasone  2 spray Each Nare Daily Diamond Martines MD      gabapentin  300 mg Oral Daily Diamond Martines MD      guaiFENesin  1,200 mg Oral Q12H [facility] Lorin Cage PA-C      lidocaine  1 patch Topical Daily Diamond Martines MD      lithium carbonate  300 mg Oral HS Diamond Martines MD      loratadine  10 mg Oral Daily Anabel Ring MD      LORazepam  1 mg Oral BID PRN Diamond Martines MD      LORazepam  1 5 mg Oral HS Diamond Martines MD      melatonin  6 mg Oral HS Diamond Martines MD      menthol-methyl salicylate   Apply externally 4x Daily PRN Janny Kaye PA-C      multivitamin with iron-minerals  15 mL Per NG Tube Daily Diamond Martines MD      nystatin  500,000 Units Swish & Swallow 4x Daily Ulus Domingo, DO      ondansetron  4 mg Intravenous Q6H PRN Diamond Martines MD      polyethylene glycol  17 g Oral Daily Diamond Martines MD      senna-docusate sodium  1 tablet Oral BID Diamond Martines MD      sertraline  100 mg Oral HS Diamond Martines MD      sodium chloride  2 spray Each Nare Q2H PRN Lorin Cage PA-C      traZODone  100 mg Oral HS Diamond Martines MD       Continuous Infusions:   PRN Meds:   acetaminophen    albuterol    aluminum-magnesium hydroxide-simethicone    benzonatate    bisacodyl    LORazepam    menthol-methyl salicylate    ondansetron    sodium chloride    Surgical procedures (if appropriate):

## 2020-12-22 NOTE — PROGRESS NOTES
Progress Note - Pulmonary   Lavena Scale 76 y o  female MRN: [id number]  Unit/Bed#: -01 Encounter: [id number]      Assessment and Plan:     Acute hypoxic respiratory failure secondary to COVID-19   - continue to wean as tolerated for SPo2 90-92%   - continue to prone    - s/p remdesivir, dexamethasone, vitamins   -restarted Dexamethasone 6mg IV daily on 12/19 x 5 days   - increased to 15lpm midflow over the weekend  - PCT negative, CRP increased to 266 (increased), Ferritin 623 (increased)  - D-dimer 1 33  - CXR on 12/17 showed worsening opacity L>R   - initially positive on 11/29  - trial additional lasix 40mg IV today    - CT chest showed diffuse GGO with crazy paving and peripheral consolidation      Sinus congestion - likely sinusitis  - c/w claritin and mucinex po bid  - Flonase, Afrin, Sudafed    - c/w saline rinses, warm water baths   - CT sinus showed mild sinus disease   - will discuss with RT about OxyMask - cannot use due to aersolization risk   - start Augmentin 875-125mg po daily x 7 days (12/21- 12/27)     Transaminitis   - closely monitor, COVID-related vs  Medication-induced     Asthma   - not in exacerbation     Subjective:   Patient seen/examined this AM   She was still feeling the same  She was placed on bedrest because of her desaturation  She doesn't feel much better than other days  She has a dry cough  Review of Systems   HENT: Positive for congestion  Respiratory: Positive for shortness of breath  All other systems reviewed and are negative        Objective:     Vitals:    12/21/20 0805 12/21/20 1336 12/21/20 1533 12/22/20 0600   BP: 102/59  93/50    Pulse: 78  75    Resp:       Temp: 98 °F (36 7 °C)  98 3 °F (36 8 °C)    TempSrc:       SpO2: 90% 91% 90%    Weight:    80 5 kg (177 lb 7 5 oz)   Height:               Intake/Output Summary (Last 24 hours) at 12/22/2020 0720  Last data filed at 12/21/2020 0805  Gross per 24 hour   Intake 120 ml   Output --   Net 120 ml     Physical Exam  Constitutional:       Appearance: Normal appearance  HENT:      Head: Normocephalic  Nose: Congestion present  Eyes:      Pupils: Pupils are equal, round, and reactive to light  Cardiovascular:      Rate and Rhythm: Normal rate and regular rhythm  Pulmonary:      Breath sounds: Rales present  Abdominal:      General: Abdomen is flat  Palpations: Abdomen is soft  Musculoskeletal: Normal range of motion  Skin:     General: Skin is warm and dry  Neurological:      General: No focal deficit present  Mental Status: She is alert and oriented to person, place, and time  Labs: I have personally reviewed pertinent lab results  Results from last 7 days   Lab Units 12/22/20  0442 12/19/20  0610 12/18/20  0538   WBC Thousand/uL 9 55 8 49 7 60   HEMOGLOBIN g/dL 11 2* 11 3* 11 6   HEMATOCRIT % 35 7 35 5 36 6   PLATELETS Thousands/uL 252 219 206   NEUTROS PCT % 71 76* 77*   MONOS PCT % 7 6 6      Results from last 7 days   Lab Units 12/22/20  0442 12/21/20  0553 12/19/20  0610   POTASSIUM mmol/L 3 6 3 4* 3 7   CHLORIDE mmol/L 105 102 101   CO2 mmol/L 28 30 30   BUN mg/dL 29* 34* 17   CREATININE mg/dL 0 90 0 90 0 80   CALCIUM mg/dL 9 7 10 1 10 1   ALK PHOS U/L 115 132* 94   ALT U/L 261* 284* 123*   AST U/L 111* 171* 80*     Results from last 7 days   Lab Units 12/21/20  0553   MAGNESIUM mg/dL 2 8*     Results from last 7 days   Lab Units 12/21/20  0553   PHOSPHORUS mg/dL 4 2*              0   Lab Value Date/Time    TROPONINI 0 14 (H) 12/05/2020 2123    TROPONINI 0 22 (H) 12/05/2020 0537    TROPONINI 0 28 (H) 12/04/2020 1836    TROPONINI 0 30 (H) 12/04/2020 1602    TROPONINI 0 32 (H) 12/04/2020 1304    TROPONINI 0 26 (H) 12/04/2020 0959    TROPONINI <0 02 10/24/2019 1048    TROPONINI <0 02 07/01/2019 1443    TROPONINI <0 02 07/01/2019 1118    TROPONINI <0 02 07/01/2019 0813       Microbiology:      Imaging and other studies: I have personally reviewed pertinent reports            Kathryn Dudley Phyllis Stern MD   Pulmonary & Critical Care Fellow  [facility]

## 2020-12-22 NOTE — OCCUPATIONAL THERAPY NOTE
Occupational Therapy         Patient Name: Slade Courser  GPBWI'R Date: 12/22/2020 12/22/20 1130   OT Last Visit   OT Visit Date 12/22/20   Note Type   Cancel Reasons Medical status       Pt not medically appropriate for therapy at this time 2' currently on 15L midflow; will cont to follow as appropriate       Chisé Diacik, MOT, OTR/L

## 2020-12-22 NOTE — PHYSICAL THERAPY NOTE
Physical Therapy Cancellation Note    Per discussion with NSG, pt is not appropriate to participate in PT session and reports pt desats as soon as pt sits up  Currently on 15L midflow  PT to continue to follow once medically stable        Rosie Aranda PT, DPT

## 2020-12-23 LAB
ALBUMIN SERPL BCP-MCNC: 2.8 G/DL (ref 3.5–5)
ALP SERPL-CCNC: 119 U/L (ref 46–116)
ALT SERPL W P-5'-P-CCNC: 268 U/L (ref 12–78)
ANION GAP SERPL CALCULATED.3IONS-SCNC: 6 MMOL/L (ref 4–13)
AST SERPL W P-5'-P-CCNC: 112 U/L (ref 5–45)
BACTERIA BLD CULT: NORMAL
BACTERIA BLD CULT: NORMAL
BASOPHILS # BLD AUTO: 0.03 THOUSANDS/ΜL (ref 0–0.1)
BASOPHILS NFR BLD AUTO: 0 % (ref 0–1)
BILIRUB SERPL-MCNC: 0.37 MG/DL (ref 0.2–1)
BUN SERPL-MCNC: 27 MG/DL (ref 5–25)
CALCIUM ALBUM COR SERPL-MCNC: 11 MG/DL (ref 8.3–10.1)
CALCIUM SERPL-MCNC: 10 MG/DL (ref 8.3–10.1)
CHLORIDE SERPL-SCNC: 102 MMOL/L (ref 100–108)
CO2 SERPL-SCNC: 30 MMOL/L (ref 21–32)
CREAT SERPL-MCNC: 0.79 MG/DL (ref 0.6–1.3)
CRP SERPL QL: 119 MG/L
D DIMER PPP FEU-MCNC: 0.8 UG/ML FEU
EOSINOPHIL # BLD AUTO: 0.33 THOUSAND/ΜL (ref 0–0.61)
EOSINOPHIL NFR BLD AUTO: 3 % (ref 0–6)
ERYTHROCYTE [DISTWIDTH] IN BLOOD BY AUTOMATED COUNT: 12.5 % (ref 11.6–15.1)
FERRITIN SERPL-MCNC: 514 NG/ML (ref 8–388)
GFR SERPL CREATININE-BSD FRML MDRD: 78 ML/MIN/1.73SQ M
GLUCOSE SERPL-MCNC: 96 MG/DL (ref 65–140)
HCT VFR BLD AUTO: 36.5 % (ref 34.8–46.1)
HGB BLD-MCNC: 11.5 G/DL (ref 11.5–15.4)
IMM GRANULOCYTES # BLD AUTO: 0.04 THOUSAND/UL (ref 0–0.2)
IMM GRANULOCYTES NFR BLD AUTO: 0 % (ref 0–2)
LYMPHOCYTES # BLD AUTO: 2.01 THOUSANDS/ΜL (ref 0.6–4.47)
LYMPHOCYTES NFR BLD AUTO: 20 % (ref 14–44)
MCH RBC QN AUTO: 26.5 PG (ref 26.8–34.3)
MCHC RBC AUTO-ENTMCNC: 31.5 G/DL (ref 31.4–37.4)
MCV RBC AUTO: 84 FL (ref 82–98)
MONOCYTES # BLD AUTO: 0.52 THOUSAND/ΜL (ref 0.17–1.22)
MONOCYTES NFR BLD AUTO: 5 % (ref 4–12)
NEUTROPHILS # BLD AUTO: 7.05 THOUSANDS/ΜL (ref 1.85–7.62)
NEUTS SEG NFR BLD AUTO: 72 % (ref 43–75)
NRBC BLD AUTO-RTO: 0 /100 WBCS
PLATELET # BLD AUTO: 287 THOUSANDS/UL (ref 149–390)
PMV BLD AUTO: 11 FL (ref 8.9–12.7)
POTASSIUM SERPL-SCNC: 3.3 MMOL/L (ref 3.5–5.3)
PROT SERPL-MCNC: 6.8 G/DL (ref 6.4–8.2)
RBC # BLD AUTO: 4.34 MILLION/UL (ref 3.81–5.12)
SODIUM SERPL-SCNC: 138 MMOL/L (ref 136–145)
WBC # BLD AUTO: 9.98 THOUSAND/UL (ref 4.31–10.16)

## 2020-12-23 PROCEDURE — 85025 COMPLETE CBC W/AUTO DIFF WBC: CPT | Performed by: INTERNAL MEDICINE

## 2020-12-23 PROCEDURE — 85379 FIBRIN DEGRADATION QUANT: CPT | Performed by: INTERNAL MEDICINE

## 2020-12-23 PROCEDURE — 99291 CRITICAL CARE FIRST HOUR: CPT | Performed by: NURSE PRACTITIONER

## 2020-12-23 PROCEDURE — 82728 ASSAY OF FERRITIN: CPT | Performed by: INTERNAL MEDICINE

## 2020-12-23 PROCEDURE — 99292 CRITICAL CARE ADDL 30 MIN: CPT | Performed by: INTERNAL MEDICINE

## 2020-12-23 PROCEDURE — 80053 COMPREHEN METABOLIC PANEL: CPT | Performed by: INTERNAL MEDICINE

## 2020-12-23 PROCEDURE — 94760 N-INVAS EAR/PLS OXIMETRY 1: CPT

## 2020-12-23 PROCEDURE — 99232 SBSQ HOSP IP/OBS MODERATE 35: CPT | Performed by: PHYSICIAN ASSISTANT

## 2020-12-23 PROCEDURE — 94660 CPAP INITIATION&MGMT: CPT

## 2020-12-23 PROCEDURE — 99232 SBSQ HOSP IP/OBS MODERATE 35: CPT | Performed by: INTERNAL MEDICINE

## 2020-12-23 PROCEDURE — 86140 C-REACTIVE PROTEIN: CPT | Performed by: INTERNAL MEDICINE

## 2020-12-23 RX ORDER — MINERAL OIL 100 G/100G
1 OIL RECTAL ONCE
Status: COMPLETED | OUTPATIENT
Start: 2020-12-23 | End: 2020-12-23

## 2020-12-23 RX ORDER — POTASSIUM CHLORIDE 20 MEQ/1
40 TABLET, EXTENDED RELEASE ORAL ONCE
Status: COMPLETED | OUTPATIENT
Start: 2020-12-23 | End: 2020-12-23

## 2020-12-23 RX ADMIN — SERTRALINE HYDROCHLORIDE 100 MG: 100 TABLET ORAL at 23:39

## 2020-12-23 RX ADMIN — SENNOSIDES AND DOCUSATE SODIUM 1 TABLET: 8.6; 5 TABLET ORAL at 17:28

## 2020-12-23 RX ADMIN — FLUTICASONE PROPIONATE 2 SPRAY: 50 SPRAY, METERED NASAL at 08:16

## 2020-12-23 RX ADMIN — ENOXAPARIN SODIUM 30 MG: 30 INJECTION SUBCUTANEOUS at 08:16

## 2020-12-23 RX ADMIN — POTASSIUM CHLORIDE 40 MEQ: 1500 TABLET, EXTENDED RELEASE ORAL at 17:28

## 2020-12-23 RX ADMIN — GUAIFENESIN 1200 MG: 600 TABLET, EXTENDED RELEASE ORAL at 08:16

## 2020-12-23 RX ADMIN — SENNOSIDES AND DOCUSATE SODIUM 1 TABLET: 8.6; 5 TABLET ORAL at 08:16

## 2020-12-23 RX ADMIN — ENOXAPARIN SODIUM 30 MG: 30 INJECTION SUBCUTANEOUS at 22:36

## 2020-12-23 RX ADMIN — MINERAL OIL 1 ENEMA: 100 ENEMA RECTAL at 21:56

## 2020-12-23 RX ADMIN — ALBUTEROL SULFATE 2 PUFF: 90 AEROSOL, METERED RESPIRATORY (INHALATION) at 08:16

## 2020-12-23 RX ADMIN — Medication 2000 UNITS: at 08:16

## 2020-12-23 RX ADMIN — GUAIFENESIN 1200 MG: 600 TABLET, EXTENDED RELEASE ORAL at 22:35

## 2020-12-23 RX ADMIN — ALBUTEROL SULFATE 2 PUFF: 90 AEROSOL, METERED RESPIRATORY (INHALATION) at 17:28

## 2020-12-23 RX ADMIN — DEXAMETHASONE SODIUM PHOSPHATE 6 MG: 4 INJECTION, SOLUTION INTRA-ARTICULAR; INTRALESIONAL; INTRAMUSCULAR; INTRAVENOUS; SOFT TISSUE at 09:43

## 2020-12-23 RX ADMIN — LORAZEPAM 1.5 MG: 1 TABLET ORAL at 22:36

## 2020-12-23 RX ADMIN — POLYETHYLENE GLYCOL 3350 17 G: 17 POWDER, FOR SOLUTION ORAL at 08:17

## 2020-12-23 RX ADMIN — FAMOTIDINE 20 MG: 20 TABLET ORAL at 08:16

## 2020-12-23 RX ADMIN — NYSTATIN 500000 UNITS: 100000 SUSPENSION ORAL at 22:37

## 2020-12-23 RX ADMIN — LORATADINE 10 MG: 10 TABLET ORAL at 08:17

## 2020-12-23 RX ADMIN — NYSTATIN 500000 UNITS: 100000 SUSPENSION ORAL at 11:44

## 2020-12-23 RX ADMIN — ATORVASTATIN CALCIUM 40 MG: 40 TABLET, FILM COATED ORAL at 23:40

## 2020-12-23 RX ADMIN — LITHIUM CARBONATE 300 MG: 300 CAPSULE, GELATIN COATED ORAL at 23:39

## 2020-12-23 RX ADMIN — GABAPENTIN 300 MG: 300 CAPSULE ORAL at 08:17

## 2020-12-23 RX ADMIN — AZELASTINE HYDROCHLORIDE 1 SPRAY: 137 SPRAY, METERED NASAL at 08:16

## 2020-12-23 RX ADMIN — FAMOTIDINE 20 MG: 20 TABLET ORAL at 22:36

## 2020-12-23 RX ADMIN — TRAZODONE HYDROCHLORIDE 100 MG: 100 TABLET ORAL at 23:39

## 2020-12-23 RX ADMIN — AMOXICILLIN AND CLAVULANATE POTASSIUM 1 TABLET: 875; 125 TABLET, FILM COATED ORAL at 08:16

## 2020-12-23 RX ADMIN — AMOXICILLIN AND CLAVULANATE POTASSIUM 1 TABLET: 875; 125 TABLET, FILM COATED ORAL at 23:39

## 2020-12-23 RX ADMIN — ALBUTEROL SULFATE 2 PUFF: 90 AEROSOL, METERED RESPIRATORY (INHALATION) at 22:43

## 2020-12-23 RX ADMIN — NYSTATIN 500000 UNITS: 100000 SUSPENSION ORAL at 17:28

## 2020-12-23 RX ADMIN — NYSTATIN 500000 UNITS: 100000 SUSPENSION ORAL at 08:17

## 2020-12-23 RX ADMIN — AZELASTINE HYDROCHLORIDE 1 SPRAY: 137 SPRAY, METERED NASAL at 17:28

## 2020-12-23 NOTE — PROGRESS NOTES
Progress Note - Pulmonary   Alem Rivera 76 y o  female MRN: [id number]  Unit/Bed#: -01 Encounter: [id number]      Assessment and Plan:     Acute hypoxic respiratory failure secondary to COVID-19   - continue to wean as tolerated for SPo2 90-92%   - [place] 40L, 100% - wean as tolerated   - continue to prone    - s/p remdesivir, dexamethasone, vitamins   -restarted Dexamethasone 6mg IV daily on 12/19 x 5 days   - PCT negative  - --> 119  -  Ferritin 623 --> 514  - D-dimer 1 33--> 0 8  - CXR on 12/17 showed worsening opacity L>R   - initially positive on 11/29  - CT chest showed diffuse GGO with crazy paving and peripheral consolidation      Sinus congestion - likely sinusitis  - c/w claritin and mucinex po bid  - Flonase, Afrin, Sudafed    - c/w saline rinses, warm water baths   - CT sinus showed mild sinus disease   - start Augmentin 875-125mg po daily x 7 days (12/21- 12/27)     Transaminitis   - closely monitor, COVID-related vs  Medication-induced     Asthma   - not in exacerbation     Subjective:   Patient seen/examined this AM   She feels much better with her nasal passages  She has a dry cough  Review of Systems   Respiratory: Positive for cough  All other systems reviewed and are negative  Objective:     Vitals:    12/23/20 0255 12/23/20 0300 12/23/20 0600 12/23/20 0828   BP:    109/64   BP Location:       Pulse:    69   Resp:       Temp:    98 9 °F (37 2 °C)   TempSrc:       SpO2: 95% 95%  95%   Weight:   80 6 kg (177 lb 11 1 oz)    Height:               Intake/Output Summary (Last 24 hours) at 12/23/2020 0843  Last data filed at 12/23/2020 0513  Gross per 24 hour   Intake 480 ml   Output 2025 ml   Net -1545 ml     Physical Exam  Constitutional:       Appearance: Normal appearance  HENT:      Head: Normocephalic       Nose: improved nasal passages   Eyes:      Pupils: Pupils are equal, round, and reactive to light     Cardiovascular:      Rate and Rhythm: Normal rate and regular rhythm  Pulmonary:      Breath sounds: Rales present  Abdominal:      General: Abdomen is flat       Palpations: Abdomen is soft  Musculoskeletal: Normal range of motion  Skin:     General: Skin is warm and dry  Neurological:      General: No focal deficit present       Mental Status: She is alert and oriented to person, place, and time           Labs: I have personally reviewed pertinent lab results  Results from last 7 days   Lab Units 12/23/20  0524 12/22/20  0442 12/19/20  0610   WBC Thousand/uL 9 98 9 55 8 49   HEMOGLOBIN g/dL 11 5 11 2* 11 3*   HEMATOCRIT % 36 5 35 7 35 5   PLATELETS Thousands/uL 287 252 219   NEUTROS PCT % 72 71 76*   MONOS PCT % 5 7 6      Results from last 7 days   Lab Units 12/23/20  0524 12/22/20  0442 12/21/20  0553   POTASSIUM mmol/L 3 3* 3 6 3 4*   CHLORIDE mmol/L 102 105 102   CO2 mmol/L 30 28 30   BUN mg/dL 27* 29* 34*   CREATININE mg/dL 0 79 0 90 0 90   CALCIUM mg/dL 10 0 9 7 10 1   ALK PHOS U/L 119* 115 132*   ALT U/L 268* 261* 284*   AST U/L 112* 111* 171*     Results from last 7 days   Lab Units 12/21/20  0553   MAGNESIUM mg/dL 2 8*     Results from last 7 days   Lab Units 12/21/20  0553   PHOSPHORUS mg/dL 4 2*              0   Lab Value Date/Time    TROPONINI 0 14 (H) 12/05/2020 2123    TROPONINI 0 22 (H) 12/05/2020 0537    TROPONINI 0 28 (H) 12/04/2020 1836    TROPONINI 0 30 (H) 12/04/2020 1602    TROPONINI 0 32 (H) 12/04/2020 1304    TROPONINI 0 26 (H) 12/04/2020 0959    TROPONINI <0 02 10/24/2019 1048    TROPONINI <0 02 07/01/2019 1443    TROPONINI <0 02 07/01/2019 1118    TROPONINI <0 02 07/01/2019 0813       Imaging and other studies: I have personally reviewed pertinent reports     and I have personally reviewed pertinent films in PACS        Bello Benjamin MD   Pulmonary & Critical Care Fellow  [facility]

## 2020-12-23 NOTE — ASSESSMENT & PLAN NOTE
· COVID-19 positive on 11/29/2020  · Patient on high flow today  Upgraded to Level 1 SD  ICU consult placed  Pulm following   · Completed remdesivir prior   S/p convalescent plasma 12/7/2020  · Was restarted on IV steroids for 5 day course which completed today  · Defer Actemra use to ICU/Pulm  · CT chest revealed diffuse bilateral groundglass opacities with crazy paving and foci of peripheral consolidation in the right lung  · On Lipitor and Lovenox

## 2020-12-23 NOTE — ASSESSMENT & PLAN NOTE
In the setting of COVID-19 infection  Currently up to 50 L via nasal cannula (high-flow)  See plan for COVID below for further details  Given a diuresis trial with a dose of Lasix per pulmonology today

## 2020-12-23 NOTE — RESPIRATORY THERAPY NOTE
resp care      12/23/20 1406   Respiratory Protocol   Protocol Initiated? Yes   Protocol Selection Respiratory   Language Barrier? No   Medical & Social History Reviewed? Yes   Diagnostic Studies Reviewed? Yes   Physical Assessment Performed? Yes   Respiratory Plan Vent/NIV/HFNC   Respiratory Assessment   Assessment Type Assess only   General Appearance Awake   Respiratory Pattern Dyspnea with exertion   Chest Assessment Chest expansion symmetrical   Bilateral Breath Sounds Clear;Diminished   Resp Comments pt covid + and arrived to micu on hfnc from p7  pt ordered tid mdi albuterol by dr Ashutosh Cobb

## 2020-12-23 NOTE — ASSESSMENT & PLAN NOTE
Previous troponin peak of 0 32 s/p subsequent downtrend  Likely a non-MI troponin elevation in the setting of COVID infection

## 2020-12-23 NOTE — ASSESSMENT & PLAN NOTE
COVID-19 infection testing positive on 11/29  Initially placed on a moderate treatment pathway -> intermittently progressive/worsening oxygen requirement -> increased to 50 L high-flow today +/- non-rebreather mask  Initially completed an IV Decadron course -> restarted due to persistently elevated oxygen requirements and suspicion of worsening pneumonitis per pulmonology - s/p completion of Remdesivir  Will initiate statin x 2 weeks for anti-inflammatory effect  Encourage ambulation and self-proning as tolerated - encourage incentive spirometry  CT of chest on 12/21 revealed "Diffuse bilateral groundglass opacities with crazy paving and foci of peripheral consolidation in the right lung due to known Covid 19 pneumonia"  Persist the elevated inflammatory markers including D-dimer, ferritin, and CRP noted - IL-6 elevation now normalized -> will discuss with pulmonology tomorrow regarding possible Actemra trial as ferritin > 600 and CRP > 90 if oxygen requirements do not improve

## 2020-12-23 NOTE — PROGRESS NOTES
Progress Note - Jami Code 1955, 76 y o  female MRN: [id number]    Unit/Bed#: [facility] 03 Encounter: [id number]    Primary Care Provider: LUIS CARLOS Berumen   Date and time admitted to hospital: 12/4/2020  9:15 AM        * Acute respiratory failure with hypoxia  Assessment & Plan  · In the setting of COVID-19 infection  · On 40-50L high flow at %  Discussed with Pulm and ICU  Upgraded to Level 1 SD    COVID-19 virus infection  Assessment & Plan  · COVID-19 positive on 11/29/2020  · Patient on high flow today  Upgraded to Level 1 SD  ICU consult placed  Pulm following   · Completed remdesivir prior  S/p convalescent plasma 12/7/2020  · Was restarted on IV steroids for 5 day course which completed today  · Defer Actemra use to ICU/Pulm  · CT chest revealed diffuse bilateral groundglass opacities with crazy paving and foci of peripheral consolidation in the right lung  · On Lipitor and Lovenox     Possible sinusitis  Assessment & Plan  · CT of sinuses on 12/21 revealed mild sinus disease  · On a seven day Augmentin course per pulmonology along with symptomatic relief agents    Transaminitis  Assessment & Plan  · Likely related to COVID-19 infection  · Monitor LFTs - limit/avoid hepatotoxins if possible    Asthma  Assessment & Plan  · Pulm following  · Albuterol on board     Bipolar disorder   Assessment & Plan  · Continue psych meds    VTE Pharmacologic Prophylaxis:   Pharmacologic: Enoxaparin (Lovenox)  Mechanical VTE Prophylaxis in Place: Yes    Patient Centered Rounds: I have performed bedside rounds with nursing staff today  Discussions with Specialists or Other Care Team Provider: Respiratory therapist, ICU Dr Stella Ballard     Education and Discussions with Family / Patient: patient; when asked if there was anyone she would like me to call today with an update the patient kindly declined     Time Spent for Care: 30 minutes    More than 50% of total time spent on counseling and coordination of care as described above  Current Length of Stay: 19 day(s)    Current Patient Status: Inpatient   Certification Statement: The patient will continue to require additional inpatient hospital stay due to upgraded to level 1 SD going to ICU    Discharge Plan: not medically stable for d/c, upgraded to Level 1 SD going to ICU on high flow     Code Status: Level 1 - Full Code      Subjective:   Ms Tha Jack states that she's breathing better today    Objective:     Vitals:   Temp (24hrs), Av 5 °F (36 9 °C), Min:98 °F (36 7 °C), Max:98 9 °F (37 2 °C)    Temp:  [98 °F (36 7 °C)-98 9 °F (37 2 °C)] 98 9 °F (37 2 °C)  HR:  [61-78] 78  BP: ()/(55-65) 97/55  SpO2:  [93 %-95 %] 95 %  Body mass index is 33 57 kg/m²  Input and Output Summary (last 24 hours): Intake/Output Summary (Last 24 hours) at 2020 1626  Last data filed at 2020 0513  Gross per 24 hour   Intake 480 ml   Output 825 ml   Net -345 ml       Physical Exam:     Physical Exam  Vitals signs and nursing note reviewed  Constitutional:       Comments: Patient seen sitting in bed comfortably resting with nurse and RT present earlier today   Cardiovascular:      Rate and Rhythm: Normal rate and regular rhythm  Pulmonary:      Effort: Pulmonary effort is normal       Comments: Patient initially on 50L high flow at 100%, however leak noted in tubing and nasal tubing from high flow was upside down  RT closed tubing and nasal tubing was reinserted  Patient ook a long time to recover, was satting 88% on 45L high flow at 90% then  Abdominal:      General: Bowel sounds are normal       Palpations: Abdomen is soft  Tenderness: There is no abdominal tenderness  Musculoskeletal:      Right lower leg: No edema  Left lower leg: No edema  Skin:     General: Skin is warm  Neurological:      Mental Status: She is alert and oriented to person, place, and time     Psychiatric:         Mood and Affect: Mood normal  Behavior: Behavior normal            Additional Data:     Labs:    Results from last 7 days   Lab Units 12/23/20  0524   WBC Thousand/uL 9 98   HEMOGLOBIN g/dL 11 5   HEMATOCRIT % 36 5   PLATELETS Thousands/uL 287   NEUTROS PCT % 72   LYMPHS PCT % 20   MONOS PCT % 5   EOS PCT % 3     Results from last 7 days   Lab Units 12/23/20  0524   SODIUM mmol/L 138   POTASSIUM mmol/L 3 3*   CHLORIDE mmol/L 102   CO2 mmol/L 30   BUN mg/dL 27*   CREATININE mg/dL 0 79   ANION GAP mmol/L 6   CALCIUM mg/dL 10 0   ALBUMIN g/dL 2 8*   TOTAL BILIRUBIN mg/dL 0 37   ALK PHOS U/L 119*   ALT U/L 268*   AST U/L 112*   GLUCOSE RANDOM mg/dL 96                 Results from last 7 days   Lab Units 12/19/20  0610 12/18/20  0538   PROCALCITONIN ng/ml 0 16 0 08           * I Have Reviewed All Lab Data Listed Above  * Additional Pertinent Lab Tests Reviewed: All Labs Within Last 24 Hours Reviewed    Imaging:    Imaging Reports Reviewed Today Include: CTs as above  Imaging Personally Reviewed by Myself Includes:  none    Recent Cultures (last 7 days):     Results from last 7 days   Lab Units 12/18/20 2002 12/18/20  1637   BLOOD CULTURE   --  No Growth After 4 Days  No Growth After 4 Days     URINE CULTURE  <10,000 cfu/ml Gram Negative Slava Enteric Like*  --        Last 24 Hours Medication List:   Current Facility-Administered Medications   Medication Dose Route Frequency Provider Last Rate    acetaminophen  650 mg Oral Q6H PRN Vanessa Sheehan MD      albuterol  2 puff Inhalation Q4H PRN Vanessa Sheehan MD      albuterol  2 puff Inhalation TID Jose Ramon Duron MD      aluminum-magnesium hydroxide-simethicone  30 mL Oral Q4H PRN Vanessa Sheehan MD      amoxicillin-clavulanate  1 tablet Oral Q12H [address] Corrine Lara MD      atorvastatin  40 mg Oral HS Melissa Lo MD      azelastine  1 spray Each Nare BID Kaye Aponte DO      benzonatate  100 mg Oral TID PRN Kailyn Mckeon MD      bisacodyl  10 mg Rectal Daily PRN Vanessa Sheehan MD     [facility] cholecalciferol  2,000 Units Oral Daily Vince Ayers MD      enoxaparin  30 mg Subcutaneous Q12H [address] Vince Ayers MD      famotidine  20 mg Oral Q12H Vince Ayers MD      fluticasone  2 spray Each Nare Daily Vince Ayers MD      gabapentin  300 mg Oral Daily Vince Ayers MD      guaiFENesin  1,200 mg Oral Q12H [address] Lorin Cage PA-C      lidocaine  1 patch Topical Daily Vince Ayesr MD      lithium carbonate  300 mg Oral HS Vince Ayers MD      loratadine  10 mg Oral Daily Gato Rutledge MD      LORazepam  1 mg Oral BID PRN Vince Ayers MD      LORazepam  1 5 mg Oral HS Vince Ayers MD      melatonin  6 mg Oral HS Vince Ayers MD      menthol-methyl salicylate   Apply externally 4x Daily PRN Magnolia Zabala PA-C      multivitamin with iron-minerals  15 mL Per NG Tube Daily Vince Ayers MD      nystatin  500,000 Units Swish & Swallow 4x Daily Avinash Kirby DO      ondansetron  4 mg Intravenous Q6H PRN Vince Ayers MD      polyethylene glycol  17 g Oral Daily Vince Ayers MD      potassium chloride  40 mEq Oral Once Octavio Benedict PA-C      senna-docusate sodium  1 tablet Oral BID Vince Ayers MD      sertraline  100 mg Oral HS Vince Ayers MD      sodium chloride  2 spray Each Nare Q2H PRN Lorin Cage PA-C      traZODone  100 mg Oral HS Vince Ayers MD          Today, Patient Was Seen By: Octavio Benedict PA-C    ** Please Note: Dictation voice to text software may have been used in the creation of this document   **

## 2020-12-23 NOTE — ASSESSMENT & PLAN NOTE
· In the setting of COVID-19 infection  · On 40-50L high flow at %  Discussed with Pulm and ICU   Upgraded to Level 1 SD

## 2020-12-23 NOTE — PHYSICAL THERAPY NOTE
Physical Therapy Cancellation Note    The pt  Is transferring to MICU  Will continue to follow as appropriate      Dejon Greene PTA

## 2020-12-23 NOTE — PLAN OF CARE
Problem: Potential for Falls  Goal: Patient will remain free of falls  Description: INTERVENTIONS:  - Assess patient frequently for physical needs  -  Identify cognitive and physical deficits and behaviors that affect risk of falls    -  [place] fall precautions as indicated by assessment   - Educate patient/family on patient safety including physical limitations  - Instruct patient to call for assistance with activity based on assessment  - Modify environment to reduce risk of injury  - Consider OT/PT consult to assist with strengthening/mobility  Outcome: Progressing     Problem: RESPIRATORY - ADULT  Goal: Achieves optimal ventilation and oxygenation  Description: INTERVENTIONS:  - Assess for changes in respiratory status  - Assess for changes in mentation and behavior  - Position to facilitate oxygenation and minimize respiratory effort  - Oxygen administered by appropriate delivery if ordered  - Initiate smoking cessation education as indicated  - Encourage broncho-pulmonary hygiene including cough, deep breathe, Incentive Spirometry  - Assess the need for suctioning and aspirate as needed  - Assess and instruct to report SOB or any respiratory difficulty  - Respiratory Therapy support as indicated  Outcome: Progressing     Problem: METABOLIC, FLUID AND ELECTROLYTES - ADULT  Goal: Electrolytes maintained within normal limits  Description: INTERVENTIONS:  - Monitor labs and assess patient for signs and symptoms of electrolyte imbalances  - Administer electrolyte replacement as ordered  - Monitor response to electrolyte replacements, including repeat lab results as appropriate  - Instruct patient on fluid and nutrition as appropriate  Outcome: Progressing  Goal: Fluid balance maintained  Description: INTERVENTIONS:  - Monitor labs   - Monitor I/O and WT  - Instruct patient on fluid and nutrition as appropriate  - Assess for signs & symptoms of volume excess or deficit  Outcome: Progressing     Problem: HEMATOLOGIC - ADULT  Goal: Maintains hematologic stability  Description: INTERVENTIONS  - Assess for signs and symptoms of bleeding or hemorrhage  - Monitor labs  - Administer supportive blood products/factors as ordered and appropriate  Outcome: Progressing     Problem: INFECTION - ADULT  Goal: Absence or prevention of progression during hospitalization  Description: INTERVENTIONS:  - Assess and monitor for signs and symptoms of infection  - Monitor lab/diagnostic results  - Monitor all insertion sites, i e  indwelling lines, tubes, and drains  - Monitor endotracheal if appropriate and nasal secretions for changes in amount and color  - [place] appropriate cooling/warming therapies per order  - Administer medications as ordered  - Instruct and encourage patient and family to use good hand hygiene technique  - Identify and instruct in appropriate isolation precautions for identified infection/condition  Outcome: Progressing  Goal: Absence of fever/infection during neutropenic period  Description: INTERVENTIONS:  - Monitor WBC    Outcome: Progressing     Problem: SAFETY ADULT  Goal: Patient will remain free of falls  Description: INTERVENTIONS:  - Assess patient frequently for physical needs  -  Identify cognitive and physical deficits and behaviors that affect risk of falls  -  [place] fall precautions as indicated by assessment   - Educate patient/family on patient safety including physical limitations  - Instruct patient to call for assistance with activity based on assessment  - Modify environment to reduce risk of injury  - Consider OT/PT consult to assist with strengthening/mobility  Outcome: Progressing  Goal: Maintain or return to baseline ADL function  Description: INTERVENTIONS:  - Assess patient frequently for physical needs  -  Identify cognitive and physical deficits and behaviors that affect risk of falls    -  [place] fall precautions as indicated by assessment   - Educate patient/family on patient safety including physical limitations  - Instruct patient to call for assistance with activity based on assessment  - Modify environment to reduce risk of injury  - Consider OT/PT consult to assist with strengthening/mobility  Outcome: Progressing  Goal: Maintain or return mobility status to optimal level  Description: INTERVENTIONS:  -  Assess patient's ability to carry out ADLs; assess patient's baseline for ADL function and identify physical deficits which impact ability to perform ADLs (bathing, care of mouth/teeth, toileting, grooming, dressing, etc )  - Assess/evaluate cause of self-care deficits   - Assess range of motion  - Assess patient's mobility; develop plan if impaired  - Assess patient's need for assistive devices and provide as appropriate  - Encourage maximum independence but intervene and supervise when necessary  - Involve family in performance of ADLs  - Assess for home care needs following discharge   - Consider OT consult to assist with ADL evaluation and planning for discharge  - Provide patient education as appropriate  Outcome: Progressing     Problem: DISCHARGE PLANNING  Goal: Discharge to home or other facility with appropriate resources  Description: INTERVENTIONS:  - Identify barriers to discharge w/patient and caregiver  - Arrange for needed discharge resources and transportation as appropriate  - Identify discharge learning needs (meds, wound care, etc )  - Arrange for interpretive services to assist at discharge as needed  - Refer to Case Management Department for coordinating discharge planning if the patient needs post-hospital services based on physician/advanced practitioner order or complex needs related to functional status, cognitive ability, or social support system  Outcome: Progressing     Problem: Knowledge Deficit  Goal: Patient/family/caregiver demonstrates understanding of disease process, treatment plan, medications, and discharge instructions  Description: Complete learning assessment and assess knowledge base  Interventions:  - Provide teaching at level of understanding  - Provide teaching via preferred learning methods  Outcome: Progressing     Problem: Prexisting or High Potential for Compromised Skin Integrity  Goal: Skin integrity is maintained or improved  Description: INTERVENTIONS:  - Identify patients at risk for skin breakdown  - Assess and monitor skin integrity  - Assess and monitor nutrition and hydration status  - Monitor labs   - Assess for incontinence   - Turn and reposition patient  - Assist with mobility/ambulation  - Relieve pressure over bony prominences  - Avoid friction and shearing  - Provide appropriate hygiene as needed including keeping skin clean and dry  - Evaluate need for skin moisturizer/barrier cream  - Collaborate with interdisciplinary team   - Patient/family teaching  - Consider wound care consult   Outcome: Progressing     Problem: Nutrition/Hydration-ADULT  Goal: Nutrient/Hydration intake appropriate for improving, restoring or maintaining nutritional needs  Description: Monitor and assess patient's nutrition/hydration status for malnutrition  Collaborate with interdisciplinary team and initiate plan and interventions as ordered  Monitor patient's weight and dietary intake as ordered or per policy  Utilize nutrition screening tool and intervene as necessary  Determine patient's food preferences and provide high-protein, high-caloric foods as appropriate       INTERVENTIONS:  - Monitor oral intake, urinary output, labs, and treatment plans  - Assess nutrition and hydration status and recommend course of action  - Evaluate amount of meals eaten  - Assist patient with eating if necessary   - Allow adequate time for meals  - Recommend/ encourage appropriate diets, oral nutritional supplements, and vitamin/mineral supplements  - Order, calculate, and assess calorie counts as needed  - Recommend, monitor, and adjust tube feedings and TPN/PPN based on assessed needs  - Assess need for intravenous fluids  - Provide specific nutrition/hydration education as appropriate  - Include patient/family/caregiver in decisions related to nutrition  Outcome: Progressing

## 2020-12-23 NOTE — ASSESSMENT & PLAN NOTE
· CT of sinuses on 12/21 revealed mild sinus disease  · On a seven day Augmentin course per pulmonology along with symptomatic relief agents

## 2020-12-23 NOTE — PROGRESS NOTES
[address] Internal [facility] - Progress Note  Patient: Carla Kennedy 76 y o  female MRN: [id number]  Unit/Bed#: -01 Encounter: [id number]  Primary Care Provider: LUIS CARLOS Ovalles  Date Of Visit: 12/22/20        Assessment & Plan:    * Acute respiratory failure with hypoxia  Assessment & Plan  In the setting of COVID-19 infection  Currently up to 50 L via nasal cannula (high-flow)  See plan for COVID below for further details  Given a diuresis trial with a dose of Lasix per pulmonology today    COVID-19 virus infection  Assessment & Plan  COVID-19 infection testing positive on 11/29  Initially placed on a moderate treatment pathway -> intermittently progressive/worsening oxygen requirement -> increased to 50 L high-flow today +/- non-rebreather mask  Initially completed an IV Decadron course -> restarted due to persistently elevated oxygen requirements and suspicion of worsening pneumonitis per pulmonology - s/p completion of Remdesivir  Will initiate statin x 2 weeks for anti-inflammatory effect  Encourage ambulation and self-proning as tolerated - encourage incentive spirometry  CT of chest on 12/21 revealed "Diffuse bilateral groundglass opacities with crazy paving and foci of peripheral consolidation in the right lung due to known Covid 19 pneumonia"  Persist the elevated inflammatory markers including D-dimer, ferritin, and CRP noted - IL-6 elevation now normalized -> will discuss with pulmonology tomorrow regarding possible Actemra trial as ferritin > 600 and CRP > 90 if oxygen requirements do not improve    Transaminitis  Assessment & Plan  Likely related to COVID-19 infection  Monitor LFTs - limit/avoid hepatotoxins if possible    GERD (gastroesophageal reflux disease)  Assessment & Plan  Continue Pepcid    Elevated troponin  Assessment & Plan  Previous troponin peak of 0 32 s/p subsequent downtrend  Likely a non-MI troponin elevation in the setting of COVID infection    Bipolar disorder Assessment & Plan  Continue Lithium/Zoloft/Trazodone/Ativan regimen    Asthma  Assessment & Plan  Albuterol on board  Maintain oxygenation    Possible sinusitis  Assessment & Plan  CT of sinuses on  revealed mild sinus disease -> was initiated on a seven day Augmentin course per pulmonology along with symptomatic relief agents      DVT Prophylaxis:  therapeutic Lovenox       Patient Centered Rounds:  I have discussed plan of care with nursing today  Discussions with Specialists or Other Care Team Provider:  see above assessments if applicable    Education and Discussions with Family / Patient:  Refused family update at this time    Time Spent for Care:  32 minutes  More than 50% of total time spent on counseling and coordination of care as described above  Current Length of Stay: 18 day(s)    Current Patient Status: Inpatient   Certification Statement:  Patient will continue to require additional hospital stay due to assessments as noted above  Code Status: Level 1 - Full Code        Subjective: Through the course of the day, oxygen requirements increased and is currently on approximately 15 L of high-flow via nasal cannula  No worsening coughing at this time however  Attempts to wean down oxygen lead to desaturation  Objective:     Vitals:   Temp (24hrs), Av 3 °F (36 8 °C), Min:98 2 °F (36 8 °C), Max:98 4 °F (36 9 °C)    Temp:  [98 2 °F (36 8 °C)-98 4 °F (36 9 °C)] 98 4 °F (36 9 °C)  HR:  [73-85] 73  BP: (90-93)/(52-53) 90/53  SpO2:  [79 %-95 %] 95 %  Body mass index is 33 53 kg/m²  Input and Output Summary (last 24 hours):        Intake/Output Summary (Last 24 hours) at 2020  Last data filed at 2020 1950  Gross per 24 hour   Intake 720 ml   Output 1200 ml   Net -480 ml       Physical Exam: (obtained today via pulmonologist and nursing exams, coupled with chart review, in attempt to limit multiple provider intra-day exposure to a COVID+ patient)    GENERAL: Well-developed/nourished - no current conversational dyspnea  HEAD:  Normocephalic - atraumatic  EYES: PERRL - EOMI   MOUTH:  Mucosa moist  NECK:  Supple - full range of motion  CARDIAC:  Rate controlled - S1/S2 positive  PULMONARY:  Diminished bilateral breath sounds with rales   ABDOMEN:  Soft - nontender/nondistended - active bowel sounds  MUSCULOSKELETAL:  Motor strength/range of motion intact  NEUROLOGIC:  Alert/oriented at baseline  SKIN:  Chronic wrinkles/blemishes       Additional Data:     Labs & Recent Cultures:    Results from last 7 days   Lab Units 12/22/20  0442   WBC Thousand/uL 9 55   HEMOGLOBIN g/dL 11 2*   HEMATOCRIT % 35 7   PLATELETS Thousands/uL 252   NEUTROS PCT % 71   LYMPHS PCT % 19   MONOS PCT % 7   EOS PCT % 2     Results from last 7 days   Lab Units 12/22/20  0442   POTASSIUM mmol/L 3 6   CHLORIDE mmol/L 105   CO2 mmol/L 28   BUN mg/dL 29*   CREATININE mg/dL 0 90   CALCIUM mg/dL 9 7   ALK PHOS U/L 115   ALT U/L 261*   AST U/L 111*                 Results from last 7 days   Lab Units 12/19/20  0610 12/18/20  0538   PROCALCITONIN ng/ml 0 16 0 08         Results from last 7 days   Lab Units 12/18/20  2002 12/18/20  1637   BLOOD CULTURE   --  No Growth After 4 Days  No Growth After 4 Days     URINE CULTURE  <10,000 cfu/ml Gram Negative Slava Enteric Like*  --          Last 24 Hours Medication List:   Current Facility-Administered Medications   Medication Dose Route Frequency Provider Last Rate    acetaminophen  650 mg Oral Q6H PRN Saira Hernandez MD      albuterol  2 puff Inhalation Q4H PRN Saira Hernandez MD      albuterol  2 puff Inhalation TID Frances Best MD      aluminum-magnesium hydroxide-simethicone  30 mL Oral Q4H PRN Saira Hernandez MD      amoxicillin-clavulanate  1 tablet Oral Q12H [facility] Pete Shabazz MD      atorvastatin  40 mg Oral HS Robin Uribe MD      azelastine  1 spray Each Nare BID Margo Mccartney DO      benzonatate  100 mg Oral TID PRN Ravi Coronado MD      bisacodyl 10 mg Rectal Daily PRN Viktoria Boo MD      cholecalciferol  2,000 Units Oral Daily Viktoria Boo MD      dexamethasone  6 mg Intravenous Q24H [facility] Cinthya Wolfe DO      enoxaparin  30 mg Subcutaneous Q12H [facility] Viktoria Boo MD      famotidine  20 mg Oral Q12H Viktoria Boo MD      fluticasone  2 spray Each Nare Daily Viktoria Boo MD      gabapentin  300 mg Oral Daily Viktoria Boo MD      guaiFENesin  1,200 mg Oral Q12H [facility] Lorin Cage PA-C      lidocaine  1 patch Topical Daily Viktoria Boo MD      lithium carbonate  300 mg Oral HS Viktoria Boo MD      loratadine  10 mg Oral Daily Rodney Saldana MD      LORazepam  1 mg Oral BID PRN Viktoria Boo MD      LORazepam  1 5 mg Oral HS Viktoria Boo MD      melatonin  6 mg Oral HS Viktoria Boo MD      menthol-methyl salicylate   Apply externally 4x Daily PRN Bakari Estrada PA-C      multivitamin with iron-minerals  15 mL Per NG Tube Daily Viktoria Boo MD      nystatin  500,000 Units Swish & Swallow 4x Daily Faith Rowland,       ondansetron  4 mg Intravenous Q6H PRN Viktoria Boo MD      polyethylene glycol  17 g Oral Daily Viktoria Boo MD      senna-docusate sodium  1 tablet Oral BID Viktoria Boo MD      sertraline  100 mg Oral HS Viktoria Boo MD      sodium chloride  2 spray Each Nare Q2H PRN Lorin Cage PA-C      traZODone  100 mg Oral HS Viktoria Boo MD                    ** Please Note: This note is constructed using a voice recognition dictation system  An occasional wrong word/phrase or sound-a-like substitution may have been picked up by dictation device due to the inherent limitations of voice recognition software  Read the chart carefully and recognize, using reasonable context, where substitutions may have occurred  **

## 2020-12-23 NOTE — ASSESSMENT & PLAN NOTE
CT of sinuses on 12/21 revealed mild sinus disease -> was initiated on a seven day Augmentin course per pulmonology along with symptomatic relief agents

## 2020-12-23 NOTE — PROGRESS NOTES
Health Maintenance Summary     Topic Due On Due Status Completed On    Pap Smear - Cervical Cancer Screening  Feb 4, 2006 Overdue     Immunization - TDAP Pregnancy  Hidden     IMMUNIZATION - DTaP/Tdap/Td Feb 4, 1995 Overdue     Immunization-Influenza Sep 1, 2017 Overdue     Depression Screening Feb 4, 1988 Overdue           Patient is due for topics as listed above, she wishes to discuss with provider .             ICU Acceptance/Transfer - Elizabeth  41 76 y o  female MRN: [id number]  Unit/Bed#: [facility] 03 Encounter: [id number]        ----------------------------------------------------------------------------------------  HPI/24hr events: 76year old female with past medical history significant for asthma, GERD, major depressive disorder and anxiety who initially presented 12/4 with increasing shortness of breath, ultimately tested positive for COVID and was admitted under mild pathway  Was briefly transferred to critical care for increasing oxygen requirements, then downgraded  Pulm following  Diureses with improvement in oxygen requirements  Restarted on steroid therapy due to increasing oxygen requirements per pulm  Today with increasing oxygen requirements prompting transfer to ICU, upon arrival to MICU oxygen requirement able to be titrated down, currrently on HFNC 70% 55L     ---------------------------------------------------------------------------------------  SUBJECTIVE  Offers no complaints    Review of Systems   Constitutional: Negative for chills and fatigue  Respiratory: Negative for chest tightness and shortness of breath  Cardiovascular: Negative for chest pain and palpitations  Gastrointestinal: Negative for abdominal distention, nausea and vomiting  Musculoskeletal: Negative for arthralgias  Neurological: Negative for dizziness and light-headedness       Review of systems was reviewed and negative unless stated above in HPI/24-hour events   ---------------------------------------------------------------------------------------  Assessment and Plan:    Neuro:    Diagnosis: Bipolar disorder  o Plan: Continue home zoloft  o Lithium 300 mg HS  o Neurontin 300mg daily  o Ativan 1 5 mg HS  o Regulate sleep/wake cycle  o ICU delirium precautions  o Continue melatonin      CV:   o Plan: No acute issues   o MAP goal >65  o Monitor telemetry    Pulm:   Diagnosis: Acute hypoxic respiratory failure secondary to COVID  o Plan: Maintain oxygen saturation >92%  o Wean HFNC as able  o Promote self proning  o COVID treatment as below  o Albuterol PRN  o Continue Mucinex  o Continue to diurese PRN      GI:   o Plan: Bowel regimen  o Tolerating regular diet      :   o Plan: Monitor I & O and renal indices       F/E/N:    F: Has been diuresed PRN, reevaluate on a daily basis    E: replete as needed    N: tolerating regular diet       Heme/Onc:    Diagnosis: COVID coagulopathy  o Plan: Lovenox 30mg Q12    Endo:    Diagnosis: Prediabetes  o Plan: Hgb A1c 5 7  o Monitor blood glucose closely      ID:    Diagnosis: COVID pneumonia  o Plan: Tested positive 11/29  o Completed remdesivir 12/8  o Completed second round of Dexamethasone today  o Convalescent plasma 12/6  o Procal negative  o Trend inflammatory markers   o Monitor fever curve and WBC  o Augmentin D3 for presumed upper respiratory infection       MSK/Skin:   o Plan: Encourage mobilization  o PT/OT  o Frequent repositioning    Disposition: Continue Stepdown Level 1 level of care   Code Status: Level 1 - Full Code  ---------------------------------------------------------------------------------------  ICU CORE MEASURES    Prophylaxis   VTE Pharmacologic Prophylaxis: Enoxaparin (Lovenox)  VTE Mechanical Prophylaxis: sequential compression device  Stress Ulcer Prophylaxis: Famotidine PO    ABCDE Protocol (if indicated)  Plan to perform spontaneous awakening trial today? Not applicable  Plan to perform spontaneous breathing trial today? Not applicable  Obvious barriers to extubation? Not applicable  CAM-ICU: Negative    Invasive Devices Review  Invasive Devices     Peripheral Intravenous Line            Peripheral IV 12/22/20 Left;Proximal;Ventral (anterior) Forearm 1 day          Drain            External Urinary Catheter 16 days              Can any invasive devices be discontinued today?  Not applicable  ---------------------------------------------------------------------------------------  OBJECTIVE    Vitals   Vitals:    20 1117 20 1404 20 1428 20 1519   BP:       BP Location:       Pulse:       Resp:       Temp:       TempSrc:       SpO2: 94% 94% 95% 95%   Weight:       Height:         Temp (24hrs), Av 4 °F (36 9 °C), Min:98 °F (36 7 °C), Max:98 9 °F (37 2 °C)  Current: Temperature: 98 9 °F (37 2 °C)  HR: 74  BP: 103/61  RR: 16  SpO2: 95    Respiratory:  SpO2: SpO2: 95 %, SpO2 Activity: SpO2 Activity: At Rest, SpO2 Device: O2 Device: High flow nasal cannula  Nasal Cannula O2 Flow Rate (L/min): 12 L/min    Invasive/non-invasive ventilation settings   Respiratory    Lab Data (Last 4 hours)    None         O2/Vent Data (Last 4 hours)       1404  1428  1519      Non-Invasive Ventilation Mode HFNC (High flow) HFNC (High flow) HFNC (High flow)                 Physical Exam  Vitals signs and nursing note reviewed  HENT:      Head: Normocephalic  Mouth/Throat:      Mouth: Mucous membranes are moist       Pharynx: Oropharynx is clear  Eyes:      Pupils: Pupils are equal, round, and reactive to light  Neck:      Musculoskeletal: Normal range of motion  Cardiovascular:      Rate and Rhythm: Normal rate and regular rhythm  Pulses: Normal pulses  Heart sounds: Normal heart sounds  Pulmonary:      Breath sounds: Decreased breath sounds present  Abdominal:      Palpations: Abdomen is soft  Musculoskeletal: Normal range of motion  Skin:     General: Skin is warm and dry  Neurological:      General: No focal deficit present  Mental Status: She is alert and oriented to person, place, and time           Laboratory and Diagnostics:  Results from last 7 days   Lab Units 20  0524 20  0442 20  0610 20  0538 20  1045   WBC Thousand/uL 9 98 9 55 8 49 7 60 7 75   HEMOGLOBIN g/dL 11 5 11 2* 11 3* 11 6 11 1* HEMATOCRIT % 36 5 35 7 35 5 36 6 34 7*   PLATELETS Thousands/uL 287 252 219 206 214   NEUTROS PCT % 72 71 76* 77* 76*   MONOS PCT % 5 7 6 6 5     Results from last 7 days   Lab Units 12/23/20  0524 12/22/20  0442 12/21/20  0553 12/19/20  0610 12/18/20  0538 12/17/20  1045   SODIUM mmol/L 138 140 138 138 139 140   POTASSIUM mmol/L 3 3* 3 6 3 4* 3 7 4 0 4 0   CHLORIDE mmol/L 102 105 102 101 104 107   CO2 mmol/L 30 28 30 30 29 25   ANION GAP mmol/L 6 7 6 7 6 8   BUN mg/dL 27* 29* 34* 17 15 13   CREATININE mg/dL 0 79 0 90 0 90 0 80 0 77 0 73   CALCIUM mg/dL 10 0 9 7 10 1 10 1 9 3 8 9   GLUCOSE RANDOM mg/dL 96 103 92 100 116 110   ALT U/L 268* 261* 284* 123* 98* 86*   AST U/L 112* 111* 171* 80* 56* 49*   ALK PHOS U/L 119* 115 132* 94 89 84   ALBUMIN g/dL 2 8* 2 7* 2 8* 3 0* 2 4* 2 5*   TOTAL BILIRUBIN mg/dL 0 37 0 39 0 24 0 77 0 61 0 42     Results from last 7 days   Lab Units 12/21/20  0553   MAGNESIUM mg/dL 2 8*   PHOSPHORUS mg/dL 4 2*                   ABG:    VBG:    Results from last 7 days   Lab Units 12/19/20  0610 12/18/20  0538   PROCALCITONIN ng/ml 0 16 0 08       Micro  Results from last 7 days   Lab Units 12/18/20 2002 12/18/20  1637   BLOOD CULTURE   --  No Growth After 4 Days  No Growth After 4 Days  URINE CULTURE  <10,000 cfu/ml Gram Negative Slava Enteric Like*  --        EKG: normal sinus rhythm  Imaging: no new imaging I have personally reviewed pertinent reports  and I have personally reviewed pertinent films in PACS    Intake and Output  I/O       12/21 0701 - 12/22 0700 12/22 0701 - 12/23 0700 12/23 0701 - 12/24 0700    P  O  120 720     Total Intake(mL/kg) 120 (1 5) 720 (8 9)     Urine (mL/kg/hr)  2025 (1)     Total Output  2025     Net +120 -1305                    Height and Weights   Height: 5' 1" (154 9 cm)  IBW: 47 8 kg  Body mass index is 33 57 kg/m²    Weight (last 2 days)     Date/Time   Weight    12/23/20 0600   80 6 (177 69)    12/22/20 0600   80 5 (177 47)    12/21/20 0600   82 9 (412 80)                Nutrition       Diet Orders   (From admission, onward)             Start     Ordered    12/05/20 0834  Diet Regular; Regular House  Diet effective now     Question Answer Comment   Diet Type Regular    Regular Regular House    Special Instructions Disposable Meal    RD to adjust diet per protocol?  Yes        12/05/20 5002                Active Medications  Scheduled Meds:  Current Facility-Administered Medications   Medication Dose Route Frequency Provider Last Rate    acetaminophen  650 mg Oral Q6H PRN Missy Wang MD      albuterol  2 puff Inhalation Q4H PRN Missy Wang MD      albuterol  2 puff Inhalation TID Rusty Harry MD      aluminum-magnesium hydroxide-simethicone  30 mL Oral Q4H PRN Missy Wang MD      amoxicillin-clavulanate  1 tablet Oral Q12H [address] Abhay Morrow MD      atorvastatin  40 mg Oral HS Rena Quigley MD      azelastine  1 spray Each Nare BID Canelo Leal DO      benzonatate  100 mg Oral TID PRN Madelaine Peter MD      bisacodyl  10 mg Rectal Daily PRN Missy Wang MD      cholecalciferol  2,000 Units Oral Daily Missy Wang MD      enoxaparin  30 mg Subcutaneous Q12H [address] Missy Wang MD      famotidine  20 mg Oral Q12H Missy Wang MD      fluticasone  2 spray Each Nare Daily Missy Wang MD      gabapentin  300 mg Oral Daily Missy Wang MD      guaiFENesin  1,200 mg Oral Q12H [address] Lorin Cage PA-C      lidocaine  1 patch Topical Daily Missy Wang MD      lithium carbonate  300 mg Oral HS Missy Wang MD      loratadine  10 mg Oral Daily Abhay Morrow MD      LORazepam  1 mg Oral BID PRN Missy Wang MD      LORazepam  1 5 mg Oral HS Missy Wang MD      melatonin  6 mg Oral HS Missy Wang MD      menthol-methyl salicylate   Apply externally 4x Daily PRN Grisel Painting PA-C      multivitamin with iron-minerals  15 mL Per NG Tube Daily Missy Wang MD      nystatin  500,000 Units Swish & Swallow 4x Daily Myrtle Caicedo DO      ondansetron  4 mg Intravenous Q6H PRN Edwin Flores MD      polyethylene glycol  17 g Oral Daily Edwin Flores MD      senna-docusate sodium  1 tablet Oral BID Edwin Flores MD      sertraline  100 mg Oral HS Edwin Flores MD      sodium chloride  2 spray Each Nare Q2H PRN Lorin Cage PA-C      traZODone  100 mg Oral HS Edwin Flores MD       Continuous Infusions:     PRN Meds:   acetaminophen, 650 mg, Q6H PRN  albuterol, 2 puff, Q4H PRN  aluminum-magnesium hydroxide-simethicone, 30 mL, Q4H PRN  benzonatate, 100 mg, TID PRN  bisacodyl, 10 mg, Daily PRN  LORazepam, 1 mg, BID PRN  menthol-methyl salicylate, , 4x Daily PRN  ondansetron, 4 mg, Q6H PRN  sodium chloride, 2 spray, Q2H PRN        Allergies   Allergies   Allergen Reactions    Amphetamine-Dextroamphetamine Other (See Comments)     Chest pain- was placed on Adderall after son passed away for depression, and she developed chest pain in 1990's; she had full cardiac work up, and was negative, so she has allergy to Adderall  Chest pain- was placed on Adderall after son passed away for depression, and she developed chest pain in 1990's; she had full cardiac work up, and was negative, so she has allergy to Adderall    Molds & Smuts     Other      Cats    Sulfa Antibiotics Other (See Comments)     ---------------------------------------------------------------------------------------  Advance Directive and Living Will:      Power of :    POLST:    ---------------------------------------------------------------------------------------  Care Time Delivered:   Upon my evaluation, this patient had a high probability of imminent or life-threatening deterioration due to acute hypoxic respiratory failure, which required my direct attention, intervention, and personal management    I have personally provided 30 minutes (1530 to 1600) of critical care time, exclusive of procedures, teaching, family meetings, and any prior time recorded by providers other than myself  LUIS CARLOS Mcgee      Portions of the record may have been created with voice recognition software  Occasional wrong word or "sound a like" substitutions may have occurred due to the inherent limitations of voice recognition software    Read the chart carefully and recognize, using context, where substitutions have occurred

## 2020-12-23 NOTE — OCCUPATIONAL THERAPY NOTE
Occupational Therapy         Patient Name: Flaco ROMANY'S Date: 12/23/2020 12/23/20 0149   OT Last Visit   OT Visit Date 12/23/20   Note Type   Cancel Reasons Medical status       PT CURRENTLY ON 40L HIFLOW WITH PLAN TO TXF TO MICU; WILL HOLD THERAPY AT THIS TIME AND CONT TO FOLLOW AS APPROPRIATE       Chisé Diacik, MOT, OTR/L

## 2020-12-24 ENCOUNTER — APPOINTMENT (INPATIENT)
Dept: RADIOLOGY | Facility: HOSPITAL | Age: 65
DRG: 177 | End: 2020-12-24
Payer: MEDICARE

## 2020-12-24 LAB
ALBUMIN SERPL BCP-MCNC: 2.6 G/DL (ref 3.5–5)
ALP SERPL-CCNC: 116 U/L (ref 46–116)
ALT SERPL W P-5'-P-CCNC: 292 U/L (ref 12–78)
ANION GAP SERPL CALCULATED.3IONS-SCNC: 3 MMOL/L (ref 4–13)
AST SERPL W P-5'-P-CCNC: 135 U/L (ref 5–45)
BASOPHILS # BLD AUTO: 0.03 THOUSANDS/ΜL (ref 0–0.1)
BASOPHILS NFR BLD AUTO: 0 % (ref 0–1)
BILIRUB SERPL-MCNC: 0.49 MG/DL (ref 0.2–1)
BUN SERPL-MCNC: 25 MG/DL (ref 5–25)
CALCIUM ALBUM COR SERPL-MCNC: 10.3 MG/DL (ref 8.3–10.1)
CALCIUM SERPL-MCNC: 9.2 MG/DL (ref 8.3–10.1)
CHLORIDE SERPL-SCNC: 108 MMOL/L (ref 100–108)
CO2 SERPL-SCNC: 30 MMOL/L (ref 21–32)
CREAT SERPL-MCNC: 0.81 MG/DL (ref 0.6–1.3)
CRP SERPL QL: 60.1 MG/L
D DIMER PPP FEU-MCNC: 0.88 UG/ML FEU
EOSINOPHIL # BLD AUTO: 0.37 THOUSAND/ΜL (ref 0–0.61)
EOSINOPHIL NFR BLD AUTO: 4 % (ref 0–6)
ERYTHROCYTE [DISTWIDTH] IN BLOOD BY AUTOMATED COUNT: 12.6 % (ref 11.6–15.1)
FERRITIN SERPL-MCNC: 437 NG/ML (ref 8–388)
GFR SERPL CREATININE-BSD FRML MDRD: 76 ML/MIN/1.73SQ M
GLUCOSE SERPL-MCNC: 82 MG/DL (ref 65–140)
HCT VFR BLD AUTO: 34.2 % (ref 34.8–46.1)
HGB BLD-MCNC: 11.1 G/DL (ref 11.5–15.4)
IMM GRANULOCYTES # BLD AUTO: 0.05 THOUSAND/UL (ref 0–0.2)
IMM GRANULOCYTES NFR BLD AUTO: 1 % (ref 0–2)
LITHIUM SERPL-SCNC: 0.3 MMOL/L (ref 0.5–1)
LYMPHOCYTES # BLD AUTO: 2.24 THOUSANDS/ΜL (ref 0.6–4.47)
LYMPHOCYTES NFR BLD AUTO: 24 % (ref 14–44)
MCH RBC QN AUTO: 27.1 PG (ref 26.8–34.3)
MCHC RBC AUTO-ENTMCNC: 32.5 G/DL (ref 31.4–37.4)
MCV RBC AUTO: 83 FL (ref 82–98)
MONOCYTES # BLD AUTO: 0.47 THOUSAND/ΜL (ref 0.17–1.22)
MONOCYTES NFR BLD AUTO: 5 % (ref 4–12)
NEUTROPHILS # BLD AUTO: 6.33 THOUSANDS/ΜL (ref 1.85–7.62)
NEUTS SEG NFR BLD AUTO: 66 % (ref 43–75)
NRBC BLD AUTO-RTO: 0 /100 WBCS
PLATELET # BLD AUTO: 299 THOUSANDS/UL (ref 149–390)
PMV BLD AUTO: 10.8 FL (ref 8.9–12.7)
POTASSIUM SERPL-SCNC: 4.2 MMOL/L (ref 3.5–5.3)
PROCALCITONIN SERPL-MCNC: 0.06 NG/ML
PROT SERPL-MCNC: 6.4 G/DL (ref 6.4–8.2)
RBC # BLD AUTO: 4.1 MILLION/UL (ref 3.81–5.12)
SODIUM SERPL-SCNC: 141 MMOL/L (ref 136–145)
WBC # BLD AUTO: 9.49 THOUSAND/UL (ref 4.31–10.16)

## 2020-12-24 PROCEDURE — 94660 CPAP INITIATION&MGMT: CPT

## 2020-12-24 PROCEDURE — 80178 ASSAY OF LITHIUM: CPT | Performed by: PHYSICIAN ASSISTANT

## 2020-12-24 PROCEDURE — 94760 N-INVAS EAR/PLS OXIMETRY 1: CPT

## 2020-12-24 PROCEDURE — 80053 COMPREHEN METABOLIC PANEL: CPT | Performed by: INTERNAL MEDICINE

## 2020-12-24 PROCEDURE — 71045 X-RAY EXAM CHEST 1 VIEW: CPT

## 2020-12-24 PROCEDURE — 82728 ASSAY OF FERRITIN: CPT | Performed by: INTERNAL MEDICINE

## 2020-12-24 PROCEDURE — 86140 C-REACTIVE PROTEIN: CPT | Performed by: INTERNAL MEDICINE

## 2020-12-24 PROCEDURE — 85379 FIBRIN DEGRADATION QUANT: CPT | Performed by: INTERNAL MEDICINE

## 2020-12-24 PROCEDURE — 99291 CRITICAL CARE FIRST HOUR: CPT | Performed by: INTERNAL MEDICINE

## 2020-12-24 PROCEDURE — 85025 COMPLETE CBC W/AUTO DIFF WBC: CPT | Performed by: INTERNAL MEDICINE

## 2020-12-24 PROCEDURE — 84145 PROCALCITONIN (PCT): CPT | Performed by: PHYSICIAN ASSISTANT

## 2020-12-24 RX ORDER — AMOXICILLIN 250 MG
2 CAPSULE ORAL 2 TIMES DAILY
Status: DISCONTINUED | OUTPATIENT
Start: 2020-12-24 | End: 2020-12-27

## 2020-12-24 RX ORDER — AMOXICILLIN 250 MG
1 CAPSULE ORAL 2 TIMES DAILY
Status: DISCONTINUED | OUTPATIENT
Start: 2020-12-24 | End: 2020-12-24

## 2020-12-24 RX ORDER — AMOXICILLIN 250 MG
2 CAPSULE ORAL 2 TIMES DAILY
Status: DISCONTINUED | OUTPATIENT
Start: 2020-12-24 | End: 2020-12-24

## 2020-12-24 RX ORDER — AMOXICILLIN 500 MG/1
500 CAPSULE ORAL EVERY 8 HOURS SCHEDULED
Status: COMPLETED | OUTPATIENT
Start: 2020-12-24 | End: 2020-12-29

## 2020-12-24 RX ADMIN — DOCUSATE SODIUM AND SENNOSIDES 2 TABLET: 8.6; 5 TABLET ORAL at 10:34

## 2020-12-24 RX ADMIN — FAMOTIDINE 20 MG: 20 TABLET ORAL at 09:14

## 2020-12-24 RX ADMIN — LORAZEPAM 1.5 MG: 1 TABLET ORAL at 22:20

## 2020-12-24 RX ADMIN — Medication 2000 UNITS: at 09:14

## 2020-12-24 RX ADMIN — AZELASTINE HYDROCHLORIDE 1 SPRAY: 137 SPRAY, METERED NASAL at 19:26

## 2020-12-24 RX ADMIN — ALBUTEROL SULFATE 2 PUFF: 90 AEROSOL, METERED RESPIRATORY (INHALATION) at 09:12

## 2020-12-24 RX ADMIN — ENOXAPARIN SODIUM 30 MG: 30 INJECTION SUBCUTANEOUS at 09:13

## 2020-12-24 RX ADMIN — LITHIUM CARBONATE 300 MG: 300 CAPSULE, GELATIN COATED ORAL at 22:22

## 2020-12-24 RX ADMIN — GUAIFENESIN 1200 MG: 600 TABLET, EXTENDED RELEASE ORAL at 09:14

## 2020-12-24 RX ADMIN — SERTRALINE HYDROCHLORIDE 100 MG: 100 TABLET ORAL at 22:22

## 2020-12-24 RX ADMIN — ALBUTEROL SULFATE 2 PUFF: 90 AEROSOL, METERED RESPIRATORY (INHALATION) at 22:23

## 2020-12-24 RX ADMIN — GABAPENTIN 300 MG: 300 CAPSULE ORAL at 09:14

## 2020-12-24 RX ADMIN — MULTIVITAMIN 15 ML: LIQUID ORAL at 09:13

## 2020-12-24 RX ADMIN — FAMOTIDINE 20 MG: 20 TABLET ORAL at 19:55

## 2020-12-24 RX ADMIN — ENOXAPARIN SODIUM 30 MG: 30 INJECTION SUBCUTANEOUS at 20:11

## 2020-12-24 RX ADMIN — MELATONIN TAB 3 MG 6 MG: 3 TAB at 22:20

## 2020-12-24 RX ADMIN — TRAZODONE HYDROCHLORIDE 100 MG: 100 TABLET ORAL at 22:22

## 2020-12-24 RX ADMIN — NYSTATIN 500000 UNITS: 100000 SUSPENSION ORAL at 09:13

## 2020-12-24 RX ADMIN — AMOXICILLIN 500 MG: 500 CAPSULE ORAL at 20:10

## 2020-12-24 RX ADMIN — AZELASTINE HYDROCHLORIDE 1 SPRAY: 137 SPRAY, METERED NASAL at 09:12

## 2020-12-24 RX ADMIN — Medication 2 SPRAY: at 09:13

## 2020-12-24 RX ADMIN — FLUTICASONE PROPIONATE 2 SPRAY: 50 SPRAY, METERED NASAL at 09:12

## 2020-12-24 RX ADMIN — ALBUTEROL SULFATE 2 PUFF: 90 AEROSOL, METERED RESPIRATORY (INHALATION) at 18:23

## 2020-12-24 RX ADMIN — LORATADINE 10 MG: 10 TABLET ORAL at 09:14

## 2020-12-24 RX ADMIN — DOCUSATE SODIUM AND SENNOSIDES 2 TABLET: 8.6; 5 TABLET ORAL at 19:26

## 2020-12-24 NOTE — RESTORATIVE TECHNICIAN NOTE
Restorative Specialist Mobility Note        Time: 11:00am        Patient refused out of bed and/or mobility  Will continue to follow up  Isa Hussein Mobility Coordinator LCFo, LCOF, ASOP R  O T, O B T

## 2020-12-24 NOTE — PROGRESS NOTES
Daily Progress Note - Elizabeth  [age] y o  female MRN: [id number]  [facility] En[facility]: [id number]        ----------------------------------------------------------------------------------------  HPI/24hr events: No acute overnight events  ---------------------------------------------------------------------------------------  SUBJECTIVE  Patient seen and examined at bedside by me today  Feels better with shortness of breath  Endorsed abdominal discomfort and constipation  Denied fever , chest pain, dizziness, light headedness, n/v    Review of Systems  Review of systems was reviewed and negative unless stated above in HPI/24-hour events   ---------------------------------------------------------------------------------------  Assessment and Plan:  70yo female with PMH of asthma ,GERD, BPD and anxiety who presented on 12/4 with worsening SOB amd found to be COVID positive on 11/29 admitted on the mild p/way  Patient had a brief Critcal care stay for increasing oxygen requirement and later downgraded  Restarted on steriod therapy by pulm due to worsening oxygen status  Was transferred back to MICU on 12/23 for increasing oxygen requirement on HFNC 70% 55 L    Neuro:    Diagnosis: BPD   o Plan: Continue zoloft   o Continiue lithium 300HS  Continue neurontin 300mg daily  Ativan 1 5mg HS  o Delirium precautions  o Regulate sleep wake cycle  o CAM-ICU daily  CV:   No acute issues  MAP Goal > 65  Pulm:  AHRF due to COVID PNA  Maintain oxygen sat > 92%  Wean HFNC as able  Self proning  Albuterol prn and continue muccinex  Positive for covid on 11/29/ completed -remdesivir, convalescent plasma on 12/6  Trend inflammatory markers  GI:   o Constipation:  o Bowel regimen   o Stress ulcer prophylaxis with famotidine  :   Monitor I/O  Follow BMP  F/E/N:   Replete electrolytes to Goal K> 4, Mg > 1 8  On regular diet  Heme/Onc:   Mild Anemia   11 2     Trend cbc     Anticoagulation with lovenox 30mg q12      Endo:   -Prediabetes  HbA1c 5 7/  Monitor glc  Goal 140-180  ID:   COVID PNA  Positive for covid on / completed -remdesivir, convalscent plasma on   Trend inflammatory markers  Started on second round of steriod on         MSK/Skin:   Frequent turning  Pressure sore precautions  Disposition: Continue Critical Care   Code Status: Level 1 - Full Code  ---------------------------------------------------------------------------------------  ICU CORE MEASURES    Prophylaxis   VTE Pharmacologic Prophylaxis: Enoxaparin (Lovenox)  VTE Mechanical Prophylaxis: sequential compression device  Stress Ulcer Prophylaxis: Famotidine PO    ABCDE Protocol (if indicated)  Plan to perform spontaneous awakening trial today? No Not applicable  Plan to perform spontaneous breathing trial today? Not applicable  Obvious barriers to extubation? Not applicable  CAM-ICU: Negative    Invasive Devices Review  Invasive Devices     Peripheral Intravenous Line            Peripheral IV 20 Left;Proximal;Ventral (anterior) Forearm 1 day          Drain            External Urinary Catheter 17 days              Can any invasive devices be discontinued today?  No  ---------------------------------------------------------------------------------------  OBJECTIVE    Vitals   Vitals:    20 0333 20 0345 20 0435 20 0445   BP:  90/53     Pulse:  62     Resp:       Temp:   98 5 °F (36 9 °C)    TempSrc:       SpO2: 94% 96%     Weight:    78 4 kg (172 lb 13 5 oz)   Height:         Temp (24hrs), Av °F (36 7 °C), Min:97 °F (36 1 °C), Max:98 9 °F (37 2 °C)  Current: Temperature: 98 5 °F (36 9 °C)  HR:   BP:   RR:   SpO2:     Respiratory:  SpO2: SpO2: 90 %  O2 Flow Rate (L/min): 50 L/min    Invasive/non-invasive ventilation settings   Respiratory    Lab Data (Last 4 hours)    None         O2/Vent Data (Last 4 hours)       1593          Non-Invasive Ventilation Mode HFNC (High flow)                   Physical Exam  Constitutional:       General: She is not in acute distress  HENT:      Head: Normocephalic and atraumatic  Cardiovascular:      Rate and Rhythm: Normal rate  Heart sounds: No murmur  No gallop  Pulmonary:      Effort: Pulmonary effort is normal  No respiratory distress  Breath sounds: Normal breath sounds  No wheezing or rales  Abdominal:      General: Abdomen is flat  Bowel sounds are normal  There is no distension  Tenderness: There is abdominal tenderness  There is no guarding  Skin:     Capillary Refill: Capillary refill takes less than 2 seconds  Neurological:      General: No focal deficit present  Mental Status: She is alert             Laboratory and Diagnostics:  Results from last 7 days   Lab Units 12/24/20  0444 12/23/20  0524 12/22/20  0442 12/19/20  0610 12/18/20  0538 12/17/20  1045   WBC Thousand/uL 9 49 9 98 9 55 8 49 7 60 7 75   HEMOGLOBIN g/dL 11 1* 11 5 11 2* 11 3* 11 6 11 1*   HEMATOCRIT % 34 2* 36 5 35 7 35 5 36 6 34 7*   PLATELETS Thousands/uL 299 287 252 219 206 214   NEUTROS PCT % 66 72 71 76* 77* 76*   MONOS PCT % 5 5 7 6 6 5     Results from last 7 days   Lab Units 12/24/20  0444 12/23/20  0524 12/22/20  0442 12/21/20  0553 12/19/20  0610 12/18/20  0538 12/17/20  1045   SODIUM mmol/L 141 138 140 138 138 139 140   POTASSIUM mmol/L 4 2 3 3* 3 6 3 4* 3 7 4 0 4 0   CHLORIDE mmol/L 108 102 105 102 101 104 107   CO2 mmol/L 30 30 28 30 30 29 25   ANION GAP mmol/L 3* 6 7 6 7 6 8   BUN mg/dL 25 27* 29* 34* 17 15 13   CREATININE mg/dL 0 81 0 79 0 90 0 90 0 80 0 77 0 73   CALCIUM mg/dL 9 2 10 0 9 7 10 1 10 1 9 3 8 9   GLUCOSE RANDOM mg/dL 82 96 103 92 100 116 110   ALT U/L 292* 268* 261* 284* 123* 98* 86*   AST U/L 135* 112* 111* 171* 80* 56* 49*   ALK PHOS U/L 116 119* 115 132* 94 89 84   ALBUMIN g/dL 2 6* 2 8* 2 7* 2 8* 3 0* 2 4* 2 5*   TOTAL BILIRUBIN mg/dL 0 49 0 37 0 39 0 24 0 77 0 61 0 42 Results from last 7 days   Lab Units 12/21/20  0553   MAGNESIUM mg/dL 2 8*   PHOSPHORUS mg/dL 4 2*                   ABG:    VBG:    Results from last 7 days   Lab Units 12/19/20  0610 12/18/20  0538   PROCALCITONIN ng/ml 0 16 0 08       Micro  Results from last 7 days   Lab Units 12/18/20 2002 12/18/20  1637   BLOOD CULTURE   --  No Growth After 5 Days  No Growth After 5 Days  URINE CULTURE  <10,000 cfu/ml Gram Negative Slava Enteric Like*  --        EKG:   Imaging:  I have personally reviewed pertinent reports  Intake and Output  I/O       12/22 0701 - 12/23 0700 12/23 0701 - 12/24 0700    P  O  720 580    Total Intake(mL/kg) 720 (8 9) 580 (7 4)    Urine (mL/kg/hr) 2025 (1) 675 (0 4)    Total Output 2025 675    Net -1305 -95              UOP: se ml/hr     Height and Weights   Height: 5' 1" (154 9 cm)  IBW: 47 8 kg  Body mass index is 32 66 kg/m²  Weight (last 2 days)     Date/Time   Weight    12/24/20 0445   78 4 (172 84)    12/23/20 0600   80 6 (177 69)    12/22/20 0600   80 5 (177 47)                Nutrition       Diet Orders   (From admission, onward)             Start     Ordered    12/05/20 0834  Diet Regular; Regular House  Diet effective now     Question Answer Comment   Diet Type Regular    Regular Regular House    Special Instructions Disposable Meal    RD to adjust diet per protocol? Yes        12/05/20 0833              TF currently running at 0 ml/hr with a goal of 0 ml/hr   Formula: 0      Active Medications  Scheduled Meds:  Current Facility-Administered Medications   Medication Dose Route Frequency Provider Last Rate    acetaminophen  650 mg Oral Q6H PRN Kai Calderon MD      albuterol  2 puff Inhalation Q4H PRN Kai Calderon MD      albuterol  2 puff Inhalation TID Veto Rai MD      aluminum-magnesium hydroxide-simethicone  30 mL Oral Q4H PRN Kai Calderon MD      amoxicillin-clavulanate  1 tablet Oral Q12H [address] Valeria Calle MD      atorvastatin  40 mg Oral HS Radha Fix, MD      azelastine  1 spray Each Nare BID Fredrich Cockayne,       benzonatate  100 mg Oral TID PRN Loli Gil MD      bisacodyl  10 mg Rectal Daily PRN Corine Petty MD      cholecalciferol  2,000 Units Oral Daily Corine Petty MD      enoxaparin  30 mg Subcutaneous Q12H [facility] Corine Petty MD      famotidine  20 mg Oral Q12H Corine Petty MD      fluticasone  2 spray Each Nare Daily Corine Petty MD      gabapentin  300 mg Oral Daily Corine Petty MD      guaiFENesin  1,200 mg Oral Q12H [facility] Lorin Cage PA-C      lidocaine  1 patch Topical Daily Corine Petty MD      lithium carbonate  300 mg Oral HS Corine Petty MD      loratadine  10 mg Oral Daily Dc Garcia MD      LORazepam  1 mg Oral BID PRN Corine Petty MD      LORazepam  1 5 mg Oral HS Corine Petty MD      melatonin  6 mg Oral HS Corine Petty MD      menthol-methyl salicylate   Apply externally 4x Daily PRN Rola Hernandez PA-C      multivitamin with iron-minerals  15 mL Per NG Tube Daily Corine Petty MD      nystatin  500,000 Units Swish & Swallow 4x Daily Rhetta Yee,       ondansetron  4 mg Intravenous Q6H PRN Corine Petty MD      polyethylene glycol  17 g Oral Daily Corine Petty MD      senna-docusate sodium  1 tablet Oral BID Corine Petty MD      sertraline  100 mg Oral HS Corine Petty MD      sodium chloride  2 spray Each Nare Q2H PRN Lorin Cage PA-C      traZODone  100 mg Oral HS Corine Petty MD       Continuous Infusions:     PRN Meds:   acetaminophen, 650 mg, Q6H PRN  albuterol, 2 puff, Q4H PRN  aluminum-magnesium hydroxide-simethicone, 30 mL, Q4H PRN  benzonatate, 100 mg, TID PRN  bisacodyl, 10 mg, Daily PRN  LORazepam, 1 mg, BID PRN  menthol-methyl salicylate, , 4x Daily PRN  ondansetron, 4 mg, Q6H PRN  sodium chloride, 2 spray, Q2H PRN        Allergies   Allergies   Allergen Reactions    Amphetamine-Dextroamphetamine Other (See Comments)     Chest pain- was placed on Adderall after son passed away for depression, and she developed chest pain in 1990's; she had full cardiac work up, and was negative, so she has allergy to Adderall  Chest pain- was placed on Adderall after son passed away for depression, and she developed chest pain in 1990's; she had full cardiac work up, and was negative, so she has allergy to Adderall    Molds & Smuts     Other      Cats    Sulfa Antibiotics Other (See Comments)     ---------------------------------------------------------------------------------------  Advance Directive and Living Will:      Power of :    POLST:    ---------------------------------------------------------------------------------------  Care Time Delivered:   Upon my evaluation, this patient had a high probability of imminent or life-threatening deterioration due to covid, which required my direct attention, intervention, and personal management  I have personally provided 20 minutes (720 to 740) of critical care time, exclusive of procedures, teaching, family meetings, and any prior time recorded by providers other than myself  Unknown MD Ramin      Portions of the record may have been created with voice recognition software  Occasional wrong word or "sound a like" substitutions may have occurred due to the inherent limitations of voice recognition software    Read the chart carefully and recognize, using context, where substitutions have occurred

## 2020-12-25 LAB
ALBUMIN SERPL BCP-MCNC: 2.7 G/DL (ref 3.5–5)
ALP SERPL-CCNC: 123 U/L (ref 46–116)
ALT SERPL W P-5'-P-CCNC: 207 U/L (ref 12–78)
ANION GAP SERPL CALCULATED.3IONS-SCNC: 6 MMOL/L (ref 4–13)
AST SERPL W P-5'-P-CCNC: 55 U/L (ref 5–45)
BASOPHILS # BLD AUTO: 0.04 THOUSANDS/ΜL (ref 0–0.1)
BASOPHILS NFR BLD AUTO: 1 % (ref 0–1)
BILIRUB SERPL-MCNC: 0.34 MG/DL (ref 0.2–1)
BUN SERPL-MCNC: 21 MG/DL (ref 5–25)
CALCIUM ALBUM COR SERPL-MCNC: 10.5 MG/DL (ref 8.3–10.1)
CALCIUM SERPL-MCNC: 9.5 MG/DL (ref 8.3–10.1)
CHLORIDE SERPL-SCNC: 106 MMOL/L (ref 100–108)
CO2 SERPL-SCNC: 29 MMOL/L (ref 21–32)
CREAT SERPL-MCNC: 0.8 MG/DL (ref 0.6–1.3)
CRP SERPL QL: 63.2 MG/L
D DIMER PPP FEU-MCNC: 0.72 UG/ML FEU
EOSINOPHIL # BLD AUTO: 0.64 THOUSAND/ΜL (ref 0–0.61)
EOSINOPHIL NFR BLD AUTO: 8 % (ref 0–6)
ERYTHROCYTE [DISTWIDTH] IN BLOOD BY AUTOMATED COUNT: 13.1 % (ref 11.6–15.1)
FERRITIN SERPL-MCNC: 399 NG/ML (ref 8–388)
GFR SERPL CREATININE-BSD FRML MDRD: 77 ML/MIN/1.73SQ M
GLUCOSE SERPL-MCNC: 84 MG/DL (ref 65–140)
HCT VFR BLD AUTO: 32.6 % (ref 34.8–46.1)
HGB BLD-MCNC: 10.5 G/DL (ref 11.5–15.4)
IMM GRANULOCYTES # BLD AUTO: 0.05 THOUSAND/UL (ref 0–0.2)
IMM GRANULOCYTES NFR BLD AUTO: 1 % (ref 0–2)
LYMPHOCYTES # BLD AUTO: 1.82 THOUSANDS/ΜL (ref 0.6–4.47)
LYMPHOCYTES NFR BLD AUTO: 24 % (ref 14–44)
MCH RBC QN AUTO: 26.9 PG (ref 26.8–34.3)
MCHC RBC AUTO-ENTMCNC: 32.2 G/DL (ref 31.4–37.4)
MCV RBC AUTO: 83 FL (ref 82–98)
MONOCYTES # BLD AUTO: 0.41 THOUSAND/ΜL (ref 0.17–1.22)
MONOCYTES NFR BLD AUTO: 5 % (ref 4–12)
NEUTROPHILS # BLD AUTO: 4.79 THOUSANDS/ΜL (ref 1.85–7.62)
NEUTS SEG NFR BLD AUTO: 61 % (ref 43–75)
NRBC BLD AUTO-RTO: 0 /100 WBCS
PLATELET # BLD AUTO: 289 THOUSANDS/UL (ref 149–390)
PMV BLD AUTO: 10.6 FL (ref 8.9–12.7)
POTASSIUM SERPL-SCNC: 3.5 MMOL/L (ref 3.5–5.3)
PROCALCITONIN SERPL-MCNC: 0.05 NG/ML
PROT SERPL-MCNC: 6.3 G/DL (ref 6.4–8.2)
RBC # BLD AUTO: 3.91 MILLION/UL (ref 3.81–5.12)
SODIUM SERPL-SCNC: 141 MMOL/L (ref 136–145)
WBC # BLD AUTO: 7.75 THOUSAND/UL (ref 4.31–10.16)

## 2020-12-25 PROCEDURE — 94760 N-INVAS EAR/PLS OXIMETRY 1: CPT

## 2020-12-25 PROCEDURE — 99291 CRITICAL CARE FIRST HOUR: CPT | Performed by: INTERNAL MEDICINE

## 2020-12-25 PROCEDURE — 80053 COMPREHEN METABOLIC PANEL: CPT | Performed by: EMERGENCY MEDICINE

## 2020-12-25 PROCEDURE — 94003 VENT MGMT INPAT SUBQ DAY: CPT

## 2020-12-25 PROCEDURE — 82728 ASSAY OF FERRITIN: CPT | Performed by: EMERGENCY MEDICINE

## 2020-12-25 PROCEDURE — 94660 CPAP INITIATION&MGMT: CPT

## 2020-12-25 PROCEDURE — 85379 FIBRIN DEGRADATION QUANT: CPT | Performed by: EMERGENCY MEDICINE

## 2020-12-25 PROCEDURE — 86140 C-REACTIVE PROTEIN: CPT | Performed by: EMERGENCY MEDICINE

## 2020-12-25 PROCEDURE — 85025 COMPLETE CBC W/AUTO DIFF WBC: CPT | Performed by: EMERGENCY MEDICINE

## 2020-12-25 PROCEDURE — 84145 PROCALCITONIN (PCT): CPT | Performed by: EMERGENCY MEDICINE

## 2020-12-25 RX ADMIN — TRAZODONE HYDROCHLORIDE 100 MG: 100 TABLET ORAL at 21:41

## 2020-12-25 RX ADMIN — FLUTICASONE PROPIONATE 2 SPRAY: 50 SPRAY, METERED NASAL at 08:32

## 2020-12-25 RX ADMIN — ALBUTEROL SULFATE 2 PUFF: 90 AEROSOL, METERED RESPIRATORY (INHALATION) at 21:24

## 2020-12-25 RX ADMIN — ENOXAPARIN SODIUM 30 MG: 30 INJECTION SUBCUTANEOUS at 08:04

## 2020-12-25 RX ADMIN — LITHIUM CARBONATE 300 MG: 300 CAPSULE, GELATIN COATED ORAL at 21:42

## 2020-12-25 RX ADMIN — LORATADINE 10 MG: 10 TABLET ORAL at 08:03

## 2020-12-25 RX ADMIN — LIDOCAINE 5% 1 PATCH: 700 PATCH TOPICAL at 08:04

## 2020-12-25 RX ADMIN — AMOXICILLIN 500 MG: 500 CAPSULE ORAL at 14:09

## 2020-12-25 RX ADMIN — AMOXICILLIN 500 MG: 500 CAPSULE ORAL at 05:37

## 2020-12-25 RX ADMIN — LORAZEPAM 1.5 MG: 1 TABLET ORAL at 21:41

## 2020-12-25 RX ADMIN — AMOXICILLIN 500 MG: 500 CAPSULE ORAL at 21:42

## 2020-12-25 RX ADMIN — MELATONIN TAB 3 MG 6 MG: 3 TAB at 21:41

## 2020-12-25 RX ADMIN — ALBUTEROL SULFATE 2 PUFF: 90 AEROSOL, METERED RESPIRATORY (INHALATION) at 17:12

## 2020-12-25 RX ADMIN — AZELASTINE HYDROCHLORIDE 1 SPRAY: 137 SPRAY, METERED NASAL at 08:32

## 2020-12-25 RX ADMIN — ENOXAPARIN SODIUM 30 MG: 30 INJECTION SUBCUTANEOUS at 21:42

## 2020-12-25 RX ADMIN — POLYETHYLENE GLYCOL 3350 17 G: 17 POWDER, FOR SOLUTION ORAL at 08:04

## 2020-12-25 RX ADMIN — FAMOTIDINE 20 MG: 20 TABLET ORAL at 08:03

## 2020-12-25 RX ADMIN — ALBUTEROL SULFATE 2 PUFF: 90 AEROSOL, METERED RESPIRATORY (INHALATION) at 08:32

## 2020-12-25 RX ADMIN — DOCUSATE SODIUM AND SENNOSIDES 2 TABLET: 8.6; 5 TABLET ORAL at 08:03

## 2020-12-25 RX ADMIN — GABAPENTIN 300 MG: 300 CAPSULE ORAL at 08:03

## 2020-12-25 RX ADMIN — FAMOTIDINE 20 MG: 20 TABLET ORAL at 21:41

## 2020-12-25 RX ADMIN — AZELASTINE HYDROCHLORIDE 1 SPRAY: 137 SPRAY, METERED NASAL at 17:12

## 2020-12-25 RX ADMIN — SERTRALINE HYDROCHLORIDE 100 MG: 100 TABLET ORAL at 21:42

## 2020-12-25 NOTE — PROGRESS NOTES
Daily Progress Note - Critical Care   Chely Ponce 76 y o  female MRN: [id number]  Unit/Bed#: -01 Encounter: [id number]        ----------------------------------------------------------------------------------------  HPI/24hr events: No overnight events  Stayed on HFNC      ---------------------------------------------------------------------------------------  SUBJECTIVE  Patient seen and examined at bedside by me today  Offers no new complaints  Reports shortness of breath feels much better  Denied fever, chills, calf pain, abdominal pain, n/v dizziness or lightheadedness  Review of Systems  Review of systems was reviewed and negative unless stated above in HPI/24-hour events   ---------------------------------------------------------------------------------------  Assessment and Plan:    72yo female with PMH of asthma ,GERD, BPD and anxiety who presented on 12/4 with worsening SOB amd found to be COVID positive on 11/29 admitted on the mild p/way  Patient had a brief Critcal care stay for increasing oxygen requirement and later downgraded  Restarted on steriod therapy by pulm due to worsening oxygen status  Was transferred back to MICU on 12/23 for increasing oxygen requirement on HFNC 70% 55 L     Neuro:   · Diagnosis: BPD   ? Plan: Continue zoloft  ? Continiue lithium 300 HS  Continue neurontin 300mg daily  Ativan 1 5mg HS  ? Delirium precautions  ? Regulate sleep wake cycle  ? CAM-ICU daily         CV:   No acute issues  MAP Goal > 65         Pulm:  AHRF due to COVID PNA  Maintain oxygen sat > 92%  Wean HFNC as able  Self proning  Albuterol prn and continue muccinex  Positive for covid on 11/29/ completed -remdesivir, convalescent plasma on 12/6  Trend inflammatory markers         GI:   ? Constipation:  ? Bowel regimen  ? Stress ulcer prophylaxis with famotidine         : Monitor I/O  Follow BMP        F/E/N:   Replete electrolytes to Goal K> 4, Mg > 1 8    On regular diet      Heme/Onc: Mild Anemia   11 2   Trend cbc      Anticoagulation with lovenox 30mg q12        Endo:   -Prediabetes  HbA1c 5 7  Monitor glc  Goal 140-180         ID:   COVID PNA  Positive for covid on /  -remdesivir, convalscent plasma on   Trend inflammatory markers  Started on second round of steriod on            MSK/Skin:   Frequent turning  Pressure sore precautions  Disposition: Continue step down 1  Code Status: Level 1 - Full Code  ---------------------------------------------------------------------------------------  ICU CORE MEASURES    Prophylaxis   VTE Pharmacologic Prophylaxis: Enoxaparin (Lovenox)  VTE Mechanical Prophylaxis: sequential compression device  Stress Ulcer Prophylaxis: Famotidine PO    ABCDE Protocol (if indicated)  Plan to perform spontaneous awakening trial today? Not applicable  Plan to perform spontaneous breathing trial today? Not applicable  Obvious barriers to extubation? Not applicable  CAM-ICU: Negative    Invasive Devices Review  Invasive Devices     Peripheral Intravenous Line            Peripheral IV 20 Dorsal (posterior); Left Forearm less than 1 day          Drain            External Urinary Catheter 1 day              Can any invasive devices be discontinued today?  Not applicable  ---------------------------------------------------------------------------------------  OBJECTIVE    Vitals   Vitals:    20 0824 20 0831 20 1025 20 1026   BP: 99/57  (!) 47 (!)    Pulse: 77  86 88   Resp:   20 20   Temp:   98 1 °F (36 7 °C) 98 1 °F (36 7 °C)   TempSrc:       SpO2: 90% 90% 92% 92%   Weight:       Height:         Temp (24hrs), Av 4 °F (36 9 °C), Min:97 8 °F (36 6 °C), Max:99 °F (37 2 °C)  Current: Temperature: 98 1 °F (36 7 °C)  HR:   BP: /  RR:   SpO2:     Respiratory:  SpO2: SpO2: 92 %  O2 Flow Rate (L/min): 30 L/min    Invasive/non-invasive ventilation settings   Respiratory    Lab Data (Last 4 hours)    None O2/Vent Data (Last 4 hours)      12/25 0831          Non-Invasive Ventilation Mode HFNC (High flow)                   Physical Exam  HENT:      Head: Normocephalic and atraumatic  Mouth/Throat:      Mouth: Mucous membranes are moist    Eyes:      Extraocular Movements: Extraocular movements intact  Neck:      Musculoskeletal: Normal range of motion  Cardiovascular:      Rate and Rhythm: Normal rate  Pulses: Normal pulses  Heart sounds: No murmur  No gallop  Pulmonary:      Effort: Pulmonary effort is normal       Breath sounds: No wheezing or rales  Abdominal:      General: Abdomen is flat  There is no distension  Palpations: Abdomen is soft  Tenderness: There is no abdominal tenderness  There is no guarding  Musculoskeletal: Normal range of motion  Skin:     General: Skin is warm  Capillary Refill: Capillary refill takes less than 2 seconds  Neurological:      General: No focal deficit present  Mental Status: She is alert     Psychiatric:         Mood and Affect: Mood normal            Laboratory and Diagnostics:  Results from last 7 days   Lab Units 12/25/20  0514 12/24/20  0444 12/23/20  0524 12/22/20  0442 12/19/20  0610   WBC Thousand/uL 7 75 9 49 9 98 9 55 8 49   HEMOGLOBIN g/dL 10 5* 11 1* 11 5 11 2* 11 3*   HEMATOCRIT % 32 6* 34 2* 36 5 35 7 35 5   PLATELETS Thousands/uL 289 299 287 252 219   NEUTROS PCT % 61 66 72 71 76*   MONOS PCT % 5 5 5 7 6     Results from last 7 days   Lab Units 12/25/20  0514 12/24/20  0444 12/23/20  0524 12/22/20  0442 12/21/20  0553 12/19/20  0610   SODIUM mmol/L 141 141 138 140 138 138   POTASSIUM mmol/L 3 5 4 2 3 3* 3 6 3 4* 3 7   CHLORIDE mmol/L 106 108 102 105 102 101   CO2 mmol/L 29 30 30 28 30 30   ANION GAP mmol/L 6 3* 6 7 6 7   BUN mg/dL 21 25 27* 29* 34* 17   CREATININE mg/dL 0 80 0 81 0 79 0 90 0 90 0 80   CALCIUM mg/dL 9 5 9 2 10 0 9 7 10 1 10 1   GLUCOSE RANDOM mg/dL 84 82 96 103 92 100   ALT U/L 207* 292* 268* 261* 284* 123*   AST U/L 55* 135* 112* 111* 171* 80*   ALK PHOS U/L 123* 116 119* 115 132* 94   ALBUMIN g/dL 2 7* 2 6* 2 8* 2 7* 2 8* 3 0*   TOTAL BILIRUBIN mg/dL 0 34 0 49 0 37 0 39 0 24 0 77     Results from last 7 days   Lab Units 12/21/20  0553   MAGNESIUM mg/dL 2 8*   PHOSPHORUS mg/dL 4 2*                   ABG:    VBG:    Results from last 7 days   Lab Units 12/25/20  0514 12/24/20  1025 12/19/20  0610   PROCALCITONIN ng/ml 0 05 0 06 0 16       Micro  Results from last 7 days   Lab Units 12/18/20 2002 12/18/20  1637   BLOOD CULTURE   --  No Growth After 5 Days  No Growth After 5 Days  URINE CULTURE  <10,000 cfu/ml Gram Negative Slava Enteric Like*  --        EKG:   Imaging:  I have personally reviewed pertinent reports  Intake and Output  I/O       12/23 0701 - 12/24 0700 12/24 0701 - 12/25 0700 12/25 0701 - 12/26 0700    P  O  580      Total Intake(mL/kg) 580 (7 4)      Urine (mL/kg/hr) 675 (0 4) 650 (0 3)     Stool  0     Total Output 675 650     Net -95 -650            Unmeasured Stool Occurrence  3 x         UOP: 650 ml/hr     Height and Weights   Height: 5' 1" (154 9 cm)  IBW: 47 8 kg  Body mass index is 32 66 kg/m²  Weight (last 2 days)     Date/Time   Weight    12/24/20 0600   78 4 (172 84)    12/24/20 0445   78 4 (172 84)    12/23/20 0600   80 6 (177 69)                Nutrition       Diet Orders   (From admission, onward)             Start     Ordered    12/05/20 0834  Diet Regular; Regular House  Diet effective now     Question Answer Comment   Diet Type Regular    Regular Regular House    Special Instructions Disposable Meal    RD to adjust diet per protocol? Yes        12/05/20 0833              TF currently running at 0 ml/hr with a goal of 0 ml/hr   Formula: 0      Active Medications  Scheduled Meds:  Current Facility-Administered Medications   Medication Dose Route Frequency Provider Last Rate    acetaminophen  650 mg Oral Q6H PRN Radha Ramirez MD      albuterol  2 puff Inhalation Q4H PRN Scooby Hopkins MD      albuterol  2 puff Inhalation TID Scooby Hopkins MD      aluminum-magnesium hydroxide-simethicone  30 mL Oral Q4H PRN Scooby Hopkins MD      amoxicillin  500 mg Oral [facility] Scooby Hopkins MD      azelastine  1 spray Each Nare BID Scooby Hopkins MD      benzonatate  100 mg Oral TID PRN Scooby Hopkins MD      bisacodyl  10 mg Rectal Daily PRN Scooby Hopkins MD      enoxaparin  30 mg Subcutaneous Q12H Stuart Angel MD      famotidine  20 mg Oral Q12H Scooby Hopkins MD      fluticasone  2 spray Each Nare Daily Scooby Hopkins MD      gabapentin  300 mg Oral Daily Scooby Hopkins MD      lidocaine  1 patch Topical Daily Scooby Hopkins MD      lithium carbonate  300 mg Oral HS Scooby Hopkins MD      loratadine  10 mg Oral Daily Scooby Hopkins MD      LORazepam  1 mg Oral BID PRN Scooby Hopkins MD      LORazepam  1 5 mg Oral HS Scooby Hopkins MD      melatonin  6 mg Oral HS Scooby Hopkins MD      menthol-methyl salicylate   Apply externally 4x Daily PRN Scooby Hopkins MD      ondansetron  4 mg Intravenous Q6H PRN Scooby Hopkins MD      polyethylene glycol  17 g Oral Daily Scooby Hopkins MD      senna-docusate sodium  2 tablet Oral BID Scooby Hopkins MD      sertraline  100 mg Oral HS Scooby Hopkins MD      sodium chloride  2 spray Each Nare Q2H PRN Scooby Hopkins MD      traZODone  100 mg Oral HS Scooby Hopkins MD       Continuous Infusions:     PRN Meds:   acetaminophen, 650 mg, Q6H PRN  albuterol, 2 puff, Q4H PRN  aluminum-magnesium hydroxide-simethicone, 30 mL, Q4H PRN  benzonatate, 100 mg, TID PRN  bisacodyl, 10 mg, Daily PRN  LORazepam, 1 mg, BID PRN  menthol-methyl salicylate, , 4x Daily PRN  ondansetron, 4 mg, Q6H PRN  sodium chloride, 2 spray, Q2H PRN        Allergies   Allergies   Allergen Reactions    Amphetamine-Dextroamphetamine Other (See Comments)     Chest pain- was placed on Adderall after son passed away for depression, and she developed chest pain in 1990's; she had full cardiac work up, and was negative, so she has allergy to Adderall  Chest pain- was placed on Adderall after son passed away for depression, and she developed chest pain in 1990's; she had full cardiac work up, and was negative, so she has allergy to Adderall    Molds & Smuts     Other      Cats    Sulfa Antibiotics Other (See Comments)     ---------------------------------------------------------------------------------------  Advance Directive and Living Will:      Power of :    POLST:    ---------------------------------------------------------------------------------------  Care Time Delivered:   Upon my evaluation, this patient had a high probability of imminent or life-threatening deterioration due to covid PNA, which required my direct attention, intervention, and personal management  I have personally provided 20 minutes (800 to 820) of critical care time, exclusive of procedures, teaching, family meetings, and any prior time recorded by providers other than myself  Lawrence Gonzalez MD      Portions of the record may have been created with voice recognition software  Occasional wrong word or "sound a like" substitutions may have occurred due to the inherent limitations of voice recognition software    Read the chart carefully and recognize, using context, where substitutions have occurred

## 2020-12-25 NOTE — PLAN OF CARE
Problem: Potential for Falls  Goal: Patient will remain free of falls  Description: INTERVENTIONS:  - Assess patient frequently for physical needs  -  Identify cognitive and physical deficits and behaviors that affect risk of falls    -  [place] fall precautions as indicated by assessment   - Educate patient/family on patient safety including physical limitations  - Instruct patient to call for assistance with activity based on assessment  - Modify environment to reduce risk of injury  - Consider OT/PT consult to assist with strengthening/mobility  Outcome: Progressing     Problem: RESPIRATORY - ADULT  Goal: Achieves optimal ventilation and oxygenation  Description: INTERVENTIONS:  - Assess for changes in respiratory status  - Assess for changes in mentation and behavior  - Position to facilitate oxygenation and minimize respiratory effort  - Oxygen administered by appropriate delivery if ordered  - Initiate smoking cessation education as indicated  - Encourage broncho-pulmonary hygiene including cough, deep breathe, Incentive Spirometry  - Assess the need for suctioning and aspirate as needed  - Assess and instruct to report SOB or any respiratory difficulty  - Respiratory Therapy support as indicated  Outcome: Progressing     Problem: METABOLIC, FLUID AND ELECTROLYTES - ADULT  Goal: Electrolytes maintained within normal limits  Description: INTERVENTIONS:  - Monitor labs and assess patient for signs and symptoms of electrolyte imbalances  - Administer electrolyte replacement as ordered  - Monitor response to electrolyte replacements, including repeat lab results as appropriate  - Instruct patient on fluid and nutrition as appropriate  Outcome: Progressing  Goal: Fluid balance maintained  Description: INTERVENTIONS:  - Monitor labs   - Monitor I/O and WT  - Instruct patient on fluid and nutrition as appropriate  - Assess for signs & symptoms of volume excess or deficit  Outcome: Progressing     Problem: HEMATOLOGIC - ADULT  Goal: Maintains hematologic stability  Description: INTERVENTIONS  - Assess for signs and symptoms of bleeding or hemorrhage  - Monitor labs  - Administer supportive blood products/factors as ordered and appropriate  Outcome: Progressing     Problem: INFECTION - ADULT  Goal: Absence or prevention of progression during hospitalization  Description: INTERVENTIONS:  - Assess and monitor for signs and symptoms of infection  - Monitor lab/diagnostic results  - Monitor all insertion sites, i e  indwelling lines, tubes, and drains  - Monitor endotracheal if appropriate and nasal secretions for changes in amount and color  - [place] appropriate cooling/warming therapies per order  - Administer medications as ordered  - Instruct and encourage patient and family to use good hand hygiene technique  - Identify and instruct in appropriate isolation precautions for identified infection/condition  Outcome: Progressing  Goal: Absence of fever/infection during neutropenic period  Description: INTERVENTIONS:  - Monitor WBC    Outcome: Progressing     Problem: SAFETY ADULT  Goal: Patient will remain free of falls  Description: INTERVENTIONS:  - Assess patient frequently for physical needs  -  Identify cognitive and physical deficits and behaviors that affect risk of falls    -  [place] fall precautions as indicated by assessment   - Educate patient/family on patient safety including physical limitations  - Instruct patient to call for assistance with activity based on assessment  - Modify environment to reduce risk of injury  - Consider OT/PT consult to assist with strengthening/mobility  Outcome: Progressing  Goal: Maintain or return to baseline ADL function  Description: INTERVENTIONS:  -  Assess patient's ability to carry out ADLs; assess patient's baseline for ADL function and identify physical deficits which impact ability to perform ADLs (bathing, care of mouth/teeth, toileting, grooming, dressing, etc )  - Assess/evaluate cause of self-care deficits   - Assess range of motion  - Assess patient's mobility; develop plan if impaired  - Assess patient's need for assistive devices and provide as appropriate  - Encourage maximum independence but intervene and supervise when necessary  - Involve family in performance of ADLs  - Assess for home care needs following discharge   - Consider OT consult to assist with ADL evaluation and planning for discharge  - Provide patient education as appropriate  Outcome: Progressing  Goal: Maintain or return mobility status to optimal level  Description: INTERVENTIONS:  - Assess patient's baseline mobility status (ambulation, transfers, stairs, etc )    - Identify cognitive and physical deficits and behaviors that affect mobility  - Identify mobility aids required to assist with transfers and/or ambulation (gait belt, sit-to-stand, lift, walker, cane, etc )  - [place] fall precautions as indicated by assessment  - Record patient progress and toleration of activity level on Mobility SBAR; progress patient to next Phase/Stage  - Instruct patient to call for assistance with activity based on assessment  - Consider rehabilitation consult to assist with strengthening/weightbearing, etc   Outcome: Progressing     Problem: DISCHARGE PLANNING  Goal: Discharge to home or other facility with appropriate resources  Description: INTERVENTIONS:  - Identify barriers to discharge w/patient and caregiver  - Arrange for needed discharge resources and transportation as appropriate  - Identify discharge learning needs (meds, wound care, etc )  - Arrange for interpretive services to assist at discharge as needed  - Refer to Case Management Department for coordinating discharge planning if the patient needs post-hospital services based on physician/advanced practitioner order or complex needs related to functional status, cognitive ability, or social support system  Outcome: Progressing     Problem: Knowledge Deficit  Goal: Patient/family/caregiver demonstrates understanding of disease process, treatment plan, medications, and discharge instructions  Description: Complete learning assessment and assess knowledge base  Interventions:  - Provide teaching at level of understanding  - Provide teaching via preferred learning methods  Outcome: Progressing     Problem: Prexisting or High Potential for Compromised Skin Integrity  Goal: Skin integrity is maintained or improved  Description: INTERVENTIONS:  - Identify patients at risk for skin breakdown  - Assess and monitor skin integrity  - Assess and monitor nutrition and hydration status  - Monitor labs   - Assess for incontinence   - Turn and reposition patient  - Assist with mobility/ambulation  - Relieve pressure over bony prominences  - Avoid friction and shearing  - Provide appropriate hygiene as needed including keeping skin clean and dry  - Evaluate need for skin moisturizer/barrier cream  - Collaborate with interdisciplinary team   - Patient/family teaching  - Consider wound care consult   Outcome: Progressing     Problem: Nutrition/Hydration-ADULT  Goal: Nutrient/Hydration intake appropriate for improving, restoring or maintaining nutritional needs  Description: Monitor and assess patient's nutrition/hydration status for malnutrition  Collaborate with interdisciplinary team and initiate plan and interventions as ordered  Monitor patient's weight and dietary intake as ordered or per policy  Utilize nutrition screening tool and intervene as necessary  Determine patient's food preferences and provide high-protein, high-caloric foods as appropriate       INTERVENTIONS:  - Monitor oral intake, urinary output, labs, and treatment plans  - Assess nutrition and hydration status and recommend course of action  - Evaluate amount of meals eaten  - Assist patient with eating if necessary   - Allow adequate time for meals  - Recommend/ encourage appropriate diets, oral nutritional supplements, and vitamin/mineral supplements  - Order, calculate, and assess calorie counts as needed  - Recommend, monitor, and adjust tube feedings and TPN/PPN based on assessed needs  - Assess need for intravenous fluids  - Provide specific nutrition/hydration education as appropriate  - Include patient/family/caregiver in decisions related to nutrition  Outcome: Progressing

## 2020-12-25 NOTE — PLAN OF CARE
Problem: Potential for Falls  Goal: Patient will remain free of falls  Description: INTERVENTIONS:  - Assess patient frequently for physical needs  -  Identify cognitive and physical deficits and behaviors that affect risk of falls    -  [place] fall precautions as indicated by assessment   - Educate patient/family on patient safety including physical limitations  - Instruct patient to call for assistance with activity based on assessment  - Modify environment to reduce risk of injury  - Consider OT/PT consult to assist with strengthening/mobility  Outcome: Progressing     Problem: RESPIRATORY - ADULT  Goal: Achieves optimal ventilation and oxygenation  Description: INTERVENTIONS:  - Assess for changes in respiratory status  - Assess for changes in mentation and behavior  - Position to facilitate oxygenation and minimize respiratory effort  - Oxygen administered by appropriate delivery if ordered  - Initiate smoking cessation education as indicated  - Encourage broncho-pulmonary hygiene including cough, deep breathe, Incentive Spirometry  - Assess the need for suctioning and aspirate as needed  - Assess and instruct to report SOB or any respiratory difficulty  - Respiratory Therapy support as indicated  Outcome: Progressing     Problem: METABOLIC, FLUID AND ELECTROLYTES - ADULT  Goal: Electrolytes maintained within normal limits  Description: INTERVENTIONS:  - Monitor labs and assess patient for signs and symptoms of electrolyte imbalances  - Administer electrolyte replacement as ordered  - Monitor response to electrolyte replacements, including repeat lab results as appropriate  - Instruct patient on fluid and nutrition as appropriate  Outcome: Progressing  Goal: Fluid balance maintained  Description: INTERVENTIONS:  - Monitor labs   - Monitor I/O and WT  - Instruct patient on fluid and nutrition as appropriate  - Assess for signs & symptoms of volume excess or deficit  Outcome: Progressing     Problem: HEMATOLOGIC - ADULT  Goal: Maintains hematologic stability  Description: INTERVENTIONS  - Assess for signs and symptoms of bleeding or hemorrhage  - Monitor labs  - Administer supportive blood products/factors as ordered and appropriate  Outcome: Progressing     Problem: INFECTION - ADULT  Goal: Absence or prevention of progression during hospitalization  Description: INTERVENTIONS:  - Assess and monitor for signs and symptoms of infection  - Monitor lab/diagnostic results  - Monitor all insertion sites, i e  indwelling lines, tubes, and drains  - Monitor endotracheal if appropriate and nasal secretions for changes in amount and color  - [place] appropriate cooling/warming therapies per order  - Administer medications as ordered  - Instruct and encourage patient and family to use good hand hygiene technique  - Identify and instruct in appropriate isolation precautions for identified infection/condition  Outcome: Progressing  Goal: Absence of fever/infection during neutropenic period  Description: INTERVENTIONS:  - Monitor WBC    Outcome: Progressing     Problem: SAFETY ADULT  Goal: Patient will remain free of falls  Description: INTERVENTIONS:  - Assess patient frequently for physical needs  -  Identify cognitive and physical deficits and behaviors that affect risk of falls    -  [place] fall precautions as indicated by assessment   - Educate patient/family on patient safety including physical limitations  - Instruct patient to call for assistance with activity based on assessment  - Modify environment to reduce risk of injury  - Consider OT/PT consult to assist with strengthening/mobility  Outcome: Progressing  Goal: Maintain or return to baseline ADL function  Description: INTERVENTIONS:  -  Assess patient's ability to carry out ADLs; assess patient's baseline for ADL function and identify physical deficits which impact ability to perform ADLs (bathing, care of mouth/teeth, toileting, grooming, dressing, etc )  - Assess/evaluate cause of self-care deficits   - Assess range of motion  - Assess patient's mobility; develop plan if impaired  - Assess patient's need for assistive devices and provide as appropriate  - Encourage maximum independence but intervene and supervise when necessary  - Involve family in performance of ADLs  - Assess for home care needs following discharge   - Consider OT consult to assist with ADL evaluation and planning for discharge  - Provide patient education as appropriate  Outcome: Progressing  Goal: Maintain or return mobility status to optimal level  Description: INTERVENTIONS:  - Assess patient's baseline mobility status (ambulation, transfers, stairs, etc )    - Identify cognitive and physical deficits and behaviors that affect mobility  - Identify mobility aids required to assist with transfers and/or ambulation (gait belt, sit-to-stand, lift, walker, cane, etc )  - [place] fall precautions as indicated by assessment  - Record patient progress and toleration of activity level on Mobility SBAR; progress patient to next Phase/Stage  - Instruct patient to call for assistance with activity based on assessment  - Consider rehabilitation consult to assist with strengthening/weightbearing, etc   Outcome: Progressing     Problem: DISCHARGE PLANNING  Goal: Discharge to home or other facility with appropriate resources  Description: INTERVENTIONS:  - Identify barriers to discharge w/patient and caregiver  - Arrange for needed discharge resources and transportation as appropriate  - Identify discharge learning needs (meds, wound care, etc )  - Arrange for interpretive services to assist at discharge as needed  - Refer to Case Management Department for coordinating discharge planning if the patient needs post-hospital services based on physician/advanced practitioner order or complex needs related to functional status, cognitive ability, or social support system  Outcome: Progressing     Problem: Knowledge Deficit  Goal: Patient/family/caregiver demonstrates understanding of disease process, treatment plan, medications, and discharge instructions  Description: Complete learning assessment and assess knowledge base  Interventions:  - Provide teaching at level of understanding  - Provide teaching via preferred learning methods  Outcome: Progressing     Problem: Prexisting or High Potential for Compromised Skin Integrity  Goal: Skin integrity is maintained or improved  Description: INTERVENTIONS:  - Identify patients at risk for skin breakdown  - Assess and monitor skin integrity  - Assess and monitor nutrition and hydration status  - Monitor labs   - Assess for incontinence   - Turn and reposition patient  - Assist with mobility/ambulation  - Relieve pressure over bony prominences  - Avoid friction and shearing  - Provide appropriate hygiene as needed including keeping skin clean and dry  - Evaluate need for skin moisturizer/barrier cream  - Collaborate with interdisciplinary team   - Patient/family teaching  - Consider wound care consult   Outcome: Progressing     Problem: Nutrition/Hydration-ADULT  Goal: Nutrient/Hydration intake appropriate for improving, restoring or maintaining nutritional needs  Description: Monitor and assess patient's nutrition/hydration status for malnutrition  Collaborate with interdisciplinary team and initiate plan and interventions as ordered  Monitor patient's weight and dietary intake as ordered or per policy  Utilize nutrition screening tool and intervene as necessary  Determine patient's food preferences and provide high-protein, high-caloric foods as appropriate       INTERVENTIONS:  - Monitor oral intake, urinary output, labs, and treatment plans  - Assess nutrition and hydration status and recommend course of action  - Evaluate amount of meals eaten  - Assist patient with eating if necessary   - Allow adequate time for meals  - Recommend/ encourage appropriate diets, oral nutritional supplements, and vitamin/mineral supplements  - Order, calculate, and assess calorie counts as needed  - Recommend, monitor, and adjust tube feedings and TPN/PPN based on assessed needs  - Assess need for intravenous fluids  - Provide specific nutrition/hydration education as appropriate  - Include patient/family/caregiver in decisions related to nutrition  Outcome: Progressing

## 2020-12-26 LAB
ALBUMIN SERPL BCP-MCNC: 2.5 G/DL (ref 3.5–5)
ALP SERPL-CCNC: 111 U/L (ref 46–116)
ALT SERPL W P-5'-P-CCNC: 143 U/L (ref 12–78)
ANION GAP SERPL CALCULATED.3IONS-SCNC: 6 MMOL/L (ref 4–13)
AST SERPL W P-5'-P-CCNC: 35 U/L (ref 5–45)
BASOPHILS # BLD AUTO: 0.04 THOUSANDS/ΜL (ref 0–0.1)
BASOPHILS NFR BLD AUTO: 1 % (ref 0–1)
BILIRUB SERPL-MCNC: 0.31 MG/DL (ref 0.2–1)
BUN SERPL-MCNC: 15 MG/DL (ref 5–25)
CALCIUM ALBUM COR SERPL-MCNC: 10.5 MG/DL (ref 8.3–10.1)
CALCIUM SERPL-MCNC: 9.3 MG/DL (ref 8.3–10.1)
CHLORIDE SERPL-SCNC: 105 MMOL/L (ref 100–108)
CO2 SERPL-SCNC: 28 MMOL/L (ref 21–32)
CREAT SERPL-MCNC: 0.82 MG/DL (ref 0.6–1.3)
CRP SERPL QL: 65.8 MG/L
D DIMER PPP FEU-MCNC: 0.94 UG/ML FEU
EOSINOPHIL # BLD AUTO: 0.56 THOUSAND/ΜL (ref 0–0.61)
EOSINOPHIL NFR BLD AUTO: 8 % (ref 0–6)
ERYTHROCYTE [DISTWIDTH] IN BLOOD BY AUTOMATED COUNT: 13.3 % (ref 11.6–15.1)
FERRITIN SERPL-MCNC: 347 NG/ML (ref 8–388)
GFR SERPL CREATININE-BSD FRML MDRD: 75 ML/MIN/1.73SQ M
GLUCOSE SERPL-MCNC: 107 MG/DL (ref 65–140)
HCT VFR BLD AUTO: 32.2 % (ref 34.8–46.1)
HGB BLD-MCNC: 10.3 G/DL (ref 11.5–15.4)
IMM GRANULOCYTES # BLD AUTO: 0.09 THOUSAND/UL (ref 0–0.2)
IMM GRANULOCYTES NFR BLD AUTO: 1 % (ref 0–2)
LYMPHOCYTES # BLD AUTO: 1.82 THOUSANDS/ΜL (ref 0.6–4.47)
LYMPHOCYTES NFR BLD AUTO: 25 % (ref 14–44)
MCH RBC QN AUTO: 26.8 PG (ref 26.8–34.3)
MCHC RBC AUTO-ENTMCNC: 32 G/DL (ref 31.4–37.4)
MCV RBC AUTO: 84 FL (ref 82–98)
MONOCYTES # BLD AUTO: 0.44 THOUSAND/ΜL (ref 0.17–1.22)
MONOCYTES NFR BLD AUTO: 6 % (ref 4–12)
NEUTROPHILS # BLD AUTO: 4.39 THOUSANDS/ΜL (ref 1.85–7.62)
NEUTS SEG NFR BLD AUTO: 59 % (ref 43–75)
NRBC BLD AUTO-RTO: 0 /100 WBCS
PLATELET # BLD AUTO: 249 THOUSANDS/UL (ref 149–390)
PMV BLD AUTO: 10.2 FL (ref 8.9–12.7)
POTASSIUM SERPL-SCNC: 3.4 MMOL/L (ref 3.5–5.3)
PROCALCITONIN SERPL-MCNC: <0.05 NG/ML
PROT SERPL-MCNC: 6.1 G/DL (ref 6.4–8.2)
RBC # BLD AUTO: 3.84 MILLION/UL (ref 3.81–5.12)
SODIUM SERPL-SCNC: 139 MMOL/L (ref 136–145)
WBC # BLD AUTO: 7.34 THOUSAND/UL (ref 4.31–10.16)

## 2020-12-26 PROCEDURE — 94003 VENT MGMT INPAT SUBQ DAY: CPT

## 2020-12-26 PROCEDURE — 85379 FIBRIN DEGRADATION QUANT: CPT | Performed by: EMERGENCY MEDICINE

## 2020-12-26 PROCEDURE — 84145 PROCALCITONIN (PCT): CPT | Performed by: PHYSICIAN ASSISTANT

## 2020-12-26 PROCEDURE — 82728 ASSAY OF FERRITIN: CPT | Performed by: EMERGENCY MEDICINE

## 2020-12-26 PROCEDURE — 99291 CRITICAL CARE FIRST HOUR: CPT | Performed by: INTERNAL MEDICINE

## 2020-12-26 PROCEDURE — 80053 COMPREHEN METABOLIC PANEL: CPT | Performed by: EMERGENCY MEDICINE

## 2020-12-26 PROCEDURE — 86140 C-REACTIVE PROTEIN: CPT | Performed by: EMERGENCY MEDICINE

## 2020-12-26 PROCEDURE — 94660 CPAP INITIATION&MGMT: CPT

## 2020-12-26 PROCEDURE — 85025 COMPLETE CBC W/AUTO DIFF WBC: CPT | Performed by: EMERGENCY MEDICINE

## 2020-12-26 PROCEDURE — 94760 N-INVAS EAR/PLS OXIMETRY 1: CPT

## 2020-12-26 RX ORDER — DEXAMETHASONE SODIUM PHOSPHATE 4 MG/ML
0.1 INJECTION, SOLUTION INTRA-ARTICULAR; INTRALESIONAL; INTRAMUSCULAR; INTRAVENOUS; SOFT TISSUE EVERY 12 HOURS SCHEDULED
Status: DISCONTINUED | OUTPATIENT
Start: 2020-12-26 | End: 2020-12-28

## 2020-12-26 RX ORDER — POTASSIUM CHLORIDE 20 MEQ/1
40 TABLET, EXTENDED RELEASE ORAL ONCE
Status: COMPLETED | OUTPATIENT
Start: 2020-12-26 | End: 2020-12-26

## 2020-12-26 RX ADMIN — GABAPENTIN 300 MG: 300 CAPSULE ORAL at 08:02

## 2020-12-26 RX ADMIN — TRAZODONE HYDROCHLORIDE 100 MG: 100 TABLET ORAL at 21:30

## 2020-12-26 RX ADMIN — POTASSIUM CHLORIDE 40 MEQ: 1500 TABLET, EXTENDED RELEASE ORAL at 12:50

## 2020-12-26 RX ADMIN — AZELASTINE HYDROCHLORIDE 1 SPRAY: 137 SPRAY, METERED NASAL at 09:30

## 2020-12-26 RX ADMIN — ALBUTEROL SULFATE 2 PUFF: 90 AEROSOL, METERED RESPIRATORY (INHALATION) at 08:05

## 2020-12-26 RX ADMIN — ALBUTEROL SULFATE 2 PUFF: 90 AEROSOL, METERED RESPIRATORY (INHALATION) at 21:39

## 2020-12-26 RX ADMIN — DOCUSATE SODIUM AND SENNOSIDES 2 TABLET: 8.6; 5 TABLET ORAL at 08:02

## 2020-12-26 RX ADMIN — FAMOTIDINE 20 MG: 20 TABLET ORAL at 08:02

## 2020-12-26 RX ADMIN — ALBUTEROL SULFATE 2 PUFF: 90 AEROSOL, METERED RESPIRATORY (INHALATION) at 15:05

## 2020-12-26 RX ADMIN — LIDOCAINE 5% 1 PATCH: 700 PATCH TOPICAL at 08:01

## 2020-12-26 RX ADMIN — SERTRALINE HYDROCHLORIDE 100 MG: 100 TABLET ORAL at 21:37

## 2020-12-26 RX ADMIN — AMOXICILLIN 500 MG: 500 CAPSULE ORAL at 04:49

## 2020-12-26 RX ADMIN — AZELASTINE HYDROCHLORIDE 1 SPRAY: 137 SPRAY, METERED NASAL at 18:08

## 2020-12-26 RX ADMIN — MELATONIN TAB 3 MG 6 MG: 3 TAB at 21:30

## 2020-12-26 RX ADMIN — DEXAMETHASONE SODIUM PHOSPHATE 7.84 MG: 4 INJECTION, SOLUTION INTRAMUSCULAR; INTRAVENOUS at 21:31

## 2020-12-26 RX ADMIN — AMOXICILLIN 500 MG: 500 CAPSULE ORAL at 21:36

## 2020-12-26 RX ADMIN — ENOXAPARIN SODIUM 30 MG: 30 INJECTION SUBCUTANEOUS at 21:30

## 2020-12-26 RX ADMIN — FAMOTIDINE 20 MG: 20 TABLET ORAL at 21:30

## 2020-12-26 RX ADMIN — POLYETHYLENE GLYCOL 3350 17 G: 17 POWDER, FOR SOLUTION ORAL at 08:05

## 2020-12-26 RX ADMIN — LITHIUM CARBONATE 300 MG: 300 CAPSULE, GELATIN COATED ORAL at 21:37

## 2020-12-26 RX ADMIN — AMOXICILLIN 500 MG: 500 CAPSULE ORAL at 15:05

## 2020-12-26 RX ADMIN — ENOXAPARIN SODIUM 30 MG: 30 INJECTION SUBCUTANEOUS at 08:02

## 2020-12-26 RX ADMIN — DEXAMETHASONE SODIUM PHOSPHATE 7.84 MG: 4 INJECTION, SOLUTION INTRAMUSCULAR; INTRAVENOUS at 12:50

## 2020-12-26 RX ADMIN — FLUTICASONE PROPIONATE 2 SPRAY: 50 SPRAY, METERED NASAL at 08:05

## 2020-12-26 RX ADMIN — LORAZEPAM 1.5 MG: 1 TABLET ORAL at 21:30

## 2020-12-26 RX ADMIN — LORATADINE 10 MG: 10 TABLET ORAL at 08:02

## 2020-12-26 NOTE — PLAN OF CARE
Problem: Potential for Falls  Goal: Patient will remain free of falls  Description: INTERVENTIONS:  - Assess patient frequently for physical needs  -  Identify cognitive and physical deficits and behaviors that affect risk of falls    -  [place] fall precautions as indicated by assessment   - Educate patient/family on patient safety including physical limitations  - Instruct patient to call for assistance with activity based on assessment  - Modify environment to reduce risk of injury  - Consider OT/PT consult to assist with strengthening/mobility  12/25/2020 2002 by Martha Luke RN  Outcome: Progressing  12/25/2020 1522 by Martha Luke RN  Outcome: Progressing     Problem: RESPIRATORY - ADULT  Goal: Achieves optimal ventilation and oxygenation  Description: INTERVENTIONS:  - Assess for changes in respiratory status  - Assess for changes in mentation and behavior  - Position to facilitate oxygenation and minimize respiratory effort  - Oxygen administered by appropriate delivery if ordered  - Initiate smoking cessation education as indicated  - Encourage broncho-pulmonary hygiene including cough, deep breathe, Incentive Spirometry  - Assess the need for suctioning and aspirate as needed  - Assess and instruct to report SOB or any respiratory difficulty  - Respiratory Therapy support as indicated  12/25/2020 2002 by Martha Luke RN  Outcome: Progressing  12/25/2020 1522 by Martha Luke RN  Outcome: Progressing     Problem: METABOLIC, FLUID AND ELECTROLYTES - ADULT  Goal: Electrolytes maintained within normal limits  Description: INTERVENTIONS:  - Monitor labs and assess patient for signs and symptoms of electrolyte imbalances  - Administer electrolyte replacement as ordered  - Monitor response to electrolyte replacements, including repeat lab results as appropriate  - Instruct patient on fluid and nutrition as appropriate  12/25/2020 2002 by Martha Luke RN  Outcome: Progressing  12/25/2020 1522 by Edgardo Burnette Herman Enrique RN  Outcome: Progressing  Goal: Fluid balance maintained  Description: INTERVENTIONS:  - Monitor labs   - Monitor I/O and WT  - Instruct patient on fluid and nutrition as appropriate  - Assess for signs & symptoms of volume excess or deficit  12/25/2020 2002 by Lucila Morillo RN  Outcome: Progressing  12/25/2020 1522 by Lucila Morillo RN  Outcome: Progressing     Problem: HEMATOLOGIC - ADULT  Goal: Maintains hematologic stability  Description: INTERVENTIONS  - Assess for signs and symptoms of bleeding or hemorrhage  - Monitor labs  - Administer supportive blood products/factors as ordered and appropriate  12/25/2020 2002 by Lucila Morillo RN  Outcome: Progressing  12/25/2020 1522 by Lucila Morillo RN  Outcome: Progressing     Problem: INFECTION - ADULT  Goal: Absence or prevention of progression during hospitalization  Description: INTERVENTIONS:  - Assess and monitor for signs and symptoms of infection  - Monitor lab/diagnostic results  - Monitor all insertion sites, i e  indwelling lines, tubes, and drains  - Monitor endotracheal if appropriate and nasal secretions for changes in amount and color  - [place] appropriate cooling/warming therapies per order  - Administer medications as ordered  - Instruct and encourage patient and family to use good hand hygiene technique  - Identify and instruct in appropriate isolation precautions for identified infection/condition  12/25/2020 2002 by Lucila Morillo RN  Outcome: Progressing  12/25/2020 1522 by Lucila Morillo RN  Outcome: Progressing  Goal: Absence of fever/infection during neutropenic period  Description: INTERVENTIONS:  - Monitor WBC    12/25/2020 2002 by Lucila Morillo RN  Outcome: Progressing  12/25/2020 1522 by Lucila Morillo RN  Outcome: Progressing     Problem: SAFETY ADULT  Goal: Patient will remain free of falls  Description: INTERVENTIONS:  - Assess patient frequently for physical needs  -  Identify cognitive and physical deficits and behaviors that affect risk of falls    -  [place] fall precautions as indicated by assessment   - Educate patient/family on patient safety including physical limitations  - Instruct patient to call for assistance with activity based on assessment  - Modify environment to reduce risk of injury  - Consider OT/PT consult to assist with strengthening/mobility  12/25/2020 2002 by Fernando Roberson RN  Outcome: Progressing  12/25/2020 1522 by Fernando Roberson RN  Outcome: Progressing  Goal: Maintain or return to baseline ADL function  Description: INTERVENTIONS:  -  Assess patient's ability to carry out ADLs; assess patient's baseline for ADL function and identify physical deficits which impact ability to perform ADLs (bathing, care of mouth/teeth, toileting, grooming, dressing, etc )  - Assess/evaluate cause of self-care deficits   - Assess range of motion  - Assess patient's mobility; develop plan if impaired  - Assess patient's need for assistive devices and provide as appropriate  - Encourage maximum independence but intervene and supervise when necessary  - Involve family in performance of ADLs  - Assess for home care needs following discharge   - Consider OT consult to assist with ADL evaluation and planning for discharge  - Provide patient education as appropriate  12/25/2020 2002 by Fernando Roberson RN  Outcome: Progressing  12/25/2020 1522 by Fernando Roberson RN  Outcome: Progressing  Goal: Maintain or return mobility status to optimal level  Description: INTERVENTIONS:  - Assess patient's baseline mobility status (ambulation, transfers, stairs, etc )    - Identify cognitive and physical deficits and behaviors that affect mobility  - Identify mobility aids required to assist with transfers and/or ambulation (gait belt, sit-to-stand, lift, walker, cane, etc )  - [place] fall precautions as indicated by assessment  - Record patient progress and toleration of activity level on Mobility SBAR; progress patient to next Phase/Stage  - Instruct patient to call for assistance with activity based on assessment  - Consider rehabilitation consult to assist with strengthening/weightbearing, etc   12/25/2020 2002 by Paulo Land RN  Outcome: Progressing  12/25/2020 1522 by Paulo Land RN  Outcome: Progressing     Problem: DISCHARGE PLANNING  Goal: Discharge to home or other facility with appropriate resources  Description: INTERVENTIONS:  - Identify barriers to discharge w/patient and caregiver  - Arrange for needed discharge resources and transportation as appropriate  - Identify discharge learning needs (meds, wound care, etc )  - Arrange for interpretive services to assist at discharge as needed  - Refer to Case Management Department for coordinating discharge planning if the patient needs post-hospital services based on physician/advanced practitioner order or complex needs related to functional status, cognitive ability, or social support system  12/25/2020 2002 by Paulo Land RN  Outcome: Progressing  12/25/2020 1522 by Paulo Land RN  Outcome: Progressing     Problem: Knowledge Deficit  Goal: Patient/family/caregiver demonstrates understanding of disease process, treatment plan, medications, and discharge instructions  Description: Complete learning assessment and assess knowledge base    Interventions:  - Provide teaching at level of understanding  - Provide teaching via preferred learning methods  12/25/2020 2002 by Paulo Land RN  Outcome: Progressing  12/25/2020 1522 by Paulo Land RN  Outcome: Progressing     Problem: Prexisting or High Potential for Compromised Skin Integrity  Goal: Skin integrity is maintained or improved  Description: INTERVENTIONS:  - Identify patients at risk for skin breakdown  - Assess and monitor skin integrity  - Assess and monitor nutrition and hydration status  - Monitor labs   - Assess for incontinence   - Turn and reposition patient  - Assist with mobility/ambulation  - Relieve pressure over bony prominences  - Avoid friction and shearing  - Provide appropriate hygiene as needed including keeping skin clean and dry  - Evaluate need for skin moisturizer/barrier cream  - Collaborate with interdisciplinary team   - Patient/family teaching  - Consider wound care consult   12/25/2020 2002 by Eulalia Martinez RN  Outcome: Progressing  12/25/2020 1522 by Eulalia Martinez RN  Outcome: Progressing     Problem: Nutrition/Hydration-ADULT  Goal: Nutrient/Hydration intake appropriate for improving, restoring or maintaining nutritional needs  Description: Monitor and assess patient's nutrition/hydration status for malnutrition  Collaborate with interdisciplinary team and initiate plan and interventions as ordered  Monitor patient's weight and dietary intake as ordered or per policy  Utilize nutrition screening tool and intervene as necessary  Determine patient's food preferences and provide high-protein, high-caloric foods as appropriate       INTERVENTIONS:  - Monitor oral intake, urinary output, labs, and treatment plans  - Assess nutrition and hydration status and recommend course of action  - Evaluate amount of meals eaten  - Assist patient with eating if necessary   - Allow adequate time for meals  - Recommend/ encourage appropriate diets, oral nutritional supplements, and vitamin/mineral supplements  - Order, calculate, and assess calorie counts as needed  - Recommend, monitor, and adjust tube feedings and TPN/PPN based on assessed needs  - Assess need for intravenous fluids  - Provide specific nutrition/hydration education as appropriate  - Include patient/family/caregiver in decisions related to nutrition  12/25/2020 2002 by Eulalia Martinez RN  Outcome: Progressing  12/25/2020 1522 by Eulalia Martinez RN  Outcome: Progressing

## 2020-12-26 NOTE — PROGRESS NOTES
Daily Progress Note - Critical Care   [place] 76 y o  female MRN: [id number]  Unit/Bed#: -01 Encounter: [id number]        ----------------------------------------------------------------------------------------  HPI/24hr events: Stayed on the HFNC      ---------------------------------------------------------------------------------------  SUBJECTIVE  Patient seen and examined at bedside by me today  Complained of rash in the back, itchy  Not associated with pain  Shortness of breath feels better  Denied fever ,chills , generalized weakness,  N/v, abdominal pain, diarrhea     Review of Systems   All other systems reviewed and are negative  Review of systems was reviewed and negative unless stated above in HPI/24-hour events   ---------------------------------------------------------------------------------------  Assessment and Plan:    70yo female with PMH of asthma ,GERD, BPD and anxiety who presented on 12/4 with worsening SOB amd found to be COVID positive on 11/29 admitted on the mild p/way  Patient had a brief Critcal care stay for increasing oxygen requirement and later downgraded  Restarted on steriod therapy by pulm due to worsening oxygen status  Was transferred back to MICU on 12/23 for increasing oxygen requirement on HFNC 70% 55 L     Neuro:   · Diagnosis: BPD   ? Plan: Continue zoloft  ? Continiue lithium 300 HS  Continue neurontin 300mg daily  Ativan 1 5mg HS  ? Delirium precautions  ? Regulate sleep wake cycle  ? CAM-ICU daily         CV:   No acute issues  MAP Goal > 65         Pulm:  AHRF due to COVID PNA  Maintain oxygen sat > 92%  Wean HFNC as able  Self proning  Albuterol prn and continue muccinex  Positive for covid on 11/29/ completed -remdesivir, convalescent plasma on 12/6  Trend inflammatory markers         GI:   ? Constipation:  ? Bowel regimen  ? Stress ulcer prophylaxis with famotidine         :   Monitor I/O  Follow BMP          F/E/N:   Replete electrolytes to Goal K> 4, Mg > 1 8  On regular diet      Heme/Onc:   Mild Anemia   11 2   Trend cbc      Anticoagulation with lovenox 30mg q12        Endo:   -Prediabetes  HbA1c 5 7  Monitor glc  Goal 140-180         ID:   COVID PNA  Positive for covid on  -remdesivir, convalscent plasma on   Trend inflammatory markers  Started on second round of steriod on            MSK/Skin:   Rash: Miliaria - keep dry  Frequent turning  Pressure sore precautions  Disposition: Continue Stepdown Level 1 level of care   Code Status: Level 1 - Full Code  ---------------------------------------------------------------------------------------  ICU CORE MEASURES    Prophylaxis   VTE Pharmacologic Prophylaxis: Enoxaparin (Lovenox)  VTE Mechanical Prophylaxis: sequential compression device  Stress Ulcer Prophylaxis: Famotidine PO    ABCDE Protocol (if indicated)  Plan to perform spontaneous awakening trial today? Not applicable  Plan to perform spontaneous breathing trial today? Not applicable  Obvious barriers to extubation? Not applicable  CAM-ICU: Negative    Invasive Devices Review  Invasive Devices     Peripheral Intravenous Line            Peripheral IV 20 Dorsal (posterior); Left Forearm 1 day          Drain            External Urinary Catheter 2 days              Can any invasive devices be discontinued today?  Not applicable  ---------------------------------------------------------------------------------------  OBJECTIVE    Vitals   Vitals:    20 0522 20 0600 20 0807 20 0910   BP: 100/55  (!) 88/46    BP Location: Left arm      Pulse: 74  82    Resp: 20  18    Temp: 98 3 °F (36 8 °C)  99 1 °F (37 3 °C)    TempSrc: Oral      SpO2: (!) 89%  90% (!) 88%   Weight:  78 4 kg (172 lb 13 5 oz)     Height:         Temp (24hrs), Av 9 °F (37 2 °C), Min:98 3 °F (36 8 °C), Max:99 8 °F (37 7 °C)  Current: Temperature: 99 1 °F (37 3 °C)  HR:   BP: /  RR:   SpO2:     Respiratory:  SpO2: SpO2: (!) 88 %    O2 Flow Rate (L/min): 30 L/min    Invasive/non-invasive ventilation settings   Respiratory    Lab Data (Last 4 hours)    None         O2/Vent Data (Last 4 hours)      12/26 0910          Non-Invasive Ventilation Mode HFNC (High flow)                   Physical Exam  Constitutional:       Appearance: Normal appearance  HENT:      Head: Normocephalic and atraumatic  Mouth/Throat:      Mouth: Mucous membranes are moist    Eyes:      Extraocular Movements: Extraocular movements intact  Neck:      Musculoskeletal: Normal range of motion  Cardiovascular:      Rate and Rhythm: Normal rate  Pulses: Normal pulses  Heart sounds: No murmur  No gallop  Pulmonary:      Effort: Pulmonary effort is normal  No respiratory distress  Breath sounds: No wheezing or rales  Abdominal:      General: Abdomen is flat  Bowel sounds are normal  There is no distension  Tenderness: There is no abdominal tenderness  Musculoskeletal: Normal range of motion  Skin:     Capillary Refill: Capillary refill takes less than 2 seconds  Findings: Rash (back) present  Neurological:      General: No focal deficit present  Mental Status: She is alert             Laboratory and Diagnostics:  Results from last 7 days   Lab Units 12/26/20  0558 12/25/20  0514 12/24/20 0444 12/23/20  0524 12/22/20  0442   WBC Thousand/uL 7 34 7 75 9 49 9 98 9 55   HEMOGLOBIN g/dL 10 3* 10 5* 11 1* 11 5 11 2*   HEMATOCRIT % 32 2* 32 6* 34 2* 36 5 35 7   PLATELETS Thousands/uL 249 289 299 287 252   NEUTROS PCT % 59 61 66 72 71   MONOS PCT % 6 5 5 5 7     Results from last 7 days   Lab Units 12/26/20  0558 12/25/20  0514 12/24/20 0444 12/23/20  0524 12/22/20  0442 12/21/20  0553   SODIUM mmol/L 139 141 141 138 140 138   POTASSIUM mmol/L 3 4* 3 5 4 2 3 3* 3 6 3 4*   CHLORIDE mmol/L 105 106 108 102 105 102   CO2 mmol/L 28 29 30 30 28 30   ANION GAP mmol/L 6 6 3* 6 7 6   BUN mg/dL 15 21 25 27* 29* 34*   CREATININE mg/dL 0 82 0 80 0 81 0 79 0 90 0 90   CALCIUM mg/dL 9 3 9 5 9 2 10 0 9 7 10 1   GLUCOSE RANDOM mg/dL 107 84 82 96 103 92   ALT U/L 143* 207* 292* 268* 261* 284*   AST U/L 35 55* 135* 112* 111* 171*   ALK PHOS U/L 111 123* 116 119* 115 132*   ALBUMIN g/dL 2 5* 2 7* 2 6* 2 8* 2 7* 2 8*   TOTAL BILIRUBIN mg/dL 0 31 0 34 0 49 0 37 0 39 0 24     Results from last 7 days   Lab Units 12/21/20  0553   MAGNESIUM mg/dL 2 8*   PHOSPHORUS mg/dL 4 2*                   ABG:    VBG:    Results from last 7 days   Lab Units 12/25/20  0514 12/24/20  1025   PROCALCITONIN ng/ml 0 05 0 06       Micro        EKG:   Imaging:  I have personally reviewed pertinent reports  Intake and Output  I/O       12/24 0701 - 12/25 0700 12/25 0701 - 12/26 0700 12/26 0701 - 12/27 0700    P  O   480     Total Intake(mL/kg)  480 (6 1)     Urine (mL/kg/hr) 650 (0 3) 824 (0 4)     Stool 0 0     Total Output 650 824     Net -650 -344            Unmeasured Urine Occurrence  1 x     Unmeasured Stool Occurrence 3 x 3 x         UOP: 844 ml/hr     Height and Weights   Height: 5' 1" (154 9 cm)  IBW: 47 8 kg  Body mass index is 32 66 kg/m²  Weight (last 2 days)     Date/Time   Weight    12/26/20 0600   78 4 (172 84)    12/24/20 0600   78 4 (172 84)    12/24/20 0445   78 4 (172 84)                Nutrition       Diet Orders   (From admission, onward)             Start     Ordered    12/05/20 0834  Diet Regular; Regular House  Diet effective now     Question Answer Comment   Diet Type Regular    Regular Regular House    Special Instructions Disposable Meal    RD to adjust diet per protocol? Yes        12/05/20 0833              TF currently running at 0 ml/hr with a goal of 0 ml/hr   Formula: 0      Active Medications  Scheduled Meds:  Current Facility-Administered Medications   Medication Dose Route Frequency Provider Last Rate    acetaminophen  650 mg Oral Q6H PRN Lillian Wagner MD      albuterol  2 puff Inhalation Q4H PRN Lillian Wagner MD      albuterol  2 puff Inhalation TID Kelsey Carbone MD      aluminum-magnesium hydroxide-simethicone  30 mL Oral Q4H PRN eKlsey Carbone MD      amoxicillin  500 mg Oral [facility] Kelsey Carbone MD      azelastine  1 spray Each Nare BID Kelsey Carbone MD      benzonatate  100 mg Oral TID PRN Kelsey Carbone MD      bisacodyl  10 mg Rectal Daily PRN Kelsey Carbone MD      enoxaparin  30 mg Subcutaneous Q12H [address] [place] [address], MD      famotidine  20 mg Oral Q12H Kelsey Carbone MD      fluticasone  2 spray Each Nare Daily Kelsey Carbone MD      gabapentin  300 mg Oral Daily Kelsey Carbone MD      lidocaine  1 patch Topical Daily Kelsey Carbone MD      lithium carbonate  300 mg Oral HS Kelsey Carbone MD      loratadine  10 mg Oral Daily Kelsey Carbone MD      LORazepam  1 mg Oral BID PRN Kelsey Carbone MD      LORazepam  1 5 mg Oral HS Kelsey Carbone MD      melatonin  6 mg Oral HS Kelsey Carbone MD      menthol-methyl salicylate   Apply externally 4x Daily PRN Kelsey Carbone MD      ondansetron  4 mg Intravenous Q6H PRN Kelsey Carbone MD      polyethylene glycol  17 g Oral Daily Kelsey Carbone MD      senna-docusate sodium  2 tablet Oral BID Kelsey Carbone MD      sertraline  100 mg Oral HS Kelsey Carbone MD      sodium chloride  2 spray Each Nare Q2H PRN Kelsey Carbone MD      traZODone  100 mg Oral HS Kelsey Carbone MD       Continuous Infusions:     PRN Meds:   acetaminophen, 650 mg, Q6H PRN  albuterol, 2 puff, Q4H PRN  aluminum-magnesium hydroxide-simethicone, 30 mL, Q4H PRN  benzonatate, 100 mg, TID PRN  bisacodyl, 10 mg, Daily PRN  LORazepam, 1 mg, BID PRN  menthol-methyl salicylate, , 4x Daily PRN  ondansetron, 4 mg, Q6H PRN  sodium chloride, 2 spray, Q2H PRN        Allergies   Allergies   Allergen Reactions    Amphetamine-Dextroamphetamine Other (See Comments)     Chest pain- was placed on Adderall after son passed away for depression, and she developed chest pain in 1990's; she had full cardiac work up, and was negative, so she has allergy to Adderall  Chest pain- was placed on Adderall after son passed away for depression, and she developed chest pain in 1990's; she had full cardiac work up, and was negative, so she has allergy to Adderall    Molds & Smuts     Other      Cats    Sulfa Antibiotics Other (See Comments)     ---------------------------------------------------------------------------------------  Advance Directive and Living Will:      Power of :    POLST:    ---------------------------------------------------------------------------------------  Care Time Delivered:   Upon my evaluation, this patient had a high probability of imminent or life-threatening deterioration due to COVID, which required my direct attention, intervention, and personal management  I have personally provided 20 minutes (800 to 820) of critical care time, exclusive of procedures, teaching, family meetings, and any prior time recorded by providers other than myself  Erwin Doherty MD      Portions of the record may have been created with voice recognition software  Occasional wrong word or "sound a like" substitutions may have occurred due to the inherent limitations of voice recognition software    Read the chart carefully and recognize, using context, where substitutions have occurred

## 2020-12-27 LAB
ALBUMIN SERPL BCP-MCNC: 2.7 G/DL (ref 3.5–5)
ALP SERPL-CCNC: 114 U/L (ref 46–116)
ALT SERPL W P-5'-P-CCNC: 139 U/L (ref 12–78)
ANION GAP SERPL CALCULATED.3IONS-SCNC: 6 MMOL/L (ref 4–13)
AST SERPL W P-5'-P-CCNC: 49 U/L (ref 5–45)
BASOPHILS # BLD AUTO: 0.02 THOUSANDS/ΜL (ref 0–0.1)
BASOPHILS NFR BLD AUTO: 0 % (ref 0–1)
BILIRUB SERPL-MCNC: 0.38 MG/DL (ref 0.2–1)
BUN SERPL-MCNC: 17 MG/DL (ref 5–25)
CALCIUM ALBUM COR SERPL-MCNC: 11.4 MG/DL (ref 8.3–10.1)
CALCIUM SERPL-MCNC: 10.4 MG/DL (ref 8.3–10.1)
CHLORIDE SERPL-SCNC: 107 MMOL/L (ref 100–108)
CO2 SERPL-SCNC: 28 MMOL/L (ref 21–32)
CREAT SERPL-MCNC: 0.82 MG/DL (ref 0.6–1.3)
CRP SERPL QL: 103 MG/L
D DIMER PPP FEU-MCNC: 0.74 UG/ML FEU
EOSINOPHIL # BLD AUTO: 0.01 THOUSAND/ΜL (ref 0–0.61)
EOSINOPHIL NFR BLD AUTO: 0 % (ref 0–6)
ERYTHROCYTE [DISTWIDTH] IN BLOOD BY AUTOMATED COUNT: 13.2 % (ref 11.6–15.1)
FERRITIN SERPL-MCNC: 385 NG/ML (ref 8–388)
GFR SERPL CREATININE-BSD FRML MDRD: 75 ML/MIN/1.73SQ M
GLUCOSE SERPL-MCNC: 131 MG/DL (ref 65–140)
HCT VFR BLD AUTO: 34.4 % (ref 34.8–46.1)
HGB BLD-MCNC: 10.9 G/DL (ref 11.5–15.4)
IMM GRANULOCYTES # BLD AUTO: 0.15 THOUSAND/UL (ref 0–0.2)
IMM GRANULOCYTES NFR BLD AUTO: 2 % (ref 0–2)
LYMPHOCYTES # BLD AUTO: 1.29 THOUSANDS/ΜL (ref 0.6–4.47)
LYMPHOCYTES NFR BLD AUTO: 16 % (ref 14–44)
MCH RBC QN AUTO: 26.8 PG (ref 26.8–34.3)
MCHC RBC AUTO-ENTMCNC: 31.7 G/DL (ref 31.4–37.4)
MCV RBC AUTO: 85 FL (ref 82–98)
MONOCYTES # BLD AUTO: 0.25 THOUSAND/ΜL (ref 0.17–1.22)
MONOCYTES NFR BLD AUTO: 3 % (ref 4–12)
NEUTROPHILS # BLD AUTO: 6.51 THOUSANDS/ΜL (ref 1.85–7.62)
NEUTS SEG NFR BLD AUTO: 79 % (ref 43–75)
NRBC BLD AUTO-RTO: 0 /100 WBCS
PLATELET # BLD AUTO: 294 THOUSANDS/UL (ref 149–390)
PMV BLD AUTO: 10.1 FL (ref 8.9–12.7)
POTASSIUM SERPL-SCNC: 4.4 MMOL/L (ref 3.5–5.3)
PROCALCITONIN SERPL-MCNC: <0.05 NG/ML
PROT SERPL-MCNC: 6.7 G/DL (ref 6.4–8.2)
RBC # BLD AUTO: 4.06 MILLION/UL (ref 3.81–5.12)
SODIUM SERPL-SCNC: 141 MMOL/L (ref 136–145)
WBC # BLD AUTO: 8.23 THOUSAND/UL (ref 4.31–10.16)

## 2020-12-27 PROCEDURE — 94760 N-INVAS EAR/PLS OXIMETRY 1: CPT

## 2020-12-27 PROCEDURE — 86140 C-REACTIVE PROTEIN: CPT | Performed by: EMERGENCY MEDICINE

## 2020-12-27 PROCEDURE — 85379 FIBRIN DEGRADATION QUANT: CPT | Performed by: EMERGENCY MEDICINE

## 2020-12-27 PROCEDURE — XW033H5 INTRODUCTION OF TOCILIZUMAB INTO PERIPHERAL VEIN, PERCUTANEOUS APPROACH, NEW TECHNOLOGY GROUP 5: ICD-10-PCS | Performed by: INTERNAL MEDICINE

## 2020-12-27 PROCEDURE — 99291 CRITICAL CARE FIRST HOUR: CPT | Performed by: INTERNAL MEDICINE

## 2020-12-27 PROCEDURE — 84145 PROCALCITONIN (PCT): CPT | Performed by: PHYSICIAN ASSISTANT

## 2020-12-27 PROCEDURE — 85025 COMPLETE CBC W/AUTO DIFF WBC: CPT | Performed by: EMERGENCY MEDICINE

## 2020-12-27 PROCEDURE — 80053 COMPREHEN METABOLIC PANEL: CPT | Performed by: EMERGENCY MEDICINE

## 2020-12-27 PROCEDURE — 82728 ASSAY OF FERRITIN: CPT | Performed by: EMERGENCY MEDICINE

## 2020-12-27 PROCEDURE — 94660 CPAP INITIATION&MGMT: CPT

## 2020-12-27 RX ORDER — ATORVASTATIN CALCIUM 40 MG/1
40 TABLET, FILM COATED ORAL
Status: DISCONTINUED | OUTPATIENT
Start: 2020-12-27 | End: 2021-01-11 | Stop reason: HOSPADM

## 2020-12-27 RX ORDER — AMOXICILLIN 250 MG
3 CAPSULE ORAL 2 TIMES DAILY
Status: DISCONTINUED | OUTPATIENT
Start: 2020-12-27 | End: 2021-01-01

## 2020-12-27 RX ORDER — BISACODYL 10 MG
10 SUPPOSITORY, RECTAL RECTAL DAILY
Status: DISCONTINUED | OUTPATIENT
Start: 2020-12-28 | End: 2021-01-01

## 2020-12-27 RX ORDER — MELATONIN
2000 DAILY
Status: DISCONTINUED | OUTPATIENT
Start: 2020-12-27 | End: 2021-01-11 | Stop reason: HOSPADM

## 2020-12-27 RX ORDER — MULTIVITAMIN/IRON/FOLIC ACID 18MG-0.4MG
1 TABLET ORAL DAILY
Status: DISCONTINUED | OUTPATIENT
Start: 2021-01-03 | End: 2021-01-11 | Stop reason: HOSPADM

## 2020-12-27 RX ORDER — ASCORBIC ACID 500 MG
1000 TABLET ORAL EVERY 12 HOURS SCHEDULED
Status: COMPLETED | OUTPATIENT
Start: 2020-12-27 | End: 2021-01-02

## 2020-12-27 RX ORDER — ZINC SULFATE 50(220)MG
220 CAPSULE ORAL DAILY
Status: COMPLETED | OUTPATIENT
Start: 2020-12-27 | End: 2021-01-02

## 2020-12-27 RX ADMIN — ALBUTEROL SULFATE 2 PUFF: 90 AEROSOL, METERED RESPIRATORY (INHALATION) at 20:18

## 2020-12-27 RX ADMIN — LORAZEPAM 1 MG: 1 TABLET ORAL at 13:55

## 2020-12-27 RX ADMIN — Medication 2000 UNITS: at 13:55

## 2020-12-27 RX ADMIN — LORAZEPAM 1.5 MG: 1 TABLET ORAL at 22:22

## 2020-12-27 RX ADMIN — SENNOSIDES AND DOCUSATE SODIUM 3 TABLET: 8.6; 5 TABLET ORAL at 17:50

## 2020-12-27 RX ADMIN — GABAPENTIN 300 MG: 300 CAPSULE ORAL at 08:15

## 2020-12-27 RX ADMIN — OXYCODONE HYDROCHLORIDE AND ACETAMINOPHEN 1000 MG: 500 TABLET ORAL at 20:17

## 2020-12-27 RX ADMIN — LITHIUM CARBONATE 300 MG: 300 CAPSULE, GELATIN COATED ORAL at 22:23

## 2020-12-27 RX ADMIN — DEXAMETHASONE SODIUM PHOSPHATE 7.84 MG: 4 INJECTION, SOLUTION INTRAMUSCULAR; INTRAVENOUS at 20:16

## 2020-12-27 RX ADMIN — AZELASTINE HYDROCHLORIDE 1 SPRAY: 137 SPRAY, METERED NASAL at 16:00

## 2020-12-27 RX ADMIN — MELATONIN TAB 3 MG 6 MG: 3 TAB at 22:23

## 2020-12-27 RX ADMIN — LORATADINE 10 MG: 10 TABLET ORAL at 08:16

## 2020-12-27 RX ADMIN — AMOXICILLIN 500 MG: 500 CAPSULE ORAL at 05:47

## 2020-12-27 RX ADMIN — ATORVASTATIN CALCIUM 40 MG: 40 TABLET, FILM COATED ORAL at 18:37

## 2020-12-27 RX ADMIN — AMOXICILLIN 500 MG: 500 CAPSULE ORAL at 20:22

## 2020-12-27 RX ADMIN — FAMOTIDINE 20 MG: 20 TABLET ORAL at 20:16

## 2020-12-27 RX ADMIN — SERTRALINE HYDROCHLORIDE 100 MG: 100 TABLET ORAL at 22:23

## 2020-12-27 RX ADMIN — ALBUTEROL SULFATE 2 PUFF: 90 AEROSOL, METERED RESPIRATORY (INHALATION) at 16:00

## 2020-12-27 RX ADMIN — TOCILIZUMAB 400 MG: 20 INJECTION, SOLUTION, CONCENTRATE INTRAVENOUS at 18:40

## 2020-12-27 RX ADMIN — ENOXAPARIN SODIUM 30 MG: 30 INJECTION SUBCUTANEOUS at 20:17

## 2020-12-27 RX ADMIN — AZELASTINE HYDROCHLORIDE 1 SPRAY: 137 SPRAY, METERED NASAL at 09:00

## 2020-12-27 RX ADMIN — ZINC SULFATE 220 MG (50 MG) CAPSULE 220 MG: CAPSULE at 13:55

## 2020-12-27 RX ADMIN — AMOXICILLIN 500 MG: 500 CAPSULE ORAL at 13:55

## 2020-12-27 RX ADMIN — FAMOTIDINE 20 MG: 20 TABLET ORAL at 08:15

## 2020-12-27 RX ADMIN — ALBUTEROL SULFATE 2 PUFF: 90 AEROSOL, METERED RESPIRATORY (INHALATION) at 08:21

## 2020-12-27 RX ADMIN — TRAZODONE HYDROCHLORIDE 100 MG: 100 TABLET ORAL at 22:22

## 2020-12-27 RX ADMIN — OXYCODONE HYDROCHLORIDE AND ACETAMINOPHEN 1000 MG: 500 TABLET ORAL at 13:55

## 2020-12-27 RX ADMIN — FLUTICASONE PROPIONATE 2 SPRAY: 50 SPRAY, METERED NASAL at 09:00

## 2020-12-27 RX ADMIN — ENOXAPARIN SODIUM 30 MG: 30 INJECTION SUBCUTANEOUS at 08:15

## 2020-12-27 RX ADMIN — DEXAMETHASONE SODIUM PHOSPHATE 7.84 MG: 4 INJECTION, SOLUTION INTRAMUSCULAR; INTRAVENOUS at 08:16

## 2020-12-27 NOTE — PROGRESS NOTES
Daily Progress Note - Critical Care   Juan Carlos Fuentes 76 y o  female MRN: [id number]  Unit/Bed#: -01 Encounter: [id number]        ----------------------------------------------------------------------------------------  HPI/24hr events: no overnight events     ---------------------------------------------------------------------------------------  SUBJECTIVE  Patient is doing well today and has no complaints  Patient is resting comfortably  Review of Systems  Review of systems was reviewed and negative unless stated above in HPI/24-hour events   ---------------------------------------------------------------------------------------  Assessment and Plan:    Neuro:   Diagnosis: BPD   Plan: Continue zoloft  Continiue lithium 300 HS  Continue neurontin 300mg daily  Ativan 1 5mg HS  Delirium precautions  Regulate sleep wake cycle  CAM-ICU daily  CV:   ·   No acute issues  · MAP Goal > 65  Pulm:   ·  Diagnosis: AHRF due to COVID PNA  ·  Maintain oxygen sat > 92%  ·  Wean HFNC as able  Self proning  ·  Albuterol prn and continue muccinex  · Decadron   ·  Positive for covid on 11/29/ completed -remdesivir,  convalescent plasma on 12/6  Trend inflammatory markers  · Amoxicillin   ·  Weaned to HF 35L/65%       GI:   Diagnosis: Constipation:   Bowel regimen  Stress ulcer prophylaxis with famotidine  :   ·  Monitor I/O  ·  Follow BMP  F/E/N:   ·  No fluids at this time   ·  Replete electrolytes to Goal K> 4, Mg > 1 8    ·  On regular diet  Heme/Onc:   ·  Diagnosis: Mild Anemia  ·  11 2   ·  Trend cbc  ·  Anticoagulation with lovenox 30mg q12       Endo:    Diagnosis: Prediabetes  HbA1c 5 7  Monitor glc  Goal 140-180  ID:   ·  COVID PNA  ·  Positive for covid on 11/29/ completed -remdesivir,  convalscent plasma on 12/6  Trend inflammatory markers  ·  Started on second round of steriod on 12/23   · See above      MSK/Skin:   ·  Rash: Miliaria - keep dry  ·  Frequent turning  ·  Pressure sore precautions  Disposition: Continue Critical Care   Code Status: Level 1 - Full Code  ---------------------------------------------------------------------------------------  ICU CORE MEASURES    Prophylaxis   VTE Pharmacologic Prophylaxis: Enoxaparin (Lovenox)  VTE Mechanical Prophylaxis: sequential compression device  Stress Ulcer Prophylaxis: Famotidine PO    ABCDE Protocol (if indicated)  Plan to perform spontaneous awakening trial today? Not applicable  Plan to perform spontaneous breathing trial today? Not applicable  Obvious barriers to extubation? Not applicable  CAM-ICU: per nursing    Invasive Devices Review  Invasive Devices     Peripheral Intravenous Line            Peripheral IV 20 Dorsal (posterior); Left Forearm 1 day          Drain            External Urinary Catheter 2 days              Can any invasive devices be discontinued today? No  ---------------------------------------------------------------------------------------  OBJECTIVE    Vitals   Vitals:    20 1630 20 1903 20 2307 20 0305   BP:       BP Location:       Pulse:       Resp:       Temp:       TempSrc:       SpO2: (!) 89% (!) 89% 92% 90%   Weight:       Height:         Temp (24hrs), Av 1 °F (37 3 °C), Min:99 1 °F (37 3 °C), Max:99 1 °F (37 3 °C)  Current: Temperature: 99 1 °F (37 3 °C)  HR: 93  BP: 96/53  RR: 18  SpO2: 90    Respiratory:  SpO2: SpO2: 90 %  Nasal Cannula O2 Flow Rate (L/min): 35 L/min    Invasive/non-invasive ventilation settings   Respiratory    Lab Data (Last 4 hours)    None         O2/Vent Data (Last 4 hours)       030          Non-Invasive Ventilation Mode HFNC (High flow)                   Physical Exam  Vitals signs and nursing note reviewed  Constitutional:       Appearance: Normal appearance  HENT:      Head: Normocephalic and atraumatic     Eyes:      Conjunctiva/sclera: Conjunctivae normal    Neck:      Musculoskeletal: Normal range of motion and neck supple  Cardiovascular:      Rate and Rhythm: Normal rate and regular rhythm  Pulmonary:      Effort: Pulmonary effort is normal  No respiratory distress  Musculoskeletal: Normal range of motion  Skin:     General: Skin is warm and dry  Neurological:      General: No focal deficit present  Mental Status: She is alert and oriented to person, place, and time  Laboratory and Diagnostics:  Results from last 7 days   Lab Units 12/26/20  0558 12/25/20  0514 12/24/20  0444 12/23/20  0524 12/22/20  0442   WBC Thousand/uL 7 34 7 75 9 49 9 98 9 55   HEMOGLOBIN g/dL 10 3* 10 5* 11 1* 11 5 11 2*   HEMATOCRIT % 32 2* 32 6* 34 2* 36 5 35 7   PLATELETS Thousands/uL 249 289 299 287 252   NEUTROS PCT % 59 61 66 72 71   MONOS PCT % 6 5 5 5 7     Results from last 7 days   Lab Units 12/26/20  0558 12/25/20  0514 12/24/20  0444 12/23/20  0524 12/22/20  0442 12/21/20  0553   SODIUM mmol/L 139 141 141 138 140 138   POTASSIUM mmol/L 3 4* 3 5 4 2 3 3* 3 6 3 4*   CHLORIDE mmol/L 105 106 108 102 105 102   CO2 mmol/L 28 29 30 30 28 30   ANION GAP mmol/L 6 6 3* 6 7 6   BUN mg/dL 15 21 25 27* 29* 34*   CREATININE mg/dL 0 82 0 80 0 81 0 79 0 90 0 90   CALCIUM mg/dL 9 3 9 5 9 2 10 0 9 7 10 1   GLUCOSE RANDOM mg/dL 107 84 82 96 103 92   ALT U/L 143* 207* 292* 268* 261* 284*   AST U/L 35 55* 135* 112* 111* 171*   ALK PHOS U/L 111 123* 116 119* 115 132*   ALBUMIN g/dL 2 5* 2 7* 2 6* 2 8* 2 7* 2 8*   TOTAL BILIRUBIN mg/dL 0 31 0 34 0 49 0 37 0 39 0 24     Results from last 7 days   Lab Units 12/21/20  0553   MAGNESIUM mg/dL 2 8*   PHOSPHORUS mg/dL 4 2*                   ABG:    VBG:    Results from last 7 days   Lab Units 12/26/20  1252 12/25/20  0514 12/24/20  1025   PROCALCITONIN ng/ml <0 05 0 05 0 06       Micro          Imaging:  I have personally reviewed pertinent films in PACS    Intake and Output  I/O       12/25 0701 - 12/26 0700 12/26 0701 - 12/27 0700    P  O  480 480    Total Intake(mL/kg) 480 (6 1) 480 (6 1)    Urine (mL/kg/hr) 824 (0 4) 800 (0 4)    Stool 0     Total Output 824 800    Net -344 -320          Unmeasured Urine Occurrence 1 x 1 x    Unmeasured Stool Occurrence 3 x 1 x        UOP: 0 4 ml/kg/hr     Height and Weights   Height: 5' 1" (154 9 cm)  IBW: 47 8 kg  Body mass index is 32 66 kg/m²  Weight (last 2 days)     Date/Time   Weight    12/26/20 0600   78 4 (172 84)                Nutrition       Diet Orders   (From admission, onward)             Start     Ordered    12/05/20 0834  Diet Regular; Regular House  Diet effective now     Question Answer Comment   Diet Type Regular    Regular Regular House    Special Instructions Disposable Meal    RD to adjust diet per protocol?  Yes        12/05/20 4285                    Active Medications  Scheduled Meds:  Current Facility-Administered Medications   Medication Dose Route Frequency Provider Last Rate    acetaminophen  650 mg Oral Q6H PRN Kellen York MD      albuterol  2 puff Inhalation Q4H PRN Kellen York MD      albuterol  2 puff Inhalation TID Kellen York MD      aluminum-magnesium hydroxide-simethicone  30 mL Oral Q4H PRN Kellen York MD      amoxicillin  500 mg Oral [facility] Kellen York MD      azelastine  1 spray Each Nare BID Kellen York MD      benzonatate  100 mg Oral TID PRN Kellen York MD      bisacodyl  10 mg Rectal Daily PRN Kellen York MD      dexamethasone  0 1 mg/kg Intravenous Q12H [address] Chris, LAUREN      enoxaparin  30 mg Subcutaneous Q12H Meir Piña MD      famotidine  20 mg Oral Q12H Kellen York MD      fluticasone  2 spray Each Nare Daily Kellen York MD      gabapentin  300 mg Oral Daily Kellen York MD      lidocaine  1 patch Topical Daily Kellen York MD      lithium carbonate  300 mg Oral HS Kellen York MD      loratadine  10 mg Oral Daily Kellen York MD      LORazepam  1 mg Oral BID PRN Kellen York MD      LORazepam  1 5 mg Oral HS Kellen York MD  melatonin  6 mg Oral HS Froilan Shaw MD      menthol-methyl salicylate   Apply externally 4x Daily PRN Froilan Shaw MD      ondansetron  4 mg Intravenous Q6H PRN Froilan Shaw MD      polyethylene glycol  17 g Oral Daily Froilan Shaw MD      senna-docusate sodium  2 tablet Oral BID Froilan Shaw MD      sertraline  100 mg Oral HS Froilan Shaw MD      sodium chloride  2 spray Each Nare Q2H PRN Froilan Shaw MD      traZODone  100 mg Oral HS Froilan Shaw MD       Continuous Infusions:     PRN Meds:   acetaminophen, 650 mg, Q6H PRN  albuterol, 2 puff, Q4H PRN  aluminum-magnesium hydroxide-simethicone, 30 mL, Q4H PRN  benzonatate, 100 mg, TID PRN  bisacodyl, 10 mg, Daily PRN  LORazepam, 1 mg, BID PRN  menthol-methyl salicylate, , 4x Daily PRN  ondansetron, 4 mg, Q6H PRN  sodium chloride, 2 spray, Q2H PRN        Allergies   Allergies   Allergen Reactions    Amphetamine-Dextroamphetamine Other (See Comments)     Chest pain- was placed on Adderall after son passed away for depression, and she developed chest pain in 1990's; she had full cardiac work up, and was negative, so she has allergy to Adderall  Chest pain- was placed on Adderall after son passed away for depression, and she developed chest pain in 1990's; she had full cardiac work up, and was negative, so she has allergy to Adderall    Molds & Smuts     Other      Cats    Sulfa Antibiotics Other (See Comments)     ---------------------------------------------------------------------------------------  Advance Directive and Living Will:      Power of :    POLST:    ---------------------------------------------------------------------------------------  Care Time Delivered:   Upon my evaluation, this patient had a high probability of imminent or life-threatening deterioration due to covid 19 pna , which required my direct attention, intervention, and personal management    I have personally provided 20 minutes (0500 to [phone]) of critical care time, exclusive of procedures, teaching, family meetings, and any prior time recorded by providers other than myself  Danney Cooks, DO      Portions of the record may have been created with voice recognition software  Occasional wrong word or "sound a like" substitutions may have occurred due to the inherent limitations of voice recognition software    Read the chart carefully and recognize, using context, where substitutions have occurred

## 2020-12-28 LAB
ANION GAP SERPL CALCULATED.3IONS-SCNC: 5 MMOL/L (ref 4–13)
BASOPHILS # BLD AUTO: 0.01 THOUSANDS/ΜL (ref 0–0.1)
BASOPHILS NFR BLD AUTO: 0 % (ref 0–1)
BUN SERPL-MCNC: 24 MG/DL (ref 5–25)
CALCIUM SERPL-MCNC: 9.7 MG/DL (ref 8.3–10.1)
CHLORIDE SERPL-SCNC: 109 MMOL/L (ref 100–108)
CO2 SERPL-SCNC: 27 MMOL/L (ref 21–32)
CREAT SERPL-MCNC: 0.92 MG/DL (ref 0.6–1.3)
EOSINOPHIL # BLD AUTO: 0.01 THOUSAND/ΜL (ref 0–0.61)
EOSINOPHIL NFR BLD AUTO: 0 % (ref 0–6)
ERYTHROCYTE [DISTWIDTH] IN BLOOD BY AUTOMATED COUNT: 13.5 % (ref 11.6–15.1)
GFR SERPL CREATININE-BSD FRML MDRD: 65 ML/MIN/1.73SQ M
GLUCOSE SERPL-MCNC: 119 MG/DL (ref 65–140)
HCT VFR BLD AUTO: 33.1 % (ref 34.8–46.1)
HGB BLD-MCNC: 9.9 G/DL (ref 11.5–15.4)
IMM GRANULOCYTES # BLD AUTO: 0.19 THOUSAND/UL (ref 0–0.2)
IMM GRANULOCYTES NFR BLD AUTO: 2 % (ref 0–2)
LYMPHOCYTES # BLD AUTO: 1.73 THOUSANDS/ΜL (ref 0.6–4.47)
LYMPHOCYTES NFR BLD AUTO: 18 % (ref 14–44)
MAGNESIUM SERPL-MCNC: 2.6 MG/DL (ref 1.6–2.6)
MCH RBC QN AUTO: 26.3 PG (ref 26.8–34.3)
MCHC RBC AUTO-ENTMCNC: 29.9 G/DL (ref 31.4–37.4)
MCV RBC AUTO: 88 FL (ref 82–98)
MONOCYTES # BLD AUTO: 0.42 THOUSAND/ΜL (ref 0.17–1.22)
MONOCYTES NFR BLD AUTO: 4 % (ref 4–12)
NEUTROPHILS # BLD AUTO: 7.43 THOUSANDS/ΜL (ref 1.85–7.62)
NEUTS SEG NFR BLD AUTO: 76 % (ref 43–75)
NRBC BLD AUTO-RTO: 0 /100 WBCS
PHOSPHATE SERPL-MCNC: 4.9 MG/DL (ref 2.3–4.1)
PLATELET # BLD AUTO: 304 THOUSANDS/UL (ref 149–390)
PMV BLD AUTO: 11.4 FL (ref 8.9–12.7)
POTASSIUM SERPL-SCNC: 4.3 MMOL/L (ref 3.5–5.3)
RBC # BLD AUTO: 3.76 MILLION/UL (ref 3.81–5.12)
SODIUM SERPL-SCNC: 141 MMOL/L (ref 136–145)
WBC # BLD AUTO: 9.79 THOUSAND/UL (ref 4.31–10.16)

## 2020-12-28 PROCEDURE — 83735 ASSAY OF MAGNESIUM: CPT | Performed by: NURSE PRACTITIONER

## 2020-12-28 PROCEDURE — 80048 BASIC METABOLIC PNL TOTAL CA: CPT | Performed by: NURSE PRACTITIONER

## 2020-12-28 PROCEDURE — 84100 ASSAY OF PHOSPHORUS: CPT | Performed by: NURSE PRACTITIONER

## 2020-12-28 PROCEDURE — 94760 N-INVAS EAR/PLS OXIMETRY 1: CPT

## 2020-12-28 PROCEDURE — 99291 CRITICAL CARE FIRST HOUR: CPT | Performed by: INTERNAL MEDICINE

## 2020-12-28 PROCEDURE — 85025 COMPLETE CBC W/AUTO DIFF WBC: CPT | Performed by: NURSE PRACTITIONER

## 2020-12-28 PROCEDURE — 94660 CPAP INITIATION&MGMT: CPT

## 2020-12-28 RX ORDER — DEXAMETHASONE SODIUM PHOSPHATE 4 MG/ML
0.1 INJECTION, SOLUTION INTRA-ARTICULAR; INTRALESIONAL; INTRAMUSCULAR; INTRAVENOUS; SOFT TISSUE DAILY
Status: COMPLETED | OUTPATIENT
Start: 2020-12-29 | End: 2021-01-04

## 2020-12-28 RX ORDER — MINERAL OIL AND PETROLATUM 150; 830 MG/G; MG/G
OINTMENT OPHTHALMIC DAILY PRN
Status: DISCONTINUED | OUTPATIENT
Start: 2020-12-28 | End: 2021-01-11 | Stop reason: HOSPADM

## 2020-12-28 RX ADMIN — AMOXICILLIN 500 MG: 500 CAPSULE ORAL at 04:21

## 2020-12-28 RX ADMIN — AMOXICILLIN 500 MG: 500 CAPSULE ORAL at 21:14

## 2020-12-28 RX ADMIN — ENOXAPARIN SODIUM 30 MG: 30 INJECTION SUBCUTANEOUS at 21:14

## 2020-12-28 RX ADMIN — Medication 2000 UNITS: at 08:10

## 2020-12-28 RX ADMIN — WHITE PETROLATUM 57.7 %-MINERAL OIL 31.9 % EYE OINTMENT 1 APPLICATION: at 18:52

## 2020-12-28 RX ADMIN — TRAZODONE HYDROCHLORIDE 100 MG: 100 TABLET ORAL at 21:13

## 2020-12-28 RX ADMIN — LORAZEPAM 1 MG: 1 TABLET ORAL at 13:50

## 2020-12-28 RX ADMIN — ENOXAPARIN SODIUM 30 MG: 30 INJECTION SUBCUTANEOUS at 08:10

## 2020-12-28 RX ADMIN — FAMOTIDINE 20 MG: 20 TABLET ORAL at 08:10

## 2020-12-28 RX ADMIN — OXYCODONE HYDROCHLORIDE AND ACETAMINOPHEN 1000 MG: 500 TABLET ORAL at 08:10

## 2020-12-28 RX ADMIN — SENNOSIDES AND DOCUSATE SODIUM 3 TABLET: 8.6; 5 TABLET ORAL at 08:10

## 2020-12-28 RX ADMIN — SERTRALINE HYDROCHLORIDE 100 MG: 100 TABLET ORAL at 21:13

## 2020-12-28 RX ADMIN — LITHIUM CARBONATE 300 MG: 300 CAPSULE, GELATIN COATED ORAL at 21:14

## 2020-12-28 RX ADMIN — ALBUTEROL SULFATE 2 PUFF: 90 AEROSOL, METERED RESPIRATORY (INHALATION) at 15:46

## 2020-12-28 RX ADMIN — ALBUTEROL SULFATE 2 PUFF: 90 AEROSOL, METERED RESPIRATORY (INHALATION) at 21:14

## 2020-12-28 RX ADMIN — SENNOSIDES AND DOCUSATE SODIUM 3 TABLET: 8.6; 5 TABLET ORAL at 18:52

## 2020-12-28 RX ADMIN — ALBUTEROL SULFATE 2 PUFF: 90 AEROSOL, METERED RESPIRATORY (INHALATION) at 08:11

## 2020-12-28 RX ADMIN — ZINC SULFATE 220 MG (50 MG) CAPSULE 220 MG: CAPSULE at 08:10

## 2020-12-28 RX ADMIN — OXYCODONE HYDROCHLORIDE AND ACETAMINOPHEN 1000 MG: 500 TABLET ORAL at 21:13

## 2020-12-28 RX ADMIN — DEXAMETHASONE SODIUM PHOSPHATE 7.84 MG: 4 INJECTION, SOLUTION INTRAMUSCULAR; INTRAVENOUS at 08:10

## 2020-12-28 RX ADMIN — FAMOTIDINE 20 MG: 20 TABLET ORAL at 19:01

## 2020-12-28 RX ADMIN — GABAPENTIN 300 MG: 300 CAPSULE ORAL at 08:10

## 2020-12-28 RX ADMIN — MELATONIN TAB 3 MG 6 MG: 3 TAB at 21:13

## 2020-12-28 RX ADMIN — AZELASTINE HYDROCHLORIDE 1 SPRAY: 137 SPRAY, METERED NASAL at 18:53

## 2020-12-28 RX ADMIN — LORATADINE 10 MG: 10 TABLET ORAL at 08:10

## 2020-12-28 RX ADMIN — ATORVASTATIN CALCIUM 40 MG: 40 TABLET, FILM COATED ORAL at 15:46

## 2020-12-28 RX ADMIN — FLUTICASONE PROPIONATE 2 SPRAY: 50 SPRAY, METERED NASAL at 08:11

## 2020-12-28 RX ADMIN — POLYETHYLENE GLYCOL 3350 17 G: 17 POWDER, FOR SOLUTION ORAL at 08:10

## 2020-12-28 RX ADMIN — LORAZEPAM 1.5 MG: 1 TABLET ORAL at 21:13

## 2020-12-28 RX ADMIN — AMOXICILLIN 500 MG: 500 CAPSULE ORAL at 14:51

## 2020-12-28 RX ADMIN — AZELASTINE HYDROCHLORIDE 1 SPRAY: 137 SPRAY, METERED NASAL at 08:36

## 2020-12-28 RX ADMIN — LIDOCAINE 5% 1 PATCH: 700 PATCH TOPICAL at 08:11

## 2020-12-28 NOTE — PROGRESS NOTES
Daily Progress Note - Critical Care   Jessica Ty 76 y o  female MRN: [id number]  Unit/Bed#: -01 Encounter: [id number]        ----------------------------------------------------------------------------------------  HPI/24hr events:     76year old female with past medical history significant for asthma, GERD, BPD and anxiety who initially presented 12/4 with increasing shortness of breath, ultimately tested positive for COVID and was admitted under mild pathway  Was briefly transferred to critical care for increasing oxygen requirements, then downgraded  Pulm was following following  Restarted on steroid therapy due to increasing oxygen requirements per pulm  Increasing oxygen requirements on 12/23 prompting transfer to ICU  Pt was weaned to midflow oxygen 15L early yesterday, but later placed back on HFNC  Received Actemra yesterday  Pt without complaints this morning  Shortness of breath and sinus congestion feeling better  Denied fever ,chills , generalized weakness,  N/v, abdominal pain, diarrhea  Review of Systems  Review of systems was reviewed and negative unless stated above in HPI/24-hour events   ---------------------------------------------------------------------------------------  Assessment and Plan:    Neuro:   · Diagnosis: BPD   · Plan: Continue zoloft  · Continiue lithium 300 HS  Continue neurontin 300mg daily  Ativan 1 5mg HS  · Delirium precautions  · Regulate sleep wake cycle  · CAM-ICU daily         CV:   · No acute issues  ? MAP Goal > 65          Pulm:   ·             Diagnosis: AHRF due to COVID PNA  ·          Positive for covid on 11/29, worsening on 12/23  ·             Completed remdesivir for 5 days on 12/08  ·             Convalescent plasma on 12/06  ·             Actemra x 1 on 12/27  ·          Second round of Decadron started on 12/23  ·             Amoxicillin for Upper URI started on 12/24  ·             Wean HFNC as able  Self proning     · Albuterol prn and continue muccinex  ·             Trend inflammatory markers  GI:   · Bowel regimen  · Stress ulcer prophylaxis with famotidine         :   ·             Monitor I/O    ·             Follow BMP         F/E/N:   ·             No fluids at this time   ·             Replete electrolytes to Goal K> 4, Mg > 1 8  ·             On regular diet       Heme/Onc:   ·             Diagnosis: Normocytic anemia  ·             Hg downtrending, currently 9 9  ·             No signs of active bleeding  ·   Trend cbc  Consider checking iron panel, B12, folate  ·             Anticoagulation with lovenox 30mg q12         Endo:   ·             Diagnosis: Prediabetes  ·             HbA1c 5 7  ·             Monitor glc  Goal 140-180          ID:   ·             COVID PNA  ? See above        MSK/Skin:   ·             Rash: Miliaria - keep dry  ·             Frequent turning  ·             Pressure sore precautions  Disposition: Continue Stepdown Level 1 level of care   Code Status: Level 1 - Full Code  ---------------------------------------------------------------------------------------  ICU CORE MEASURES    Prophylaxis   VTE Pharmacologic Prophylaxis: Enoxaparin (Lovenox)  VTE Mechanical Prophylaxis: sequential compression device  Stress Ulcer Prophylaxis: Famotidine PO    ABCDE Protocol (if indicated)  Plan to perform spontaneous awakening trial today? Not applicable  Plan to perform spontaneous breathing trial today? Not applicable  Obvious barriers to extubation? Not applicable  CAM-ICU: Negative    Invasive Devices Review  Invasive Devices     Peripheral Intravenous Line            Peripheral IV 12/27/20 Right Forearm less than 1 day              Can any invasive devices be discontinued today?  Not applicable  ---------------------------------------------------------------------------------------  OBJECTIVE    Vitals   Vitals:    12/27/20 2258 12/28/20 0053 12/28/20 0345 20 0600   BP:       BP Location:       Pulse:       Resp:       Temp:       TempSrc:       SpO2: (!) 89% 93% 94%    Weight:    79 6 kg (175 lb 7 8 oz)   Height:         Temp (24hrs), Av 3 °F (36 8 °C), Min:98 °F (36 7 °C), Max:98 6 °F (37 °C)  Current: Temperature: 98 6 °F (37 °C)  HR: 77  BP:97/58  RR: 18  SpO2: 94%    Respiratory:    HFNC 35L/70%    Invasive/non-invasive ventilation settings   Respiratory    Lab Data (Last 4 hours)    None         O2/Vent Data (Last 4 hours)      345          Non-Invasive Ventilation Mode HFNC (High flow)                   Physical Exam  Constitutional:       General: She is not in acute distress  HENT:      Head: Normocephalic and atraumatic  Mouth/Throat:      Mouth: Mucous membranes are moist    Eyes:      Conjunctiva/sclera: Conjunctivae normal       Pupils: Pupils are equal, round, and reactive to light  Cardiovascular:      Rate and Rhythm: Normal rate and regular rhythm  Heart sounds: Normal heart sounds  Pulmonary:      Effort: Pulmonary effort is normal  No respiratory distress  Comments: Few crackles on R  Abdominal:      Palpations: Abdomen is soft  Tenderness: There is no abdominal tenderness  Musculoskeletal:      Right lower leg: No edema  Left lower leg: No edema  Skin:     General: Skin is warm  Neurological:      General: No focal deficit present  Mental Status: She is alert and oriented to person, place, and time     Psychiatric:         Mood and Affect: Mood normal          Laboratory and Diagnostics:  Results from last 7 days   Lab Units 20  0552 20  0602 20  0558 20  0514 20  0444 20  0524 20  0442   WBC Thousand/uL 9 79 8 23 7 34 7 75 9 49 9 98 9 55   HEMOGLOBIN g/dL 9 9* 10 9* 10 3* 10 5* 11 1* 11 5 11 2*   HEMATOCRIT % 33 1* 34 4* 32 2* 32 6* 34 2* 36 5 35 7   PLATELETS Thousands/uL 304 294 249 289 299 287 252   NEUTROS PCT % 76* 79* 59 61 66 72 71 MONOS PCT % 4 3* 6 5 5 5 7     Results from last 7 days   Lab Units 12/28/20  0610 12/27/20  0602 12/26/20  0558 12/25/20  0514 12/24/20  0444 12/23/20  0524 12/22/20  0442   SODIUM mmol/L 141 141 139 141 141 138 140   POTASSIUM mmol/L 4 3 4 4 3 4* 3 5 4 2 3 3* 3 6   CHLORIDE mmol/L 109* 107 105 106 108 102 105   CO2 mmol/L 27 28 28 29 30 30 28   ANION GAP mmol/L 5 6 6 6 3* 6 7   BUN mg/dL 24 17 15 21 25 27* 29*   CREATININE mg/dL 0 92 0 82 0 82 0 80 0 81 0 79 0 90   CALCIUM mg/dL 9 7 10 4* 9 3 9 5 9 2 10 0 9 7   GLUCOSE RANDOM mg/dL 119 131 107 84 82 96 103   ALT U/L  --  139* 143* 207* 292* 268* 261*   AST U/L  --  49* 35 55* 135* 112* 111*   ALK PHOS U/L  --  114 111 123* 116 119* 115   ALBUMIN g/dL  --  2 7* 2 5* 2 7* 2 6* 2 8* 2 7*   TOTAL BILIRUBIN mg/dL  --  0 38 0 31 0 34 0 49 0 37 0 39     Results from last 7 days   Lab Units 12/28/20  0610   MAGNESIUM mg/dL 2 6   PHOSPHORUS mg/dL 4 9*                   ABG:    VBG:    Results from last 7 days   Lab Units 12/27/20  0602 12/26/20  1252 12/25/20  0514 12/24/20  1025   PROCALCITONIN ng/ml <0 05 <0 05 0 05 0 06       Micro        EKG: n/a  Imaging:  I have personally reviewed pertinent films in PACS    Intake and Output  I/O       12/26 0701 - 12/27 0700 12/27 0701 - 12/28 0700    P  O  480     Total Intake(mL/kg) 480 (6)     Urine (mL/kg/hr) 800 (0 4) 850 (0 4)    Total Output 800 850    Net -320 -850          Unmeasured Urine Occurrence 1 x     Unmeasured Stool Occurrence 1 x         UOP: 0 4 ml/kg/hr     Height and Weights   Height: 5' 1" (154 9 cm)  IBW: 47 8 kg  Body mass index is 33 16 kg/m²    Weight (last 2 days)     Date/Time   Weight    12/28/20 0600   79 6 (175 49)    12/27/20 0600   79 7 (175 71)    12/26/20 0600   78 4 (172 84)                Nutrition       Diet Orders   (From admission, onward)             Start     Ordered    12/05/20 0834  Diet Regular; Regular House  Diet effective now     Question Answer Comment   Diet Type Regular    Regular Regular House    Special Instructions Disposable Meal    RD to adjust diet per protocol?  Yes        12/05/20 6329                  Active Medications  Scheduled Meds:  Current Facility-Administered Medications   Medication Dose Route Frequency Provider Last Rate    acetaminophen  650 mg Oral Q6H PRN Anay Paniagua MD      albuterol  2 puff Inhalation Q4H PRN Anay Paniagua MD      albuterol  2 puff Inhalation TID Anay Paniagua MD      aluminum-magnesium hydroxide-simethicone  30 mL Oral Q4H PRN Anay Paniagua MD      amoxicillin  500 mg Oral [facility] Anay Paniagua MD      ascorbic acid  1,000 mg Oral Q12H [address] Janas Six, CRIMMANUEL      atorvastatin  40 mg Oral Daily With Nick Mata MD      azelastine  1 spray Each Nare BID Anay Paniagua MD      benzonatate  100 mg Oral TID PRN Anay Paniagua MD      bisacodyl  10 mg Rectal Daily Janas Six, CRIMMANUEL      cholecalciferol  2,000 Units Oral Daily Janas Six, CRIMMANUEL      dexamethasone  0 1 mg/kg Intravenous Q12H [address] LAUREN Perez      enoxaparin  30 mg Subcutaneous Q12H Rocio Lanier MD      famotidine  20 mg Oral Q12H Anay Paniagua MD      fluticasone  2 spray Each Nare Daily Anay Paniagua MD      gabapentin  300 mg Oral Daily Anay Paniagua MD      lidocaine  1 patch Topical Daily Anay Paniagua MD      lithium carbonate  300 mg Oral HS Anay Paniagua MD      loratadine  10 mg Oral Daily Anay Paniagua MD      LORazepam  1 mg Oral BID PRN Anay Paniagua MD      LORazepam  1 5 mg Oral HS Anay Paniagua MD      melatonin  6 mg Oral HS Anay Paniagua MD      menthol-methyl salicylate   Apply externally 4x Daily PRN Anay Paniagua MD      zinc sulfate  220 mg Oral Daily Janas Six, CRIMMANUEL      Followed by   Bernadette Becker ON 1/3/2021] multivitamin-minerals  1 tablet Oral Daily Janas Six, LUIS CARLOS      ondansetron  4 mg Intravenous Q6H PRN Anay Paniagua MD      polyethylene glycol  17 g Oral Daily Anay Paniagua MD      senna-docusate sodium  3 tablet Oral BID LUIS CARLOS Storey      sertraline  100 mg Oral HS Froilan Shaw MD      sodium chloride  2 spray Each Nare Q2H PRN Froilan Shaw MD      traZODone  100 mg Oral HS Froilan Shaw MD       Continuous Infusions:     PRN Meds:   acetaminophen, 650 mg, Q6H PRN  albuterol, 2 puff, Q4H PRN  aluminum-magnesium hydroxide-simethicone, 30 mL, Q4H PRN  benzonatate, 100 mg, TID PRN  LORazepam, 1 mg, BID PRN  menthol-methyl salicylate, , 4x Daily PRN  ondansetron, 4 mg, Q6H PRN  sodium chloride, 2 spray, Q2H PRN        Allergies   Allergies   Allergen Reactions    Amphetamine-Dextroamphetamine Other (See Comments)     Chest pain- was placed on Adderall after son passed away for depression, and she developed chest pain in 1990's; she had full cardiac work up, and was negative, so she has allergy to Adderall  Chest pain- was placed on Adderall after son passed away for depression, and she developed chest pain in 1990's; she had full cardiac work up, and was negative, so she has allergy to Adderall    Molds & Smuts     Other      Cats    Sulfa Antibiotics Other (See Comments)     ---------------------------------------------------------------------------------------  Advance Directive and Living Will:      Power of :    POLST:    ---------------------------------------------------------------------------------------      Claudetta Cuba, MD      Portions of the record may have been created with voice recognition software  Occasional wrong word or "sound a like" substitutions may have occurred due to the inherent limitations of voice recognition software    Read the chart carefully and recognize, using context, where substitutions have occurred

## 2020-12-28 NOTE — QUICK NOTE
Spoke with pt's significant other Alvarez and updated him on how pt is doing  All questions were answered

## 2020-12-28 NOTE — PLAN OF CARE
Problem: Potential for Falls  Goal: Patient will remain free of falls  Description: INTERVENTIONS:  - Assess patient frequently for physical needs  -  Identify cognitive and physical deficits and behaviors that affect risk of falls    -  [place] fall precautions as indicated by assessment   - Educate patient/family on patient safety including physical limitations  - Instruct patient to call for assistance with activity based on assessment  - Modify environment to reduce risk of injury  - Consider OT/PT consult to assist with strengthening/mobility  Outcome: Progressing     Problem: RESPIRATORY - ADULT  Goal: Achieves optimal ventilation and oxygenation  Description: INTERVENTIONS:  - Assess for changes in respiratory status  - Assess for changes in mentation and behavior  - Position to facilitate oxygenation and minimize respiratory effort  - Oxygen administered by appropriate delivery if ordered  - Initiate smoking cessation education as indicated  - Encourage broncho-pulmonary hygiene including cough, deep breathe, Incentive Spirometry  - Assess the need for suctioning and aspirate as needed  - Assess and instruct to report SOB or any respiratory difficulty  - Respiratory Therapy support as indicated  Outcome: Progressing     Problem: METABOLIC, FLUID AND ELECTROLYTES - ADULT  Goal: Electrolytes maintained within normal limits  Description: INTERVENTIONS:  - Monitor labs and assess patient for signs and symptoms of electrolyte imbalances  - Administer electrolyte replacement as ordered  - Monitor response to electrolyte replacements, including repeat lab results as appropriate  - Instruct patient on fluid and nutrition as appropriate  Outcome: Progressing  Goal: Fluid balance maintained  Description: INTERVENTIONS:  - Monitor labs   - Monitor I/O and WT  - Instruct patient on fluid and nutrition as appropriate  - Assess for signs & symptoms of volume excess or deficit  Outcome: Progressing     Problem: HEMATOLOGIC - ADULT  Goal: Maintains hematologic stability  Description: INTERVENTIONS  - Assess for signs and symptoms of bleeding or hemorrhage  - Monitor labs  - Administer supportive blood products/factors as ordered and appropriate  Outcome: Progressing     Problem: INFECTION - ADULT  Goal: Absence or prevention of progression during hospitalization  Description: INTERVENTIONS:  - Assess and monitor for signs and symptoms of infection  - Monitor lab/diagnostic results  - Monitor all insertion sites, i e  indwelling lines, tubes, and drains  - Monitor endotracheal if appropriate and nasal secretions for changes in amount and color  - [place] appropriate cooling/warming therapies per order  - Administer medications as ordered  - Instruct and encourage patient and family to use good hand hygiene technique  - Identify and instruct in appropriate isolation precautions for identified infection/condition  Outcome: Progressing  Goal: Absence of fever/infection during neutropenic period  Description: INTERVENTIONS:  - Monitor WBC    Outcome: Progressing     Problem: SAFETY ADULT  Goal: Patient will remain free of falls  Description: INTERVENTIONS:  - Assess patient frequently for physical needs  -  Identify cognitive and physical deficits and behaviors that affect risk of falls    -  [place] fall precautions as indicated by assessment   - Educate patient/family on patient safety including physical limitations  - Instruct patient to call for assistance with activity based on assessment  - Modify environment to reduce risk of injury  - Consider OT/PT consult to assist with strengthening/mobility  Outcome: Progressing  Goal: Maintain or return to baseline ADL function  Description: INTERVENTIONS:  -  Assess patient's ability to carry out ADLs; assess patient's baseline for ADL function and identify physical deficits which impact ability to perform ADLs (bathing, care of mouth/teeth, toileting, grooming, dressing, etc )  - Assess/evaluate cause of self-care deficits   - Assess range of motion  - Assess patient's mobility; develop plan if impaired  - Assess patient's need for assistive devices and provide as appropriate  - Encourage maximum independence but intervene and supervise when necessary  - Involve family in performance of ADLs  - Assess for home care needs following discharge   - Consider OT consult to assist with ADL evaluation and planning for discharge  - Provide patient education as appropriate  Outcome: Progressing  Goal: Maintain or return mobility status to optimal level  Description: INTERVENTIONS:  - Assess patient's baseline mobility status (ambulation, transfers, stairs, etc )    - Identify cognitive and physical deficits and behaviors that affect mobility  - Identify mobility aids required to assist with transfers and/or ambulation (gait belt, sit-to-stand, lift, walker, cane, etc )  - [place] fall precautions as indicated by assessment  - Record patient progress and toleration of activity level on Mobility SBAR; progress patient to next Phase/Stage  - Instruct patient to call for assistance with activity based on assessment  - Consider rehabilitation consult to assist with strengthening/weightbearing, etc   Outcome: Progressing     Problem: DISCHARGE PLANNING  Goal: Discharge to home or other facility with appropriate resources  Description: INTERVENTIONS:  - Identify barriers to discharge w/patient and caregiver  - Arrange for needed discharge resources and transportation as appropriate  - Identify discharge learning needs (meds, wound care, etc )  - Arrange for interpretive services to assist at discharge as needed  - Refer to Case Management Department for coordinating discharge planning if the patient needs post-hospital services based on physician/advanced practitioner order or complex needs related to functional status, cognitive ability, or social support system  Outcome: Progressing     Problem: Knowledge Deficit  Goal: Patient/family/caregiver demonstrates understanding of disease process, treatment plan, medications, and discharge instructions  Description: Complete learning assessment and assess knowledge base  Interventions:  - Provide teaching at level of understanding  - Provide teaching via preferred learning methods  Outcome: Progressing     Problem: Prexisting or High Potential for Compromised Skin Integrity  Goal: Skin integrity is maintained or improved  Description: INTERVENTIONS:  - Identify patients at risk for skin breakdown  - Assess and monitor skin integrity  - Assess and monitor nutrition and hydration status  - Monitor labs   - Assess for incontinence   - Turn and reposition patient  - Assist with mobility/ambulation  - Relieve pressure over bony prominences  - Avoid friction and shearing  - Provide appropriate hygiene as needed including keeping skin clean and dry  - Evaluate need for skin moisturizer/barrier cream  - Collaborate with interdisciplinary team   - Patient/family teaching  - Consider wound care consult   Outcome: Progressing     Problem: Nutrition/Hydration-ADULT  Goal: Nutrient/Hydration intake appropriate for improving, restoring or maintaining nutritional needs  Description: Monitor and assess patient's nutrition/hydration status for malnutrition  Collaborate with interdisciplinary team and initiate plan and interventions as ordered  Monitor patient's weight and dietary intake as ordered or per policy  Utilize nutrition screening tool and intervene as necessary  Determine patient's food preferences and provide high-protein, high-caloric foods as appropriate       INTERVENTIONS:  - Monitor oral intake, urinary output, labs, and treatment plans  - Assess nutrition and hydration status and recommend course of action  - Evaluate amount of meals eaten  - Assist patient with eating if necessary   - Allow adequate time for meals  - Recommend/ encourage appropriate diets, oral nutritional supplements, and vitamin/mineral supplements  - Order, calculate, and assess calorie counts as needed  - Recommend, monitor, and adjust tube feedings and TPN/PPN based on assessed needs  - Assess need for intravenous fluids  - Provide specific nutrition/hydration education as appropriate  - Include patient/family/caregiver in decisions related to nutrition  Outcome: Progressing

## 2020-12-29 LAB
ALBUMIN SERPL BCP-MCNC: 2.5 G/DL (ref 3.5–5)
ALP SERPL-CCNC: 93 U/L (ref 46–116)
ALT SERPL W P-5'-P-CCNC: 118 U/L (ref 12–78)
ANION GAP SERPL CALCULATED.3IONS-SCNC: 7 MMOL/L (ref 4–13)
AST SERPL W P-5'-P-CCNC: 46 U/L (ref 5–45)
BILIRUB SERPL-MCNC: 0.26 MG/DL (ref 0.2–1)
BUN SERPL-MCNC: 26 MG/DL (ref 5–25)
CALCIUM ALBUM COR SERPL-MCNC: 10.3 MG/DL (ref 8.3–10.1)
CALCIUM SERPL-MCNC: 9.1 MG/DL (ref 8.3–10.1)
CHLORIDE SERPL-SCNC: 114 MMOL/L (ref 100–108)
CO2 SERPL-SCNC: 24 MMOL/L (ref 21–32)
CREAT SERPL-MCNC: 0.9 MG/DL (ref 0.6–1.3)
ERYTHROCYTE [DISTWIDTH] IN BLOOD BY AUTOMATED COUNT: 14 % (ref 11.6–15.1)
GFR SERPL CREATININE-BSD FRML MDRD: 67 ML/MIN/1.73SQ M
GLUCOSE SERPL-MCNC: 88 MG/DL (ref 65–140)
HCT VFR BLD AUTO: 31.3 % (ref 34.8–46.1)
HGB BLD-MCNC: 9.6 G/DL (ref 11.5–15.4)
MAGNESIUM SERPL-MCNC: 2.7 MG/DL (ref 1.6–2.6)
MCH RBC QN AUTO: 26.7 PG (ref 26.8–34.3)
MCHC RBC AUTO-ENTMCNC: 30.7 G/DL (ref 31.4–37.4)
MCV RBC AUTO: 87 FL (ref 82–98)
PHOSPHATE SERPL-MCNC: 4.8 MG/DL (ref 2.3–4.1)
PLATELET # BLD AUTO: 285 THOUSANDS/UL (ref 149–390)
PMV BLD AUTO: 10.9 FL (ref 8.9–12.7)
POTASSIUM SERPL-SCNC: 3.7 MMOL/L (ref 3.5–5.3)
PROT SERPL-MCNC: 5.7 G/DL (ref 6.4–8.2)
RBC # BLD AUTO: 3.6 MILLION/UL (ref 3.81–5.12)
SODIUM SERPL-SCNC: 145 MMOL/L (ref 136–145)
WBC # BLD AUTO: 8.27 THOUSAND/UL (ref 4.31–10.16)

## 2020-12-29 PROCEDURE — 85027 COMPLETE CBC AUTOMATED: CPT | Performed by: STUDENT IN AN ORGANIZED HEALTH CARE EDUCATION/TRAINING PROGRAM

## 2020-12-29 PROCEDURE — 97530 THERAPEUTIC ACTIVITIES: CPT

## 2020-12-29 PROCEDURE — 99233 SBSQ HOSP IP/OBS HIGH 50: CPT | Performed by: INTERNAL MEDICINE

## 2020-12-29 PROCEDURE — 97116 GAIT TRAINING THERAPY: CPT

## 2020-12-29 PROCEDURE — 97168 OT RE-EVAL EST PLAN CARE: CPT

## 2020-12-29 PROCEDURE — 94660 CPAP INITIATION&MGMT: CPT

## 2020-12-29 PROCEDURE — 83735 ASSAY OF MAGNESIUM: CPT | Performed by: STUDENT IN AN ORGANIZED HEALTH CARE EDUCATION/TRAINING PROGRAM

## 2020-12-29 PROCEDURE — 94760 N-INVAS EAR/PLS OXIMETRY 1: CPT

## 2020-12-29 PROCEDURE — 80053 COMPREHEN METABOLIC PANEL: CPT | Performed by: STUDENT IN AN ORGANIZED HEALTH CARE EDUCATION/TRAINING PROGRAM

## 2020-12-29 PROCEDURE — 84100 ASSAY OF PHOSPHORUS: CPT | Performed by: STUDENT IN AN ORGANIZED HEALTH CARE EDUCATION/TRAINING PROGRAM

## 2020-12-29 RX ADMIN — AMOXICILLIN 500 MG: 500 CAPSULE ORAL at 12:56

## 2020-12-29 RX ADMIN — FAMOTIDINE 20 MG: 20 TABLET ORAL at 20:33

## 2020-12-29 RX ADMIN — ENOXAPARIN SODIUM 30 MG: 30 INJECTION SUBCUTANEOUS at 10:00

## 2020-12-29 RX ADMIN — ATORVASTATIN CALCIUM 40 MG: 40 TABLET, FILM COATED ORAL at 16:49

## 2020-12-29 RX ADMIN — LORAZEPAM 1.5 MG: 1 TABLET ORAL at 22:04

## 2020-12-29 RX ADMIN — AZELASTINE HYDROCHLORIDE 1 SPRAY: 137 SPRAY, METERED NASAL at 10:14

## 2020-12-29 RX ADMIN — OXYCODONE HYDROCHLORIDE AND ACETAMINOPHEN 1000 MG: 500 TABLET ORAL at 20:33

## 2020-12-29 RX ADMIN — ENOXAPARIN SODIUM 30 MG: 30 INJECTION SUBCUTANEOUS at 20:33

## 2020-12-29 RX ADMIN — SERTRALINE HYDROCHLORIDE 100 MG: 100 TABLET ORAL at 22:04

## 2020-12-29 RX ADMIN — LIDOCAINE 5% 1 PATCH: 700 PATCH TOPICAL at 10:00

## 2020-12-29 RX ADMIN — ALBUTEROL SULFATE 2 PUFF: 90 AEROSOL, METERED RESPIRATORY (INHALATION) at 16:49

## 2020-12-29 RX ADMIN — DEXAMETHASONE SODIUM PHOSPHATE 7.84 MG: 4 INJECTION, SOLUTION INTRAMUSCULAR; INTRAVENOUS at 10:03

## 2020-12-29 RX ADMIN — FAMOTIDINE 20 MG: 20 TABLET ORAL at 09:58

## 2020-12-29 RX ADMIN — Medication 2000 UNITS: at 09:58

## 2020-12-29 RX ADMIN — ZINC SULFATE 220 MG (50 MG) CAPSULE 220 MG: CAPSULE at 09:58

## 2020-12-29 RX ADMIN — AMOXICILLIN 500 MG: 500 CAPSULE ORAL at 20:33

## 2020-12-29 RX ADMIN — ALBUTEROL SULFATE 2 PUFF: 90 AEROSOL, METERED RESPIRATORY (INHALATION) at 20:40

## 2020-12-29 RX ADMIN — OXYCODONE HYDROCHLORIDE AND ACETAMINOPHEN 1000 MG: 500 TABLET ORAL at 09:58

## 2020-12-29 RX ADMIN — AMOXICILLIN 500 MG: 500 CAPSULE ORAL at 05:14

## 2020-12-29 RX ADMIN — LORATADINE 10 MG: 10 TABLET ORAL at 09:58

## 2020-12-29 RX ADMIN — AZELASTINE HYDROCHLORIDE 1 SPRAY: 137 SPRAY, METERED NASAL at 17:12

## 2020-12-29 RX ADMIN — MELATONIN TAB 3 MG 6 MG: 3 TAB at 22:04

## 2020-12-29 RX ADMIN — TRAZODONE HYDROCHLORIDE 100 MG: 100 TABLET ORAL at 22:04

## 2020-12-29 RX ADMIN — LITHIUM CARBONATE 300 MG: 300 CAPSULE, GELATIN COATED ORAL at 22:04

## 2020-12-29 RX ADMIN — ALBUTEROL SULFATE 2 PUFF: 90 AEROSOL, METERED RESPIRATORY (INHALATION) at 09:59

## 2020-12-29 RX ADMIN — GABAPENTIN 300 MG: 300 CAPSULE ORAL at 09:58

## 2020-12-29 RX ADMIN — FLUTICASONE PROPIONATE 2 SPRAY: 50 SPRAY, METERED NASAL at 09:59

## 2020-12-29 NOTE — PLAN OF CARE
Problem: OCCUPATIONAL THERAPY ADULT  Goal: Performs self-care activities at highest level of function for planned discharge setting  See evaluation for individualized goals  Description: Treatment Interventions: ADL retraining, Functional transfer training, Endurance training, Energy conservation, Continued evaluation, Patient/family training  Equipment Recommended: (S) Other (comment)(TUB TRANSFER BENCH)       See flowsheet documentation for full assessment, interventions and recommendations  Outcome: Progressing  Note: Limitation: Decreased ADL status, Decreased UE strength, Decreased endurance, Decreased self-care trans, Decreased high-level ADLs  Prognosis: Good  Assessment: Pt is a 76 y o  female admitted to [facility] on 2020 w/ Acute Respiratory Failure with Hypoxia  Comorbidities include a h/o Bipolar Disorder, GERD, Asthma, COVID-19 Virus, Elevated Troponin, Transaminitis, Possible Sinusitis  Pt seen this AM for re-evaluation to assess goals established on initial evaluation on 12/15/2020  Refer to list of goals below this table for modifications made  Currently pt requires Min A UB dressing, Mod A LB bathing, Max A LB dressing and mod A toileting  Pt requires min A bed mobility, transfers with HHA and functional mobility with HHA  Pt is limited at this time 2*: endurance, activity tolerance, functional mobility, balance, functional standing tolerance, unsupportive home environment, decreased I w/ ADLS/IADLS and strength  The following Occupational Performance Areas to address include: grooming, bathing/shower, toilet hygiene, dressing, health maintenance, functional mobility, clothing management and household maintenance  Pt scored overall  55/100 on the Barthel Index  From OT standpoint, anticipate d/c home with family support and home OT  Pt to continue to benefit from acute immediate OT services to address the following goals 3-5x/week to  w/in 10-14 days   POC end date has been extended to 1/12/20201 to allow for patient to make functional gains towards independence  OT Discharge Recommendation: (S) Home with skilled therapy  OT - OK to Discharge:  Yes

## 2020-12-29 NOTE — OCCUPATIONAL THERAPY NOTE
Occupational Therapy Re-Evaluation     Patient Name: Brandon Deluna  PQWNW'R Date: 2020  Problem List  Principal Problem:    Acute respiratory failure with hypoxia  Active Problems:    Bipolar disorder     GERD (gastroesophageal reflux disease)    Asthma    COVID-19 virus infection    Elevated troponin    Transaminitis    Possible sinusitis    Past Medical History  Past Medical History:   Diagnosis Date    Asthma     Bipolar disorder ([facility] [address] )     GERD (gastroesophageal reflux disease)      Past Surgical History  Past Surgical History:   Procedure Laterality Date     SECTION      x3    CHOLECYSTECTOMY      HYSTERECTOMY           20 0850   OT Last Visit   OT Visit Date 20   Note Type   Note type Re-Evaluation   Restrictions/Precautions   Weight Bearing Precautions Per Order No   Other Precautions Airborne/isolation;Contact/isolation;Multiple lines;Telemetry;O2;Fall Risk  (COVID-19, HFNC 35L)   Pain Assessment   Pain Assessment Tool 0-10   Pain Score No Pain   Home Living   Additional Comments see initial evaluation for home setup   Prior Function   Comments See initial evaluation for PLOF   Lifestyle   Autonomy pta pt peports I in ADLS/IADLs/funcitonal mobility   Reciprocal Relationships supportive boyfriend LOS   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   Intrinsic Gratification enjoys watching tv and spending time w/ her boyfriend   ADL   Eating Assistance 7  Independent   Grooming Assistance 5  Supervision/Setup   UB Bathing Assistance 5  Supervision/Setup   LB Bathing Assistance 3  Moderate Assistance   UB Dressing Assistance 4  Minimal Assistance   LB Dressing Assistance 2  Maximal Assistance   Toileting Assistance  3  Moderate Assistance   Bed Mobility   Supine to Sit 4  Minimal assistance   Additional items Assist x 1;HOB elevated; Bedrails; Increased time required;Verbal cues;LE management   Sit to Supine 4  Minimal assistance   Additional items Assist x 1;HOB elevated; Bedrails; Increased time required;Verbal cues;LE management   Transfers   Sit to Stand 4  Minimal assistance   Additional items Assist x 1; Increased time required;Verbal cues   Stand to Sit 4  Minimal assistance   Additional items Assist x 1; Increased time required;Verbal cues   Additional Comments Transfers with HHA - Pt desaturated to 78% on HFNC in stance and returned to supine - O2 improved to >90% after ~5 minutes supine   Functional Mobility   Functional Mobility 4  Minimal assistance   Additional Comments Ax1 short distance in room approx 3"   Additional items Hand hold assistance   Balance   Static Sitting Fair -   Dynamic Sitting Poor +   Static Standing Poor +   Dynamic Standing Poor +   Activity Tolerance   Activity Tolerance Treatment limited secondary to medical complications (Comment); Patient limited by fatigue  (SOB, hypoxia, dizziness)   Medical Staff Made Aware PT [facility]   Nurse Made Aware RN gave clearance for OT re-evaluation   RUE Assessment   RUE Assessment WFL   LUE Assessment   LUE Assessment WFL   Hand Function   Gross Motor Coordination Functional   Fine Motor Coordination Functional   Sensation   Light Touch No apparent deficits   Cognition   Overall Cognitive Status WFL   Arousal/Participation Alert; Responsive; Cooperative   Attention Within functional limits   Orientation Level Oriented X4   Memory Within functional limits   Following Commands Follows all commands and directions without difficulty   Assessment   Limitation Decreased ADL status; Decreased UE strength;Decreased endurance;Decreased self-care trans;Decreased high-level ADLs   Prognosis Good   Assessment Pt is a 76 y o  female admitted to [facility] on 12/4/2020 w/ Acute Respiratory Failure with Hypoxia  Comorbidities include a h/o Bipolar Disorder, GERD, Asthma, COVID-19 Virus, Elevated Troponin, Transaminitis, Possible Sinusitis  Pt seen this AM for re-evaluation to assess goals established on initial evaluation on 12/15/2020   Refer to list of goals below this table for modifications made  Currently pt requires Min A UB dressing, Mod A LB bathing, Max A LB dressing and mod A toileting  Pt requires min A bed mobility, transfers with HHA and functional mobility with HHA  Pt is limited at this time 2*: endurance, activity tolerance, functional mobility, balance, functional standing tolerance, unsupportive home environment, decreased I w/ ADLS/IADLS and strength  The following Occupational Performance Areas to address include: grooming, bathing/shower, toilet hygiene, dressing, health maintenance, functional mobility, clothing management and household maintenance  Pt scored overall  55/100 on the Barthel Index  From OT standpoint, anticipate d/c home with family support and home OT  Pt to continue to benefit from acute immediate OT services to address the following goals 3-5x/week to  w/in 10-14 days  POC end date has been extended to  to allow for patient to make functional gains towards independence  Plan   Treatment Interventions ADL retraining;Functional transfer training;UE strengthening/ROM; Endurance training;Patient/family training;Equipment evaluation/education; Compensatory technique education; Energy conservation; Activityengagement   Goal Expiration Date 21   OT Treatment Day 1   OT Frequency 3-5x/wk   Recommendation   OT Discharge Recommendation Home with skilled therapy   Equipment Recommended Other (comment)  (tub transfer bench)   OT - OK to Discharge Yes   Barthel Index   Feeding 10   Bathing 0   Grooming Score 5   Dressing Score 5   Bladder Score 10   Bowels Score 10   Toilet Use Score 5   Transfers (Bed/Chair) Score 10   Mobility (Level Surface) Score 0   Stairs Score 0   Barthel Index Score 55        GOALS     1) Pt will increase activity tolerance to G for 30 min txment sessions     2) Pt will complete UB/LB dressing/self care w/ mod I using adaptive device and DME as needed     3) Pt will complete bathing w/ Mod I w/ use of AE and DME as needed     4) Pt will complete toileting w/ mod I w/ G hygiene/thoroughness using DME as needed     5) Pt will improve functional transfers to Mod I on/off all surfaces using DME as needed w/ G balance/safety      6) Pt will improve functional mobility during ADL/IADL/leisure tasks to Mod I using DME as needed w/ G balance/safety      7) Pt will participate in simulated IADL management task to increase independence to  w/ G safety and endurance     8) Pt will demonstrate G carryover of pt/caregiver education and training as appropriate      9) Pt will demonstrate 100% carryover of energy conservation techniques t/o functional I/ADL/leisure tasks w/o cues s/p skilled education     10) Pt will independently identify and utilize 2-3 coping strategies to increase positive affect and promote overall well-being  Mitch HORTA,OTR/L

## 2020-12-29 NOTE — PLAN OF CARE
Problem: PHYSICAL THERAPY ADULT  Goal: Performs mobility at highest level of function for planned discharge setting  See evaluation for individualized goals  Description: Treatment/Interventions: Functional transfer training, LE strengthening/ROM, Elevations, Therapeutic exercise, Endurance training, Patient/family training, Equipment eval/education, Bed mobility, Gait training, Spoke to nursing, OT  Equipment Recommended: Other (Comment)(tbd, likely RW pending progress)       See flowsheet documentation for full assessment, interventions and recommendations  Outcome: Progressing  Note: Prognosis: Fair  Problem List: Decreased strength, Decreased endurance, Impaired balance, Decreased mobility  Assessment: Pt presents with decreased mobility, strength, balance, and activity tolerance  Pt demonstrated ability to perform bed mobility at [address] [place] [address]  Pt on HFNC w/ O2 sats 94% supine  Pt desaturated to 85% once sitting EOB and reports dizziness  Pt educated on deep breathing techniques and recovered to 89% w/ ~2 min seated rest break  Pt performed STS at [facility]- transfers w/ HHA  Pt ambulated 3ft forward and backward at [address] [facility] A x2 w/ b/l HHA  Pt desaturated to 78% on HFNC during ambulation and pt reports significant dizziness  Pt was returned supine w/ dizziness immediately resolving; pt required lying supine ~5 minutes before O2 sats returned to >90%  Pt reports she has only been using the bedpan and has not been OOB in several days- pt educated on benefits of transferring to [facility] to assist body getting re-oriented to an upright position and decrease dizziness symptoms when OOB  Pt remains limited 2' SOB and decreased spO2 sats during mobility  Pt continues to benefit from skilled therapy to maximize functional independence  Recommendation at this time is Home PT   Pt would benefit from continued ambulation, functional transfers, strength, balance, and activity tolerance        PT Discharge Recommendation: Home with skilled therapy(Home PT)     PT - OK to Discharge: No    See flowsheet documentation for full assessment

## 2020-12-29 NOTE — QUICK NOTE
Spoke with pt's significant other Alvarez and gave him updates on how pt is doing clinically  Elin Bolanos states he has been in communication with Fuad Leyva throughout her hospital stay and has been updated already

## 2020-12-29 NOTE — PHYSICAL THERAPY NOTE
PHYSICAL THERAPY NOTE  Patient Name: Jami Regan  STKEG'L Date: 12/29/2020 12/29/20 0849   PT Last Visit   PT Visit Date 12/29/20   Note Type   Note Type Treatment   Pain Assessment   Pain Assessment Tool 0-10   Pain Score No Pain   Restrictions/Precautions   Weight Bearing Precautions Per Order No   Other Precautions Contact/isolation; Airborne/isolation;Multiple lines;Telemetry;O2;Fall Risk  (COVID-19; HFNC)   General   Chart Reviewed Yes   Response to Previous Treatment Patient with no complaints from previous session  Family/Caregiver Present No   Cognition   Overall Cognitive Status WFL   Arousal/Participation Alert; Responsive; Cooperative   Attention Within functional limits   Orientation Level Oriented X4   Memory Within functional limits   Following Commands Follows all commands and directions without difficulty   Comments Pt overall pleasant and cooperative to work w/ therapy  Pt appears very fearful of movement expressing "I'm so scared to move"   Subjective   Subjective Pt lying supine and agreeable to work w/ therapy   Bed Mobility   Supine to Sit 4  Minimal assistance   Additional items Assist x 1;HOB elevated; Increased time required;Verbal cues;LE management   Sit to Supine 4  Minimal assistance   Additional items Assist x 1; Increased time required;Verbal cues   Additional Comments Pt lying supine upon PT arrival  Pt on HFNC w/ O2 sats 94% supine  Pt desaturated to 85% once sitting EOB and reports dizziness  Pt educated on deep breathing techniques and recovered to 89% w/ ~2 min seated rest break  Pt was returned lying supine at end of session w/ all needs within reach   Transfers   Sit to Stand 4  Minimal assistance   Additional items Assist x 1; Increased time required;Verbal cues   Stand to Sit 4  Minimal assistance   Additional items Assist x 1; Increased time required;Verbal cues   Additional Comments Transfers w/ HHA  Pt ambulated 3ft forward and backward at [address]  Pt desaturated to 78% on HFNC during ambulation and pt reports significant dizziness  Pt was returned supine w/ dizziness immediately resolving; pt required lying supine ~5 minutes before O2 sats returned to >90%  Pt reports she has only been using the bedpan- pt educated on benefits of transferring to [facility] to assist body getting re-oriented to an upright position and decrease dizziness symptoms when OOB   Ambulation/Elevation   Gait pattern Excessively slow; Short stride; Inconsistent grant;Decreased foot clearance;Shuffling   Gait Assistance 4  Minimal assist   Additional items Assist x 2;Verbal cues   Assistive Device   (B/L HHA)   Distance 3ft forward and backward  (limited 2' spO2 and dizziness)   Balance   Static Sitting Fair -   Dynamic Sitting Poor +   Static Standing Poor +   Dynamic Standing Poor +   Ambulatory Poor +   Endurance Deficit   Endurance Deficit Yes   Endurance Deficit Description spO2 desat to 78% w/ ambulation, fatigue, weakness, dizziness   Activity Tolerance   Activity Tolerance Treatment limited secondary to medical complications (Comment); Patient limited by fatigue  (SOB, hypoxia)   Medical Staff Made Aware TESFAYE Naranjo   Nurse Made Aware yes   Assessment   Prognosis Fair   Problem List Decreased strength;Decreased endurance; Impaired balance;Decreased mobility   Assessment Pt presents with decreased mobility, strength, balance, and activity tolerance  Pt demonstrated ability to perform bed mobility at [address]  Pt on HFNC w/ O2 sats 94% supine  Pt desaturated to 85% once sitting EOB and reports dizziness  Pt educated on deep breathing techniques and recovered to 89% w/ ~2 min seated rest break  Pt performed STS at [facility] w/ HHA  Pt ambulated 3ft forward and backward at [address] [facility] A x2 w/ b/l HHA  Pt desaturated to 78% on HFNC during ambulation and pt reports significant dizziness   Pt was returned supine w/ dizziness immediately resolving; pt required lying supine ~5 minutes before O2 sats returned to >90%  Pt reports she has only been using the bedpan and has not been OOB in several days- pt educated on benefits of transferring to [facility] to assist body getting re-oriented to an upright position and decrease dizziness symptoms when OOB  Pt remains limited 2' SOB and decreased spO2 sats during mobility  Pt continues to benefit from skilled therapy to maximize functional independence  Recommendation at this time is Home PT  Pt would benefit from continued ambulation, functional transfers, strength, balance, and activity tolerance   Goals   Patient Goals to feel better   STG Expiration Date 01/12/21  (goals extended +14 days)   Short Term Goal #1 1  Pt will demonstrate the ability to perform all aspects of bed mobility at [facility] I in onder to maximize functional independence and decrease burden on caregivers  2 Pt will demonstrate the ability to perform all functional transfers at [facility] I in order to maximize functional independence and decrease burden on caregivers  3 Pt will demonstrate the ability to ambulate at least 150ft with least restrictive device at Mod I in order to maximize functional independence  4 Pt will demonstrate the ability to negotiate at least 2 steps at Mod I in order to return to household/community mobility  5 Pt will demonstrate improved balance by one grade in order to decrease risk of falls  6 Pt will increase b/l LE strength by 1 grade in order to increase ease of functional mobility and transfers   PT Treatment Day 2   Plan   Treatment/Interventions Functional transfer training;LE strengthening/ROM; Therapeutic exercise; Endurance training;Patient/family training;Equipment eval/education; Bed mobility;Gait training;Spoke to nursing   Progress Slow progress, medical status limitations   PT Frequency   (3-5x/week)   Recommendation   PT Discharge Recommendation Home with skilled therapy  (Home PT)   PT - OK to Discharge No Gloriann Sensing, PT, DPT

## 2020-12-29 NOTE — PROGRESS NOTES
Daily Progress Note - Critical Care   Marielos Pryor 76 y o  female MRN: [id number]  Unit/Bed#: -01 Encounter: [id number]        ----------------------------------------------------------------------------------------  HPI/24hr events:     No events overnight  Currently on HFNC 60% 30L  States she is sleeping well as well as eating and drinking ok  No fever, chills  SOB and sinus congestion overall improving  Using incentive spirometry  Review of Systems  Review of systems was reviewed and negative unless stated above in HPI/24-hour events   ---------------------------------------------------------------------------------------  Assessment and Plan:    Neuro:   · Diagnosis: BPD   · Plan: Continue zoloft  · Continiue lithium 300 HS  Continue neurontin 300mg daily  Ativan 1 5mg HS  · Delirium precautions  · Regulate sleep wake cycle  · CAM-ICU daily         CV:   · No acute issues  ? MAP Goal > 65          Pulm:   ·             Diagnosis: AHRF due to COVID PNA  ·             Positive for covid on 11/29, worsening on 12/23  ·             Completed remdesivir for 5 days on 12/08  ·             Convalescent plasma on 12/06  ·             Actemra x 1 on 12/27  ·             Second round of Decadron started on 12/23  ·             Amoxicillin for Upper URI started on 12/24, will d/c today  ·             Wean HFNC as able  Self proning  ·             Albuterol prn and continue muccinex                 GI:   · BM last night, continue bowel regimen  · Stress ulcer prophylaxis with famotidine         :   ·             Monitor I/O  ·             NLPFWN BMP        F/E/N:   ·             No fluids at this time   ·             Replete electrolytes to Goal K> 4, Mg > 1 8  ·             On regular diet       Heme/Onc:   ·             Diagnosis: Normocytic anemia     ·             QH downtrending, currently 9 6  ·             No signs of active bleeding  ·             Trend cbc  ·             Anticoagulation with lovenox 30mg q12         Endo:   ·             Diagnosis: Prediabetes  ·             HbA1c 5 7  ·             Monitor glc  Goal 140-180          ID:   ·             COVID PNA  ?             See above        MSK/Skin:   ·             Frequent turning  ·             Pressure sore precautions  ·             PT/OT    Disposition: Continue Stepdown Level 1 level of care   Code Status: Level 1 - Full Code  ---------------------------------------------------------------------------------------  ICU CORE MEASURES    Prophylaxis   VTE Pharmacologic Prophylaxis: Enoxaparin (Lovenox)  VTE Mechanical Prophylaxis: sequential compression device  Stress Ulcer Prophylaxis: Famotidine PO    ABCDE Protocol (if indicated)  Plan to perform spontaneous awakening trial today? Not applicable  Plan to perform spontaneous breathing trial today? Not applicable  Obvious barriers to extubation? Not applicable  CAM-ICU: Negative    Invasive Devices Review  Invasive Devices     Peripheral Intravenous Line            Peripheral IV 20 Right Forearm 1 day          Drain            External Urinary Catheter less than 1 day              Can any invasive devices be discontinued today?  Not applicable  ---------------------------------------------------------------------------------------  OBJECTIVE    Vitals   Vitals:    20 0020 20 0200 20 0312 20 0350   BP:    91/51   BP Location:    Left arm   Pulse:    75   Resp:    20   Temp:    98 6 °F (37 °C)   TempSrc:    Oral   SpO2: 95% 96% 91% 90%   Weight:    78 2 kg (172 lb 6 4 oz)   Height:         Temp (24hrs), Av 5 °F (36 9 °C), Min:98 2 °F (36 8 °C), Max:98 7 °F (37 1 °C)  Current: Temperature: 98 6 °F (37 °C)  HR: 75  BP: 91/51  RR: 20  SpO2: 90% on HFNC 60% 30L    Respiratory:    HFNC 60% 30L    Invasive/non-invasive ventilation settings   Respiratory    Lab Data (Last 4 hours)    None         O2/Vent Data (Last 4 hours)    None                Physical Exam  Constitutional:       General: She is not in acute distress  HENT:      Head: Normocephalic and atraumatic  Mouth/Throat:      Mouth: Mucous membranes are moist    Eyes:      Conjunctiva/sclera: Conjunctivae normal       Pupils: Pupils are equal, round, and reactive to light  Neck:      Musculoskeletal: Neck supple  Cardiovascular:      Rate and Rhythm: Normal rate and regular rhythm  Heart sounds: Normal heart sounds  Pulmonary:      Effort: Pulmonary effort is normal  No respiratory distress  Breath sounds: Normal breath sounds  Abdominal:      Palpations: Abdomen is soft  Tenderness: There is no abdominal tenderness  Musculoskeletal:      Right lower leg: No edema  Left lower leg: No edema  Skin:     General: Skin is warm  Neurological:      General: No focal deficit present  Mental Status: She is alert and oriented to person, place, and time     Psychiatric:         Mood and Affect: Mood normal          Laboratory and Diagnostics:  Results from last 7 days   Lab Units 12/29/20  0514 12/28/20  0552 12/27/20  0602 12/26/20  0558 12/25/20  0514 12/24/20  0444 12/23/20  0524   WBC Thousand/uL 8 27 9 79 8 23 7 34 7 75 9 49 9 98   HEMOGLOBIN g/dL 9 6* 9 9* 10 9* 10 3* 10 5* 11 1* 11 5   HEMATOCRIT % 31 3* 33 1* 34 4* 32 2* 32 6* 34 2* 36 5   PLATELETS Thousands/uL 285 304 294 249 289 299 287   NEUTROS PCT %  --  76* 79* 59 61 66 72   MONOS PCT %  --  4 3* 6 5 5 5     Results from last 7 days   Lab Units 12/29/20  0514 12/28/20  0610 12/27/20  0602 12/26/20  0558 12/25/20  0514 12/24/20  0444 12/23/20  0524   SODIUM mmol/L 145 141 141 139 141 141 138   POTASSIUM mmol/L 3 7 4 3 4 4 3 4* 3 5 4 2 3 3*   CHLORIDE mmol/L 114* 109* 107 105 106 108 102   CO2 mmol/L 24 27 28 28 29 30 30   ANION GAP mmol/L 7 5 6 6 6 3* 6   BUN mg/dL 26* 24 17 15 21 25 27*   CREATININE mg/dL 0 90 0 92 0 82 0 82 0 80 0 81 0 79   CALCIUM mg/dL 9 1 9 7 10 4* 9 3 9 5 9 2 10 0   GLUCOSE RANDOM mg/dL 88 119 131 107 84 82 96   ALT U/L 118*  --  139* 143* 207* 292* 268*   AST U/L 46*  --  49* 35 55* 135* 112*   ALK PHOS U/L 93  --  114 111 123* 116 119*   ALBUMIN g/dL 2 5*  --  2 7* 2 5* 2 7* 2 6* 2 8*   TOTAL BILIRUBIN mg/dL 0 26  --  0 38 0 31 0 34 0 49 0 37     Results from last 7 days   Lab Units 12/29/20  0514 12/28/20  0610   MAGNESIUM mg/dL 2 7* 2 6   PHOSPHORUS mg/dL 4 8* 4 9*                   ABG:    VBG:    Results from last 7 days   Lab Units 12/27/20  0602 12/26/20  1252 12/25/20  0514 12/24/20  1025   PROCALCITONIN ng/ml <0 05 <0 05 0 05 0 06       Micro        EKG: n/a  Imaging: n/a    Intake and Output  I/O       12/27 0701 - 12/28 0700 12/28 0701 - 12/29 0700 12/29 0701 - 12/30 0700    P  O   800     Total Intake(mL/kg)  800 (10 2)     Urine (mL/kg/hr) 850 (0 4) 1000 (0 5)     Stool  0     Total Output 850 1000     Net -850 -200            Unmeasured Stool Occurrence  1 x         UOP: 0 5 ml/kg/hr     Height and Weights   Height: 5' 1" (154 9 cm)  IBW: 47 8 kg  Body mass index is 32 57 kg/m²  Weight (last 2 days)     Date/Time   Weight    12/29/20 0350   78 2 (172 4)    12/28/20 0600   79 6 (175 49)    12/27/20 0600   79 7 (175 71)                Nutrition       Diet Orders   (From admission, onward)             Start     Ordered    12/28/20 0950  Diet Cardiovascular; Sodium 2 GM  Diet effective now     Question Answer Comment   Diet Type Cardiovascular    Cardiac Sodium 2 GM    Special Instructions Disposable Meal    RD to adjust diet per protocol?  Yes        12/28/20 0949                Active Medications  Scheduled Meds:  Current Facility-Administered Medications   Medication Dose Route Frequency Provider Last Rate    acetaminophen  650 mg Oral Q6H PRN Lorenza Cason MD      albuterol  2 puff Inhalation Q4H PRN Lorenza Cason MD      albuterol  2 puff Inhalation TID Lorenza Cason MD      aluminum-magnesium hydroxide-simethicone  30 mL Oral Q4H PRN Judy Chowdhury Manish Dahl MD      amoxicillin  500 mg Oral [facility] Stacey Junior MD      artificial tear   Both Eyes Daily PRN Leeann House MD      ascorbic acid  1,000 mg Oral Q12H [facility] Damaris Plant, LUIS CARLOS      atorvastatin  40 mg Oral Daily With Saida Gray MD      azelastine  1 spray Each Nare BID Stacey Junior MD      benzonatate  100 mg Oral TID PRN Stacey Junior MD      bisacodyl  10 mg Rectal Daily Damaris Plant, CRIMMANUEL      cholecalciferol  2,000 Units Oral Daily Damaris Plant, CRIMMANUEL      dexamethasone  0 1 mg/kg Intravenous Daily Honey Yepez MD      enoxaparin  30 mg Subcutaneous Q12H Keshav Arceo MD      famotidine  20 mg Oral Q12H Stacey Junior MD      fluticasone  2 spray Each Nare Daily Stacey Junior MD      gabapentin  300 mg Oral Daily Stacey Junior MD      lidocaine  1 patch Topical Daily Stacey Junior MD      lithium carbonate  300 mg Oral HS Stacey Junior MD      loratadine  10 mg Oral Daily Stacey Junior MD      LORazepam  1 mg Oral BID PRN Stacey Junior MD      LORazepam  1 5 mg Oral HS Stacey Junior MD      melatonin  6 mg Oral HS Stacey Junior MD      menthol-methyl salicylate   Apply externally 4x Daily PRN Stacey Junior MD      zinc sulfate  220 mg Oral Daily Damaris Plant, LUIS CARLOS      Followed by   Vaishnavi Po ON 1/3/2021] multivitamin-minerals  1 tablet Oral Daily Damaris Plant, LUIS CARLOS      ondansetron  4 mg Intravenous Q6H PRN Stacey Junior MD      polyethylene glycol  17 g Oral Daily Stacey Junior MD      senna-docusate sodium  3 tablet Oral BID Damaris Plant, CRIMMANUEL      sertraline  100 mg Oral HS Stacey Junior MD      sodium chloride  2 spray Each Nare Q2H PRN Stacey Junior MD      traZODone  100 mg Oral HS Stacey Junior MD       Continuous Infusions:     PRN Meds:   acetaminophen, 650 mg, Q6H PRN  albuterol, 2 puff, Q4H PRN  aluminum-magnesium hydroxide-simethicone, 30 mL, Q4H PRN  artificial tear, , Daily PRN  benzonatate, 100 mg, TID PRN  LORazepam, 1 mg, BID PRN  menthol-methyl salicylate, , 4x Daily PRN  ondansetron, 4 mg, Q6H PRN  sodium chloride, 2 spray, Q2H PRN        Allergies   Allergies   Allergen Reactions    Amphetamine-Dextroamphetamine Other (See Comments)     Chest pain- was placed on Adderall after son passed away for depression, and she developed chest pain in 1990's; she had full cardiac work up, and was negative, so she has allergy to Adderall  Chest pain- was placed on Adderall after son passed away for depression, and she developed chest pain in 1990's; she had full cardiac work up, and was negative, so she has allergy to Adderall    Molds & Smuts     Other      Cats    Sulfa Antibiotics Other (See Comments)     ---------------------------------------------------------------------------------------  Advance Directive and Living Will:      Power of :    POLST:      Erin Landis MD      Portions of the record may have been created with voice recognition software  Occasional wrong word or "sound a like" substitutions may have occurred due to the inherent limitations of voice recognition software    Read the chart carefully and recognize, using context, where substitutions have occurred

## 2020-12-30 LAB
ANION GAP SERPL CALCULATED.3IONS-SCNC: 9 MMOL/L (ref 4–13)
BUN SERPL-MCNC: 22 MG/DL (ref 5–25)
CALCIUM SERPL-MCNC: 9.1 MG/DL (ref 8.3–10.1)
CHLORIDE SERPL-SCNC: 112 MMOL/L (ref 100–108)
CO2 SERPL-SCNC: 25 MMOL/L (ref 21–32)
CREAT SERPL-MCNC: 0.91 MG/DL (ref 0.6–1.3)
CRP SERPL QL: 7.6 MG/L
D DIMER PPP FEU-MCNC: 0.48 UG/ML FEU
ERYTHROCYTE [DISTWIDTH] IN BLOOD BY AUTOMATED COUNT: 14.2 % (ref 11.6–15.1)
GFR SERPL CREATININE-BSD FRML MDRD: 66 ML/MIN/1.73SQ M
GLUCOSE SERPL-MCNC: 90 MG/DL (ref 65–140)
HCT VFR BLD AUTO: 31.6 % (ref 34.8–46.1)
HGB BLD-MCNC: 9.7 G/DL (ref 11.5–15.4)
MAGNESIUM SERPL-MCNC: 2.6 MG/DL (ref 1.6–2.6)
MCH RBC QN AUTO: 26.9 PG (ref 26.8–34.3)
MCHC RBC AUTO-ENTMCNC: 30.7 G/DL (ref 31.4–37.4)
MCV RBC AUTO: 88 FL (ref 82–98)
PHOSPHATE SERPL-MCNC: 4.5 MG/DL (ref 2.3–4.1)
PLATELET # BLD AUTO: 313 THOUSANDS/UL (ref 149–390)
PMV BLD AUTO: 10.7 FL (ref 8.9–12.7)
POTASSIUM SERPL-SCNC: 3.7 MMOL/L (ref 3.5–5.3)
RBC # BLD AUTO: 3.61 MILLION/UL (ref 3.81–5.12)
SODIUM SERPL-SCNC: 146 MMOL/L (ref 136–145)
WBC # BLD AUTO: 8.61 THOUSAND/UL (ref 4.31–10.16)

## 2020-12-30 PROCEDURE — 94760 N-INVAS EAR/PLS OXIMETRY 1: CPT

## 2020-12-30 PROCEDURE — 94660 CPAP INITIATION&MGMT: CPT

## 2020-12-30 PROCEDURE — 84100 ASSAY OF PHOSPHORUS: CPT | Performed by: STUDENT IN AN ORGANIZED HEALTH CARE EDUCATION/TRAINING PROGRAM

## 2020-12-30 PROCEDURE — NC001 PR NO CHARGE: Performed by: INTERNAL MEDICINE

## 2020-12-30 PROCEDURE — 85027 COMPLETE CBC AUTOMATED: CPT | Performed by: STUDENT IN AN ORGANIZED HEALTH CARE EDUCATION/TRAINING PROGRAM

## 2020-12-30 PROCEDURE — 99233 SBSQ HOSP IP/OBS HIGH 50: CPT | Performed by: INTERNAL MEDICINE

## 2020-12-30 PROCEDURE — 86140 C-REACTIVE PROTEIN: CPT | Performed by: STUDENT IN AN ORGANIZED HEALTH CARE EDUCATION/TRAINING PROGRAM

## 2020-12-30 PROCEDURE — 85379 FIBRIN DEGRADATION QUANT: CPT | Performed by: STUDENT IN AN ORGANIZED HEALTH CARE EDUCATION/TRAINING PROGRAM

## 2020-12-30 PROCEDURE — 83735 ASSAY OF MAGNESIUM: CPT | Performed by: STUDENT IN AN ORGANIZED HEALTH CARE EDUCATION/TRAINING PROGRAM

## 2020-12-30 PROCEDURE — 80048 BASIC METABOLIC PNL TOTAL CA: CPT | Performed by: STUDENT IN AN ORGANIZED HEALTH CARE EDUCATION/TRAINING PROGRAM

## 2020-12-30 RX ADMIN — ALBUTEROL SULFATE 2 PUFF: 90 AEROSOL, METERED RESPIRATORY (INHALATION) at 10:13

## 2020-12-30 RX ADMIN — FLUTICASONE PROPIONATE 2 SPRAY: 50 SPRAY, METERED NASAL at 10:12

## 2020-12-30 RX ADMIN — ENOXAPARIN SODIUM 30 MG: 30 INJECTION SUBCUTANEOUS at 20:09

## 2020-12-30 RX ADMIN — Medication 2000 UNITS: at 10:11

## 2020-12-30 RX ADMIN — LITHIUM CARBONATE 300 MG: 300 CAPSULE, GELATIN COATED ORAL at 21:07

## 2020-12-30 RX ADMIN — LIDOCAINE 5% 1 PATCH: 700 PATCH TOPICAL at 10:13

## 2020-12-30 RX ADMIN — SENNOSIDES AND DOCUSATE SODIUM 3 TABLET: 8.6; 5 TABLET ORAL at 18:01

## 2020-12-30 RX ADMIN — ATORVASTATIN CALCIUM 40 MG: 40 TABLET, FILM COATED ORAL at 18:01

## 2020-12-30 RX ADMIN — FAMOTIDINE 20 MG: 20 TABLET ORAL at 19:53

## 2020-12-30 RX ADMIN — MELATONIN TAB 3 MG 6 MG: 3 TAB at 21:06

## 2020-12-30 RX ADMIN — ALBUTEROL SULFATE 2 PUFF: 90 AEROSOL, METERED RESPIRATORY (INHALATION) at 18:01

## 2020-12-30 RX ADMIN — ENOXAPARIN SODIUM 30 MG: 30 INJECTION SUBCUTANEOUS at 10:12

## 2020-12-30 RX ADMIN — GABAPENTIN 300 MG: 300 CAPSULE ORAL at 10:11

## 2020-12-30 RX ADMIN — FAMOTIDINE 20 MG: 20 TABLET ORAL at 10:11

## 2020-12-30 RX ADMIN — ZINC SULFATE 220 MG (50 MG) CAPSULE 220 MG: CAPSULE at 10:11

## 2020-12-30 RX ADMIN — SERTRALINE HYDROCHLORIDE 100 MG: 100 TABLET ORAL at 21:07

## 2020-12-30 RX ADMIN — ALBUTEROL SULFATE 2 PUFF: 90 AEROSOL, METERED RESPIRATORY (INHALATION) at 20:10

## 2020-12-30 RX ADMIN — OXYCODONE HYDROCHLORIDE AND ACETAMINOPHEN 1000 MG: 500 TABLET ORAL at 10:11

## 2020-12-30 RX ADMIN — POLYETHYLENE GLYCOL 3350 17 G: 17 POWDER, FOR SOLUTION ORAL at 10:11

## 2020-12-30 RX ADMIN — LORATADINE 10 MG: 10 TABLET ORAL at 10:11

## 2020-12-30 RX ADMIN — AZELASTINE HYDROCHLORIDE 1 SPRAY: 137 SPRAY, METERED NASAL at 18:01

## 2020-12-30 RX ADMIN — SENNOSIDES AND DOCUSATE SODIUM 3 TABLET: 8.6; 5 TABLET ORAL at 10:09

## 2020-12-30 RX ADMIN — TRAZODONE HYDROCHLORIDE 100 MG: 100 TABLET ORAL at 21:07

## 2020-12-30 RX ADMIN — LORAZEPAM 1.5 MG: 1 TABLET ORAL at 21:07

## 2020-12-30 RX ADMIN — AZELASTINE HYDROCHLORIDE 1 SPRAY: 137 SPRAY, METERED NASAL at 10:00

## 2020-12-30 RX ADMIN — DEXAMETHASONE SODIUM PHOSPHATE 7.84 MG: 4 INJECTION, SOLUTION INTRAMUSCULAR; INTRAVENOUS at 11:35

## 2020-12-30 RX ADMIN — OXYCODONE HYDROCHLORIDE AND ACETAMINOPHEN 1000 MG: 500 TABLET ORAL at 20:09

## 2020-12-30 NOTE — ASSESSMENT & PLAN NOTE
AHRF due to COVID PNA    Positive for covid on 11/29, worsening on 12/23  Completed remdesivir for 5 days on 12/08  Convalescent plasma on 12/06  Actemra x 1 on 12/27  Second round of Decadron started on 12/23, continue for 10 days   Inflammatory markers downtrending   CRP downtrending from 103->7 6   D-dimer downtrending from 0 74--> 0 48   Procal negative   Ferritin wnl  Wean HFNC as able  Self proning  Albuterol prn and continue muccinex     Encourage Incentive spirometry use  PT/OT

## 2020-12-30 NOTE — PROGRESS NOTES
Transfer Note - Akuatr  [age] y o  female MRN: [id number]  Unit/Bed#: -01 Encounter: [id number]    Code Status: Level 1 - Full Code  POA:    POLST:      Reason for ICU admission:   AHRF due to COVID PNA    Active problems:   Principal Problem:    Acute respiratory failure with hypoxia  Active Problems:    Bipolar disorder     GERD (gastroesophageal reflux disease)    Asthma    COVID-19 virus infection    Elevated troponin    Transaminitis    Possible sinusitis  Resolved Problems:    * No resolved hospital problems  *    * Acute respiratory failure with hypoxia  Assessment & Plan  AHRF due to COVID PNA    Positive for covid on 11/29, worsening on 12/23  Completed remdesivir for 5 days on 12/08  Convalescent plasma on 12/06  Actemra x 1 on 12/27  Second round of Decadron started on 12/23, continue for 10 days   Inflammatory markers downtrending   CRP downtrending from 103->7 6   D-dimer downtrending from 0 74--> 0 48   Procal negative   Ferritin wnl  Wean HFNC as able  Self proning  Albuterol prn and continue muccinex     Encourage Incentive spirometry use  PT/OT    COVID-19 virus infection  Assessment & Plan  See 'Acute respiratory failure with hypoxia' for plan    Possible sinusitis  Assessment & Plan  CT of sinuses on 12/21 revealed mild sinus disease  Completed 5 day course of Augmentin    Asthma  Assessment & Plan  Continue albuterol    GERD (gastroesophageal reflux disease)  Assessment & Plan  Continue pepcid    Bipolar disorder   Assessment & Plan  Continue home medications of zoloft 100mg daily,  neurontin 200mg daily, lithium 300mg HS , trazadone 100mg HS, ativan 1 5mg HS      Consultants:   Pulmonology    HPI/Summary of clinical course:     Jaun Whalen is a 76year old female with past medical history significant for asthma, GERD, bipolar disorder and anxiety who initially presented on 12/4 with increasing shortness of breath, ultimately tested positive for COVID and was admitted under mild pathway  Was briefly transferred to critical care for increasing oxygen requirements, then downgraded  She completed course of remdesevir and dexamethasone x 10 days and was continued on vit c/D3/zinc, famotidine, atorvastatin  Pulm was following  Pt was restarted on steroid therapy due to increasing oxygen requirements on 12/23 and again transferred to critical care stepdown 1  At that time, she was on HFNC 70% 55L  She did receive actemra x 1 on 12/27  Currently HFNC 50% 30L  Pt states she is feeling much better  Finishing up second round of decadron  Inflammatory markers rechecked today, CRP downtrending from 103 to 7 6 and D dimer 0 74 to 0 48  Procal has been negative throughout hospital stay  Ferritin wnl  Blood cx negative  Stable for transfer to Stepdown 2  Recent or scheduled procedures:   none    Outstanding/pending diagnostics:   none    Cultures:   Blood cx negative       Mobilization Plan:   OOBTC as tolerated  PT/OT    Nutrition Plan:   Cardiovascular diet    VTE Pharmacologic Prophylaxis: Enoxaparin (Lovenox)  VTE Mechanical Prophylaxis: sequential compression device    Discharge Plan:     Initial Physical Therapy Recommendations: Home with skilled therapy  Initial Occupational Therapy Recommendations: Home with skilled therapy  Initial /Plan: pending    Home medications that are not reordered and reason why:   n/a    Spoke with Dr Avinash Lau  regarding transfer  Please Austin Text with any questions or concerns  Portions of the record may have been created with voice recognition software  Occasional wrong word or "sound a like" substitutions may have occurred due to the inherent limitations of voice recognition software  Read the chart carefully and recognize, using context, where substitutions have occurred

## 2020-12-30 NOTE — RESTORATIVE TECHNICIAN NOTE
Restorative Specialist Mobility Note           Time: 10:25am    Eloina Walker and SAUNDRA assisted patient with mobility in room and chair follow  Patient is back in chair with call bell and phone within reach  RN aware  Thank you  Lawrence Hong Mobility Coordinator LCFo, LCOF, ASOP R  O T, O B T

## 2020-12-30 NOTE — ASSESSMENT & PLAN NOTE
Continue home medications of zoloft 100mg daily,  neurontin 200mg daily, lithium 300mg HS , trazadone 100mg HS, ativan 1 5mg HS

## 2020-12-30 NOTE — PROGRESS NOTES
Daily Progress Note - Critical Care   Brandon Deluna 76 y o  female MRN: [id number]  Unit/Bed#: -01 Encounter: [id number]        ----------------------------------------------------------------------------------------  HPI/24hr events:     No events overnight  States she moved out of bed yesterday evening and sat on the chair for 3 hours  Tolerated it well  Using incentive spirometry  Symptoms improving  Currently on HFNC 30L 50%  Review of Systems  Review of systems was reviewed and negative unless stated above in HPI/24-hour events   ---------------------------------------------------------------------------------------  Assessment and Plan:    Neuro:   · Diagnosis: BPD   · Plan: Continue zoloft  · Continiue lithium 300 HS  Continue neurontin 300mg daily  Ativan 1 5mg HS  · Delirium precautions  · Regulate sleep wake cycle  · CAM-ICU daily         CV:   · No acute issues  ? MAP Goal > 65          Pulm:   ·             Diagnosis: AHRF due to COVID PNA    ·             Positive for covid on 11/29, worsening on 12/23  ·             Completed remdesivir for 5 days on 12/08  ·             Convalescent plasma on 12/06  ·             Actemra x 1 on 12/27  ·             Second round of Decadron started on 12/23, continue for 10 days   ·             Amoxicillin for Upper URI, completed on 12/29  ·             Rechecked inflammatory markers today  · CRP downtrending from 103->7 6  · D-dimer downtrending from 0 74--> 0 48  ·             Wean HFNC as able  Self proning  ·             Albuterol prn and continue muccinex                 GI:   · BM yesterday, continue bowel regimen  · Stress ulcer prophylaxis with famotidine         :   ·             Monitor I/O  ·             LDFQLW BMP        F/E/N:   ·             No fluids at this time   ·             Replete electrolytes to Goal K> 4, Mg > 1 8     ·             On regular diet       Heme/Onc:   ·             Diagnosis: Normocytic anemia    ·             Hg currently 9 7  ·             No signs of active bleeding  ·             Trend cbc  ·             Anticoagulation with lovenox 30mg q12         Endo:   ·             Diagnosis: Prediabetes  ·             HbA1c 5 7  ·             Monitor glc  Goal 140-180          ID:   ·             COVID PNA  ?             See above        MSK/Skin:   ·             Frequent turning  ·             Pressure sore precautions  ·             PT/OT    Disposition: Continue Stepdown Level 1 level of care   Code Status: Level 1 - Full Code  ---------------------------------------------------------------------------------------  ICU CORE MEASURES    Prophylaxis   VTE Pharmacologic Prophylaxis: Enoxaparin (Lovenox)  VTE Mechanical Prophylaxis: sequential compression device  Stress Ulcer Prophylaxis: Famotidine PO    ABCDE Protocol (if indicated)  Plan to perform spontaneous awakening trial today? Not applicable  Plan to perform spontaneous breathing trial today? Not applicable  Obvious barriers to extubation? Not applicable  CAM-ICU: Negative    Invasive Devices Review  Invasive Devices     Peripheral Intravenous Line            Peripheral IV 20 Right Forearm 2 days          Drain            External Urinary Catheter 1 day              Can any invasive devices be discontinued today?  Not applicable  ---------------------------------------------------------------------------------------  OBJECTIVE    Vitals   Vitals:    20 0013 20 0257 20 0322 20 0618   BP:  90/55  109/65   BP Location:  Left arm     Pulse:  60  71   Resp:  18  18   Temp:    98 6 °F (37 °C)   TempSrc:       SpO2: 95% 98% 99% 96%   Weight:       Height:         Temp (24hrs), Av 5 °F (36 9 °C), Min:97 9 °F (36 6 °C), Max:99 2 °F (37 3 °C)  Current: Temperature: 98 6 °F (37 °C)  HR: 71  BP: 109/65  RR: 18  SpO2: 96% on HFNC 30L 60%     Respiratory:    Nasal Cannula O2 Flow Rate (L/min): 30 L/min    Invasive/non-invasive ventilation settings   Respiratory    Lab Data (Last 4 hours)    None         O2/Vent Data (Last 4 hours)      12/30 0322          Non-Invasive Ventilation Mode HFNC (High flow)                   Physical Exam  Constitutional:       General: She is not in acute distress  HENT:      Head: Normocephalic and atraumatic  Mouth/Throat:      Mouth: Mucous membranes are moist    Eyes:      Pupils: Pupils are equal, round, and reactive to light  Neck:      Musculoskeletal: Neck supple  Cardiovascular:      Rate and Rhythm: Normal rate and regular rhythm  Heart sounds: Normal heart sounds  Pulmonary:      Effort: Pulmonary effort is normal  No respiratory distress  Breath sounds: Normal breath sounds  Abdominal:      General: There is no distension  Palpations: Abdomen is soft  Tenderness: There is no abdominal tenderness  Musculoskeletal:      Right lower leg: No edema  Left lower leg: No edema  Skin:     General: Skin is warm  Neurological:      General: No focal deficit present  Mental Status: She is alert and oriented to person, place, and time     Psychiatric:         Mood and Affect: Mood normal          Laboratory and Diagnostics:  Results from last 7 days   Lab Units 12/29/20  0514 12/28/20  0552 12/27/20  0602 12/26/20  0558 12/25/20  0514 12/24/20  0444   WBC Thousand/uL 8 27 9 79 8 23 7 34 7 75 9 49   HEMOGLOBIN g/dL 9 6* 9 9* 10 9* 10 3* 10 5* 11 1*   HEMATOCRIT % 31 3* 33 1* 34 4* 32 2* 32 6* 34 2*   PLATELETS Thousands/uL 285 304 294 249 289 299   NEUTROS PCT %  --  76* 79* 59 61 66   MONOS PCT %  --  4 3* 6 5 5     Results from last 7 days   Lab Units 12/29/20  0514 12/28/20  0610 12/27/20  0602 12/26/20  0558 12/25/20  0514 12/24/20  0444   SODIUM mmol/L 145 141 141 139 141 141   POTASSIUM mmol/L 3 7 4 3 4 4 3 4* 3 5 4 2   CHLORIDE mmol/L 114* 109* 107 105 106 108   CO2 mmol/L 24 27 28 28 29 30   ANION GAP mmol/L 7 5 6 6 6 3* BUN mg/dL 26* 24 17 15 21 25   CREATININE mg/dL 0 90 0 92 0 82 0 82 0 80 0 81   CALCIUM mg/dL 9 1 9 7 10 4* 9 3 9 5 9 2   GLUCOSE RANDOM mg/dL 88 119 131 107 84 82   ALT U/L 118*  --  139* 143* 207* 292*   AST U/L 46*  --  49* 35 55* 135*   ALK PHOS U/L 93  --  114 111 123* 116   ALBUMIN g/dL 2 5*  --  2 7* 2 5* 2 7* 2 6*   TOTAL BILIRUBIN mg/dL 0 26  --  0 38 0 31 0 34 0 49     Results from last 7 days   Lab Units 12/29/20  0514 12/28/20  0610   MAGNESIUM mg/dL 2 7* 2 6   PHOSPHORUS mg/dL 4 8* 4 9*                   ABG:    VBG:    Results from last 7 days   Lab Units 12/27/20  0602 12/26/20  1252 12/25/20  0514 12/24/20  1025   PROCALCITONIN ng/ml <0 05 <0 05 0 05 0 06       Micro        EKG: n/a  Imaging: n/a    Intake and Output  I/O       12/28 0701 - 12/29 0700 12/29 0701 - 12/30 0700    P  O  800 1740    Total Intake(mL/kg) 800 (10 2) 1740 (22 3)    Urine (mL/kg/hr) 1000 (0 5) 850 (0 5)    Stool 0     Total Output 1000 850    Net -200 +890          Unmeasured Stool Occurrence 1 x         UOP: 0 5 ml/kg/hr     Height and Weights   Height: 5' 1" (154 9 cm)  IBW: 47 8 kg  Body mass index is 32 57 kg/m²  Weight (last 2 days)     Date/Time   Weight    12/29/20 0350   78 2 (172 4)    12/28/20 0600   79 6 (175 49)                Nutrition       Diet Orders   (From admission, onward)             Start     Ordered    12/28/20 0950  Diet Cardiovascular; Sodium 2 GM  Diet effective now     Question Answer Comment   Diet Type Cardiovascular    Cardiac Sodium 2 GM    Special Instructions Disposable Meal    RD to adjust diet per protocol?  Yes        12/28/20 0949                  Active Medications  Scheduled Meds:  Current Facility-Administered Medications   Medication Dose Route Frequency Provider Last Rate    acetaminophen  650 mg Oral Q6H PRN Ludy Fuentes MD      albuterol  2 puff Inhalation Q4H PRN Ludy Fuentes MD      albuterol  2 puff Inhalation TID Ludy Fuentes MD      aluminum-magnesium hydroxide-simethicone  30 mL Oral Q4H PRN Sander Brink MD      artificial tear   Both Eyes Daily PRN Asia Orozco MD      ascorbic acid  1,000 mg Oral Q12H [address] Kimberly Fulling, CRNP      atorvastatin  40 mg Oral Daily With Travis Jaimes MD      azelastine  1 spray Each Nare BID Sander Brink MD      benzonatate  100 mg Oral TID PRN Sander Brink MD      bisacodyl  10 mg Rectal Daily Kimberly Fulling, CRNP      cholecalciferol  2,000 Units Oral Daily Kimberly Fulling, CRNP      dexamethasone  0 1 mg/kg Intravenous Daily Jorge Troy MD      enoxaparin  30 mg Subcutaneous Q12H Katya Briscoe MD      famotidine  20 mg Oral Q12H Sander Brink MD      fluticasone  2 spray Each Nare Daily Sander Brink MD      gabapentin  300 mg Oral Daily Sander Brink MD      lidocaine  1 patch Topical Daily Sander Brink MD      lithium carbonate  300 mg Oral HS Sander Brink MD      loratadine  10 mg Oral Daily Sander Brink MD      LORazepam  1 mg Oral BID PRN Sander Brink MD      LORazepam  1 5 mg Oral HS Sander Brink MD      melatonin  6 mg Oral HS Sander Brink MD      menthol-methyl salicylate   Apply externally 4x Daily PRN Sander Brink MD      zinc sulfate  220 mg Oral Daily Kimberly Fulling, CRNP      Followed by   [facility] 1/3/2021] multivitamin-minerals  1 tablet Oral Daily Kimberly Fulling, CRNP      ondansetron  4 mg Intravenous Q6H PRN Sander Brink MD      polyethylene glycol  17 g Oral Daily Sander Brink MD      senna-docusate sodium  3 tablet Oral BID Kimberly Fulling, CRNP      sertraline  100 mg Oral HS Sander Brink MD      sodium chloride  2 spray Each Nare Q2H PRN Sander Brink MD      traZODone  100 mg Oral HS Sander Brink MD       Continuous Infusions:     PRN Meds:   acetaminophen, 650 mg, Q6H PRN  albuterol, 2 puff, Q4H PRN  aluminum-magnesium hydroxide-simethicone, 30 mL, Q4H PRN  artificial tear, , Daily PRN  benzonatate, 100 mg, TID PRN  LORazepam, 1 mg, BID PRN  menthol-methyl salicylate, , 4x Daily PRN  ondansetron, 4 mg, Q6H PRN  sodium chloride, 2 spray, Q2H PRN        Allergies   Allergies   Allergen Reactions    Amphetamine-Dextroamphetamine Other (See Comments)     Chest pain- was placed on Adderall after son passed away for depression, and she developed chest pain in 1990's; she had full cardiac work up, and was negative, so she has allergy to Adderall  Chest pain- was placed on Adderall after son passed away for depression, and she developed chest pain in 1990's; she had full cardiac work up, and was negative, so she has allergy to Adderall    Molds & Smuts     Other      Cats    Sulfa Antibiotics Other (See Comments)     ---------------------------------------------------------------------------------------  Advance Directive and Living Will:      Power of :    POLST:    ---------------------------------------------------------------------------------------      Kayla Helton MD      Portions of the record may have been created with voice recognition software  Occasional wrong word or "sound a like" substitutions may have occurred due to the inherent limitations of voice recognition software    Read the chart carefully and recognize, using context, where substitutions have occurred

## 2020-12-30 NOTE — ASSESSMENT & PLAN NOTE
CT of sinuses on 12/21 revealed mild sinus disease  Completed 5 day course of Augmentin per pulmonology

## 2020-12-31 PROBLEM — R77.8 ELEVATED TROPONIN: Status: RESOLVED | Noted: 2020-12-04 | Resolved: 2020-12-31

## 2020-12-31 PROBLEM — R79.89 ELEVATED TROPONIN: Status: RESOLVED | Noted: 2020-12-04 | Resolved: 2020-12-31

## 2020-12-31 PROCEDURE — 94760 N-INVAS EAR/PLS OXIMETRY 1: CPT

## 2020-12-31 PROCEDURE — 94660 CPAP INITIATION&MGMT: CPT

## 2020-12-31 PROCEDURE — 97112 NEUROMUSCULAR REEDUCATION: CPT

## 2020-12-31 PROCEDURE — 99232 SBSQ HOSP IP/OBS MODERATE 35: CPT | Performed by: PHYSICIAN ASSISTANT

## 2020-12-31 PROCEDURE — 97530 THERAPEUTIC ACTIVITIES: CPT

## 2020-12-31 PROCEDURE — 94760 N-INVAS EAR/PLS OXIMETRY 1: CPT | Performed by: SOCIAL WORKER

## 2020-12-31 RX ADMIN — SENNOSIDES AND DOCUSATE SODIUM 3 TABLET: 8.6; 5 TABLET ORAL at 08:37

## 2020-12-31 RX ADMIN — MELATONIN TAB 3 MG 6 MG: 3 TAB at 21:38

## 2020-12-31 RX ADMIN — ENOXAPARIN SODIUM 30 MG: 30 INJECTION SUBCUTANEOUS at 21:40

## 2020-12-31 RX ADMIN — ZINC SULFATE 220 MG (50 MG) CAPSULE 220 MG: CAPSULE at 08:37

## 2020-12-31 RX ADMIN — DEXAMETHASONE SODIUM PHOSPHATE 7.84 MG: 4 INJECTION, SOLUTION INTRAMUSCULAR; INTRAVENOUS at 08:37

## 2020-12-31 RX ADMIN — LIDOCAINE 5% 1 PATCH: 700 PATCH TOPICAL at 08:37

## 2020-12-31 RX ADMIN — LORATADINE 10 MG: 10 TABLET ORAL at 08:37

## 2020-12-31 RX ADMIN — GABAPENTIN 300 MG: 300 CAPSULE ORAL at 08:37

## 2020-12-31 RX ADMIN — FLUTICASONE PROPIONATE 2 SPRAY: 50 SPRAY, METERED NASAL at 08:38

## 2020-12-31 RX ADMIN — SENNOSIDES AND DOCUSATE SODIUM 3 TABLET: 8.6; 5 TABLET ORAL at 17:44

## 2020-12-31 RX ADMIN — ATORVASTATIN CALCIUM 40 MG: 40 TABLET, FILM COATED ORAL at 17:44

## 2020-12-31 RX ADMIN — Medication 2000 UNITS: at 08:37

## 2020-12-31 RX ADMIN — OXYCODONE HYDROCHLORIDE AND ACETAMINOPHEN 1000 MG: 500 TABLET ORAL at 21:38

## 2020-12-31 RX ADMIN — ALBUTEROL SULFATE 2 PUFF: 90 AEROSOL, METERED RESPIRATORY (INHALATION) at 08:38

## 2020-12-31 RX ADMIN — OXYCODONE HYDROCHLORIDE AND ACETAMINOPHEN 1000 MG: 500 TABLET ORAL at 08:37

## 2020-12-31 RX ADMIN — AZELASTINE HYDROCHLORIDE 1 SPRAY: 137 SPRAY, METERED NASAL at 09:08

## 2020-12-31 RX ADMIN — ENOXAPARIN SODIUM 30 MG: 30 INJECTION SUBCUTANEOUS at 08:38

## 2020-12-31 RX ADMIN — FAMOTIDINE 20 MG: 20 TABLET ORAL at 08:37

## 2020-12-31 RX ADMIN — ALBUTEROL SULFATE 2 PUFF: 90 AEROSOL, METERED RESPIRATORY (INHALATION) at 17:43

## 2020-12-31 RX ADMIN — AZELASTINE HYDROCHLORIDE 1 SPRAY: 137 SPRAY, METERED NASAL at 17:42

## 2020-12-31 RX ADMIN — SERTRALINE HYDROCHLORIDE 100 MG: 100 TABLET ORAL at 21:41

## 2020-12-31 RX ADMIN — LITHIUM CARBONATE 300 MG: 300 CAPSULE, GELATIN COATED ORAL at 21:40

## 2020-12-31 RX ADMIN — TRAZODONE HYDROCHLORIDE 100 MG: 100 TABLET ORAL at 21:38

## 2020-12-31 RX ADMIN — LORAZEPAM 1.5 MG: 1 TABLET ORAL at 21:38

## 2020-12-31 RX ADMIN — FAMOTIDINE 20 MG: 20 TABLET ORAL at 21:38

## 2020-12-31 NOTE — PLAN OF CARE
Problem: Potential for Falls  Goal: Patient will remain free of falls  Description: INTERVENTIONS:  - Assess patient frequently for physical needs  -  Identify cognitive and physical deficits and behaviors that affect risk of falls    -  [place] fall precautions as indicated by assessment   - Educate patient/family on patient safety including physical limitations  - Instruct patient to call for assistance with activity based on assessment  - Modify environment to reduce risk of injury  - Consider OT/PT consult to assist with strengthening/mobility  Outcome: Progressing     Problem: RESPIRATORY - ADULT  Goal: Achieves optimal ventilation and oxygenation  Description: INTERVENTIONS:  - Assess for changes in respiratory status  - Assess for changes in mentation and behavior  - Position to facilitate oxygenation and minimize respiratory effort  - Oxygen administered by appropriate delivery if ordered  - Initiate smoking cessation education as indicated  - Encourage broncho-pulmonary hygiene including cough, deep breathe, Incentive Spirometry  - Assess the need for suctioning and aspirate as needed  - Assess and instruct to report SOB or any respiratory difficulty  - Respiratory Therapy support as indicated  Outcome: Progressing     Problem: METABOLIC, FLUID AND ELECTROLYTES - ADULT  Goal: Electrolytes maintained within normal limits  Description: INTERVENTIONS:  - Monitor labs and assess patient for signs and symptoms of electrolyte imbalances  - Administer electrolyte replacement as ordered  - Monitor response to electrolyte replacements, including repeat lab results as appropriate  - Instruct patient on fluid and nutrition as appropriate  Outcome: Progressing  Goal: Fluid balance maintained  Description: INTERVENTIONS:  - Monitor labs   - Monitor I/O and WT  - Instruct patient on fluid and nutrition as appropriate  - Assess for signs & symptoms of volume excess or deficit  Outcome: Progressing     Problem: HEMATOLOGIC - ADULT  Goal: Maintains hematologic stability  Description: INTERVENTIONS  - Assess for signs and symptoms of bleeding or hemorrhage  - Monitor labs  - Administer supportive blood products/factors as ordered and appropriate  Outcome: Progressing     Problem: INFECTION - ADULT  Goal: Absence or prevention of progression during hospitalization  Description: INTERVENTIONS:  - Assess and monitor for signs and symptoms of infection  - Monitor lab/diagnostic results  - Monitor all insertion sites, i e  indwelling lines, tubes, and drains  - Monitor endotracheal if appropriate and nasal secretions for changes in amount and color  - [place] appropriate cooling/warming therapies per order  - Administer medications as ordered  - Instruct and encourage patient and family to use good hand hygiene technique  - Identify and instruct in appropriate isolation precautions for identified infection/condition  Outcome: Progressing  Goal: Absence of fever/infection during neutropenic period  Description: INTERVENTIONS:  - Monitor WBC    Outcome: Progressing

## 2020-12-31 NOTE — PHYSICAL THERAPY NOTE
Physical Therapy Progress Note     12/31/20 1230   PT Last Visit   PT Visit Date 12/31/20   Note Type   Note Type Treatment   Pain Assessment   Pain Assessment Tool Pain Assessment not indicated - pt denies pain   Pain Score No Pain   Restrictions/Precautions   Other Precautions Contact/isolation; Airborne/isolation;O2;Telemetry  (15L Midflow )   Subjective   Subjective The pt  expressive about her situation and medical course  She is eager to get better, and to return to her independent life  Bed Mobility   Supine to Sit 5  Supervision   Additional items Increased time required   Sit to Supine 5  Supervision   Additional items Increased time required   Transfers   Sit to Stand 5  Supervision   Additional items Increased time required   Stand to Sit 5  Supervision   Ambulation/Elevation   Gait pattern Excessively slow;Decreased foot clearance   Gait Assistance 5  Supervision   Additional items Verbal cues   Assistive Device Other (Comment)  (Armrest of the chair )   Distance 4 feet  Balance   Static Sitting Fair +   Dynamic Sitting Fair   Static Standing Fair -   Ambulatory Fair -   Activity Tolerance   Activity Tolerance Patient tolerated treatment well;Treatment limited secondary to medical complications (Comment)   Medical Staff Made Aware Karen Mendez RT  Nurse Made Claire Ovalles RN  Exercises   Hip Flexion Sitting;Bilateral;AROM;5 reps   Hip Abduction Sitting;Bilateral;AROM;5 reps   Hip Adduction Sitting;Bilateral;AROM;5 reps   Knee AROM Long Arc Quad Sitting;Bilateral;AROM;5 reps   Assessment   Prognosis Good   Problem List Decreased strength;Decreased endurance; Impaired balance;Decreased mobility   Assessment The pt  has improved strength and balance, but she continues to remain limited due to deceompensating with activity  She was recently placed on 15L Midflow and saturating mid 90's at rest and with minimal activity   After the transfer she decompensated to the low to mid 80's, and she required 3-4 minutes to recover to 88%, and another 3 minutes to recover to low 90's  Extended time taken to reassure the patient about mobilizing and that she is doing better with her oxygen  Discussed energy conservation with activity as well as performing small movements / transfers more frequently  She demonstrated proper pursed-lip breathing technique as well  She definitely demonstrates improvement from prior sessions  Pt  in the chair with all needs within reach post session  Goals   Patient Goals To regain her independence  STG Expiration Date 01/12/21   PT Treatment Day 3   Plan   Treatment/Interventions Functional transfer training;LE strengthening/ROM; Therapeutic exercise; Endurance training;Patient/family training;Bed mobility;Gait training;Elevations   Progress Progressing toward goals   PT Frequency   (3-5x a week )   Recommendation   PT Discharge Recommendation Home with skilled therapy   PT - OK to Discharge No     Saima Rosales, PTA

## 2020-12-31 NOTE — ASSESSMENT & PLAN NOTE
· In the setting of COVID-19 infection  · Originally tested positive for COVID on 11/29/2020  · Completed 5 days of remdesivir  · Received convalescent plasma on 12/06/2020  · Received Actemra on 12/27/2020  · Started a 2nd round of Decadron on 12/23/2020 which will be completed on 01/01/2021  · Patient has successfully transitioned to mid flow oxygen at 15 L earlier today  Continue to wean oxygen as able to maintain sats greater than 90%  · Pulmonary following and appreciate their input

## 2020-12-31 NOTE — ASSESSMENT & PLAN NOTE
· Originally diagnosed on 11/29/2020  · Successfully completed treatment with remdesivir, convalescent plasma, Actemra, Decadron  · Unfortunately, patient remains on mid flow oxygen

## 2020-12-31 NOTE — PLAN OF CARE
Problem: PHYSICAL THERAPY ADULT  Goal: Performs mobility at highest level of function for planned discharge setting  See evaluation for individualized goals  Description: Treatment/Interventions: Functional transfer training, LE strengthening/ROM, Elevations, Therapeutic exercise, Endurance training, Patient/family training, Equipment eval/education, Bed mobility, Gait training, Spoke to nursing, OT  Equipment Recommended: Other (Comment)(tbd, likely RW pending progress)       See flowsheet documentation for full assessment, interventions and recommendations  Outcome: Progressing  Note: Prognosis: Good  Problem List: Decreased strength, Decreased endurance, Impaired balance, Decreased mobility  Assessment: The pt  has improved strength and balance, but she continues to remain limited due to deceompensating with activity  She was recently placed on 15L Midflow and saturating mid 90's at rest and with minimal activity  After the transfer she decompensated to the low to mid 80's, and she required 3-4 minutes to recover to 88%, and another 3 minutes to recover to low 90's  Extended time taken to reassure the patient about mobilizing and that she is doing better with her oxygen  Discussed energy conservation with activity as well as performing small movements / transfers more frequently  She demonstrated proper pursed-lip breathing technique as well  She definitely demonstrates improvement from prior sessions  Pt  in the chair with all needs within reach post session  PT Discharge Recommendation: Home with skilled therapy     PT - OK to Discharge: No    See flowsheet documentation for full assessment

## 2020-12-31 NOTE — RESPIRATORY THERAPY NOTE
resp care      12/31/20 1151   Respiratory Assessment   Resp Comments Pt had sat of 94 0n 50%,30lpm hfnc  Pt changed to midflow at 15lpm  Sat remains 96%  Will cont to titrate down midflow after pt ambulated to chair  Pt states she does have some desaturation with ambulation      Additional Assessments   SpO2 96 %

## 2020-12-31 NOTE — PROGRESS NOTES
Progress Note - Andres Callejas 1955, 76 y o  female MRN: [id number]  Unit/Bed#: -01 Encounter: [id number]  Primary Care Provider: LUIS CARLOS Morris   Date and time admitted to hospital: 12/4/2020  9:15 AM    * Acute respiratory failure with hypoxia  Assessment & Plan  · In the setting of COVID-19 infection  · Originally tested positive for COVID on 11/29/2020  · Completed 5 days of remdesivir  · Received convalescent plasma on 12/06/2020  · Received Actemra on 12/27/2020  · Started a 2nd round of Decadron on 12/23/2020 which will be completed on 01/01/2021  · Patient has successfully transitioned to mid flow oxygen at 15 L earlier today  Continue to wean oxygen as able to maintain sats greater than 90%  · Pulmonary following and appreciate their input  COVID-19 virus infection  Assessment & Plan  · Originally diagnosed on 11/29/2020  · Successfully completed treatment with remdesivir, convalescent plasma, Actemra, Decadron  · Unfortunately, patient remains on mid flow oxygen  Bipolar disorder   Assessment & Plan  · Continue psych meds      VTE Pharmacologic Prophylaxis:   Pharmacologic: Enoxaparin (Lovenox)  Mechanical: Mechanical VTE prophylaxis in place  Patient Centered Rounds: I have performed bedside rounds with nursing staff today  Discussions with Specialists or Other Care Team Provider: None  Education and Discussions with Family / Patient:  All patient questions answered to the best my ability  Time Spent for Care: 30 minutes  More than 50% of total time spent on counseling and coordination of care as described above  Current Length of Stay: 27 day(s)  Current Patient Status: Inpatient   Certification Statement: The patient will continue to require additional inpatient hospital stay due to Need for mid flow oxygen    Discharge Plan:  Patient is not medically stable for discharge since she remains on mid flow oxygen    Code Status: Level 1 - Full Code    Subjective:   Patient successfully transitioned from high-flow to mid flow oxygen earlier today  She continues to have a dry cough and appears short of breath although she denies this  Otherwise, all symptoms of COVID have resolved  Objective:   Vitals:   Temp (24hrs), Av 4 °F (36 9 °C), Min:98 °F (36 7 °C), Max:98 9 °F (37 2 °C)    Temp:  [98 °F (36 7 °C)-98 9 °F (37 2 °C)] 98 1 °F (36 7 °C)  HR:  [62-88] 79  Resp:  [18-22] 20  BP: ()/(52-60) 111/52  SpO2:  [88 %-96 %] 92 %  Body mass index is 32 31 kg/m²  Input and Output Summary (last 24 hours): Intake/Output Summary (Last 24 hours) at 2020 1544  Last data filed at 2020 0800  Gross per 24 hour   Intake 360 ml   Output 760 ml   Net -400 ml       Physical Exam:     Physical Exam  Vitals signs reviewed  HENT:      Head: Normocephalic and atraumatic  Eyes:      General: No scleral icterus  Cardiovascular:      Rate and Rhythm: Normal rate and regular rhythm  Heart sounds: No murmur  Pulmonary:      Breath sounds: Examination of the right-lower field reveals rales  Examination of the left-lower field reveals rales  Rales present  No wheezing  Chest:      Chest wall: No tenderness  Abdominal:      General: Bowel sounds are normal  There is no distension  Palpations: Abdomen is soft  Tenderness: There is no abdominal tenderness  Musculoskeletal: Normal range of motion  Skin:     General: Skin is warm and dry  Findings: No rash  Additional Data:   Labs:  Results from last 7 days   Lab Units 20  0552   WBC Thousand/uL 8 61   < > 9 79   HEMOGLOBIN g/dL 9 7*   < > 9 9*   HEMATOCRIT % 31 6*   < > 33 1*   PLATELETS Thousands/uL 313   < > 304   NEUTROS PCT %  --   --  76*   LYMPHS PCT %  --   --  18   MONOS PCT %  --   --  4   EOS PCT %  --   --  0    < > = values in this interval not displayed       Results from last 7 days   Lab Units 20  0514   POTASSIUM mmol/L 3 7 3 7 CHLORIDE mmol/L 112* 114*   CO2 mmol/L 25 24   BUN mg/dL 22 26*   CREATININE mg/dL 0 91 0 90   CALCIUM mg/dL 9 1 9 1   ALK PHOS U/L  --  93   ALT U/L  --  118*   AST U/L  --  46*           * I Have Reviewed All Lab Data Listed Above  * Additional Pertinent Lab Tests Reviewed: All Labs Within Last 24 Hours Reviewed    Imaging:    Imaging Reports Reviewed Today Include:  None new    Cultures:   Blood Culture:   Lab Results   Component Value Date    BLOODCX No Growth After 5 Days  12/18/2020    BLOODCX No Growth After 5 Days   12/18/2020     Urine Culture:   Lab Results   Component Value Date    URINECX <10,000 cfu/ml Gram Negative Slava Enteric Like (A) 12/18/2020     Sputum Culture: No components found for: SPUTUMCX  Wound Culture: No results found for: WOUNDCULT    Last 24 Hours Medication List:   Current Facility-Administered Medications   Medication Dose Route Frequency Provider Last Rate    acetaminophen  650 mg Oral Q6H PRN Lillian Wagner MD      albuterol  2 puff Inhalation Q4H PRN Lillian Wagner MD      albuterol  2 puff Inhalation TID Lillian Wagner MD      aluminum-magnesium hydroxide-simethicone  30 mL Oral Q4H PRN Lillian Wagner MD      artificial tear   Both Eyes Daily PRN Farida Officer, MD      ascorbic acid  1,000 mg Oral Q12H [address] SARA OliveiraNP      atorvastatin  40 mg Oral Daily With Ammon Webb MD      azelastine  1 spray Each Nare BID Lillian Wagner MD      benzonatate  100 mg Oral TID PRN Lillian Wagner MD      bisacodyl  10 mg Rectal Daily Annabelle NicoleLUIS CARLOS      cholecalciferol  2,000 Units Oral Daily Annabelle Nicole, CRNP      dexamethasone  0 1 mg/kg Intravenous Daily Yari Jauregui MD      enoxaparin  30 mg Subcutaneous Q12H Rogers Harvey MD      famotidine  20 mg Oral Q12H Lillian Wagner MD      fluticasone  2 spray Each Nare Daily Lillian Wagner MD      gabapentin  300 mg Oral Daily Lillian Wagner MD      lidocaine  1 patch Topical Daily MD Gilda Braun lithium carbonate  300 mg Oral HS Scooby Hopkins MD      loratadine  10 mg Oral Daily Scooby Hopkins MD      LORazepam  1 mg Oral BID PRN Scooby Hopkins MD      LORazepam  1 5 mg Oral HS Scooby Hopkins MD      melatonin  6 mg Oral HS Scooby Hopkins MD      menthol-methyl salicylate   Apply externally 4x Daily PRN Scooby Hopkins MD      zinc sulfate  220 mg Oral Daily LUIS CARLOS Mustafa      Followed by   Katty Alba ON 1/3/2021] multivitamin-minerals  1 tablet Oral Daily LUIS CARLOS Mustafa      ondansetron  4 mg Intravenous Q6H PRN Scooby Hopkins MD      polyethylene glycol  17 g Oral Daily Scooby Hopkins MD      senna-docusate sodium  3 tablet Oral BID LUIS CARLOS Mustafa      sertraline  100 mg Oral HS Scooby Hopkins MD      sodium chloride  2 spray Each Nare Q2H PRN Scooby Hopkins MD      traZODone  100 mg Oral HS Scooby Hopkins MD          Today, Patient Was Seen By: Clary Fitch PA-C    ** Please Note: Dragon 360 Dictation voice to text software may have been used in the creation of this document   **

## 2021-01-01 PROBLEM — D64.9 ANEMIA: Status: ACTIVE | Noted: 2021-01-01

## 2021-01-01 PROCEDURE — 99232 SBSQ HOSP IP/OBS MODERATE 35: CPT | Performed by: NURSE PRACTITIONER

## 2021-01-01 PROCEDURE — 94760 N-INVAS EAR/PLS OXIMETRY 1: CPT

## 2021-01-01 RX ORDER — POLYETHYLENE GLYCOL 3350 17 G/17G
17 POWDER, FOR SOLUTION ORAL DAILY PRN
Status: DISCONTINUED | OUTPATIENT
Start: 2021-01-01 | End: 2021-01-11 | Stop reason: HOSPADM

## 2021-01-01 RX ORDER — BISACODYL 10 MG
10 SUPPOSITORY, RECTAL RECTAL DAILY PRN
Status: DISCONTINUED | OUTPATIENT
Start: 2021-01-01 | End: 2021-01-02

## 2021-01-01 RX ORDER — AMOXICILLIN 250 MG
3 CAPSULE ORAL 2 TIMES DAILY PRN
Status: DISCONTINUED | OUTPATIENT
Start: 2021-01-01 | End: 2021-01-02

## 2021-01-01 RX ADMIN — ALBUTEROL SULFATE 2 PUFF: 90 AEROSOL, METERED RESPIRATORY (INHALATION) at 21:43

## 2021-01-01 RX ADMIN — ATORVASTATIN CALCIUM 40 MG: 40 TABLET, FILM COATED ORAL at 16:54

## 2021-01-01 RX ADMIN — LORAZEPAM 1 MG: 1 TABLET ORAL at 16:54

## 2021-01-01 RX ADMIN — GABAPENTIN 300 MG: 300 CAPSULE ORAL at 09:12

## 2021-01-01 RX ADMIN — SERTRALINE HYDROCHLORIDE 100 MG: 100 TABLET ORAL at 21:43

## 2021-01-01 RX ADMIN — ALBUTEROL SULFATE 2 PUFF: 90 AEROSOL, METERED RESPIRATORY (INHALATION) at 09:12

## 2021-01-01 RX ADMIN — LORAZEPAM 1.5 MG: 1 TABLET ORAL at 21:43

## 2021-01-01 RX ADMIN — MELATONIN TAB 3 MG 6 MG: 3 TAB at 21:43

## 2021-01-01 RX ADMIN — Medication 2 SPRAY: at 17:00

## 2021-01-01 RX ADMIN — FAMOTIDINE 20 MG: 20 TABLET ORAL at 09:12

## 2021-01-01 RX ADMIN — ENOXAPARIN SODIUM 30 MG: 30 INJECTION SUBCUTANEOUS at 21:43

## 2021-01-01 RX ADMIN — OXYCODONE HYDROCHLORIDE AND ACETAMINOPHEN 1000 MG: 500 TABLET ORAL at 09:12

## 2021-01-01 RX ADMIN — AZELASTINE HYDROCHLORIDE 1 SPRAY: 137 SPRAY, METERED NASAL at 09:14

## 2021-01-01 RX ADMIN — DEXAMETHASONE SODIUM PHOSPHATE 7.84 MG: 4 INJECTION, SOLUTION INTRAMUSCULAR; INTRAVENOUS at 09:14

## 2021-01-01 RX ADMIN — ENOXAPARIN SODIUM 30 MG: 30 INJECTION SUBCUTANEOUS at 09:11

## 2021-01-01 RX ADMIN — ALBUTEROL SULFATE 2 PUFF: 90 AEROSOL, METERED RESPIRATORY (INHALATION) at 16:54

## 2021-01-01 RX ADMIN — FAMOTIDINE 20 MG: 20 TABLET ORAL at 20:26

## 2021-01-01 RX ADMIN — OXYCODONE HYDROCHLORIDE AND ACETAMINOPHEN 1000 MG: 500 TABLET ORAL at 21:43

## 2021-01-01 RX ADMIN — LITHIUM CARBONATE 300 MG: 300 CAPSULE, GELATIN COATED ORAL at 21:43

## 2021-01-01 RX ADMIN — Medication 2000 UNITS: at 09:12

## 2021-01-01 RX ADMIN — TRAZODONE HYDROCHLORIDE 100 MG: 100 TABLET ORAL at 21:43

## 2021-01-01 RX ADMIN — ZINC SULFATE 220 MG (50 MG) CAPSULE 220 MG: CAPSULE at 09:12

## 2021-01-01 RX ADMIN — LORATADINE 10 MG: 10 TABLET ORAL at 09:12

## 2021-01-01 RX ADMIN — FLUTICASONE PROPIONATE 2 SPRAY: 50 SPRAY, METERED NASAL at 09:14

## 2021-01-01 NOTE — ASSESSMENT & PLAN NOTE
· In the setting of acute illness   Lab Results   Component Value Date    HGB 9 7 (L) 12/30/2020    HGB 9 6 (L) 12/29/2020    HGB 9 9 (L) 12/28/2020    HGB 10 9 (L) 12/27/2020   · check iron panel   · Check hemocult stools

## 2021-01-01 NOTE — PROGRESS NOTES
Progress Note - Vin Carrasco 1955, 76 y o  female MRN: [id number]    Unit/Bed#: -01 Encounter: [id number]    Primary Care Provider: LUIS CARLOS Holbrook   Date and time admitted to hospital: 12/4/2020  9:15 AM        * Acute respiratory failure with hypoxia  Assessment & Plan  · In the setting of COVID-19 infection  · Originally tested positive for COVID on 11/29/2020  · Completed 5 days of remdesivir  · Received convalescent plasma on 12/06/2020  · Received Actemra on 12/27/2020  · Started a 2nd round of Decadron on 12/23/2020 which will be completed on 01/01/2021  · Patient has successfully transitioned to mid flow oxygen and is down to  8L  today  Continue to wean oxygen as able to maintain sats greater than 90%  · Pulmonary following and appreciate their input  COVID-19 virus infection  Assessment & Plan  · Originally diagnosed on 11/29/2020  · Successfully completed treatment with remdesivir, convalescent plasma, Actemra, Decadron  · Unfortunately, patient remains on mid flow oxygen but o2 requirements are decreasing     Anemia  Assessment & Plan  · In the setting of acute illness   Lab Results   Component Value Date    HGB 9 7 (L) 12/30/2020    HGB 9 6 (L) 12/29/2020    HGB 9 9 (L) 12/28/2020    HGB 10 9 (L) 12/27/2020   · check iron panel   · Check hemocult stools       Asthma  Assessment & Plan  · Pulm following  · Albuterol on board     GERD (gastroesophageal reflux disease)  Assessment & Plan  Continue Pepcid    Bipolar disorder   Assessment & Plan  · Continue psych meds        VTE Pharmacologic Prophylaxis:   Pharmacologic: Enoxaparin (Lovenox)  Mechanical VTE Prophylaxis in Place: No    Patient Centered Rounds: I have performed bedside rounds with nursing staff today  Discussions with Specialists or Other Care Team Provider: d/w RN     Education and Discussions with Family / Patient: d/w patient  Declined call to family     Time Spent for Care: 30 minutes    More than 50% of total time spent on counseling and coordination of care as described above  Current Length of Stay: 28 day(s)    Current Patient Status: Inpatient   Certification Statement: The patient will continue to require additional inpatient hospital stay due to continued o2 requirements    Discharge Plan: continued o2 requirements     Code Status: Level 1 - Full Code      Subjective:   Pt reports that her stools have been loose since yesterday and is requesting that her stool softeners be discontinued  She reports having 4 bowel movements yesterday that were formed and 1 today was performed  She reports overall she is feeling better and her breathing is improving  He denies any abdominal pain, nausea, vomiting  Denies any chest pain  Objective:     Vitals:   Temp (24hrs), Av 2 °F (36 8 °C), Min:97 8 °F (36 6 °C), Max:98 4 °F (36 9 °C)    Temp:  [97 8 °F (36 6 °C)-98 4 °F (36 9 °C)] 97 8 °F (36 6 °C)  HR:  [55-79] 55  Resp:  [19-20] 19  BP: ()/(52-75) 94/59  SpO2:  [86 %-99 %] 96 %  Body mass index is 32 71 kg/m²  Input and Output Summary (last 24 hours): Intake/Output Summary (Last 24 hours) at 2021 0906  Last data filed at 2020 1937  Gross per 24 hour   Intake 225 ml   Output 300 ml   Net -75 ml       Physical Exam:     Physical Exam  Vitals signs and nursing note reviewed  Constitutional:       General: She is not in acute distress  Appearance: She is well-developed  Cardiovascular:      Rate and Rhythm: Normal rate and regular rhythm  Heart sounds: No murmur  Pulmonary:      Effort: Pulmonary effort is normal  No respiratory distress  Breath sounds: Normal breath sounds  Comments: Fine crackles bilateral lower lobes  Nonlabored  Able to talk in full sentences without breathlessness  Abdominal:      Palpations: Abdomen is soft  Tenderness: There is no abdominal tenderness  Musculoskeletal:         General: No swelling or tenderness     Skin:     General: Skin is warm and dry  Findings: No erythema  Neurological:      Mental Status: She is alert and oriented to person, place, and time  Mental status is at baseline  Psychiatric:         Mood and Affect: Mood normal            Additional Data:     Labs:    Results from last 7 days   Lab Units 12/30/20  0458  12/28/20  0552   WBC Thousand/uL 8 61   < > 9 79   HEMOGLOBIN g/dL 9 7*   < > 9 9*   HEMATOCRIT % 31 6*   < > 33 1*   PLATELETS Thousands/uL 313   < > 304   NEUTROS PCT %  --   --  76*   LYMPHS PCT %  --   --  18   MONOS PCT %  --   --  4   EOS PCT %  --   --  0    < > = values in this interval not displayed  Results from last 7 days   Lab Units 12/30/20  0458 12/29/20  0514   SODIUM mmol/L 146* 145   POTASSIUM mmol/L 3 7 3 7   CHLORIDE mmol/L 112* 114*   CO2 mmol/L 25 24   BUN mg/dL 22 26*   CREATININE mg/dL 0 91 0 90   ANION GAP mmol/L 9 7   CALCIUM mg/dL 9 1 9 1   ALBUMIN g/dL  --  2 5*   TOTAL BILIRUBIN mg/dL  --  0 26   ALK PHOS U/L  --  93   ALT U/L  --  118*   AST U/L  --  46*   GLUCOSE RANDOM mg/dL 90 88                 Results from last 7 days   Lab Units 12/27/20  0602 12/26/20  1252   PROCALCITONIN ng/ml <0 05 <0 05           * I Have Reviewed All Lab Data Listed Above    * Additional Pertinent Lab Tests Reviewed: No New Labs Available For Today    Imaging:    Imaging Reports Reviewed Today Include chest x-ray, CT chest    Recent Cultures (last 7 days):           Last 24 Hours Medication List:   Current Facility-Administered Medications   Medication Dose Route Frequency Provider Last Rate    acetaminophen  650 mg Oral Q6H PRN Antonia Matthews MD      albuterol  2 puff Inhalation Q4H PRN Antonia Matthews MD      albuterol  2 puff Inhalation TID Antonia Matthews MD      aluminum-magnesium hydroxide-simethicone  30 mL Oral Q4H PRN Antonia Matthews MD      artificial tear   Both Eyes Daily PRN Najma Wood MD      ascorbic acid  1,000 mg Oral Q12H [address] Izella Grain, CRNP      atorvastatin  40 mg Oral Daily With Nick Mata MD      azelastine  1 spray Each Nare BID Anay Paniagua MD      benzonatate  100 mg Oral TID PRN Anay Paniagua MD      bisacodyl  10 mg Rectal Daily PRN LUIS CARLOS Hollis      cholecalciferol  2,000 Units Oral Daily Janas SixLUIS CARLOS      dexamethasone  0 1 mg/kg Intravenous Daily Maximus Naylor MD      enoxaparin  30 mg Subcutaneous Q12H Rocio Lanier MD      famotidine  20 mg Oral Q12H Anay Paniagua MD      fluticasone  2 spray Each Nare Daily Anay Paniagua MD      gabapentin  300 mg Oral Daily Anay Paniagua MD      lidocaine  1 patch Topical Daily Anay Paniagua MD      lithium carbonate  300 mg Oral HS Anya Paniagua, MD      loratadine  10 mg Oral Daily Anay Paniagua MD      LORazepam  1 mg Oral BID PRN Anay Paniagua MD      LORazepam  1 5 mg Oral HS Anay Paniagua MD      melatonin  6 mg Oral HS Anay Paniagua, MD      menthol-methyl salicylate   Apply externally 4x Daily PRN Anay Paniagua MD      zinc sulfate  220 mg Oral Daily Janas SixLUIS CARLOS      Followed by   Bernadette Becker ON 1/3/2021] multivitamin-minerals  1 tablet Oral Daily Janas SixLUIS CARLOS      ondansetron  4 mg Intravenous Q6H PRN Anay Paniagua MD      polyethylene glycol  17 g Oral Daily PRN LUIS CARLOS Hollis      senna-docusate sodium  3 tablet Oral BID PRN LUIS CARLOS Hollis      sertraline  100 mg Oral HS Anay Paniagua MD      sodium chloride  2 spray Each Nare Q2H PRN Anay Paniagua MD      traZODone  100 mg Oral HS Anay Paniagua MD          Today, Patient Was Seen By: LUIS CARLOS Hollis    ** Please Note: Dictation voice to text software may have been used in the creation of this document   **

## 2021-01-01 NOTE — ASSESSMENT & PLAN NOTE
· Originally diagnosed on 11/29/2020    · Successfully completed treatment with remdesivir, convalescent plasma, Actemra, Decadron  · Unfortunately, patient remains on mid flow oxygen but o2 requirements are decreasing

## 2021-01-01 NOTE — ASSESSMENT & PLAN NOTE
· In the setting of COVID-19 infection  · Originally tested positive for COVID on 11/29/2020  · Completed 5 days of remdesivir  · Received convalescent plasma on 12/06/2020  · Received Actemra on 12/27/2020  · Started a 2nd round of Decadron on 12/23/2020 which will be completed on 01/01/2021  · Patient has successfully transitioned to mid flow oxygen and is down to  8L  today  Continue to wean oxygen as able to maintain sats greater than 90%  · Pulmonary following and appreciate their input

## 2021-01-02 PROBLEM — R74.01 TRANSAMINITIS: Status: RESOLVED | Noted: 2020-12-08 | Resolved: 2021-01-02

## 2021-01-02 LAB
ALBUMIN SERPL BCP-MCNC: 3.2 G/DL (ref 3.5–5)
ALP SERPL-CCNC: 94 U/L (ref 46–116)
ALT SERPL W P-5'-P-CCNC: 75 U/L (ref 12–78)
ANION GAP SERPL CALCULATED.3IONS-SCNC: 4 MMOL/L (ref 4–13)
AST SERPL W P-5'-P-CCNC: 26 U/L (ref 5–45)
BILIRUB SERPL-MCNC: 0.29 MG/DL (ref 0.2–1)
BUN SERPL-MCNC: 22 MG/DL (ref 5–25)
CALCIUM ALBUM COR SERPL-MCNC: 9.5 MG/DL (ref 8.3–10.1)
CALCIUM SERPL-MCNC: 8.9 MG/DL (ref 8.3–10.1)
CHLORIDE SERPL-SCNC: 114 MMOL/L (ref 100–108)
CO2 SERPL-SCNC: 26 MMOL/L (ref 21–32)
CREAT SERPL-MCNC: 0.87 MG/DL (ref 0.6–1.3)
CRP SERPL QL: <3 MG/L
D DIMER PPP FEU-MCNC: 0.39 UG/ML FEU
ERYTHROCYTE [DISTWIDTH] IN BLOOD BY AUTOMATED COUNT: 14.7 % (ref 11.6–15.1)
FERRITIN SERPL-MCNC: 257 NG/ML (ref 8–388)
GFR SERPL CREATININE-BSD FRML MDRD: 70 ML/MIN/1.73SQ M
GLUCOSE SERPL-MCNC: 89 MG/DL (ref 65–140)
HCT VFR BLD AUTO: 34.9 % (ref 34.8–46.1)
HGB BLD-MCNC: 10.7 G/DL (ref 11.5–15.4)
IRON SATN MFR SERPL: 18 %
IRON SERPL-MCNC: 64 UG/DL (ref 50–170)
MCH RBC QN AUTO: 26.8 PG (ref 26.8–34.3)
MCHC RBC AUTO-ENTMCNC: 30.7 G/DL (ref 31.4–37.4)
MCV RBC AUTO: 88 FL (ref 82–98)
PLATELET # BLD AUTO: 270 THOUSANDS/UL (ref 149–390)
PMV BLD AUTO: 10.1 FL (ref 8.9–12.7)
POTASSIUM SERPL-SCNC: 3.6 MMOL/L (ref 3.5–5.3)
PROT SERPL-MCNC: 6 G/DL (ref 6.4–8.2)
RBC # BLD AUTO: 3.99 MILLION/UL (ref 3.81–5.12)
SODIUM SERPL-SCNC: 144 MMOL/L (ref 136–145)
TIBC SERPL-MCNC: 348 UG/DL (ref 250–450)
WBC # BLD AUTO: 7.22 THOUSAND/UL (ref 4.31–10.16)

## 2021-01-02 PROCEDURE — 83550 IRON BINDING TEST: CPT | Performed by: NURSE PRACTITIONER

## 2021-01-02 PROCEDURE — 80053 COMPREHEN METABOLIC PANEL: CPT | Performed by: NURSE PRACTITIONER

## 2021-01-02 PROCEDURE — 82728 ASSAY OF FERRITIN: CPT | Performed by: NURSE PRACTITIONER

## 2021-01-02 PROCEDURE — 85027 COMPLETE CBC AUTOMATED: CPT | Performed by: NURSE PRACTITIONER

## 2021-01-02 PROCEDURE — 94760 N-INVAS EAR/PLS OXIMETRY 1: CPT

## 2021-01-02 PROCEDURE — 99232 SBSQ HOSP IP/OBS MODERATE 35: CPT | Performed by: GENERAL PRACTICE

## 2021-01-02 PROCEDURE — 83540 ASSAY OF IRON: CPT | Performed by: NURSE PRACTITIONER

## 2021-01-02 PROCEDURE — 86140 C-REACTIVE PROTEIN: CPT | Performed by: NURSE PRACTITIONER

## 2021-01-02 PROCEDURE — 85379 FIBRIN DEGRADATION QUANT: CPT | Performed by: NURSE PRACTITIONER

## 2021-01-02 RX ORDER — BISACODYL 10 MG
10 SUPPOSITORY, RECTAL RECTAL DAILY PRN
Status: DISCONTINUED | OUTPATIENT
Start: 2021-01-02 | End: 2021-01-11 | Stop reason: HOSPADM

## 2021-01-02 RX ORDER — AMOXICILLIN 250 MG
3 CAPSULE ORAL 2 TIMES DAILY PRN
Status: DISCONTINUED | OUTPATIENT
Start: 2021-01-02 | End: 2021-01-06

## 2021-01-02 RX ORDER — OXYMETAZOLINE HYDROCHLORIDE 0.05 G/100ML
2 SPRAY NASAL EVERY 12 HOURS SCHEDULED
Status: COMPLETED | OUTPATIENT
Start: 2021-01-02 | End: 2021-01-04

## 2021-01-02 RX ADMIN — MELATONIN TAB 3 MG 6 MG: 3 TAB at 22:42

## 2021-01-02 RX ADMIN — OXYCODONE HYDROCHLORIDE AND ACETAMINOPHEN 1000 MG: 500 TABLET ORAL at 22:42

## 2021-01-02 RX ADMIN — LORAZEPAM 1.5 MG: 1 TABLET ORAL at 22:42

## 2021-01-02 RX ADMIN — AZELASTINE HYDROCHLORIDE 1 SPRAY: 137 SPRAY, METERED NASAL at 08:22

## 2021-01-02 RX ADMIN — ALBUTEROL SULFATE 2 PUFF: 90 AEROSOL, METERED RESPIRATORY (INHALATION) at 22:43

## 2021-01-02 RX ADMIN — OXYMETAZOLINE HYDROCHLORIDE 2 SPRAY: 0.05 SPRAY NASAL at 14:15

## 2021-01-02 RX ADMIN — ALBUTEROL SULFATE 2 PUFF: 90 AEROSOL, METERED RESPIRATORY (INHALATION) at 08:22

## 2021-01-02 RX ADMIN — LITHIUM CARBONATE 300 MG: 300 CAPSULE, GELATIN COATED ORAL at 22:42

## 2021-01-02 RX ADMIN — AZELASTINE HYDROCHLORIDE 1 SPRAY: 137 SPRAY, METERED NASAL at 17:55

## 2021-01-02 RX ADMIN — DEXAMETHASONE SODIUM PHOSPHATE 7.84 MG: 4 INJECTION, SOLUTION INTRAMUSCULAR; INTRAVENOUS at 08:22

## 2021-01-02 RX ADMIN — SERTRALINE HYDROCHLORIDE 100 MG: 100 TABLET ORAL at 22:42

## 2021-01-02 RX ADMIN — OXYMETAZOLINE HYDROCHLORIDE 2 SPRAY: 0.05 SPRAY NASAL at 22:43

## 2021-01-02 RX ADMIN — ENOXAPARIN SODIUM 30 MG: 30 INJECTION SUBCUTANEOUS at 22:42

## 2021-01-02 RX ADMIN — TRAZODONE HYDROCHLORIDE 100 MG: 100 TABLET ORAL at 22:42

## 2021-01-02 RX ADMIN — FAMOTIDINE 20 MG: 20 TABLET ORAL at 22:44

## 2021-01-02 RX ADMIN — GABAPENTIN 300 MG: 300 CAPSULE ORAL at 08:22

## 2021-01-02 RX ADMIN — ALBUTEROL SULFATE 2 PUFF: 90 AEROSOL, METERED RESPIRATORY (INHALATION) at 17:54

## 2021-01-02 RX ADMIN — ZINC SULFATE 220 MG (50 MG) CAPSULE 220 MG: CAPSULE at 08:21

## 2021-01-02 RX ADMIN — ENOXAPARIN SODIUM 30 MG: 30 INJECTION SUBCUTANEOUS at 08:21

## 2021-01-02 RX ADMIN — LORATADINE 10 MG: 10 TABLET ORAL at 08:22

## 2021-01-02 RX ADMIN — ATORVASTATIN CALCIUM 40 MG: 40 TABLET, FILM COATED ORAL at 18:08

## 2021-01-02 RX ADMIN — FLUTICASONE PROPIONATE 2 SPRAY: 50 SPRAY, METERED NASAL at 08:22

## 2021-01-02 RX ADMIN — FAMOTIDINE 20 MG: 20 TABLET ORAL at 08:21

## 2021-01-02 RX ADMIN — OXYCODONE HYDROCHLORIDE AND ACETAMINOPHEN 1000 MG: 500 TABLET ORAL at 08:21

## 2021-01-02 RX ADMIN — Medication 2000 UNITS: at 08:21

## 2021-01-02 NOTE — PROGRESS NOTES
Progress Note - Joseph Knowles 1955, 76 y o  female MRN: [id number]    Unit/Bed#: -01 Encounter: [id number]    Primary Care Provider: LUIS CARLOS Barbour   Date and time admitted to hospital: 12/4/2020  9:15 AM        * Acute respiratory failure with hypoxia  Assessment & Plan  · In the setting of COVID-19 infection  · Originally tested positive for COVID on 11/29/2020  · Completed 5 days of remdesivir  · Received convalescent plasma on 12/06/2020  · Received Actemra on 12/27/2020  · Started a 2nd round of Decadron on 12/23/2020 which will be completed on 01/04/2021 (10th day of continuous steroids)  · Patient has successfully transitioned to mid flow oxygen and is down to  5L  today  Continue to wean oxygen as able to maintain sats greater than 90%  · Pulmonary following and appreciate their input  COVID-19 virus infection  Assessment & Plan  · Originally diagnosed on 11/29/2020    · Successfully completed treatment with remdesivir, convalescent plasma, Actemra, Decadron  · Unfortunately, patient remains on but o2 requirements are decreasing     Possible sinusitis  Assessment & Plan  · CT of sinuses on 12/21 revealed mild sinus disease  · Completed seven days Augmentin course per pulmonology along with symptomatic relief agents  · Today gave pt 3 days of Afrin - advised pt that this medicine is temporary as it can lead to rebound symptoms    Anemia  Assessment & Plan  · In the setting of acute illness   Lab Results   Component Value Date    HGB 10 7 (L) 01/02/2021    HGB 9 7 (L) 12/30/2020    HGB 9 6 (L) 12/29/2020    HGB 9 9 (L) 12/28/2020   · Hgb stable  · Iron sat borderline low at 17 - will avoid po iron as pt got Actemra to avoid bowel perf  · FOBT pending      Asthma  Assessment & Plan  · Pulm following  · Albuterol on board     GERD (gastroesophageal reflux disease)  Assessment & Plan  Continue Pepcid    Bipolar disorder   Assessment & Plan  · Continue psych meds    Transaminitis-resolved as of 2021  Assessment & Plan  · Likely related to COVID-19 infection  · Resolved    VTE Pharmacologic Prophylaxis:   Pharmacologic: Enoxaparin (Lovenox)  Mechanical VTE Prophylaxis in Place: Yes    Patient Centered Rounds: I have performed bedside rounds with nursing staff today  Discussions with Specialists or Other Care Team Provider: no    Education and Discussions with Family / Patient: pt    Time Spent for Care: 30 minutes  More than 50% of total time spent on counseling and coordination of care as described above  Current Length of Stay: 29 day(s)    Current Patient Status: Inpatient   Certification Statement: The patient will continue to require additional inpatient hospital stay due to need to monitor hypoxia    Discharge Plan: when O2 requirements improved will check ambulatory pulse ox    Code Status: Level 1 - Full Code      Subjective:   C/o nasal congestion    Objective:     Vitals:   Temp (24hrs), Av 1 °F (36 7 °C), Min:97 6 °F (36 4 °C), Max:99 1 °F (37 3 °C)    Temp:  [97 6 °F (36 4 °C)-99 1 °F (37 3 °C)] 99 1 °F (37 3 °C)  HR:  [66-87] 87  Resp:  [19-24] 19  BP: ()/(46-61) 88/56  SpO2:  [88 %-98 %] 88 %  Body mass index is 33 03 kg/m²  Input and Output Summary (last 24 hours): Intake/Output Summary (Last 24 hours) at 2021  Last data filed at 2021 1401  Gross per 24 hour   Intake 360 ml   Output 200 ml   Net 160 ml       Physical Exam:     Physical Exam  HENT:      Head: Normocephalic and atraumatic  Nose: Nose normal       Mouth/Throat:      Mouth: Mucous membranes are moist    Eyes:      Extraocular Movements: Extraocular movements intact  Conjunctiva/sclera: Conjunctivae normal    Neck:      Musculoskeletal: Normal range of motion and neck supple  Cardiovascular:      Rate and Rhythm: Normal rate and regular rhythm  Pulmonary:      Effort: Pulmonary effort is normal       Breath sounds: Normal breath sounds  No wheezing or rales     Abdominal: General: Bowel sounds are normal  There is no distension  Palpations: Abdomen is soft  Tenderness: There is no abdominal tenderness  Musculoskeletal: Normal range of motion  Right lower leg: No edema  Left lower leg: No edema  Skin:     General: Skin is warm and dry  Neurological:      Mental Status: She is alert and oriented to person, place, and time  Mental status is at baseline  Additional Data:     Labs:    Results from last 7 days   Lab Units 01/02/21  0552  12/28/20  0552   WBC Thousand/uL 7 22   < > 9 79   HEMOGLOBIN g/dL 10 7*   < > 9 9*   HEMATOCRIT % 34 9   < > 33 1*   PLATELETS Thousands/uL 270   < > 304   NEUTROS PCT %  --   --  76*   LYMPHS PCT %  --   --  18   MONOS PCT %  --   --  4   EOS PCT %  --   --  0    < > = values in this interval not displayed  Results from last 7 days   Lab Units 01/02/21  0551   POTASSIUM mmol/L 3 6   CHLORIDE mmol/L 114*   CO2 mmol/L 26   BUN mg/dL 22   CREATININE mg/dL 0 87   CALCIUM mg/dL 8 9   ALK PHOS U/L 94   ALT U/L 75   AST U/L 26           * I Have Reviewed All Lab Data Listed Above  * Additional Pertinent Lab Tests Reviewed:  [facility] [address] Admission Reviewed        Recent Cultures (last 7 days):           Last 24 Hours Medication List:   Current Facility-Administered Medications   Medication Dose Route Frequency Provider Last Rate    acetaminophen  650 mg Oral Q6H PRN Ellyn Quintanilla MD      albuterol  2 puff Inhalation Q4H PRN Ellyn Quintanilla MD      albuterol  2 puff Inhalation TID Ellyn Quintanilla MD      aluminum-magnesium hydroxide-simethicone  30 mL Oral Q4H PRN Ellyn Quintanilla MD      artificial tear   Both Eyes Daily PRN Mika Cr MD      ascorbic acid  1,000 mg Oral Q12H [facility] LUIS CARLOS Blanco      atorvastatin  40 mg Oral Daily With Afshin Hargrove MD      azelastine  1 spray Each Nare BID Ellyn Quintanilla MD      benzonatate  100 mg Oral TID PRN Ellyn Quintanilla MD      bisacodyl  10 mg Rectal Daily PRN LUIS CARLOS Munoz      cholecalciferol  2,000 Units Oral Daily LUIS CARLOS Bashir      dexamethasone  0 1 mg/kg Intravenous Daily Stewart Loyd DO      enoxaparin  30 mg Subcutaneous Q12H [address], MD      famotidine  20 mg Oral Q12H Mandy Caban MD      fluticasone  2 spray Each Nare Daily Mandy Caban MD      gabapentin  300 mg Oral Daily Mandy Caban MD      lidocaine  1 patch Topical Daily Mandy Caban MD      lithium carbonate  300 mg Oral HS Mandy Caban MD      loratadine  10 mg Oral Daily Mandy Caban MD      LORazepam  1 mg Oral BID PRN Mandy Caban MD      LORazepam  1 5 mg Oral HS Mandy Caban MD      melatonin  6 mg Oral HS Mandy Caban MD      menthol-methyl salicylate   Apply externally 4x Daily PRN Madny Caban MD      [START ON 1/3/2021] multivitamin-minerals  1 tablet Oral Daily LUIS CARLOS Bashir      ondansetron  4 mg Intravenous Q6H PRN Mandy Caban MD      oxymetazoline  2 spray Each Nare Q12H [address] Stewart Loyd DO      polyethylene glycol  17 g Oral Daily PRN LUIS CARLOS Munoz      senna-docusate sodium  3 tablet Oral BID PRN LUIS CARLOS Munoz      sertraline  100 mg Oral HS Mandy Caban MD      sodium chloride  2 spray Each Nare Q2H PRN Mandy Caban MD      traZODone  100 mg Oral HS Mandy Caban MD          Today, Patient Was Seen By: Stewart Loyd DO    ** Please Note: Dictation voice to text software may have been used in the creation of this document   **

## 2021-01-03 LAB
ALBUMIN SERPL BCP-MCNC: 3.3 G/DL (ref 3.5–5)
ALP SERPL-CCNC: 93 U/L (ref 46–116)
ALT SERPL W P-5'-P-CCNC: 71 U/L (ref 12–78)
ANION GAP SERPL CALCULATED.3IONS-SCNC: 4 MMOL/L (ref 4–13)
AST SERPL W P-5'-P-CCNC: 38 U/L (ref 5–45)
BILIRUB SERPL-MCNC: 0.4 MG/DL (ref 0.2–1)
BUN SERPL-MCNC: 20 MG/DL (ref 5–25)
CALCIUM ALBUM COR SERPL-MCNC: 9.6 MG/DL (ref 8.3–10.1)
CALCIUM SERPL-MCNC: 9 MG/DL (ref 8.3–10.1)
CHLORIDE SERPL-SCNC: 114 MMOL/L (ref 100–108)
CO2 SERPL-SCNC: 26 MMOL/L (ref 21–32)
CREAT SERPL-MCNC: 0.94 MG/DL (ref 0.6–1.3)
ERYTHROCYTE [DISTWIDTH] IN BLOOD BY AUTOMATED COUNT: 15.2 % (ref 11.6–15.1)
GFR SERPL CREATININE-BSD FRML MDRD: 63 ML/MIN/1.73SQ M
GLUCOSE SERPL-MCNC: 84 MG/DL (ref 65–140)
HCT VFR BLD AUTO: 35.9 % (ref 34.8–46.1)
HGB BLD-MCNC: 10.9 G/DL (ref 11.5–15.4)
MCH RBC QN AUTO: 26.7 PG (ref 26.8–34.3)
MCHC RBC AUTO-ENTMCNC: 30.4 G/DL (ref 31.4–37.4)
MCV RBC AUTO: 88 FL (ref 82–98)
PLATELET # BLD AUTO: 287 THOUSANDS/UL (ref 149–390)
PMV BLD AUTO: 10.7 FL (ref 8.9–12.7)
POTASSIUM SERPL-SCNC: 4 MMOL/L (ref 3.5–5.3)
PROT SERPL-MCNC: 6.1 G/DL (ref 6.4–8.2)
RBC # BLD AUTO: 4.08 MILLION/UL (ref 3.81–5.12)
SODIUM SERPL-SCNC: 144 MMOL/L (ref 136–145)
WBC # BLD AUTO: 6.72 THOUSAND/UL (ref 4.31–10.16)

## 2021-01-03 PROCEDURE — 80053 COMPREHEN METABOLIC PANEL: CPT | Performed by: GENERAL PRACTICE

## 2021-01-03 PROCEDURE — 94760 N-INVAS EAR/PLS OXIMETRY 1: CPT

## 2021-01-03 PROCEDURE — 99232 SBSQ HOSP IP/OBS MODERATE 35: CPT | Performed by: GENERAL PRACTICE

## 2021-01-03 PROCEDURE — 85027 COMPLETE CBC AUTOMATED: CPT | Performed by: GENERAL PRACTICE

## 2021-01-03 RX ADMIN — POLYETHYLENE GLYCOL 3350 17 G: 17 POWDER, FOR SOLUTION ORAL at 08:52

## 2021-01-03 RX ADMIN — ATORVASTATIN CALCIUM 40 MG: 40 TABLET, FILM COATED ORAL at 16:15

## 2021-01-03 RX ADMIN — FAMOTIDINE 20 MG: 20 TABLET ORAL at 08:53

## 2021-01-03 RX ADMIN — ALBUTEROL SULFATE 2 PUFF: 90 AEROSOL, METERED RESPIRATORY (INHALATION) at 08:54

## 2021-01-03 RX ADMIN — MELATONIN TAB 3 MG 6 MG: 3 TAB at 22:38

## 2021-01-03 RX ADMIN — GABAPENTIN 300 MG: 300 CAPSULE ORAL at 08:53

## 2021-01-03 RX ADMIN — ENOXAPARIN SODIUM 30 MG: 30 INJECTION SUBCUTANEOUS at 22:38

## 2021-01-03 RX ADMIN — OXYMETAZOLINE HYDROCHLORIDE 2 SPRAY: 0.05 SPRAY NASAL at 22:45

## 2021-01-03 RX ADMIN — Medication 2 SPRAY: at 22:40

## 2021-01-03 RX ADMIN — Medication 2000 UNITS: at 08:53

## 2021-01-03 RX ADMIN — DEXAMETHASONE SODIUM PHOSPHATE 7.84 MG: 4 INJECTION, SOLUTION INTRAMUSCULAR; INTRAVENOUS at 08:52

## 2021-01-03 RX ADMIN — SERTRALINE HYDROCHLORIDE 100 MG: 100 TABLET ORAL at 22:38

## 2021-01-03 RX ADMIN — DOCUSATE SODIUM AND SENNOSIDES 2 TABLET: 8.6; 5 TABLET ORAL at 08:57

## 2021-01-03 RX ADMIN — LORAZEPAM 1.5 MG: 1 TABLET ORAL at 22:38

## 2021-01-03 RX ADMIN — ENOXAPARIN SODIUM 30 MG: 30 INJECTION SUBCUTANEOUS at 08:53

## 2021-01-03 RX ADMIN — AZELASTINE HYDROCHLORIDE 1 SPRAY: 137 SPRAY, METERED NASAL at 08:54

## 2021-01-03 RX ADMIN — LORATADINE 10 MG: 10 TABLET ORAL at 08:53

## 2021-01-03 RX ADMIN — ALBUTEROL SULFATE 2 PUFF: 90 AEROSOL, METERED RESPIRATORY (INHALATION) at 16:15

## 2021-01-03 RX ADMIN — LIDOCAINE 5% 1 PATCH: 700 PATCH TOPICAL at 08:52

## 2021-01-03 RX ADMIN — TRAZODONE HYDROCHLORIDE 100 MG: 100 TABLET ORAL at 22:38

## 2021-01-03 RX ADMIN — FLUTICASONE PROPIONATE 2 SPRAY: 50 SPRAY, METERED NASAL at 08:54

## 2021-01-03 RX ADMIN — OXYMETAZOLINE HYDROCHLORIDE 2 SPRAY: 0.05 SPRAY NASAL at 08:54

## 2021-01-03 RX ADMIN — Medication 1 TABLET: at 08:53

## 2021-01-03 RX ADMIN — AZELASTINE HYDROCHLORIDE 1 SPRAY: 137 SPRAY, METERED NASAL at 16:15

## 2021-01-03 RX ADMIN — LITHIUM CARBONATE 300 MG: 300 CAPSULE, GELATIN COATED ORAL at 22:38

## 2021-01-03 RX ADMIN — FAMOTIDINE 20 MG: 20 TABLET ORAL at 20:16

## 2021-01-03 RX ADMIN — ALBUTEROL SULFATE 2 PUFF: 90 AEROSOL, METERED RESPIRATORY (INHALATION) at 22:40

## 2021-01-03 NOTE — ASSESSMENT & PLAN NOTE
· Originally diagnosed on 11/29/2020    · Successfully completed treatment with remdesivir, convalescent plasma, Actemra, Decadron  · Unfortunately, patient remains on but o2 requirements are decreasing

## 2021-01-03 NOTE — ASSESSMENT & PLAN NOTE
· In the setting of COVID-19 infection  · Originally tested positive for COVID on 11/29/2020  · Completed 5 days of remdesivir  · Received convalescent plasma on 12/06/2020  · Received Actemra on 12/27/2020  · Started a 2nd round of Decadron on 12/23/2020 which will be completed on 01/04/2021 (10th day of continuous steroids)  · Patient has successfully transitioned to mid flow oxygen and is down to  5L  today  Continue to wean oxygen as able to maintain sats greater than 90%  · Pulmonary following and appreciate their input

## 2021-01-03 NOTE — PLAN OF CARE
Problem: Potential for Falls  Goal: Patient will remain free of falls  Description: INTERVENTIONS:  - Assess patient frequently for physical needs  -  Identify cognitive and physical deficits and behaviors that affect risk of falls    -  [place] fall precautions as indicated by assessment   - Educate patient/family on patient safety including physical limitations  - Instruct patient to call for assistance with activity based on assessment  - Modify environment to reduce risk of injury  - Consider OT/PT consult to assist with strengthening/mobility  Outcome: Progressing     Problem: RESPIRATORY - ADULT  Goal: Achieves optimal ventilation and oxygenation  Description: INTERVENTIONS:  - Assess for changes in respiratory status  - Assess for changes in mentation and behavior  - Position to facilitate oxygenation and minimize respiratory effort  - Oxygen administered by appropriate delivery if ordered  - Initiate smoking cessation education as indicated  - Encourage broncho-pulmonary hygiene including cough, deep breathe, Incentive Spirometry  - Assess the need for suctioning and aspirate as needed  - Assess and instruct to report SOB or any respiratory difficulty  - Respiratory Therapy support as indicated  Outcome: Progressing     Problem: METABOLIC, FLUID AND ELECTROLYTES - ADULT  Goal: Electrolytes maintained within normal limits  Description: INTERVENTIONS:  - Monitor labs and assess patient for signs and symptoms of electrolyte imbalances  - Administer electrolyte replacement as ordered  - Monitor response to electrolyte replacements, including repeat lab results as appropriate  - Instruct patient on fluid and nutrition as appropriate  Outcome: Progressing  Goal: Fluid balance maintained  Description: INTERVENTIONS:  - Monitor labs   - Monitor I/O and WT  - Instruct patient on fluid and nutrition as appropriate  - Assess for signs & symptoms of volume excess or deficit  Outcome: Progressing     Problem: HEMATOLOGIC - ADULT  Goal: Maintains hematologic stability  Description: INTERVENTIONS  - Assess for signs and symptoms of bleeding or hemorrhage  - Monitor labs  - Administer supportive blood products/factors as ordered and appropriate  Outcome: Progressing     Problem: INFECTION - ADULT  Goal: Absence or prevention of progression during hospitalization  Description: INTERVENTIONS:  - Assess and monitor for signs and symptoms of infection  - Monitor lab/diagnostic results  - Monitor all insertion sites, i e  indwelling lines, tubes, and drains  - Monitor endotracheal if appropriate and nasal secretions for changes in amount and color  - [place] appropriate cooling/warming therapies per order  - Administer medications as ordered  - Instruct and encourage patient and family to use good hand hygiene technique  - Identify and instruct in appropriate isolation precautions for identified infection/condition  Outcome: Progressing  Goal: Absence of fever/infection during neutropenic period  Description: INTERVENTIONS:  - Monitor WBC    Outcome: Progressing     Problem: SAFETY ADULT  Goal: Patient will remain free of falls  Description: INTERVENTIONS:  - Assess patient frequently for physical needs  -  Identify cognitive and physical deficits and behaviors that affect risk of falls    -  [place] fall precautions as indicated by assessment   - Educate patient/family on patient safety including physical limitations  - Instruct patient to call for assistance with activity based on assessment  - Modify environment to reduce risk of injury  - Consider OT/PT consult to assist with strengthening/mobility  Outcome: Progressing  Goal: Maintain or return to baseline ADL function  Description: INTERVENTIONS:  -  Assess patient's ability to carry out ADLs; assess patient's baseline for ADL function and identify physical deficits which impact ability to perform ADLs (bathing, care of mouth/teeth, toileting, grooming, dressing, etc )  - Assess/evaluate cause of self-care deficits   - Assess range of motion  - Assess patient's mobility; develop plan if impaired  - Assess patient's need for assistive devices and provide as appropriate  - Encourage maximum independence but intervene and supervise when necessary  - Involve family in performance of ADLs  - Assess for home care needs following discharge   - Consider OT consult to assist with ADL evaluation and planning for discharge  - Provide patient education as appropriate  Outcome: Progressing  Goal: Maintain or return mobility status to optimal level  Description: INTERVENTIONS:  - Assess patient's baseline mobility status (ambulation, transfers, stairs, etc )    - Identify cognitive and physical deficits and behaviors that affect mobility  - Identify mobility aids required to assist with transfers and/or ambulation (gait belt, sit-to-stand, lift, walker, cane, etc )  - [place] fall precautions as indicated by assessment  - Record patient progress and toleration of activity level on Mobility SBAR; progress patient to next Phase/Stage  - Instruct patient to call for assistance with activity based on assessment  - Consider rehabilitation consult to assist with strengthening/weightbearing, etc   Outcome: Progressing     Problem: DISCHARGE PLANNING  Goal: Discharge to home or other facility with appropriate resources  Description: INTERVENTIONS:  - Identify barriers to discharge w/patient and caregiver  - Arrange for needed discharge resources and transportation as appropriate  - Identify discharge learning needs (meds, wound care, etc )  - Arrange for interpretive services to assist at discharge as needed  - Refer to Case Management Department for coordinating discharge planning if the patient needs post-hospital services based on physician/advanced practitioner order or complex needs related to functional status, cognitive ability, or social support system  Outcome: Progressing     Problem: Knowledge Deficit  Goal: Patient/family/caregiver demonstrates understanding of disease process, treatment plan, medications, and discharge instructions  Description: Complete learning assessment and assess knowledge base  Interventions:  - Provide teaching at level of understanding  - Provide teaching via preferred learning methods  Outcome: Progressing     Problem: Prexisting or High Potential for Compromised Skin Integrity  Goal: Skin integrity is maintained or improved  Description: INTERVENTIONS:  - Identify patients at risk for skin breakdown  - Assess and monitor skin integrity  - Assess and monitor nutrition and hydration status  - Monitor labs   - Assess for incontinence   - Turn and reposition patient  - Assist with mobility/ambulation  - Relieve pressure over bony prominences  - Avoid friction and shearing  - Provide appropriate hygiene as needed including keeping skin clean and dry  - Evaluate need for skin moisturizer/barrier cream  - Collaborate with interdisciplinary team   - Patient/family teaching  - Consider wound care consult   Outcome: Progressing     Problem: Nutrition/Hydration-ADULT  Goal: Nutrient/Hydration intake appropriate for improving, restoring or maintaining nutritional needs  Description: Monitor and assess patient's nutrition/hydration status for malnutrition  Collaborate with interdisciplinary team and initiate plan and interventions as ordered  Monitor patient's weight and dietary intake as ordered or per policy  Utilize nutrition screening tool and intervene as necessary  Determine patient's food preferences and provide high-protein, high-caloric foods as appropriate       INTERVENTIONS:  - Monitor oral intake, urinary output, labs, and treatment plans  - Assess nutrition and hydration status and recommend course of action  - Evaluate amount of meals eaten  - Assist patient with eating if necessary   - Allow adequate time for meals  - Recommend/ encourage appropriate diets, oral nutritional supplements, and vitamin/mineral supplements  - Order, calculate, and assess calorie counts as needed  - Recommend, monitor, and adjust tube feedings and TPN/PPN based on assessed needs  - Assess need for intravenous fluids  - Provide specific nutrition/hydration education as appropriate  - Include patient/family/caregiver in decisions related to nutrition  Outcome: Progressing

## 2021-01-03 NOTE — PROGRESS NOTES
Progress Note - Urban Shameka 1955, 76 y o  female MRN: [id number]    Unit/Bed#: -01 Encounter: [id number]    Primary Care Provider: LUIS CARLOS Weems   Date and time admitted to hospital: 12/4/2020  9:15 AM        * Acute respiratory failure with hypoxia  Assessment & Plan  · In the setting of COVID-19 infection  · Originally tested positive for COVID on 11/29/2020  · Completed 5 days of remdesivir  · Received convalescent plasma on 12/06/2020  · Received Actemra on 12/27/2020  · Started a 2nd round of Decadron on 12/23/2020 which will be completed on 01/04/2021 (10th day of continuous steroids)  · Patient has successfully transitioned to 6L  today  · Continue to wean oxygen as able to maintain sats greater than 90%  · Pulmonary following and appreciate their input  COVID-19 virus infection  Assessment & Plan  · Originally diagnosed on 11/29/2020    · Successfully completed treatment with remdesivir, convalescent plasma, Actemra, Decadron  · Unfortunately, patient remains on O2 but O2 requirements are decreasing     Possible sinusitis  Assessment & Plan  · CT of sinuses on 12/21 revealed mild sinus disease  · Completed seven days Augmentin course per pulmonology along with symptomatic relief agents  · 1/2 gave pt 3 days of Afrin - advised pt that this medicine is temporary as it can lead to rebound symptoms    Anemia  Assessment & Plan  · In the setting of acute illness   Lab Results   Component Value Date    HGB 10 9 (L) 01/03/2021    HGB 10 7 (L) 01/02/2021    HGB 9 7 (L) 12/30/2020    HGB 9 6 (L) 12/29/2020   · Hgb stable  · Iron sat borderline low at 17 - will avoid po iron as pt got Actemra to avoid bowel perf  · FOBT pending      Asthma  Assessment & Plan  · Pulm appreciated  · Albuterol on board     GERD (gastroesophageal reflux disease)  Assessment & Plan  Continue Pepcid    Bipolar disorder   Assessment & Plan  · Continue psych meds    VTE Pharmacologic Prophylaxis:   Pharmacologic: Enoxaparin (Lovenox)  Mechanical VTE Prophylaxis in Place: Yes    Patient Centered Rounds: I have performed bedside rounds with nursing staff today  Discussions with Specialists or Other Care Team Provider: no    Education and Discussions with Family / Patient: pt    Time Spent for Care: 30 minutes  More than 50% of total time spent on counseling and coordination of care as described above  Current Length of Stay: 30 day(s)    Current Patient Status: Inpatient   Certification Statement: The patient will continue to require additional inpatient hospital stay due to need for O2    Discharge Plan: when O2 weaned    Code Status: Level 1 - Full Code      Subjective:   No acute complaints    Objective:     Vitals:   Temp (24hrs), Av 3 °F (36 8 °C), Min:97 9 °F (36 6 °C), Max:98 6 °F (37 °C)    Temp:  [97 9 °F (36 6 °C)-98 6 °F (37 °C)] 98 6 °F (37 °C)  HR:  [58-75] 75  Resp:  [15-20] 19  BP: ()/(46-62) 95/53  SpO2:  [88 %-100 %] 96 %  Body mass index is 32 12 kg/m²  Input and Output Summary (last 24 hours): Intake/Output Summary (Last 24 hours) at 1/3/2021 1943  Last data filed at 1/3/2021 1557  Gross per 24 hour   Intake 960 ml   Output 750 ml   Net 210 ml       Physical Exam:     Physical Exam  HENT:      Head: Normocephalic and atraumatic  Nose: Nose normal       Mouth/Throat:      Mouth: Mucous membranes are moist    Eyes:      Extraocular Movements: Extraocular movements intact  Conjunctiva/sclera: Conjunctivae normal    Neck:      Musculoskeletal: Normal range of motion and neck supple  Cardiovascular:      Rate and Rhythm: Normal rate and regular rhythm  Pulmonary:      Effort: Pulmonary effort is normal       Breath sounds: Normal breath sounds  No wheezing or rales  Abdominal:      General: Bowel sounds are normal  There is no distension  Palpations: Abdomen is soft  Tenderness: There is no abdominal tenderness  Musculoskeletal: Normal range of motion  Right lower leg: No edema  Left lower leg: No edema  Skin:     General: Skin is warm and dry  Neurological:      Mental Status: She is alert and oriented to person, place, and time  Mental status is at baseline  Additional Data:     Labs:    Results from last 7 days   Lab Units 01/03/21  0626  12/28/20  0552   WBC Thousand/uL 6 72   < > 9 79   HEMOGLOBIN g/dL 10 9*   < > 9 9*   HEMATOCRIT % 35 9   < > 33 1*   PLATELETS Thousands/uL 287   < > 304   NEUTROS PCT %  --   --  76*   LYMPHS PCT %  --   --  18   MONOS PCT %  --   --  4   EOS PCT %  --   --  0    < > = values in this interval not displayed  Results from last 7 days   Lab Units 01/03/21  0626   POTASSIUM mmol/L 4 0   CHLORIDE mmol/L 114*   CO2 mmol/L 26   BUN mg/dL 20   CREATININE mg/dL 0 94   CALCIUM mg/dL 9 0   ALK PHOS U/L 93   ALT U/L 71   AST U/L 38           * I Have Reviewed All Lab Data Listed Above  * Additional Pertinent Lab Tests Reviewed:  Sahil 66 Admission Reviewed        Recent Cultures (last 7 days):           Last 24 Hours Medication List:   Current Facility-Administered Medications   Medication Dose Route Frequency Provider Last Rate    acetaminophen  650 mg Oral Q6H PRN Noble Byrnes MD      albuterol  2 puff Inhalation Q4H PRN Noble Byrnes MD      albuterol  2 puff Inhalation TID Noble Byrnes MD      aluminum-magnesium hydroxide-simethicone  30 mL Oral Q4H PRN Noble Byrnes MD      artificial tear   Both Eyes Daily PRN Verna Wagner MD      atorvastatin  40 mg Oral Daily With Liborio Hussein MD      azelastine  1 spray Each Nare BID Noble Byrnes MD      benzonatate  100 mg Oral TID PRN Noble Byrnes MD      bisacodyl  10 mg Rectal Daily PRN LUIS CARLOS Adams      cholecalciferol  2,000 Units Oral Daily LUIS CARLOS Henning      dexamethasone  0 1 mg/kg Intravenous Daily Zara Guo DO      enoxaparin  30 mg Subcutaneous Q12H [address], [place]      famotidine  20 mg Oral Q12H Berkley Tadeo MD      fluticasone  2 spray Each Nare Daily Berkley Tadeo MD      gabapentin  300 mg Oral Daily Berkley Tadeo MD      lidocaine  1 patch Topical Daily Berkley Tadeo MD      lithium carbonate  300 mg Oral HS Berkley Tadeo MD      loratadine  10 mg Oral Daily Berkley Tadeo MD      LORazepam  1 mg Oral BID PRN Berkley Tadeo MD      LORazepam  1 5 mg Oral HS Berkley Tadeo MD      melatonin  6 mg Oral HS Berkley Tadeo MD      menthol-methyl salicylate   Apply externally 4x Daily PRN Berkley Tadeo MD      multivitamin-minerals  1 tablet Oral Daily LUIS CARLOS Christensen      ondansetron  4 mg Intravenous Q6H PRN Berkley Tadeo MD      oxymetazoline  2 spray Each Nare Q12H [address] Jennifer Hernandez DO      polyethylene glycol  17 g Oral Daily PRN LUIS CARLOS Pollack      senna-docusate sodium  3 tablet Oral BID PRN LUIS CARLOS Pollakc      sertraline  100 mg Oral HS Berkley Tadeo MD      sodium chloride  2 spray Each Nare Q2H PRN Berkley Tadeo MD      traZODone  100 mg Oral HS Berkley Tadeo MD          Today, Patient Was Seen By: Jennifer Hernandez DO    ** Please Note: Dictation voice to text software may have been used in the creation of this document   **

## 2021-01-03 NOTE — ASSESSMENT & PLAN NOTE
· In the setting of acute illness   Lab Results   Component Value Date    HGB 10 7 (L) 01/02/2021    HGB 9 7 (L) 12/30/2020    HGB 9 6 (L) 12/29/2020    HGB 9 9 (L) 12/28/2020   · Hgb stable  · Iron sat borderline low at 17 - will avoid po iron as pt got Actemra to avoid bowel perf  · FOBT pending

## 2021-01-04 LAB
ALBUMIN SERPL BCP-MCNC: 3.1 G/DL (ref 3.5–5)
ALP SERPL-CCNC: 92 U/L (ref 46–116)
ALT SERPL W P-5'-P-CCNC: 64 U/L (ref 12–78)
ANION GAP SERPL CALCULATED.3IONS-SCNC: 5 MMOL/L (ref 4–13)
AST SERPL W P-5'-P-CCNC: 25 U/L (ref 5–45)
BILIRUB SERPL-MCNC: 0.3 MG/DL (ref 0.2–1)
BUN SERPL-MCNC: 19 MG/DL (ref 5–25)
CALCIUM ALBUM COR SERPL-MCNC: 9.5 MG/DL (ref 8.3–10.1)
CALCIUM SERPL-MCNC: 8.8 MG/DL (ref 8.3–10.1)
CHLORIDE SERPL-SCNC: 112 MMOL/L (ref 100–108)
CO2 SERPL-SCNC: 25 MMOL/L (ref 21–32)
CREAT SERPL-MCNC: 0.77 MG/DL (ref 0.6–1.3)
D DIMER PPP FEU-MCNC: 0.27 UG/ML FEU
ERYTHROCYTE [DISTWIDTH] IN BLOOD BY AUTOMATED COUNT: 15.2 % (ref 11.6–15.1)
GFR SERPL CREATININE-BSD FRML MDRD: 81 ML/MIN/1.73SQ M
GLUCOSE SERPL-MCNC: 87 MG/DL (ref 65–140)
HCT VFR BLD AUTO: 32.5 % (ref 34.8–46.1)
HEMOCCULT STL QL: NEGATIVE
HGB BLD-MCNC: 10.3 G/DL (ref 11.5–15.4)
MCH RBC QN AUTO: 27.2 PG (ref 26.8–34.3)
MCHC RBC AUTO-ENTMCNC: 31.7 G/DL (ref 31.4–37.4)
MCV RBC AUTO: 86 FL (ref 82–98)
PLATELET # BLD AUTO: 249 THOUSANDS/UL (ref 149–390)
PMV BLD AUTO: 10.1 FL (ref 8.9–12.7)
POTASSIUM SERPL-SCNC: 3.6 MMOL/L (ref 3.5–5.3)
PROT SERPL-MCNC: 5.5 G/DL (ref 6.4–8.2)
RBC # BLD AUTO: 3.78 MILLION/UL (ref 3.81–5.12)
SODIUM SERPL-SCNC: 142 MMOL/L (ref 136–145)
WBC # BLD AUTO: 7.12 THOUSAND/UL (ref 4.31–10.16)

## 2021-01-04 PROCEDURE — 97110 THERAPEUTIC EXERCISES: CPT

## 2021-01-04 PROCEDURE — 99232 SBSQ HOSP IP/OBS MODERATE 35: CPT | Performed by: GENERAL PRACTICE

## 2021-01-04 PROCEDURE — 85027 COMPLETE CBC AUTOMATED: CPT | Performed by: GENERAL PRACTICE

## 2021-01-04 PROCEDURE — 82272 OCCULT BLD FECES 1-3 TESTS: CPT | Performed by: NURSE PRACTITIONER

## 2021-01-04 PROCEDURE — 97530 THERAPEUTIC ACTIVITIES: CPT

## 2021-01-04 PROCEDURE — 80053 COMPREHEN METABOLIC PANEL: CPT | Performed by: GENERAL PRACTICE

## 2021-01-04 PROCEDURE — 85379 FIBRIN DEGRADATION QUANT: CPT | Performed by: GENERAL PRACTICE

## 2021-01-04 RX ADMIN — SERTRALINE HYDROCHLORIDE 100 MG: 100 TABLET ORAL at 22:04

## 2021-01-04 RX ADMIN — LORATADINE 10 MG: 10 TABLET ORAL at 08:56

## 2021-01-04 RX ADMIN — ATORVASTATIN CALCIUM 40 MG: 40 TABLET, FILM COATED ORAL at 15:46

## 2021-01-04 RX ADMIN — MELATONIN TAB 3 MG 6 MG: 3 TAB at 22:04

## 2021-01-04 RX ADMIN — FAMOTIDINE 20 MG: 20 TABLET ORAL at 08:56

## 2021-01-04 RX ADMIN — LORAZEPAM 1.5 MG: 1 TABLET ORAL at 22:03

## 2021-01-04 RX ADMIN — LIDOCAINE 5% 1 PATCH: 700 PATCH TOPICAL at 08:56

## 2021-01-04 RX ADMIN — LITHIUM CARBONATE 300 MG: 300 CAPSULE, GELATIN COATED ORAL at 22:03

## 2021-01-04 RX ADMIN — Medication 1 TABLET: at 08:56

## 2021-01-04 RX ADMIN — AZELASTINE HYDROCHLORIDE 1 SPRAY: 137 SPRAY, METERED NASAL at 18:31

## 2021-01-04 RX ADMIN — ALBUTEROL SULFATE 2 PUFF: 90 AEROSOL, METERED RESPIRATORY (INHALATION) at 15:46

## 2021-01-04 RX ADMIN — GABAPENTIN 300 MG: 300 CAPSULE ORAL at 08:56

## 2021-01-04 RX ADMIN — AZELASTINE HYDROCHLORIDE 1 SPRAY: 137 SPRAY, METERED NASAL at 08:57

## 2021-01-04 RX ADMIN — DEXAMETHASONE SODIUM PHOSPHATE 7.84 MG: 4 INJECTION, SOLUTION INTRAMUSCULAR; INTRAVENOUS at 09:01

## 2021-01-04 RX ADMIN — FLUTICASONE PROPIONATE 2 SPRAY: 50 SPRAY, METERED NASAL at 08:57

## 2021-01-04 RX ADMIN — ACETAMINOPHEN 650 MG: 325 TABLET, FILM COATED ORAL at 20:12

## 2021-01-04 RX ADMIN — DOCUSATE SODIUM AND SENNOSIDES 3 TABLET: 8.6; 5 TABLET ORAL at 09:01

## 2021-01-04 RX ADMIN — OXYMETAZOLINE HYDROCHLORIDE 2 SPRAY: 0.05 SPRAY NASAL at 08:58

## 2021-01-04 RX ADMIN — Medication 2000 UNITS: at 08:56

## 2021-01-04 RX ADMIN — BISACODYL 10 MG: 10 SUPPOSITORY RECTAL at 20:08

## 2021-01-04 RX ADMIN — ALBUTEROL SULFATE 2 PUFF: 90 AEROSOL, METERED RESPIRATORY (INHALATION) at 08:57

## 2021-01-04 RX ADMIN — ENOXAPARIN SODIUM 30 MG: 30 INJECTION SUBCUTANEOUS at 20:08

## 2021-01-04 RX ADMIN — ENOXAPARIN SODIUM 30 MG: 30 INJECTION SUBCUTANEOUS at 08:56

## 2021-01-04 RX ADMIN — OXYMETAZOLINE HYDROCHLORIDE 2 SPRAY: 0.05 SPRAY NASAL at 20:09

## 2021-01-04 RX ADMIN — FAMOTIDINE 20 MG: 20 TABLET ORAL at 20:08

## 2021-01-04 RX ADMIN — ALBUTEROL SULFATE 2 PUFF: 90 AEROSOL, METERED RESPIRATORY (INHALATION) at 20:09

## 2021-01-04 RX ADMIN — TRAZODONE HYDROCHLORIDE 100 MG: 100 TABLET ORAL at 22:04

## 2021-01-04 NOTE — ASSESSMENT & PLAN NOTE
· Originally diagnosed on 11/29/2020    · Successfully completed treatment with remdesivir, convalescent plasma, Actemra, Decadron

## 2021-01-04 NOTE — ASSESSMENT & PLAN NOTE
· In the setting of acute illness   Lab Results   Component Value Date    HGB 10 3 (L) 01/04/2021    HGB 10 9 (L) 01/03/2021    HGB 10 7 (L) 01/02/2021    HGB 9 7 (L) 12/30/2020   · Hgb stable  · Iron sat borderline low at 17 - will avoid po iron as pt got Actemra to avoid bowel perf  · FOBT pending

## 2021-01-04 NOTE — ASSESSMENT & PLAN NOTE
· Originally diagnosed on 11/29/2020    · Successfully completed treatment with remdesivir, convalescent plasma, Actemra, Decadron  · Unfortunately, patient remains on O2 but O2 requirements are decreasing

## 2021-01-04 NOTE — ASSESSMENT & PLAN NOTE
· In the setting of COVID-19 infection  · Originally tested positive for COVID on 11/29/2020  · Completed 5 days of remdesivir  · Received convalescent plasma on 12/06/2020  · Received Actemra on 12/27/2020  · Started a 2nd round of Decadron on 12/23/2020 which was completed today   · Patient still on 6L  today  · Continue to wean oxygen as able to maintain sats greater than 90%  · Pulmonary following and appreciate their input

## 2021-01-04 NOTE — PHYSICAL THERAPY NOTE
Physical Therapy Treatment Note    Patient's Name: James Sheffield  : 1955       01/04/21 0152   PT Last Visit   PT Visit Date 21   Note Type   Note Type Treatment   Pain Assessment   Pain Assessment Tool Pain Assessment not indicated - pt denies pain   Restrictions/Precautions   Other Precautions Contact/isolation; Airborne/isolation;O2;Fall Risk  (COVID)   General   Chart Reviewed Yes   Cognition   Overall Cognitive Status WFL   Arousal/Participation Cooperative   Attention Within functional limits   Orientation Level Oriented X4   Memory Within functional limits   Following Commands Follows all commands and directions without difficulty   Bed Mobility   Additional Comments Pt OOB upon arrival, not assessed   Transfers   Sit to Stand 5  Supervision   Additional items Assist x 1; Increased time required   Stand to Sit 5  Supervision   Additional items Assist x 1; Increased time required   Additional Comments no AD   Ambulation/Elevation   Gait pattern Excessively slow  (cautious)   Gait Assistance 5  Supervision   Additional items Assist x 1   Assistive Device None   Distance 15ft during which SpO2 dropped into high 70s requiring 3 minutes of seated rest with proper breathing technique to return to high 80s   Balance   Static Sitting Fair +   Dynamic Sitting Fair +   Static Standing Fair   Dynamic Standing Fair -   Ambulatory Fair -   Endurance Deficit   Endurance Deficit Yes   Endurance Deficit Description SOB with short distance ambulation   Activity Tolerance   Activity Tolerance Patient limited by fatigue   Nurse Made Aware pt okay to see per RN   Exercises   Heelslides Sitting;10 reps;Bilateral  (with 3s iso hold against therapist resistance)   Knee AROM Long Arc Quad Sitting;15 reps;AROM; Bilateral   Ankle Pumps Sitting;20 reps;AROM; Bilateral   Neuro re-ed diaphragmatic breathing x10 reps with hand on stomach for tactile cueing   Assessment   Prognosis Good   Problem List Decreased strength;Decreased endurance; Impaired balance;Decreased mobility   Assessment Pt making slow progress towards mobility at this time, limited mostly by respiratory status  Pt is able to perform transfers and short distance ambulation with supervision assist  However, pt SpO2 levels dropped into the low 70s after ambulating, requiring 3 minute seated rest break to return to WNL  Pt educated on diaphragmatic breathing, using her hand on her stomach for tactile cueing  No further ambulation this session 2* SpO2 levels  Pt verbalized that she does not want home PT upon d/c  Will continue to monitor pt progress to determine if pt will be safe to d/c without HHPT  Goals   Patient Goals to get better   STG Expiration Date 01/12/20   Plan   Treatment/Interventions Functional transfer training;LE strengthening/ROM; Elevations; Therapeutic exercise; Endurance training;Gait training;Spoke to nursing   Progress Progressing toward goals   PT Frequency Other (Comment)  (3-5x/wk)   Recommendation   PT Discharge Recommendation Home with skilled therapy   Additional Comments Pt verbalized that she does not want home PT upon d/c   Will continue to monitor pt progress to see if she would be safe to d/c without HHPT       Dean Fleischer, PT, DPT

## 2021-01-04 NOTE — PLAN OF CARE
Problem: PHYSICAL THERAPY ADULT  Goal: Performs mobility at highest level of function for planned discharge setting  See evaluation for individualized goals  Description: Treatment/Interventions: Functional transfer training, LE strengthening/ROM, Elevations, Therapeutic exercise, Endurance training, Patient/family training, Equipment eval/education, Bed mobility, Gait training, Spoke to nursing, OT  Equipment Recommended: Other (Comment)(tbd, likely RW pending progress)       See flowsheet documentation for full assessment, interventions and recommendations  Outcome: Progressing  Note: Prognosis: Good  Problem List: Decreased strength, Decreased endurance, Impaired balance, Decreased mobility  Assessment: Pt making slow progress towards mobility at this time, limited mostly by respiratory status  Pt is able to perform transfers and short distance ambulation with supervision assist  However, pt SpO2 levels dropped into the low 70s after ambulating, requiring 3 minute seated rest break to return to WNL  Pt educated on diaphragmatic breathing, using her hand on her stomach for tactile cueing  No further ambulation this session 2* SpO2 levels  Pt verbalized that she does not want home PT upon d/c  Will continue to monitor pt progress to determine if pt will be safe to d/c without HHPT  PT Discharge Recommendation: Home with skilled therapy     PT - OK to Discharge: No    See flowsheet documentation for full assessment

## 2021-01-04 NOTE — ASSESSMENT & PLAN NOTE
· In the setting of acute illness   Lab Results   Component Value Date    HGB 10 9 (L) 01/03/2021    HGB 10 7 (L) 01/02/2021    HGB 9 7 (L) 12/30/2020    HGB 9 6 (L) 12/29/2020   · Hgb stable  · Iron sat borderline low at 17 - will avoid po iron as pt got Actemra to avoid bowel perf  · FOBT pending

## 2021-01-04 NOTE — ASSESSMENT & PLAN NOTE
· CT of sinuses on 12/21 revealed mild sinus disease  · Completed seven days Augmentin course per pulmonology along with symptomatic relief agents  · 1/2 gave pt 3 days of Afrin - advised pt that this medicine is temporary as it can lead to rebound symptoms

## 2021-01-04 NOTE — PROGRESS NOTES
Progress Note - Marielos Pryor 1955, 76 y o  female MRN: [id number]    Unit/Bed#: -01 Encounter: [id number]    Primary Care Provider: LUIS CARLOS Rodriguez   Date and time admitted to hospital: 12/4/2020  9:15 AM        * Acute respiratory failure with hypoxia  Assessment & Plan  · In the setting of COVID-19 infection  · Originally tested positive for COVID on 11/29/2020  · Completed 5 days of remdesivir  · Received convalescent plasma on 12/06/2020  · Received Actemra on 12/27/2020  · Started a 2nd round of Decadron on 12/23/2020 which was completed today   · Patient still on 6L  today  · Continue to wean oxygen as able to maintain sats greater than 90%  · Pulmonary following and appreciate their input  COVID-19 virus infection  Assessment & Plan  · Originally diagnosed on 11/29/2020    · Successfully completed treatment with remdesivir, convalescent plasma, Actemra, Decadron    Possible sinusitis  Assessment & Plan  · CT of sinuses on 12/21 revealed mild sinus disease  · Completed seven days Augmentin course per pulmonology along with symptomatic relief agents  · 1/2 gave pt 3 days of Afrin - advised pt that this medicine is temporary as it can lead to rebound symptoms    Anemia  Assessment & Plan  · In the setting of acute illness   Lab Results   Component Value Date    HGB 10 3 (L) 01/04/2021    HGB 10 9 (L) 01/03/2021    HGB 10 7 (L) 01/02/2021    HGB 9 7 (L) 12/30/2020   · Hgb stable  · Iron sat borderline low at 17 - will avoid po iron as pt got Actemra to avoid bowel perf  · FOBT pending      Asthma  Assessment & Plan  · Pulm appreciated  · Albuterol on board     GERD (gastroesophageal reflux disease)  Assessment & Plan  Continue Pepcid    Bipolar disorder   Assessment & Plan  · Continue psych meds    VTE Pharmacologic Prophylaxis:   Pharmacologic: Enoxaparin (Lovenox)  Mechanical VTE Prophylaxis in Place: Yes    Patient Centered Rounds: I have performed bedside rounds with nursing staff today     Discussions with Specialists or Other Care Team Provider: no    Education and Discussions with Family / Patient: pt    Time Spent for Care: 30 minutes  More than 50% of total time spent on counseling and coordination of care as described above  Current Length of Stay: 31 day(s)    Current Patient Status: Inpatient   Certification Statement: The patient will continue to require additional inpatient hospital stay due to need for O2    Discharge Plan: when hypoxia improved    Code Status: Level 1 - Full Code      Subjective:   Feeling better today    Objective:     Vitals:   Temp (24hrs), Av 1 °F (36 7 °C), Min:97 7 °F (36 5 °C), Max:98 5 °F (36 9 °C)    Temp:  [97 7 °F (36 5 °C)-98 5 °F (36 9 °C)] 98 5 °F (36 9 °C)  HR:  [65-80] 80  Resp:  [16] 16  BP: ()/(41-59) 102/59  SpO2:  [94 %-98 %] 94 %  Body mass index is 32 12 kg/m²  Input and Output Summary (last 24 hours): Intake/Output Summary (Last 24 hours) at 2021 1832  Last data filed at 2021 1825  Gross per 24 hour   Intake 1380 ml   Output 800 ml   Net 580 ml       Physical Exam:     Physical Exam  HENT:      Head: Normocephalic and atraumatic  Nose: Nose normal       Mouth/Throat:      Mouth: Mucous membranes are moist    Eyes:      Extraocular Movements: Extraocular movements intact  Conjunctiva/sclera: Conjunctivae normal    Neck:      Musculoskeletal: Normal range of motion and neck supple  Cardiovascular:      Rate and Rhythm: Normal rate and regular rhythm  Pulmonary:      Effort: Pulmonary effort is normal       Breath sounds: Normal breath sounds  No wheezing or rales  Abdominal:      General: Bowel sounds are normal  There is no distension  Palpations: Abdomen is soft  Tenderness: There is no abdominal tenderness  Musculoskeletal: Normal range of motion  Right lower leg: No edema  Left lower leg: No edema  Skin:     General: Skin is warm and dry     Neurological:      Mental Status: She is alert and oriented to person, place, and time  Mental status is at baseline  Additional Data:     Labs:    Results from last 7 days   Lab Units 01/04/21  0509   WBC Thousand/uL 7 12   HEMOGLOBIN g/dL 10 3*   HEMATOCRIT % 32 5*   PLATELETS Thousands/uL 249     Results from last 7 days   Lab Units 01/04/21  0509   POTASSIUM mmol/L 3 6   CHLORIDE mmol/L 112*   CO2 mmol/L 25   BUN mg/dL 19   CREATININE mg/dL 0 77   CALCIUM mg/dL 8 8   ALK PHOS U/L 92   ALT U/L 64   AST U/L 25           * I Have Reviewed All Lab Data Listed Above  * Additional Pertinent Lab Tests Reviewed:  Sahil 66 Admission Reviewed        Recent Cultures (last 7 days):           Last 24 Hours Medication List:   Current Facility-Administered Medications   Medication Dose Route Frequency Provider Last Rate    acetaminophen  650 mg Oral Q6H PRN Sander Brink MD      albuterol  2 puff Inhalation Q4H PRN Sander Brink MD      albuterol  2 puff Inhalation TID Sander Brink MD      aluminum-magnesium hydroxide-simethicone  30 mL Oral Q4H PRN Sander Brink MD      artificial tear   Both Eyes Daily PRN Asia Orozco MD      atorvastatin  40 mg Oral Daily With Travis Jaimes MD      azelastine  1 spray Each Nare BID Sander Brink MD      benzonatate  100 mg Oral TID PRN Sander Birnk MD      bisacodyl  10 mg Rectal Daily PRN LUIS CARLOS Guzman      cholecalciferol  2,000 Units Oral Daily LUIS CARLOS Foreman      enoxaparin  30 mg Subcutaneous Q12H [address] [place] [address], MD      famotidine  20 mg Oral Q12H Sander Brink MD      fluticasone  2 spray Each Nare Daily Sander Brink MD      gabapentin  300 mg Oral Daily Sander Brink MD      lidocaine  1 patch Topical Daily Sander Brink MD      lithium carbonate  300 mg Oral HS Sander Brink MD      loratadine  10 mg Oral Daily Sander Brink MD      LORazepam  1 mg Oral BID PRN Sander Brink MD      LORazepam  1 5 mg Oral HS MD Maya Delgado melatonin  6 mg Oral HS Jadene Necessary, MD      menthol-methyl salicylate   Apply externally 4x Daily PRN Jadene Necessary, MD      multivitamin-minerals  1 tablet Oral Daily LUIS CARLOS Back      ondansetron  4 mg Intravenous Q6H PRN Jadene Necessary, MD      oxymetazoline  2 spray Each Nare Q12H [address] Megan Warner DO      polyethylene glycol  17 g Oral Daily PRN Christean Spare, LUIS CARLOS      senna-docusate sodium  3 tablet Oral BID PRN Christeaderek Spare, LUIS CARLOS      sertraline  100 mg Oral HS Jadene Necessary, MD      sodium chloride  2 spray Each Nare Q2H PRN Jadene Necessary, MD      traZODone  100 mg Oral HS Jadene Necessary, MD          Today, Patient Was Seen By: Megan Warner DO    ** Please Note: Dictation voice to text software may have been used in the creation of this document   **

## 2021-01-04 NOTE — PLAN OF CARE
Problem: Potential for Falls  Goal: Patient will remain free of falls  Description: INTERVENTIONS:  - Assess patient frequently for physical needs  -  Identify cognitive and physical deficits and behaviors that affect risk of falls    -  [place] fall precautions as indicated by assessment   - Educate patient/family on patient safety including physical limitations  - Instruct patient to call for assistance with activity based on assessment  - Modify environment to reduce risk of injury  - Consider OT/PT consult to assist with strengthening/mobility  Outcome: Progressing     Problem: RESPIRATORY - ADULT  Goal: Achieves optimal ventilation and oxygenation  Description: INTERVENTIONS:  - Assess for changes in respiratory status  - Assess for changes in mentation and behavior  - Position to facilitate oxygenation and minimize respiratory effort  - Oxygen administered by appropriate delivery if ordered  - Initiate smoking cessation education as indicated  - Encourage broncho-pulmonary hygiene including cough, deep breathe, Incentive Spirometry  - Assess the need for suctioning and aspirate as needed  - Assess and instruct to report SOB or any respiratory difficulty  - Respiratory Therapy support as indicated  Outcome: Progressing     Problem: METABOLIC, FLUID AND ELECTROLYTES - ADULT  Goal: Electrolytes maintained within normal limits  Description: INTERVENTIONS:  - Monitor labs and assess patient for signs and symptoms of electrolyte imbalances  - Administer electrolyte replacement as ordered  - Monitor response to electrolyte replacements, including repeat lab results as appropriate  - Instruct patient on fluid and nutrition as appropriate  Outcome: Progressing  Goal: Fluid balance maintained  Description: INTERVENTIONS:  - Monitor labs   - Monitor I/O and WT  - Instruct patient on fluid and nutrition as appropriate  - Assess for signs & symptoms of volume excess or deficit  Outcome: Progressing     Problem: HEMATOLOGIC - ADULT  Goal: Maintains hematologic stability  Description: INTERVENTIONS  - Assess for signs and symptoms of bleeding or hemorrhage  - Monitor labs  - Administer supportive blood products/factors as ordered and appropriate  Outcome: Progressing     Problem: INFECTION - ADULT  Goal: Absence or prevention of progression during hospitalization  Description: INTERVENTIONS:  - Assess and monitor for signs and symptoms of infection  - Monitor lab/diagnostic results  - Monitor all insertion sites, i e  indwelling lines, tubes, and drains  - Monitor endotracheal if appropriate and nasal secretions for changes in amount and color  - [place] appropriate cooling/warming therapies per order  - Administer medications as ordered  - Instruct and encourage patient and family to use good hand hygiene technique  - Identify and instruct in appropriate isolation precautions for identified infection/condition  Outcome: Progressing  Goal: Absence of fever/infection during neutropenic period  Description: INTERVENTIONS:  - Monitor WBC    Outcome: Progressing     Problem: SAFETY ADULT  Goal: Patient will remain free of falls  Description: INTERVENTIONS:  - Assess patient frequently for physical needs  -  Identify cognitive and physical deficits and behaviors that affect risk of falls    -  [place] fall precautions as indicated by assessment   - Educate patient/family on patient safety including physical limitations  - Instruct patient to call for assistance with activity based on assessment  - Modify environment to reduce risk of injury  - Consider OT/PT consult to assist with strengthening/mobility  Outcome: Progressing  Goal: Maintain or return to baseline ADL function  Description: INTERVENTIONS:  -  Assess patient's ability to carry out ADLs; assess patient's baseline for ADL function and identify physical deficits which impact ability to perform ADLs (bathing, care of mouth/teeth, toileting, grooming, dressing, etc )  - Assess/evaluate cause of self-care deficits   - Assess range of motion  - Assess patient's mobility; develop plan if impaired  - Assess patient's need for assistive devices and provide as appropriate  - Encourage maximum independence but intervene and supervise when necessary  - Involve family in performance of ADLs  - Assess for home care needs following discharge   - Consider OT consult to assist with ADL evaluation and planning for discharge  - Provide patient education as appropriate  Outcome: Progressing  Goal: Maintain or return mobility status to optimal level  Description: INTERVENTIONS:  - Assess patient's baseline mobility status (ambulation, transfers, stairs, etc )    - Identify cognitive and physical deficits and behaviors that affect mobility  - Identify mobility aids required to assist with transfers and/or ambulation (gait belt, sit-to-stand, lift, walker, cane, etc )  - [place] fall precautions as indicated by assessment  - Record patient progress and toleration of activity level on Mobility SBAR; progress patient to next Phase/Stage  - Instruct patient to call for assistance with activity based on assessment  - Consider rehabilitation consult to assist with strengthening/weightbearing, etc   Outcome: Progressing     Problem: DISCHARGE PLANNING  Goal: Discharge to home or other facility with appropriate resources  Description: INTERVENTIONS:  - Identify barriers to discharge w/patient and caregiver  - Arrange for needed discharge resources and transportation as appropriate  - Identify discharge learning needs (meds, wound care, etc )  - Arrange for interpretive services to assist at discharge as needed  - Refer to Case Management Department for coordinating discharge planning if the patient needs post-hospital services based on physician/advanced practitioner order or complex needs related to functional status, cognitive ability, or social support system  Outcome: Progressing     Problem: Knowledge Deficit  Goal: Patient/family/caregiver demonstrates understanding of disease process, treatment plan, medications, and discharge instructions  Description: Complete learning assessment and assess knowledge base  Interventions:  - Provide teaching at level of understanding  - Provide teaching via preferred learning methods  Outcome: Progressing     Problem: Prexisting or High Potential for Compromised Skin Integrity  Goal: Skin integrity is maintained or improved  Description: INTERVENTIONS:  - Identify patients at risk for skin breakdown  - Assess and monitor skin integrity  - Assess and monitor nutrition and hydration status  - Monitor labs   - Assess for incontinence   - Turn and reposition patient  - Assist with mobility/ambulation  - Relieve pressure over bony prominences  - Avoid friction and shearing  - Provide appropriate hygiene as needed including keeping skin clean and dry  - Evaluate need for skin moisturizer/barrier cream  - Collaborate with interdisciplinary team   - Patient/family teaching  - Consider wound care consult   Outcome: Progressing     Problem: Nutrition/Hydration-ADULT  Goal: Nutrient/Hydration intake appropriate for improving, restoring or maintaining nutritional needs  Description: Monitor and assess patient's nutrition/hydration status for malnutrition  Collaborate with interdisciplinary team and initiate plan and interventions as ordered  Monitor patient's weight and dietary intake as ordered or per policy  Utilize nutrition screening tool and intervene as necessary  Determine patient's food preferences and provide high-protein, high-caloric foods as appropriate       INTERVENTIONS:  - Monitor oral intake, urinary output, labs, and treatment plans  - Assess nutrition and hydration status and recommend course of action  - Evaluate amount of meals eaten  - Assist patient with eating if necessary   - Allow adequate time for meals  - Recommend/ encourage appropriate diets, oral nutritional supplements, and vitamin/mineral supplements  - Order, calculate, and assess calorie counts as needed  - Recommend, monitor, and adjust tube feedings and TPN/PPN based on assessed needs  - Assess need for intravenous fluids  - Provide specific nutrition/hydration education as appropriate  - Include patient/family/caregiver in decisions related to nutrition  Outcome: Progressing

## 2021-01-04 NOTE — ASSESSMENT & PLAN NOTE
· In the setting of COVID-19 infection  · Originally tested positive for COVID on 11/29/2020  · Completed 5 days of remdesivir  · Received convalescent plasma on 12/06/2020  · Received Actemra on 12/27/2020  · Started a 2nd round of Decadron on 12/23/2020 which will be completed on 01/04/2021 (10th day of continuous steroids)  · Patient has successfully transitioned to 6L  today  · Continue to wean oxygen as able to maintain sats greater than 90%  · Pulmonary following and appreciate their input

## 2021-01-05 PROBLEM — U07.1 ACUTE HYPOXEMIC RESPIRATORY FAILURE DUE TO COVID-19 (HCC): Status: ACTIVE | Noted: 2020-12-04

## 2021-01-05 LAB
ALBUMIN SERPL BCP-MCNC: 3.6 G/DL (ref 3.5–5)
ALP SERPL-CCNC: 104 U/L (ref 46–116)
ALT SERPL W P-5'-P-CCNC: 59 U/L (ref 12–78)
ANION GAP SERPL CALCULATED.3IONS-SCNC: 4 MMOL/L (ref 4–13)
AST SERPL W P-5'-P-CCNC: 20 U/L (ref 5–45)
BILIRUB SERPL-MCNC: 0.37 MG/DL (ref 0.2–1)
BUN SERPL-MCNC: 19 MG/DL (ref 5–25)
CALCIUM SERPL-MCNC: 9.3 MG/DL (ref 8.3–10.1)
CHLORIDE SERPL-SCNC: 114 MMOL/L (ref 100–108)
CO2 SERPL-SCNC: 26 MMOL/L (ref 21–32)
CREAT SERPL-MCNC: 0.85 MG/DL (ref 0.6–1.3)
CRP SERPL QL: <3 MG/L
D DIMER PPP FEU-MCNC: 0.47 UG/ML FEU
ERYTHROCYTE [DISTWIDTH] IN BLOOD BY AUTOMATED COUNT: 15.7 % (ref 11.6–15.1)
FERRITIN SERPL-MCNC: 216 NG/ML (ref 8–388)
GFR SERPL CREATININE-BSD FRML MDRD: 72 ML/MIN/1.73SQ M
GLUCOSE SERPL-MCNC: 84 MG/DL (ref 65–140)
HCT VFR BLD AUTO: 37.6 % (ref 34.8–46.1)
HGB BLD-MCNC: 11.7 G/DL (ref 11.5–15.4)
MCH RBC QN AUTO: 27.1 PG (ref 26.8–34.3)
MCHC RBC AUTO-ENTMCNC: 31.1 G/DL (ref 31.4–37.4)
MCV RBC AUTO: 87 FL (ref 82–98)
PLATELET # BLD AUTO: 240 THOUSANDS/UL (ref 149–390)
PMV BLD AUTO: 10.4 FL (ref 8.9–12.7)
POTASSIUM SERPL-SCNC: 3.7 MMOL/L (ref 3.5–5.3)
PROT SERPL-MCNC: 6.4 G/DL (ref 6.4–8.2)
RBC # BLD AUTO: 4.31 MILLION/UL (ref 3.81–5.12)
SODIUM SERPL-SCNC: 144 MMOL/L (ref 136–145)
WBC # BLD AUTO: 7.17 THOUSAND/UL (ref 4.31–10.16)

## 2021-01-05 PROCEDURE — 97530 THERAPEUTIC ACTIVITIES: CPT

## 2021-01-05 PROCEDURE — 80053 COMPREHEN METABOLIC PANEL: CPT | Performed by: GENERAL PRACTICE

## 2021-01-05 PROCEDURE — 97116 GAIT TRAINING THERAPY: CPT

## 2021-01-05 PROCEDURE — 94761 N-INVAS EAR/PLS OXIMETRY MLT: CPT

## 2021-01-05 PROCEDURE — 99232 SBSQ HOSP IP/OBS MODERATE 35: CPT | Performed by: INTERNAL MEDICINE

## 2021-01-05 PROCEDURE — 85379 FIBRIN DEGRADATION QUANT: CPT | Performed by: GENERAL PRACTICE

## 2021-01-05 PROCEDURE — 82728 ASSAY OF FERRITIN: CPT | Performed by: GENERAL PRACTICE

## 2021-01-05 PROCEDURE — 97112 NEUROMUSCULAR REEDUCATION: CPT

## 2021-01-05 PROCEDURE — 86140 C-REACTIVE PROTEIN: CPT | Performed by: GENERAL PRACTICE

## 2021-01-05 PROCEDURE — 85027 COMPLETE CBC AUTOMATED: CPT | Performed by: GENERAL PRACTICE

## 2021-01-05 RX ADMIN — LITHIUM CARBONATE 300 MG: 300 CAPSULE, GELATIN COATED ORAL at 21:47

## 2021-01-05 RX ADMIN — AZELASTINE HYDROCHLORIDE 1 SPRAY: 137 SPRAY, METERED NASAL at 19:02

## 2021-01-05 RX ADMIN — ALBUTEROL SULFATE 2 PUFF: 90 AEROSOL, METERED RESPIRATORY (INHALATION) at 19:02

## 2021-01-05 RX ADMIN — ENOXAPARIN SODIUM 30 MG: 30 INJECTION SUBCUTANEOUS at 10:04

## 2021-01-05 RX ADMIN — LORAZEPAM 1.5 MG: 1 TABLET ORAL at 21:45

## 2021-01-05 RX ADMIN — TRAZODONE HYDROCHLORIDE 100 MG: 100 TABLET ORAL at 21:46

## 2021-01-05 RX ADMIN — POLYETHYLENE GLYCOL 3350 17 G: 17 POWDER, FOR SOLUTION ORAL at 19:04

## 2021-01-05 RX ADMIN — GABAPENTIN 300 MG: 300 CAPSULE ORAL at 10:03

## 2021-01-05 RX ADMIN — Medication 2000 UNITS: at 10:03

## 2021-01-05 RX ADMIN — LIDOCAINE 5% 1 PATCH: 700 PATCH TOPICAL at 10:05

## 2021-01-05 RX ADMIN — FLUTICASONE PROPIONATE 2 SPRAY: 50 SPRAY, METERED NASAL at 10:04

## 2021-01-05 RX ADMIN — SERTRALINE HYDROCHLORIDE 100 MG: 100 TABLET ORAL at 21:47

## 2021-01-05 RX ADMIN — AZELASTINE HYDROCHLORIDE 1 SPRAY: 137 SPRAY, METERED NASAL at 10:04

## 2021-01-05 RX ADMIN — ENOXAPARIN SODIUM 30 MG: 30 INJECTION SUBCUTANEOUS at 20:42

## 2021-01-05 RX ADMIN — FAMOTIDINE 20 MG: 20 TABLET ORAL at 10:03

## 2021-01-05 RX ADMIN — FAMOTIDINE 20 MG: 20 TABLET ORAL at 20:42

## 2021-01-05 RX ADMIN — MELATONIN TAB 3 MG 6 MG: 3 TAB at 21:46

## 2021-01-05 RX ADMIN — Medication 1 TABLET: at 10:03

## 2021-01-05 RX ADMIN — LORATADINE 10 MG: 10 TABLET ORAL at 10:03

## 2021-01-05 RX ADMIN — ALBUTEROL SULFATE 2 PUFF: 90 AEROSOL, METERED RESPIRATORY (INHALATION) at 10:04

## 2021-01-05 RX ADMIN — ATORVASTATIN CALCIUM 40 MG: 40 TABLET, FILM COATED ORAL at 19:04

## 2021-01-05 NOTE — RESPIRATORY THERAPY NOTE
Home Oxygen Qualifying Test       Patient name: José Manuel Castillo        : 1955   Date of Test:  2021  Diagnosis:      Home Oxygen Test:    **Medicare Guidelines require item(s) 1-5 on all ambulatory patients or 1 and 2 on non-ambulatory patients  1   Baseline SPO2 on Room Air at rest 85 %  2   SPO2 during exercise on Room Air n/a  %  During exercise monitor SpO2  If SPO2 increases >=89% with ambulation do not add supplemental             oxygen  If <= 88% on room air add O2 via NC and titrate patient  Patient must be ambulated with O2 and titrated to > 88% with exertion  3   SPO2 on Oxygen at rest 90 % 3 lpm     4   SPO2 during exercise on Oxygen  77% a liter flow of 6 lpm     5   Exercise performed:          walking, duration 1 (min), distance 10 (feet)          [x]  Supplemental Home Oxygen is indicated  []  Client does not qualify for home oxygen  Respiratory Additional Notes- Pt was unable to ambulate for more than one minute prior to desaturation  Pt's SpO2 as low as 77% on 6L and required aprox 5min at rest to return to 89% on 6L SHANTE Simms, RT

## 2021-01-05 NOTE — ASSESSMENT & PLAN NOTE
· Acute respiratory failure/hypoxia secondary to COVID-19    · Completed remdesivir and received convalescent plasma and Actemra  · Completed to courses dexamethasone  · Continue weaning oxygen and will discharge home with home oxygen    Lab Results   Component Value Date    SARSCOV2 Detected (A) 11/29/2020    [address] [address] Not Detected 10/09/2020     Results from last 7 days   Lab Units 01/05/21  0544 01/04/21  0509 01/02/21  0552 01/02/21  0551 12/30/20  0458   D-DIMER QUANTITATIVE ug/ml FEU 0 47 0 27 0 39  --  0 48   CRP mg/L <3 0  --   --  <3 0 7 6*   FERRITIN ng/mL 216  --   --  257  --

## 2021-01-05 NOTE — ASSESSMENT & PLAN NOTE
· Transaminitis secondary to viral infection    Results from last 7 days   Lab Units 01/05/21  0544 01/04/21  0509 01/03/21  0626 01/02/21  0551   AST U/L 20 25 38 26   ALT U/L 59 64 71 75   TOTAL BILIRUBIN mg/dL 0 37 0 30 0 40 0 29

## 2021-01-05 NOTE — PLAN OF CARE
Problem: Potential for Falls  Goal: Patient will remain free of falls  Description: INTERVENTIONS:  - Assess patient frequently for physical needs  -  Identify cognitive and physical deficits and behaviors that affect risk of falls  -  [place] fall precautions as indicated by assessment   - Educate patient/family on patient safety including physical limitations  - Instruct patient to call for assistance with activity based on assessment  - Modify environment to reduce risk of injury  - Consider OT/PT consult to assist with strengthening/mobility  Outcome: Progressing     Problem: RESPIRATORY - ADULT  Goal: Achieves optimal ventilation and oxygenation  Description: INTERVENTIONS:  - Assess for changes in respiratory status  - Assess for changes in mentation and behavior  - Position to facilitate oxygenation and minimize respiratory effort  - Oxygen administered by appropriate delivery if ordered  - Initiate smoking cessation education as indicated  - Encourage broncho-pulmonary hygiene including cough, deep breathe, Incentive Spirometry  - Assess the need for suctioning and aspirate as needed  - Assess and instruct to report SOB or any respiratory difficulty  - Respiratory Therapy support as indicated  Outcome: Progressing     Problem: SAFETY ADULT  Goal: Patient will remain free of falls  Description: INTERVENTIONS:  - Assess patient frequently for physical needs  -  Identify cognitive and physical deficits and behaviors that affect risk of falls    -  [place] fall precautions as indicated by assessment   - Educate patient/family on patient safety including physical limitations  - Instruct patient to call for assistance with activity based on assessment  - Modify environment to reduce risk of injury  - Consider OT/PT consult to assist with strengthening/mobility  Outcome: Progressing  Goal: Maintain or return to baseline ADL function  Description: INTERVENTIONS:  -  Assess patient's ability to carry out ADLs; assess patient's baseline for ADL function and identify physical deficits which impact ability to perform ADLs (bathing, care of mouth/teeth, toileting, grooming, dressing, etc )  - Assess/evaluate cause of self-care deficits   - Assess range of motion  - Assess patient's mobility; develop plan if impaired  - Assess patient's need for assistive devices and provide as appropriate  - Encourage maximum independence but intervene and supervise when necessary  - Involve family in performance of ADLs  - Assess for home care needs following discharge   - Consider OT consult to assist with ADL evaluation and planning for discharge  - Provide patient education as appropriate  Outcome: Progressing  Goal: Maintain or return mobility status to optimal level  Description: INTERVENTIONS:  - Assess patient's baseline mobility status (ambulation, transfers, stairs, etc )    - Identify cognitive and physical deficits and behaviors that affect mobility  - Identify mobility aids required to assist with transfers and/or ambulation (gait belt, sit-to-stand, lift, walker, cane, etc )  - [place] fall precautions as indicated by assessment  - Record patient progress and toleration of activity level on Mobility SBAR; progress patient to next Phase/Stage  - Instruct patient to call for assistance with activity based on assessment  - Consider rehabilitation consult to assist with strengthening/weightbearing, etc   Outcome: Progressing

## 2021-01-05 NOTE — PROGRESS NOTES
Progress Note - [place] 1955, 76 y o  female MRN: [id number]    Unit/Bed#: -01 Encounter: [id number]    Primary Care Provider: LUIS CARLOS Urban   Date and time admitted to hospital: 12/4/2020  9:15 AM        * Acute hypoxemic respiratory failure due to COVID-19 [facility])  Assessment & Plan  · Acute respiratory failure/hypoxia secondary to COVID-19  · Completed remdesivir and received convalescent plasma and Actemra  · Completed to courses dexamethasone  · Continue weaning oxygen and will discharge home with home oxygen    Lab Results   Component Value Date    SARSCOV2 Detected (A) 11/29/2020    SARSCOV2 Not Detected 10/09/2020     Results from last 7 days   Lab Units 01/05/21  0544 01/04/21  0509 01/02/21  0552 01/02/21  0551 12/30/20  0458   D-DIMER QUANTITATIVE ug/ml FEU 0 47 0 27 0 39  --  0 48   CRP mg/L <3 0  --   --  <3 0 7 6*   FERRITIN ng/mL 216  --   --  257  --        Possible sinusitis  Assessment & Plan  · Pansinusitis completed course of augmentin as recommended by pulmonology    Asthma  Assessment & Plan  · Asthma without exacerbation    GERD (gastroesophageal reflux disease)  Assessment & Plan  · GERD continue famotidine    Bipolar disorder   Assessment & Plan  · Bipolar mood stable trazodone sertraline lorazepam and lithium    Transaminitis-resolved as of 1/2/2021  Assessment & Plan  · Transaminitis secondary to viral infection    Results from last 7 days   Lab Units 01/05/21  0544 01/04/21  0509 01/03/21  0626 01/02/21  0551   AST U/L 20 25 38 26   ALT U/L 59 64 71 75   TOTAL BILIRUBIN mg/dL 0 37 0 30 0 40 0 29       VTE Pharmacologic Prophylaxis: VTE Score: 6 High Risk (Score >/= 5) - Pharmacological DVT Prophylaxis Ordered: Enoxaparin (Lovenox)  Sequential Compression Devices Ordered  Patient Centered Rounds: I have performed bedside rounds with nursing staff today    Discussions with Specialists or Other Care Team Provider:  Case management    Education and Discussions with Family / Patient:  Significant other on telephone    Time Spent for Care: 25 mins  More than 50% of total time spent on counseling and coordination of care as described above  Current Length of Stay: 32 day(s)  Current Patient Status: Inpatient   Certification Statement: The patient will continue to require additional inpatient hospital stay due to COVID-19 and respiratory failure  Discharge Plan / Estimated Discharge Date: 24-48 hours with home oxygen    Code Status: Level 1 - Full Code      Subjective:   Patient seen and examined  Feeling much better  Down to 4 L  Objective:   Vitals: Blood pressure 102/60, pulse 67, temperature (!) 97 3 °F (36 3 °C), resp  rate 20, height 5' 1" (1 549 m), weight 77 1 kg (170 lb), SpO2 97 %  Physical Exam  Vitals signs reviewed  Constitutional:       General: She is not in acute distress  Appearance: Normal appearance  HENT:      Head: Atraumatic  Eyes:      General: No scleral icterus  Extraocular Movements: Extraocular movements intact  Cardiovascular:      Rate and Rhythm: Regular rhythm  Heart sounds: Normal heart sounds  Pulmonary:      Breath sounds: Decreased breath sounds present  No wheezing  Abdominal:      General: Bowel sounds are normal       Palpations: Abdomen is soft  Tenderness: There is no guarding or rebound  Musculoskeletal:         General: No swelling  Skin:     General: Skin is warm  Neurological:      Mental Status: She is alert and oriented to person, place, and time  Mental status is at baseline     Psychiatric:         Mood and Affect: Mood normal        Additional Data:   Labs:  Results from last 7 days   Lab Units 01/05/21  0544 01/04/21  0509 01/03/21  0626 01/02/21  0552 12/30/20  0458   WBC Thousand/uL 7 17 7 12 6 72 7 22 8 61   HEMOGLOBIN g/dL 11 7 10 3* 10 9* 10 7* 9 7*   HEMATOCRIT % 37 6 32 5* 35 9 34 9 31 6*   MCV fL 87 86 88 88 88   PLATELETS Thousands/uL 240 249 287 270 313     Results from last 7 days   Lab Units 01/05/21  0544 01/04/21  0509 01/03/21  0626 01/02/21  0551 12/30/20  0458   SODIUM mmol/L 144 142 144 144 146*   POTASSIUM mmol/L 3 7 3 6 4 0 3 6 3 7   CHLORIDE mmol/L 114* 112* 114* 114* 112*   CO2 mmol/L 26 25 26 26 25   ANION GAP mmol/L 4 5 4 4 9   BUN mg/dL 19 19 20 22 22   CREATININE mg/dL 0 85 0 77 0 94 0 87 0 91   CALCIUM mg/dL 9 3 8 8 9 0 8 9 9 1   ALBUMIN g/dL 3 6 3 1* 3 3* 3 2*  --    TOTAL BILIRUBIN mg/dL 0 37 0 30 0 40 0 29  --    ALK PHOS U/L 104 92 93 94  --    ALT U/L 59 64 71 75  --    AST U/L 20 25 38 26  --    EGFR ml/min/1 73sq m 72 81 63 70 66   GLUCOSE RANDOM mg/dL 84 87 84 89 90     Results from last 7 days   Lab Units 12/30/20  0458   MAGNESIUM mg/dL 2 6   PHOSPHORUS mg/dL 4 5*                              * I Have Reviewed All Lab Data Listed Above  Cultures:             Results from last 7 days   Lab Units 01/04/21  2255   FECAL OCCULT BLOOD DIAGNOSTIC  Negative   FECAL OCCULT BLOOD DIAGNOSTIC 2  Negative   FECAL OCCULT BLOOD DIAGNOSTIC 3  Negative       Lines/Drains:  Invasive Devices     Peripheral Intravenous Line            Peripheral IV 01/03/21 Left Antecubital 2 days              Telemetry:      Imaging:  Imaging Reports Reviewed Today Include:   Xr Chest 1 View Portable    Result Date: 12/4/2020  Impression: Left lung base infiltrate  In the setting of clinically suspected/proven COVID-19, this plain film appearance does not contain findings that raise concern for viral pneumonia such as COVID-19, but does not rule out this diagnosis  The study was marked in EPIC for significant notification   Workstation performed: [id number]     Scheduled Meds:  Current Facility-Administered Medications   Medication Dose Route Frequency Provider Last Rate    acetaminophen  650 mg Oral Q6H PRN Cristian Weaver MD      albuterol  2 puff Inhalation Q4H PRN Cristian Weaver MD      albuterol  2 puff Inhalation TID Cristian Weaver MD      aluminum-magnesium hydroxide-simethicone  30 mL Oral Q4H PRN Froilan Shaw MD      artificial tear   Both Eyes Daily PRN Claudetta Cuba, MD      atorvastatin  40 mg Oral Daily With Katlin Waddell MD      azelastine  1 spray Each Nare BID Froilan Shaw MD      benzonatate  100 mg Oral TID PRN Froilan Shaw MD      bisacodyl  10 mg Rectal Daily PRN Ora Quarto, CRNP      cholecalciferol  2,000 Units Oral Daily María Melendez, LUIS CARLOS      enoxaparin  30 mg Subcutaneous Q12H [address] [place] [address], MD      famotidine  20 mg Oral Q12H Froilan Shaw MD      fluticasone  2 spray Each Nare Daily Froilan Shaw MD      gabapentin  300 mg Oral Daily Froilan Shaw MD      lidocaine  1 patch Topical Daily Froilan Shaw MD      lithium carbonate  300 mg Oral HS Froilan Shaw MD      loratadine  10 mg Oral Daily Froilan Shaw MD      LORazepam  1 mg Oral BID PRN Froilan Shaw MD      LORazepam  1 5 mg Oral HS Froilan Shaw MD      melatonin  6 mg Oral HS Froilan Shaw MD      menthol-methyl salicylate   Apply externally 4x Daily PRN Froilan Shaw MD      multivitamin-minerals  1 tablet Oral Daily María Melendez, LUIS CARLOS      ondansetron  4 mg Intravenous Q6H PRN Froilan Shaw MD      polyethylene glycol  17 g Oral Daily PRN Ora Quarto, CRNP      senna-docusate sodium  3 tablet Oral BID PRN Ora Quarto, CRNP      sertraline  100 mg Oral HS Froilan Shaw MD      sodium chloride  2 spray Each Nare Q2H PRN Froilan Shaw MD      traZODone  100 mg Oral HS MD Ronaldo Mayers DO [address] Internal Medicine  Hospitalist    ** Please Note: This note has been constructed using a voice recognition system   **

## 2021-01-05 NOTE — PHYSICAL THERAPY NOTE
Physical Therapy Progress Note     01/05/21 1145   PT Last Visit   PT Visit Date 01/05/21   Note Type   Note Type Treatment   Pain Assessment   Pain Assessment Tool Pain Assessment not indicated - pt denies pain   Pain Score No Pain   Restrictions/Precautions   Other Precautions Contact/isolation; Airborne/isolation;Telemetry;O2;Fall Risk   Subjective   Subjective The pt  notes that she is feeling better, but that she continues to desaturate with any activity  Bed Mobility   Supine to Sit 5  Supervision   Additional items Increased time required   Transfers   Sit to Stand 5  Supervision   Stand to Sit 5  Supervision   Ambulation/Elevation   Gait pattern Excessively slow; Inconsistent grant   Gait Assistance 5  Supervision   Assistive Device None   Distance 8 feet, 20 feet  Balance   Static Sitting Good   Dynamic Sitting Good   Static Standing Fair   Ambulatory Fair   Activity Tolerance   Activity Tolerance Patient tolerated treatment well;Patient limited by fatigue;Treatment limited secondary to medical complications (Comment)  (O2 desaturation )   Nurse Noemi Wiley RN  Assessment   Prognosis Good   Problem List Decreased endurance; Impaired balance;Decreased mobility   Assessment The pt  has improving balance and overall activity tolerance  She does, however, continue to rapidly decompensate with activity and require extended recovery times after even very short activities  She was able to ambulate to the chair, but desaturated to 84% after only a few feet  After several minutes seated rest and recovery to 94% SPO2, she was able to ambulate a little farther, but this was followed again by rapid decompensation  She demonstrated proper pursed-lip breathing techniques  Reviewed proper use of the incentive spirometer with the pt  and reinforced the importance of performing this during every commercial break 1-2 times  Also reviewed energy conservation techniques and home setup for when she does return home  The pt  remains primarily limited due to her O2 decompensation, but she is also limited due to weakness and decreased stamina as a result of that  Anticipate that when her oxygen demands improve that she will be able to mobilize at home safely  Goals   Patient Goals To go home  STG Expiration Date 01/12/21   PT Treatment Day 4   Plan   Treatment/Interventions Functional transfer training;Elevations;LE strengthening/ROM; Therapeutic exercise; Endurance training;Patient/family training;Bed mobility;Gait training   Progress Progressing toward goals   PT Frequency   (3-5x a week )   Recommendation   PT Discharge Recommendation Home with skilled therapy     Bryson Swan [address], PTA

## 2021-01-05 NOTE — PLAN OF CARE
Problem: PHYSICAL THERAPY ADULT  Goal: Performs mobility at highest level of function for planned discharge setting  See evaluation for individualized goals  Description: Treatment/Interventions: Functional transfer training, LE strengthening/ROM, Elevations, Therapeutic exercise, Endurance training, Patient/family training, Equipment eval/education, Bed mobility, Gait training, Spoke to nursing, OT  Equipment Recommended: Other (Comment)(tbd, likely RW pending progress)       See flowsheet documentation for full assessment, interventions and recommendations  Outcome: Progressing  Note: Prognosis: Good  Problem List: Decreased endurance, Impaired balance, Decreased mobility  Assessment: The pt  has improving balance and overall activity tolerance  She does, however, continue to rapidly decompensate with activity and require extended recovery times after even very short activities  She was able to ambulate to the chair, but desaturated to 84% after only a few feet  After several minutes seated rest and recovery to 94% SPO2, she was able to ambulate a little farther, but this was followed again by rapid decompensation  She demonstrated proper pursed-lip breathing techniques  Reviewed proper use of the incentive spirometer with the pt  and reinforced the importance of performing this during every commercial break 1-2 times  Also reviewed energy conservation techniques and home setup for when she does return home  The pt  remains primarily limited due to her O2 decompensation, but she is also limited due to weakness and decreased stamina as a result of that  Anticipate that when her oxygen demands improve that she will be able to mobilize at home safely  PT Discharge Recommendation: Home with skilled therapy     PT - OK to Discharge: No    See flowsheet documentation for full assessment

## 2021-01-05 NOTE — PLAN OF CARE
Problem: Potential for Falls  Goal: Patient will remain free of falls  Description: INTERVENTIONS:  - Assess patient frequently for physical needs  -  Identify cognitive and physical deficits and behaviors that affect risk of falls    -  [place] fall precautions as indicated by assessment   - Educate patient/family on patient safety including physical limitations  - Instruct patient to call for assistance with activity based on assessment  - Modify environment to reduce risk of injury  - Consider OT/PT consult to assist with strengthening/mobility  Outcome: Progressing     Problem: RESPIRATORY - ADULT  Goal: Achieves optimal ventilation and oxygenation  Description: INTERVENTIONS:  - Assess for changes in respiratory status  - Assess for changes in mentation and behavior  - Position to facilitate oxygenation and minimize respiratory effort  - Oxygen administered by appropriate delivery if ordered  - Initiate smoking cessation education as indicated  - Encourage broncho-pulmonary hygiene including cough, deep breathe, Incentive Spirometry  - Assess the need for suctioning and aspirate as needed  - Assess and instruct to report SOB or any respiratory difficulty  - Respiratory Therapy support as indicated  Outcome: Progressing     Problem: METABOLIC, FLUID AND ELECTROLYTES - ADULT  Goal: Electrolytes maintained within normal limits  Description: INTERVENTIONS:  - Monitor labs and assess patient for signs and symptoms of electrolyte imbalances  - Administer electrolyte replacement as ordered  - Monitor response to electrolyte replacements, including repeat lab results as appropriate  - Instruct patient on fluid and nutrition as appropriate  Outcome: Progressing  Goal: Fluid balance maintained  Description: INTERVENTIONS:  - Monitor labs   - Monitor I/O and WT  - Instruct patient on fluid and nutrition as appropriate  - Assess for signs & symptoms of volume excess or deficit  Outcome: Progressing     Problem: INFECTION - ADULT  Goal: Absence or prevention of progression during hospitalization  Description: INTERVENTIONS:  - Assess and monitor for signs and symptoms of infection  - Monitor lab/diagnostic results  - Monitor all insertion sites, i e  indwelling lines, tubes, and drains  - Monitor endotracheal if appropriate and nasal secretions for changes in amount and color  - [place] appropriate cooling/warming therapies per order  - Administer medications as ordered  - Instruct and encourage patient and family to use good hand hygiene technique  - Identify and instruct in appropriate isolation precautions for identified infection/condition  Outcome: Progressing  Goal: Absence of fever/infection during neutropenic period  Description: INTERVENTIONS:  - Monitor WBC    Outcome: Progressing     Problem: SAFETY ADULT  Goal: Patient will remain free of falls  Description: INTERVENTIONS:  - Assess patient frequently for physical needs  -  Identify cognitive and physical deficits and behaviors that affect risk of falls    -  [place] fall precautions as indicated by assessment   - Educate patient/family on patient safety including physical limitations  - Instruct patient to call for assistance with activity based on assessment  - Modify environment to reduce risk of injury  - Consider OT/PT consult to assist with strengthening/mobility  Outcome: Progressing  Goal: Maintain or return to baseline ADL function  Description: INTERVENTIONS:  -  Assess patient's ability to carry out ADLs; assess patient's baseline for ADL function and identify physical deficits which impact ability to perform ADLs (bathing, care of mouth/teeth, toileting, grooming, dressing, etc )  - Assess/evaluate cause of self-care deficits   - Assess range of motion  - Assess patient's mobility; develop plan if impaired  - Assess patient's need for assistive devices and provide as appropriate  - Encourage maximum independence but intervene and supervise when necessary  - Involve family in performance of ADLs  - Assess for home care needs following discharge   - Consider OT consult to assist with ADL evaluation and planning for discharge  - Provide patient education as appropriate  Outcome: Progressing  Goal: Maintain or return mobility status to optimal level  Description: INTERVENTIONS:  - Assess patient's baseline mobility status (ambulation, transfers, stairs, etc )    - Identify cognitive and physical deficits and behaviors that affect mobility  - Identify mobility aids required to assist with transfers and/or ambulation (gait belt, sit-to-stand, lift, walker, cane, etc )  - [place] fall precautions as indicated by assessment  - Record patient progress and toleration of activity level on Mobility SBAR; progress patient to next Phase/Stage  - Instruct patient to call for assistance with activity based on assessment  - Consider rehabilitation consult to assist with strengthening/weightbearing, etc   Outcome: Progressing     Problem: DISCHARGE PLANNING  Goal: Discharge to home or other facility with appropriate resources  Description: INTERVENTIONS:  - Identify barriers to discharge w/patient and caregiver  - Arrange for needed discharge resources and transportation as appropriate  - Identify discharge learning needs (meds, wound care, etc )  - Arrange for interpretive services to assist at discharge as needed  - Refer to Case Management Department for coordinating discharge planning if the patient needs post-hospital services based on physician/advanced practitioner order or complex needs related to functional status, cognitive ability, or social support system  Outcome: Progressing     Problem: Knowledge Deficit  Goal: Patient/family/caregiver demonstrates understanding of disease process, treatment plan, medications, and discharge instructions  Description: Complete learning assessment and assess knowledge base    Interventions:  - Provide teaching at level of understanding  - Provide teaching via preferred learning methods  Outcome: Progressing     Problem: Prexisting or High Potential for Compromised Skin Integrity  Goal: Skin integrity is maintained or improved  Description: INTERVENTIONS:  - Identify patients at risk for skin breakdown  - Assess and monitor skin integrity  - Assess and monitor nutrition and hydration status  - Monitor labs   - Assess for incontinence   - Turn and reposition patient  - Assist with mobility/ambulation  - Relieve pressure over bony prominences  - Avoid friction and shearing  - Provide appropriate hygiene as needed including keeping skin clean and dry  - Evaluate need for skin moisturizer/barrier cream  - Collaborate with interdisciplinary team   - Patient/family teaching  - Consider wound care consult   Outcome: Progressing     Problem: Nutrition/Hydration-ADULT  Goal: Nutrient/Hydration intake appropriate for improving, restoring or maintaining nutritional needs  Description: Monitor and assess patient's nutrition/hydration status for malnutrition  Collaborate with interdisciplinary team and initiate plan and interventions as ordered  Monitor patient's weight and dietary intake as ordered or per policy  Utilize nutrition screening tool and intervene as necessary  Determine patient's food preferences and provide high-protein, high-caloric foods as appropriate       INTERVENTIONS:  - Monitor oral intake, urinary output, labs, and treatment plans  - Assess nutrition and hydration status and recommend course of action  - Evaluate amount of meals eaten  - Assist patient with eating if necessary   - Allow adequate time for meals  - Recommend/ encourage appropriate diets, oral nutritional supplements, and vitamin/mineral supplements  - Order, calculate, and assess calorie counts as needed  - Recommend, monitor, and adjust tube feedings and TPN/PPN based on assessed needs  - Assess need for intravenous fluids  - Provide specific nutrition/hydration education as appropriate  - Include patient/family/caregiver in decisions related to nutrition  Outcome: Progressing

## 2021-01-05 NOTE — RESPIRATORY THERAPY NOTE
01/05/21 1700   Respiratory Assessment   Resp Comments Home O2 evaluation competed  Pt is not ready to go home on oxygen  She desaturated to 77% on 4L during the first couple of steps and required 6L O2 NC to reach 88% at rest aprox 5 min after desaturation      Additional Assessments   Pulse 88   SpO2 89 %

## 2021-01-06 PROCEDURE — 99232 SBSQ HOSP IP/OBS MODERATE 35: CPT | Performed by: INTERNAL MEDICINE

## 2021-01-06 PROCEDURE — 97535 SELF CARE MNGMENT TRAINING: CPT

## 2021-01-06 RX ORDER — METHYLPREDNISOLONE SODIUM SUCCINATE 40 MG/ML
40 INJECTION, POWDER, LYOPHILIZED, FOR SOLUTION INTRAMUSCULAR; INTRAVENOUS EVERY 8 HOURS SCHEDULED
Status: DISCONTINUED | OUTPATIENT
Start: 2021-01-06 | End: 2021-01-11 | Stop reason: HOSPADM

## 2021-01-06 RX ORDER — AMOXICILLIN 250 MG
1 CAPSULE ORAL
Status: DISCONTINUED | OUTPATIENT
Start: 2021-01-06 | End: 2021-01-11 | Stop reason: HOSPADM

## 2021-01-06 RX ORDER — FUROSEMIDE 10 MG/ML
20 INJECTION INTRAMUSCULAR; INTRAVENOUS
Status: DISCONTINUED | OUTPATIENT
Start: 2021-01-06 | End: 2021-01-10

## 2021-01-06 RX ORDER — POLYETHYLENE GLYCOL 3350 17 G/17G
17 POWDER, FOR SOLUTION ORAL DAILY
Status: DISCONTINUED | OUTPATIENT
Start: 2021-01-06 | End: 2021-01-11 | Stop reason: HOSPADM

## 2021-01-06 RX ADMIN — LITHIUM CARBONATE 300 MG: 300 CAPSULE, GELATIN COATED ORAL at 21:50

## 2021-01-06 RX ADMIN — LORAZEPAM 1.5 MG: 1 TABLET ORAL at 21:47

## 2021-01-06 RX ADMIN — DOCUSATE SODIUM AND SENNOSIDES 1 TABLET: 8.6; 5 TABLET ORAL at 21:49

## 2021-01-06 RX ADMIN — FAMOTIDINE 20 MG: 20 TABLET ORAL at 21:48

## 2021-01-06 RX ADMIN — POLYETHYLENE GLYCOL 3350 17 G: 17 POWDER, FOR SOLUTION ORAL at 17:40

## 2021-01-06 RX ADMIN — MELATONIN TAB 3 MG 6 MG: 3 TAB at 21:48

## 2021-01-06 RX ADMIN — SERTRALINE HYDROCHLORIDE 100 MG: 100 TABLET ORAL at 21:50

## 2021-01-06 RX ADMIN — ATORVASTATIN CALCIUM 40 MG: 40 TABLET, FILM COATED ORAL at 17:38

## 2021-01-06 RX ADMIN — LORATADINE 10 MG: 10 TABLET ORAL at 08:51

## 2021-01-06 RX ADMIN — METHYLPREDNISOLONE SODIUM SUCCINATE 40 MG: 40 INJECTION, POWDER, FOR SOLUTION INTRAMUSCULAR; INTRAVENOUS at 17:40

## 2021-01-06 RX ADMIN — METHYLPREDNISOLONE SODIUM SUCCINATE 40 MG: 40 INJECTION, POWDER, FOR SOLUTION INTRAMUSCULAR; INTRAVENOUS at 21:49

## 2021-01-06 RX ADMIN — TRAZODONE HYDROCHLORIDE 100 MG: 100 TABLET ORAL at 21:48

## 2021-01-06 RX ADMIN — Medication 1 TABLET: at 08:51

## 2021-01-06 RX ADMIN — LIDOCAINE 5% 1 PATCH: 700 PATCH TOPICAL at 08:50

## 2021-01-06 RX ADMIN — ENOXAPARIN SODIUM 30 MG: 30 INJECTION SUBCUTANEOUS at 21:49

## 2021-01-06 RX ADMIN — ALBUTEROL SULFATE 2 PUFF: 90 AEROSOL, METERED RESPIRATORY (INHALATION) at 21:50

## 2021-01-06 RX ADMIN — FAMOTIDINE 20 MG: 20 TABLET ORAL at 08:51

## 2021-01-06 RX ADMIN — AZELASTINE HYDROCHLORIDE 1 SPRAY: 137 SPRAY, METERED NASAL at 17:53

## 2021-01-06 RX ADMIN — ALBUTEROL SULFATE 2 PUFF: 90 AEROSOL, METERED RESPIRATORY (INHALATION) at 17:53

## 2021-01-06 RX ADMIN — ALBUTEROL SULFATE 2 PUFF: 90 AEROSOL, METERED RESPIRATORY (INHALATION) at 08:47

## 2021-01-06 RX ADMIN — ENOXAPARIN SODIUM 30 MG: 30 INJECTION SUBCUTANEOUS at 08:50

## 2021-01-06 RX ADMIN — AZELASTINE HYDROCHLORIDE 1 SPRAY: 137 SPRAY, METERED NASAL at 08:47

## 2021-01-06 RX ADMIN — FUROSEMIDE 20 MG: 10 INJECTION, SOLUTION INTRAMUSCULAR; INTRAVENOUS at 17:38

## 2021-01-06 RX ADMIN — Medication 2000 UNITS: at 08:51

## 2021-01-06 RX ADMIN — GABAPENTIN 300 MG: 300 CAPSULE ORAL at 08:51

## 2021-01-06 RX ADMIN — FLUTICASONE PROPIONATE 2 SPRAY: 50 SPRAY, METERED NASAL at 08:47

## 2021-01-06 NOTE — PLAN OF CARE
Problem: Potential for Falls  Goal: Patient will remain free of falls  Description: INTERVENTIONS:  - Assess patient frequently for physical needs  -  Identify cognitive and physical deficits and behaviors that affect risk of falls    -  [place] fall precautions as indicated by assessment   - Educate patient/family on patient safety including physical limitations  - Instruct patient to call for assistance with activity based on assessment  - Modify environment to reduce risk of injury  - Consider OT/PT consult to assist with strengthening/mobility  Outcome: Progressing     Problem: RESPIRATORY - ADULT  Goal: Achieves optimal ventilation and oxygenation  Description: INTERVENTIONS:  - Assess for changes in respiratory status  - Assess for changes in mentation and behavior  - Position to facilitate oxygenation and minimize respiratory effort  - Oxygen administered by appropriate delivery if ordered  - Initiate smoking cessation education as indicated  - Encourage broncho-pulmonary hygiene including cough, deep breathe, Incentive Spirometry  - Assess the need for suctioning and aspirate as needed  - Assess and instruct to report SOB or any respiratory difficulty  - Respiratory Therapy support as indicated  Outcome: Progressing     Problem: METABOLIC, FLUID AND ELECTROLYTES - ADULT  Goal: Electrolytes maintained within normal limits  Description: INTERVENTIONS:  - Monitor labs and assess patient for signs and symptoms of electrolyte imbalances  - Administer electrolyte replacement as ordered  - Monitor response to electrolyte replacements, including repeat lab results as appropriate  - Instruct patient on fluid and nutrition as appropriate  Outcome: Progressing  Goal: Fluid balance maintained  Description: INTERVENTIONS:  - Monitor labs   - Monitor I/O and WT  - Instruct patient on fluid and nutrition as appropriate  - Assess for signs & symptoms of volume excess or deficit  Outcome: Progressing     Problem: HEMATOLOGIC - ADULT  Goal: Maintains hematologic stability  Description: INTERVENTIONS  - Assess for signs and symptoms of bleeding or hemorrhage  - Monitor labs  - Administer supportive blood products/factors as ordered and appropriate  Outcome: Progressing     Problem: INFECTION - ADULT  Goal: Absence or prevention of progression during hospitalization  Description: INTERVENTIONS:  - Assess and monitor for signs and symptoms of infection  - Monitor lab/diagnostic results  - Monitor all insertion sites, i e  indwelling lines, tubes, and drains  - Monitor endotracheal if appropriate and nasal secretions for changes in amount and color  - [place] appropriate cooling/warming therapies per order  - Administer medications as ordered  - Instruct and encourage patient and family to use good hand hygiene technique  - Identify and instruct in appropriate isolation precautions for identified infection/condition  Outcome: Progressing  Goal: Absence of fever/infection during neutropenic period  Description: INTERVENTIONS:  - Monitor WBC    Outcome: Progressing     Problem: SAFETY ADULT  Goal: Patient will remain free of falls  Description: INTERVENTIONS:  - Assess patient frequently for physical needs  -  Identify cognitive and physical deficits and behaviors that affect risk of falls    -  [place] fall precautions as indicated by assessment   - Educate patient/family on patient safety including physical limitations  - Instruct patient to call for assistance with activity based on assessment  - Modify environment to reduce risk of injury  - Consider OT/PT consult to assist with strengthening/mobility  Outcome: Progressing  Goal: Maintain or return to baseline ADL function  Description: INTERVENTIONS:  -  Assess patient's ability to carry out ADLs; assess patient's baseline for ADL function and identify physical deficits which impact ability to perform ADLs (bathing, care of mouth/teeth, toileting, grooming, dressing, etc )  - Assess/evaluate cause of self-care deficits   - Assess range of motion  - Assess patient's mobility; develop plan if impaired  - Assess patient's need for assistive devices and provide as appropriate  - Encourage maximum independence but intervene and supervise when necessary  - Involve family in performance of ADLs  - Assess for home care needs following discharge   - Consider OT consult to assist with ADL evaluation and planning for discharge  - Provide patient education as appropriate  Outcome: Progressing  Goal: Maintain or return mobility status to optimal level  Description: INTERVENTIONS:  - Assess patient's baseline mobility status (ambulation, transfers, stairs, etc )    - Identify cognitive and physical deficits and behaviors that affect mobility  - Identify mobility aids required to assist with transfers and/or ambulation (gait belt, sit-to-stand, lift, walker, cane, etc )  - [place] fall precautions as indicated by assessment  - Record patient progress and toleration of activity level on Mobility SBAR; progress patient to next Phase/Stage  - Instruct patient to call for assistance with activity based on assessment  - Consider rehabilitation consult to assist with strengthening/weightbearing, etc   Outcome: Progressing     Problem: DISCHARGE PLANNING  Goal: Discharge to home or other facility with appropriate resources  Description: INTERVENTIONS:  - Identify barriers to discharge w/patient and caregiver  - Arrange for needed discharge resources and transportation as appropriate  - Identify discharge learning needs (meds, wound care, etc )  - Arrange for interpretive services to assist at discharge as needed  - Refer to Case Management Department for coordinating discharge planning if the patient needs post-hospital services based on physician/advanced practitioner order or complex needs related to functional status, cognitive ability, or social support system  Outcome: Progressing     Problem: Knowledge Deficit  Goal: Patient/family/caregiver demonstrates understanding of disease process, treatment plan, medications, and discharge instructions  Description: Complete learning assessment and assess knowledge base  Interventions:  - Provide teaching at level of understanding  - Provide teaching via preferred learning methods  Outcome: Progressing     Problem: Prexisting or High Potential for Compromised Skin Integrity  Goal: Skin integrity is maintained or improved  Description: INTERVENTIONS:  - Identify patients at risk for skin breakdown  - Assess and monitor skin integrity  - Assess and monitor nutrition and hydration status  - Monitor labs   - Assess for incontinence   - Turn and reposition patient  - Assist with mobility/ambulation  - Relieve pressure over bony prominences  - Avoid friction and shearing  - Provide appropriate hygiene as needed including keeping skin clean and dry  - Evaluate need for skin moisturizer/barrier cream  - Collaborate with interdisciplinary team   - Patient/family teaching  - Consider wound care consult   Outcome: Progressing     Problem: Nutrition/Hydration-ADULT  Goal: Nutrient/Hydration intake appropriate for improving, restoring or maintaining nutritional needs  Description: Monitor and assess patient's nutrition/hydration status for malnutrition  Collaborate with interdisciplinary team and initiate plan and interventions as ordered  Monitor patient's weight and dietary intake as ordered or per policy  Utilize nutrition screening tool and intervene as necessary  Determine patient's food preferences and provide high-protein, high-caloric foods as appropriate       INTERVENTIONS:  - Monitor oral intake, urinary output, labs, and treatment plans  - Assess nutrition and hydration status and recommend course of action  - Evaluate amount of meals eaten  - Assist patient with eating if necessary   - Allow adequate time for meals  - Recommend/ encourage appropriate diets, oral nutritional supplements, and vitamin/mineral supplements  - Order, calculate, and assess calorie counts as needed  - Recommend, monitor, and adjust tube feedings and TPN/PPN based on assessed needs  - Assess need for intravenous fluids  - Provide specific nutrition/hydration education as appropriate  - Include patient/family/caregiver in decisions related to nutrition  Outcome: Progressing

## 2021-01-06 NOTE — PLAN OF CARE
Problem: OCCUPATIONAL THERAPY ADULT  Goal: Performs self-care activities at highest level of function for planned discharge setting  See evaluation for individualized goals  Description: Treatment Interventions: ADL retraining, Functional transfer training, Endurance training, Energy conservation, Continued evaluation, Patient/family training  Equipment Recommended: (S) Other (comment)(TUB TRANSFER BENCH)       See flowsheet documentation for full assessment, interventions and recommendations  Outcome: Adequate for Discharge  Note: Limitation: Decreased ADL status, Decreased UE strength, Decreased endurance, Decreased self-care trans, Decreased high-level ADLs  Prognosis: Good  Assessment: Patient participated in Skilled OT session 1/6/2021 with interventions consisting of ADL re training with the use of correct body mechnaics, Energy Conservation techniques, deep breathing technique, safety awareness and fall prevention techniques, one handed dressing technique,  long handle equipment,  DME recommendations*,  therapeutic activities to: increase activity tolerance and increase OOB/ sitting tolerance  Patient agreeable to OT treatment session, upon arrival patient was found supine in bed  Pt overall is functioning at a S level for ADL and functional mobility tasks  Pt's main limiting factor is her decreased O2 sat w/ increased mvmt (drops to low 80's w/ minimal activity and takes prolonged period to recover)  Pt aware of limitations/deficits; educated on deep breathing and energy conservation strategies; pt demonstrated plan/recommendations appropriately  Pt reported no concerns about eventual D/C home and managing basic self care/IADL routine aside from showering  Recommend tub-bench and BSC upon D/C for pt  Pt reports she will have her BF assist w/ laundry and household chores and has someone designated for groceries   Pt very self aware of deficits and not demonstrating further need for OT services but will benefit from continued engagement in functional mobility to improve endurance and activity tolerance w/ PT and restorative staff as able  No further acute OT needs identified at this time to warrant continuation of services  D/C OT services  From OT standpoint, recommendation at time of d/c would be home w/ family support, along w/ tub-bench and BSC, once medically stable       OT Discharge Recommendation: Return to previous environment with social support  OT - OK to Discharge: Yes(when medically stable)     Kari Quiroz MS, OTR/L

## 2021-01-06 NOTE — OCCUPATIONAL THERAPY NOTE
Occupational Therapy Treatment Note      Francisco Sifuentes    2021    Principal Problem:    Acute hypoxemic respiratory failure due to COVID-19 [facility])  Active Problems:    Bipolar disorder     GERD (gastroesophageal reflux disease)    Asthma    COVID-19 virus infection    Possible sinusitis    Anemia      Past Medical History:   Diagnosis Date    Asthma     Bipolar disorder ([address] )     GERD (gastroesophageal reflux disease)        Past Surgical History:   Procedure Laterality Date     SECTION      x3    CHOLECYSTECTOMY      HYSTERECTOMY          21 1056   OT Last Visit   OT Visit Date 21   Note Type   Note Type Treatment   Restrictions/Precautions   Weight Bearing Precautions Per Order No   Other Precautions Contact/isolation; Airborne/isolation;O2;Fall Risk;Pain  (COVID(+); 6L O2)   Lifestyle   Autonomy pta pt peports I in ADLS/IADLs/funcitonal mobility   Reciprocal Relationships supportive boyfriend LOS   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   Intrinsic Gratification enjoys watching tv and spending time w/ her boyfriend   Pain Assessment   Pain Assessment Tool Pain Assessment not indicated - pt denies pain   Pain Score No Pain   ADL   Grooming Assistance 5  Supervision/Setup   Grooming Deficit Setup; Increased time to complete; Teeth care   Grooming Comments Pt completed grooming while seated in chair w/ setup  Able to manage oral hygiene w/ increased time 2/2 increased WOB w/ activity  Attempted grooming activity in standing however pt O2 sat dropped to 80% w/ prolonged static standing  Functional Standing Tolerance   Time 2 mins   Activity Static/dynamic standing   Comments Pt attempted to complete grooming in standing w/ no use of DME  Pt O2 dropped to 80% after 1 min w/ static stand  Pt needed seated rest break and VC for deep breathing, w/ increased time pt O2 increased to betwen 82-88%, w/ approx 3-4 min rest break     Bed Mobility   Supine to Sit 5  Supervision   Additional items Verbal cues Sit to Supine 5  Supervision   Additional items Verbal cues   Additional Comments Pt went from supine<>sit w/ S, no DME used  Sat EOB w/ fair sitting balance/trunk control   Transfers   Sit to Stand 5  Supervision   Additional items Verbal cues   Stand to Sit 5  Supervision   Additional items Verbal cues   Additional Comments Pt performed STS from EOB w/ S, no DME used  Functional Mobility   Functional Mobility 5  Supervision   Additional Comments Pt took few small steps from EOB to chair w/ S, no DME Used  pt limited by drop in O2 w/ increased mvmt  Dropped to 80%, took about 3-4 mins to recover to roughly 88-90% w/ seated rest break  Pt self initiated deep breathing  Cognition   Overall Cognitive Status WFL   Arousal/Participation Responsive; Cooperative   Attention Within functional limits   Orientation Level Oriented X4   Memory Within functional limits   Following Commands Follows all commands and directions without difficulty   Comments Pt is pleasant and cooperative   Activity Tolerance   Activity Tolerance Patient limited by fatigue   Medical Staff Made Aware RN   Assessment   Assessment Patient participated in Skilled OT session 1/6/2021 with interventions consisting of ADL re training with the use of correct body mechnaics, Energy Conservation techniques, deep breathing technique, safety awareness and fall prevention techniques, one handed dressing technique,  long handle equipment,  DME recommendations*,  therapeutic activities to: increase activity tolerance and increase OOB/ sitting tolerance  Patient agreeable to OT treatment session, upon arrival patient was found supine in bed  Pt overall is functioning at a S level for ADL and functional mobility tasks  Pt's main limiting factor is her decreased O2 sat w/ increased mvmt (drops to low 80's w/ minimal activity and takes prolonged period to recover)   Pt aware of limitations/deficits; educated on deep breathing and energy conservation strategies; pt demonstrated plan/recommendations appropriately  Pt reported no concerns about eventual D/C home and managing basic self care/IADL routine aside from showering  Recommend tub-bench and BSC upon D/C for pt  Pt reports she will have her BF assist w/ laundry and household chores and has someone designated for groceries  Pt very self aware of deficits and not demonstrating further need for OT services but will benefit from continued engagement in functional mobility to improve endurance and activity tolerance w/ PT and restorative staff as able  No further acute OT needs identified at this time to warrant continuation of services  D/C OT services  From OT standpoint, recommendation at time of d/c would be home w/ family support, along w/ tub-bench and BSC, once medically stable  Plan   Treatment Interventions Energy conservation; Endurance training   Goal Expiration Date 01/12/21   OT Treatment Day 2   OT Frequency 3-5x/wk   Recommendation   OT Discharge Recommendation Return to previous environment with social support   Equipment Recommended Other (comment); Bedside commode  (tub transfer bench )   OT - OK to Discharge Yes  (when medically stable)   [place] Index   Feeding 10   Bathing 5   Grooming Score 5   Dressing Score 10   Bladder Score 10   Bowels Score 10   Toilet Use Score 10   Transfers (Bed/Chair) Score 10   Mobility (Level Surface) Score 0   Stairs Score 0   [place] Index Score 70       Erika Ly MS, OTR/L

## 2021-01-06 NOTE — PROGRESS NOTES
Progress Note - Miguel Brown 1955, 76 y o  female MRN: [id number]    Unit/Bed#: -01 Encounter: [id number]    Primary Care Provider: LUIS CARLOS Hilario   Date and time admitted to hospital: 12/4/2020  9:15 AM        * Acute hypoxemic respiratory failure due to COVID-19 [facility])  Assessment & Plan  · Acute respiratory failure/hypoxia secondary to COVID-19  · Completed remdesivir and received convalescent plasma and Actemra  · Completed two courses dexamethasone  · Attempted to wean oxygen however patient has significant desaturation with ambulation down to 77% 6 L   · ? Progression to fibrotic state, will trial IV methylprednisolone and furosemide    Lab Results   Component Value Date    SARSCOV2 Detected (A) 11/29/2020    [facility] 98 Not Detected 10/09/2020     Results from last 7 days   Lab Units 01/05/21  0544 01/04/21  0509 01/02/21  0552 01/02/21  0551   D-DIMER QUANTITATIVE ug/ml FEU 0 47 0 27 0 39  --    CRP mg/L <3 0  --   --  <3 0   FERRITIN ng/mL 216  --   --  257       Possible sinusitis  Assessment & Plan  · Pansinusitis completed course of augmentin as recommended by pulmonology    Asthma  Assessment & Plan  · Asthma without exacerbation    GERD (gastroesophageal reflux disease)  Assessment & Plan  · GERD continue famotidine    Bipolar disorder   Assessment & Plan  · Bipolar mood stable trazodone sertraline lorazepam and lithium    Transaminitis-resolved as of 1/2/2021  Assessment & Plan  · Transaminitis secondary to viral infection    Results from last 7 days   Lab Units 01/05/21  0544 01/04/21  0509 01/03/21  0626 01/02/21  0551   AST U/L 20 25 38 26   ALT U/L 59 64 71 75   TOTAL BILIRUBIN mg/dL 0 37 0 30 0 40 0 29       VTE Pharmacologic Prophylaxis: VTE Score: 6 High Risk (Score >/= 5) - Pharmacological DVT Prophylaxis Ordered: Enoxaparin (Lovenox)  Sequential Compression Devices Ordered  Patient Centered Rounds: I have performed bedside rounds with nursing staff today    Discussions with Specialists or Other Care Team Provider:  Case management    Education and Discussions with Family / Patient:     Time Spent for Care: 25 mins  More than 50% of total time spent on counseling and coordination of care as described above  Current Length of Stay: 33 day(s)  Current Patient Status: Inpatient   Certification Statement: The patient will continue to require additional inpatient hospital stay due to hypoxia  Discharge Plan / Estimated Discharge Date: Anticipate discharge in 48-72 hrs to home  Code Status: Level 1 - Full Code      Subjective:   Patient seen and examined  Attempted to arrange home oxygen however patient has significant desaturation with ambulation    Objective:   Vitals: Blood pressure 102/53, pulse 82, temperature 97 6 °F (36 4 °C), temperature source Oral, resp  rate 16, height 5' 1" (1 549 m), weight 78 1 kg (172 lb 3 2 oz), SpO2 91 %  Physical Exam  Vitals signs reviewed  Constitutional:       General: She is not in acute distress  Appearance: Normal appearance  HENT:      Head: Atraumatic  Eyes:      General: No scleral icterus  Cardiovascular:      Rate and Rhythm: Regular rhythm  Heart sounds: Normal heart sounds  Pulmonary:      Breath sounds: Decreased breath sounds present  No wheezing  Comments: Crackles right base  Abdominal:      General: Bowel sounds are normal       Palpations: Abdomen is soft  Tenderness: There is no guarding or rebound  Musculoskeletal:         General: No swelling  Skin:     General: Skin is warm  Neurological:      Mental Status: She is alert and oriented to person, place, and time  Mental status is at baseline     Psychiatric:         Mood and Affect: Mood normal        Additional Data:   Labs:  Results from last 7 days   Lab Units 01/05/21  0544 01/04/21  0509 01/03/21  0626 01/02/21  0552   WBC Thousand/uL 7 17 7 12 6 72 7 22   HEMOGLOBIN g/dL 11 7 10 3* 10 9* 10 7*   HEMATOCRIT % 37 6 32 5* 35 9 34 9   MCV fL 87 86 88 88   PLATELETS Thousands/uL 240 249 287 270     Results from last 7 days   Lab Units 01/05/21  0544 01/04/21  0509 01/03/21  0626 01/02/21  0551   SODIUM mmol/L 144 142 144 144   POTASSIUM mmol/L 3 7 3 6 4 0 3 6   CHLORIDE mmol/L 114* 112* 114* 114*   CO2 mmol/L 26 25 26 26   ANION GAP mmol/L 4 5 4 4   BUN mg/dL 19 19 20 22   CREATININE mg/dL 0 85 0 77 0 94 0 87   CALCIUM mg/dL 9 3 8 8 9 0 8 9   ALBUMIN g/dL 3 6 3 1* 3 3* 3 2*   TOTAL BILIRUBIN mg/dL 0 37 0 30 0 40 0 29   ALK PHOS U/L 104 92 93 94   ALT U/L 59 64 71 75   AST U/L 20 25 38 26   EGFR ml/min/1 73sq m 72 81 63 70   GLUCOSE RANDOM mg/dL 84 87 84 89               * I Have Reviewed All Lab Data Listed Above  Cultures:             Results from last 7 days   Lab Units 01/04/21  2255   FECAL OCCULT BLOOD DIAGNOSTIC  Negative   FECAL OCCULT BLOOD DIAGNOSTIC 2  Negative   FECAL OCCULT BLOOD DIAGNOSTIC 3  Negative       Lines/Drains:  Invasive Devices     Peripheral Intravenous Line            Peripheral IV 01/05/21 Left Forearm less than 1 day              Telemetry:      Imaging:  Imaging Reports Reviewed Today Include:   Xr Chest 1 View Portable    Result Date: 12/4/2020  Impression: Left lung base infiltrate  In the setting of clinically suspected/proven COVID-19, this plain film appearance does not contain findings that raise concern for viral pneumonia such as COVID-19, but does not rule out this diagnosis  The study was marked in EPIC for significant notification   Workstation performed: [id number]     Scheduled Meds:  Current Facility-Administered Medications   Medication Dose Route Frequency Provider Last Rate    acetaminophen  650 mg Oral Q6H PRN Sena Torres MD      albuterol  2 puff Inhalation Q4H PRN Sena Torres MD      albuterol  2 puff Inhalation TID Snea Torres MD      aluminum-magnesium hydroxide-simethicone  30 mL Oral Q4H PRN Sena Torres MD      artificial tear   Both Eyes Daily PRN Tania Ovalle MD      atorvastatin 40 mg Oral Daily With Ben Curran MD      azelastine  1 spray Each Nare BID Los Morton, MD      benzonatate  100 mg Oral TID PRN Los Morton, MD      bisacodyl  10 mg Rectal Daily PRN Vance Doing, CRNP      cholecalciferol  2,000 Units Oral Daily Arlean Swapnil, CRNP      enoxaparin  30 mg Subcutaneous Q12H [address] Los Morton, MD      famotidine  20 mg Oral Q12H Los Morton, MD      fluticasone  2 spray Each Nare Daily Los Morton, MD      gabapentin  300 mg Oral Daily Los Morton, MD      lidocaine  1 patch Topical Daily Los Morton, MD      lithium carbonate  300 mg Oral HS Los Morton, MD      loratadine  10 mg Oral Daily Los Morton, MD      LORazepam  1 mg Oral BID PRN Los Morton, MD      LORazepam  1 5 mg Oral HS Los Morton, MD      melatonin  6 mg Oral HS Los Morton, MD      menthol-methyl salicylate   Apply externally 4x Daily PRN Los Morton, MD      multivitamin-minerals  1 tablet Oral Daily Praveen Swapnil, CRNP      ondansetron  4 mg Intravenous Q6H PRN Los Morton, MD      polyethylene glycol  17 g Oral Daily PRN Vance Doing, CRNP      senna-docusate sodium  3 tablet Oral BID PRN Vance Doing, CRNP      sertraline  100 mg Oral HS Los Morton, MD      sodium chloride  2 spray Each Nare Q2H PRN Los Morton, MD      traZODone  100 mg Oral HS Los Morton, DO [address] [facility]  Hospitalist    ** Please Note: This note has been constructed using a voice recognition system   **

## 2021-01-07 ENCOUNTER — APPOINTMENT (INPATIENT)
Dept: RADIOLOGY | Facility: HOSPITAL | Age: 66
DRG: 177 | End: 2021-01-07
Payer: MEDICARE

## 2021-01-07 PROCEDURE — 97110 THERAPEUTIC EXERCISES: CPT

## 2021-01-07 PROCEDURE — 71250 CT THORAX DX C-: CPT

## 2021-01-07 PROCEDURE — G1004 CDSM NDSC: HCPCS

## 2021-01-07 PROCEDURE — 99232 SBSQ HOSP IP/OBS MODERATE 35: CPT | Performed by: INTERNAL MEDICINE

## 2021-01-07 RX ORDER — FUROSEMIDE 10 MG/ML
20 INJECTION INTRAMUSCULAR; INTRAVENOUS ONCE
Status: COMPLETED | OUTPATIENT
Start: 2021-01-07 | End: 2021-01-07

## 2021-01-07 RX ADMIN — FAMOTIDINE 20 MG: 20 TABLET ORAL at 20:54

## 2021-01-07 RX ADMIN — MELATONIN TAB 3 MG 6 MG: 3 TAB at 21:42

## 2021-01-07 RX ADMIN — FAMOTIDINE 20 MG: 20 TABLET ORAL at 08:53

## 2021-01-07 RX ADMIN — AZELASTINE HYDROCHLORIDE 1 SPRAY: 137 SPRAY, METERED NASAL at 08:54

## 2021-01-07 RX ADMIN — LORATADINE 10 MG: 10 TABLET ORAL at 08:53

## 2021-01-07 RX ADMIN — ALBUTEROL SULFATE 2 PUFF: 90 AEROSOL, METERED RESPIRATORY (INHALATION) at 20:55

## 2021-01-07 RX ADMIN — LITHIUM CARBONATE 300 MG: 300 CAPSULE, GELATIN COATED ORAL at 21:44

## 2021-01-07 RX ADMIN — FLUTICASONE PROPIONATE 2 SPRAY: 50 SPRAY, METERED NASAL at 08:54

## 2021-01-07 RX ADMIN — POLYETHYLENE GLYCOL 3350 17 G: 17 POWDER, FOR SOLUTION ORAL at 21:06

## 2021-01-07 RX ADMIN — Medication 1 TABLET: at 08:53

## 2021-01-07 RX ADMIN — METHYLPREDNISOLONE SODIUM SUCCINATE 40 MG: 40 INJECTION, POWDER, FOR SOLUTION INTRAMUSCULAR; INTRAVENOUS at 17:29

## 2021-01-07 RX ADMIN — Medication 2 SPRAY: at 08:54

## 2021-01-07 RX ADMIN — AZELASTINE HYDROCHLORIDE 1 SPRAY: 137 SPRAY, METERED NASAL at 17:28

## 2021-01-07 RX ADMIN — FUROSEMIDE 20 MG: 10 INJECTION, SOLUTION INTRAMUSCULAR; INTRAVENOUS at 08:54

## 2021-01-07 RX ADMIN — TRAZODONE HYDROCHLORIDE 100 MG: 100 TABLET ORAL at 21:43

## 2021-01-07 RX ADMIN — METHYLPREDNISOLONE SODIUM SUCCINATE 40 MG: 40 INJECTION, POWDER, FOR SOLUTION INTRAMUSCULAR; INTRAVENOUS at 21:42

## 2021-01-07 RX ADMIN — ALBUTEROL SULFATE 2 PUFF: 90 AEROSOL, METERED RESPIRATORY (INHALATION) at 08:54

## 2021-01-07 RX ADMIN — ATORVASTATIN CALCIUM 40 MG: 40 TABLET, FILM COATED ORAL at 17:28

## 2021-01-07 RX ADMIN — LORAZEPAM 1.5 MG: 1 TABLET ORAL at 21:43

## 2021-01-07 RX ADMIN — METHYLPREDNISOLONE SODIUM SUCCINATE 40 MG: 40 INJECTION, POWDER, FOR SOLUTION INTRAMUSCULAR; INTRAVENOUS at 05:19

## 2021-01-07 RX ADMIN — ENOXAPARIN SODIUM 30 MG: 30 INJECTION SUBCUTANEOUS at 20:53

## 2021-01-07 RX ADMIN — DOCUSATE SODIUM AND SENNOSIDES 1 TABLET: 8.6; 5 TABLET ORAL at 21:46

## 2021-01-07 RX ADMIN — FUROSEMIDE 20 MG: 10 INJECTION, SOLUTION INTRAMUSCULAR; INTRAVENOUS at 12:46

## 2021-01-07 RX ADMIN — ENOXAPARIN SODIUM 30 MG: 30 INJECTION SUBCUTANEOUS at 08:53

## 2021-01-07 RX ADMIN — FUROSEMIDE 20 MG: 10 INJECTION, SOLUTION INTRAMUSCULAR; INTRAVENOUS at 17:28

## 2021-01-07 RX ADMIN — SERTRALINE HYDROCHLORIDE 100 MG: 100 TABLET ORAL at 21:44

## 2021-01-07 RX ADMIN — GABAPENTIN 300 MG: 300 CAPSULE ORAL at 08:53

## 2021-01-07 RX ADMIN — Medication 2000 UNITS: at 08:53

## 2021-01-07 RX ADMIN — LORAZEPAM 1 MG: 1 TABLET ORAL at 12:46

## 2021-01-07 RX ADMIN — ALBUTEROL SULFATE 2 PUFF: 90 AEROSOL, METERED RESPIRATORY (INHALATION) at 17:28

## 2021-01-07 NOTE — CASE MANAGEMENT
Pt covered in rounding with physician  CM informed that pt is on 4L 02 and currently on steroids and lasix  CM informed that pt stats drop with exertion  CM will continue to follow

## 2021-01-07 NOTE — PLAN OF CARE
Problem: Nutrition/Hydration-ADULT  Goal: Nutrient/Hydration intake appropriate for improving, restoring or maintaining nutritional needs  Description: Monitor and assess patient's nutrition/hydration status for malnutrition  Collaborate with interdisciplinary team and initiate plan and interventions as ordered  Monitor patient's weight and dietary intake as ordered or per policy  Utilize nutrition screening tool and intervene as necessary  Determine patient's food preferences and provide high-protein, high-caloric foods as appropriate  INTERVENTIONS:  - Monitor oral intake, urinary output, labs, and treatment plans  - Assess nutrition and hydration status and recommend course of action  - Evaluate amount of meals eaten  - Assist patient with eating if necessary   - Allow adequate time for meals  - Recommend/ encourage appropriate diets, oral nutritional supplements, and vitamin/mineral supplements  - Order, calculate, and assess calorie counts as needed  - Recommend, monitor, and adjust tube feedings and TPN/PPN based on assessed needs  - Assess need for intravenous fluids  - Provide specific nutrition/hydration education as appropriate  - Include patient/family/caregiver in decisions related to nutrition  Outcome: Progressing     Problem: Nutrition/Hydration-ADULT  Goal: Nutrient/Hydration intake appropriate for improving, restoring or maintaining nutritional needs  Description: Monitor and assess patient's nutrition/hydration status for malnutrition  Collaborate with interdisciplinary team and initiate plan and interventions as ordered  Monitor patient's weight and dietary intake as ordered or per policy  Utilize nutrition screening tool and intervene as necessary  Determine patient's food preferences and provide high-protein, high-caloric foods as appropriate       INTERVENTIONS:  - Monitor oral intake, urinary output, labs, and treatment plans  - Assess nutrition and hydration status and recommend course of action  - Evaluate amount of meals eaten  - Assist patient with eating if necessary   - Allow adequate time for meals  - Recommend/ encourage appropriate diets, oral nutritional supplements, and vitamin/mineral supplements  - Order, calculate, and assess calorie counts as needed  - Recommend, monitor, and adjust tube feedings and TPN/PPN based on assessed needs  - Assess need for intravenous fluids  - Provide specific nutrition/hydration education as appropriate  - Include patient/family/caregiver in decisions related to nutrition  1/7/2021 1743 by Adelita Spears  Outcome: Progressing

## 2021-01-07 NOTE — PHYSICAL THERAPY NOTE
Physical Therapy Treatment Note    Patient's Name: Filomena Bernal  : 1955       01/07/21 1117   PT Last Visit   PT Visit Date 21   Note Type   Note Type Treatment   Pain Assessment   Pain Assessment Tool Pain Assessment not indicated - pt denies pain   Restrictions/Precautions   Weight Bearing Precautions Per Order No   Other Precautions Contact/isolation; Airborne/isolation;O2;Fall Risk  (COVID)   General   Chart Reviewed Yes   Cognition   Overall Cognitive Status WFL   Arousal/Participation Cooperative   Attention Within functional limits   Orientation Level Oriented X4   Memory Within functional limits   Following Commands Follows all commands and directions without difficulty   Comments Pt very pleasant and cooperative, with good insight into deficits  Becomes anxious with functional mobility   Bed Mobility   Supine to Sit 5  Supervision   Additional items Increased time required   Sit to Supine 5  Supervision   Additional items Increased time required   Additional Comments Upon sitting EOB, pt's SpO2 fluctuated between 76-84 while performing breathing techniques  No further OOB mobility performed 2* pt anxiety and hypoxia with sitting EOB  See below for breathing exercises  Transfers   Additional Comments OOB mobility deferred this session 2* hypoxia   Balance   Static Sitting Good   Dynamic Sitting Good   Activity Tolerance   Activity Tolerance Other (Comment)  (SpO2 levels)   Nurse Made Aware pt okay to see per RN   Exercises   Neuro re-ed Performed the following breathing exercises sitting EOB: bilateral shoulder abduction x10 with VCs for coordinating inhalation/exhalation with arm movements; single arm shoulder abduction with lateral trunk flexion x5 each side with coordinated breathing   Assessment   Prognosis Good   Problem List Decreased strength;Decreased endurance; Impaired balance;Decreased mobility   Assessment Pt seen for PT session today with a focus on breathing exercises  Upon sitting EOB, pt's SpO2 dropped into the high 70s, fluctuating up into the mid 80s  OOB mobility deferred this session 2* to SpO2 levels, instead focusing on educating pt on different breathing exercises  Pt had difficulty coordinating arm movements with inhalation and exhalation  When pt becomes anxious she requires VCs for pursed lip breathing and reminder for "in through the nose, out through the mouth " Pt would benefit from continued practice with breathing exercises to help with her respiratory status as well as her anxiety with functional mobility  Pt ended session back in bed with SpO2 WNL and all needs in reach  Recommending home with HHPT upon d/c  Pt had initially said she didn't want HHPT, but is now agreeable to HHPT if it is through [place] [id number]  Goals   Patient Goals to get better   STG Expiration Date 01/12/21   Plan   Treatment/Interventions Functional transfer training;LE strengthening/ROM; Elevations; Therapeutic exercise; Endurance training;Gait training;Spoke to nursing   Progress Progressing toward goals   PT Frequency Other (Comment)  (3-5x/wk)   Recommendation   PT Discharge Recommendation Home with skilled therapy  (HHPT)   Additional Comments Pt reported this session that she would like HHPT if it is through [address] [place] [address], PT, DPT

## 2021-01-07 NOTE — PLAN OF CARE
Problem: PHYSICAL THERAPY ADULT  Goal: Performs mobility at highest level of function for planned discharge setting  See evaluation for individualized goals  Description: Treatment/Interventions: Functional transfer training, LE strengthening/ROM, Elevations, Therapeutic exercise, Endurance training, Patient/family training, Equipment eval/education, Bed mobility, Gait training, Spoke to nursing, OT  Equipment Recommended: Other (Comment)(tbd, likely RW pending progress)       See flowsheet documentation for full assessment, interventions and recommendations  Outcome: Progressing  Note: Prognosis: Good  Problem List: Decreased strength, Decreased endurance, Impaired balance, Decreased mobility  Assessment: Pt seen for PT session today with a focus on breathing exercises  Upon sitting EOB, pt's SpO2 dropped into the high 70s, fluctuating up into the mid 80s  OOB mobility deferred this session 2* to SpO2 levels, instead focusing on educating pt on different breathing exercises  Pt had difficulty coordinating arm movements with inhalation and exhalation  When pt becomes anxious she requires VCs for pursed lip breathing and reminder for "in through the nose, out through the mouth " Pt would benefit from continued practice with breathing exercises to help with her respiratory status as well as her anxiety with functional mobility  Pt ended session back in bed with SpO2 WNL and all needs in reach  Recommending home with HHPT upon d/c  Pt had initially said she didn't want HHPT, but is now agreeable to HHPT if it is through [facility]  PT Discharge Recommendation: Home with skilled therapy(HHPT)     PT - OK to Discharge: No    See flowsheet documentation for full assessment

## 2021-01-07 NOTE — ASSESSMENT & PLAN NOTE
· Acute respiratory failure/hypoxia secondary to COVID-19  · Completed remdesivir and received convalescent plasma and Actemra  · Completed two courses dexamethasone  · Attempted to wean oxygen however patient has significant desaturation with ambulation down to 77% 6 L   · ? Progression to fibrotic state, will trial IV methylprednisolone and furosemide    Recheck CT chest    Lab Results   Component Value Date    SARSCOV2 Detected (A) 11/29/2020    [facility] 98 Not Detected 10/09/2020     Results from last 7 days   Lab Units 01/05/21  0544 01/04/21  0509 01/02/21  0552 01/02/21  0551   D-DIMER QUANTITATIVE ug/ml FEU 0 47 0 27 0 39  --    CRP mg/L <3 0  --   --  <3 0   FERRITIN ng/mL 216  --   --  257

## 2021-01-07 NOTE — CASE MANAGEMENT
CM in contact with Rosalinda Arguello who is currently following pt  Pt expected dc 48-72 hrs  Fax DCI info to [address] [place] [address] at [phone] when dc ready

## 2021-01-07 NOTE — PROGRESS NOTES
Progress Note - Adarsh Rose 1955, 76 y o  female MRN: [id number]    Unit/Bed#: -01 Encounter: [id number]    Primary Care Provider: LUIS CARLOS Syed   Date and time admitted to hospital: 12/4/2020  9:15 AM        * Acute hypoxemic respiratory failure due to COVID-19 [facility])  Assessment & Plan  · Acute respiratory failure/hypoxia secondary to COVID-19  · Completed remdesivir and received convalescent plasma and Actemra  · Completed two courses dexamethasone  · Attempted to wean oxygen however patient has significant desaturation with ambulation down to 77% 6 L   · ? Progression to fibrotic state, will trial IV methylprednisolone and furosemide  Recheck CT chest    Lab Results   Component Value Date    SARSCOV2 Detected (A) 11/29/2020    [address] [facility] 98 Not Detected 10/09/2020     Results from last 7 days   Lab Units 01/05/21  0544 01/04/21  0509 01/02/21  0552 01/02/21  0551   D-DIMER QUANTITATIVE ug/ml FEU 0 47 0 27 0 39  --    CRP mg/L <3 0  --   --  <3 0   FERRITIN ng/mL 216  --   --  257       Possible sinusitis  Assessment & Plan  · Pansinusitis completed course of augmentin as recommended by pulmonology    Asthma  Assessment & Plan  · Asthma without exacerbation    GERD (gastroesophageal reflux disease)  Assessment & Plan  · GERD continue famotidine    Bipolar disorder   Assessment & Plan  · Bipolar mood stable trazodone sertraline lorazepam and lithium    Transaminitis-resolved as of 1/2/2021  Assessment & Plan  · Transaminitis secondary to viral infection    Results from last 7 days   Lab Units 01/05/21  0544 01/04/21  0509 01/03/21  0626 01/02/21  0551   AST U/L 20 25 38 26   ALT U/L 59 64 71 75   TOTAL BILIRUBIN mg/dL 0 37 0 30 0 40 0 29       VTE Pharmacologic Prophylaxis: VTE Score: 6 High Risk (Score >/= 5) - Pharmacological DVT Prophylaxis Ordered: Enoxaparin (Lovenox)  Sequential Compression Devices Ordered      Patient Centered Rounds: I have performed bedside rounds with nursing staff today   Discussions with Specialists or Other Care Team Provider:  Case management    Education and Discussions with Family / Patient:     Time Spent for Care: 25 mins  More than 50% of total time spent on counseling and coordination of care as described above  Current Length of Stay: 34 day(s)  Current Patient Status: Inpatient   Certification Statement: The patient will continue to require additional inpatient hospital stay due to  Respiratory failure  Discharge Plan / Estimated Discharge Date: TBD    Code Status: Level 1 - Full Code      Subjective:   Patient seen and examined  Feeling okay, good urine output  Objective:   Vitals: Blood pressure 101/51, pulse 79, temperature 99 1 °F (37 3 °C), temperature source Oral, resp  rate 20, height 5' 1" (1 549 m), weight 76 9 kg (169 lb 9 6 oz), SpO2 93 %  Physical Exam  Vitals signs reviewed  Constitutional:       General: She is not in acute distress  Appearance: Normal appearance  HENT:      Head: Atraumatic  Eyes:      General: No scleral icterus  Cardiovascular:      Rate and Rhythm: Regular rhythm  Heart sounds: Normal heart sounds  Pulmonary:      Breath sounds: Decreased breath sounds present  No wheezing  Comments: Persistent crackles right base  Abdominal:      General: Bowel sounds are normal       Palpations: Abdomen is soft  Tenderness: There is no guarding or rebound  Musculoskeletal:         General: No swelling  Skin:     General: Skin is warm  Neurological:      Mental Status: She is alert and oriented to person, place, and time  Mental status is at baseline         Additional Data:   Labs:  Results from last 7 days   Lab Units 01/05/21  0544 01/04/21  0509 01/03/21  0626 01/02/21  0552   WBC Thousand/uL 7 17 7 12 6 72 7 22   HEMOGLOBIN g/dL 11 7 10 3* 10 9* 10 7*   HEMATOCRIT % 37 6 32 5* 35 9 34 9   MCV fL 87 86 88 88   PLATELETS Thousands/uL 240 249 287 270     Results from last 7 days   Lab Units 01/05/21  0544 01/04/21  0509 01/03/21  0626 01/02/21  0551   SODIUM mmol/L 144 142 144 144   POTASSIUM mmol/L 3 7 3 6 4 0 3 6   CHLORIDE mmol/L 114* 112* 114* 114*   CO2 mmol/L 26 25 26 26   ANION GAP mmol/L 4 5 4 4   BUN mg/dL 19 19 20 22   CREATININE mg/dL 0 85 0 77 0 94 0 87   CALCIUM mg/dL 9 3 8 8 9 0 8 9   ALBUMIN g/dL 3 6 3 1* 3 3* 3 2*   TOTAL BILIRUBIN mg/dL 0 37 0 30 0 40 0 29   ALK PHOS U/L 104 92 93 94   ALT U/L 59 64 71 75   AST U/L 20 25 38 26   EGFR ml/min/1 73sq m 72 81 63 70   GLUCOSE RANDOM mg/dL 84 87 84 89                                  * I Have Reviewed All Lab Data Listed Above  Cultures:             Results from last 7 days   Lab Units 01/04/21  2255   FECAL OCCULT BLOOD DIAGNOSTIC  Negative   FECAL OCCULT BLOOD DIAGNOSTIC 2  Negative   FECAL OCCULT BLOOD DIAGNOSTIC 3  Negative       Lines/Drains:  Invasive Devices     Peripheral Intravenous Line            Peripheral IV 01/05/21 Left Forearm 1 day              Telemetry:      Imaging:  Imaging Reports Reviewed Today Include:   Xr Chest 1 View Portable    Result Date: 12/4/2020  Impression: Left lung base infiltrate  In the setting of clinically suspected/proven COVID-19, this plain film appearance does not contain findings that raise concern for viral pneumonia such as COVID-19, but does not rule out this diagnosis  The study was marked in EPIC for significant notification   Workstation performed: [id number]     Scheduled Meds:  Current Facility-Administered Medications   Medication Dose Route Frequency Provider Last Rate    acetaminophen  650 mg Oral Q6H PRN Yoly Schofield MD      albuterol  2 puff Inhalation Q4H PRN Yoly Schofield MD      albuterol  2 puff Inhalation TID Yoly Schofield MD      aluminum-magnesium hydroxide-simethicone  30 mL Oral Q4H PRN Yoly Schofield MD      artificial tear   Both Eyes Daily PRN Colleen Goodman MD      atorvastatin  40 mg Oral Daily With Earley Schaumann, MD      azelastine  1 spray Each Nare BID Kelsey Carbone MD      benzonatate  100 mg Oral TID PRN Kelsey Carbone MD      bisacodyl  10 mg Rectal Daily PRN LUIS CARLOS Ac      cholecalciferol  2,000 Units Oral Daily Cheryle Baltimore, LUIS CARLOS      enoxaparin  30 mg Subcutaneous Q12H [address], MD      famotidine  20 mg Oral Q12H Kelsey Carbone MD      fluticasone  2 spray Each Nare Daily Kelsey Carbone MD      furosemide  20 mg Intravenous BID (diuretic) Lum Bread, DO      gabapentin  300 mg Oral Daily Kelsey Carbone MD      lidocaine  1 patch Topical Daily Kelsey Carbone MD      lithium carbonate  300 mg Oral HS Kelsey Carbone MD      loratadine  10 mg Oral Daily Kelsey Carbone MD      LORazepam  1 mg Oral BID PRN Kelsey Carbone MD      LORazepam  1 5 mg Oral HS Kelsey Carbone MD      melatonin  6 mg Oral HS Kelsey Carbone MD      menthol-methyl salicylate   Apply externally 4x Daily PRN Kelsey Carbone MD      methylPREDNISolone sodium succinate  40 mg Intravenous [facility] Lum Bread, DO      multivitamin-minerals  1 tablet Oral Daily Cheryle Baltimore, CRIMMANUEL      ondansetron  4 mg Intravenous Q6H PRN Kelsey Carbone MD      polyethylene glycol  17 g Oral Daily PRN LUIS CARLOS Ac      polyethylene glycol  17 g Oral Daily Lum Bread, DO      senna-docusate sodium  1 tablet Oral HS Lum Bread, DO      sertraline  100 mg Oral HS Kelsey Carbone MD      sodium chloride  2 spray Each Nare Q2H PRN Kelsey Carbone MD      traZODone  100 mg Oral HS Kelsey Carbone MD         Lum Bread, DO  [facility]  Hospitalist    ** Please Note: This note has been constructed using a voice recognition system   **

## 2021-01-07 NOTE — CASE MANAGEMENT
Pt informed CM that she no longer wants [address] [place] [address] to follow with pt  Pt requested that CM send a referral to [facility] in [place]  CM sent referral, awaiting response  Pt is [facility] accepted

## 2021-01-08 PROCEDURE — 99232 SBSQ HOSP IP/OBS MODERATE 35: CPT | Performed by: INTERNAL MEDICINE

## 2021-01-08 RX ADMIN — LITHIUM CARBONATE 300 MG: 300 CAPSULE, GELATIN COATED ORAL at 21:54

## 2021-01-08 RX ADMIN — AZELASTINE HYDROCHLORIDE 1 SPRAY: 137 SPRAY, METERED NASAL at 08:30

## 2021-01-08 RX ADMIN — BENZONATATE 100 MG: 100 CAPSULE ORAL at 08:34

## 2021-01-08 RX ADMIN — LORATADINE 10 MG: 10 TABLET ORAL at 08:27

## 2021-01-08 RX ADMIN — METHYLPREDNISOLONE SODIUM SUCCINATE 40 MG: 40 INJECTION, POWDER, FOR SOLUTION INTRAMUSCULAR; INTRAVENOUS at 21:54

## 2021-01-08 RX ADMIN — Medication 2000 UNITS: at 08:27

## 2021-01-08 RX ADMIN — ENOXAPARIN SODIUM 30 MG: 30 INJECTION SUBCUTANEOUS at 20:10

## 2021-01-08 RX ADMIN — FUROSEMIDE 20 MG: 10 INJECTION, SOLUTION INTRAMUSCULAR; INTRAVENOUS at 17:50

## 2021-01-08 RX ADMIN — ENOXAPARIN SODIUM 30 MG: 30 INJECTION SUBCUTANEOUS at 08:27

## 2021-01-08 RX ADMIN — POLYETHYLENE GLYCOL 3350 17 G: 17 POWDER, FOR SOLUTION ORAL at 21:48

## 2021-01-08 RX ADMIN — METHYLPREDNISOLONE SODIUM SUCCINATE 40 MG: 40 INJECTION, POWDER, FOR SOLUTION INTRAMUSCULAR; INTRAVENOUS at 05:56

## 2021-01-08 RX ADMIN — AZELASTINE HYDROCHLORIDE 1 SPRAY: 137 SPRAY, METERED NASAL at 17:51

## 2021-01-08 RX ADMIN — ALBUTEROL SULFATE 2 PUFF: 90 AEROSOL, METERED RESPIRATORY (INHALATION) at 08:30

## 2021-01-08 RX ADMIN — GABAPENTIN 300 MG: 300 CAPSULE ORAL at 08:27

## 2021-01-08 RX ADMIN — BENZONATATE 100 MG: 100 CAPSULE ORAL at 20:16

## 2021-01-08 RX ADMIN — LORAZEPAM 1.5 MG: 1 TABLET ORAL at 21:54

## 2021-01-08 RX ADMIN — Medication 1 TABLET: at 08:27

## 2021-01-08 RX ADMIN — FUROSEMIDE 20 MG: 10 INJECTION, SOLUTION INTRAMUSCULAR; INTRAVENOUS at 08:28

## 2021-01-08 RX ADMIN — LIDOCAINE 5% 1 PATCH: 700 PATCH TOPICAL at 08:27

## 2021-01-08 RX ADMIN — METHYLPREDNISOLONE SODIUM SUCCINATE 40 MG: 40 INJECTION, POWDER, FOR SOLUTION INTRAMUSCULAR; INTRAVENOUS at 13:34

## 2021-01-08 RX ADMIN — SERTRALINE HYDROCHLORIDE 100 MG: 100 TABLET ORAL at 21:54

## 2021-01-08 RX ADMIN — ATORVASTATIN CALCIUM 40 MG: 40 TABLET, FILM COATED ORAL at 17:50

## 2021-01-08 RX ADMIN — BENZONATATE 100 MG: 100 CAPSULE ORAL at 13:34

## 2021-01-08 RX ADMIN — ALBUTEROL SULFATE 2 PUFF: 90 AEROSOL, METERED RESPIRATORY (INHALATION) at 20:11

## 2021-01-08 RX ADMIN — ALBUTEROL SULFATE 2 PUFF: 90 AEROSOL, METERED RESPIRATORY (INHALATION) at 17:51

## 2021-01-08 RX ADMIN — DOCUSATE SODIUM AND SENNOSIDES 1 TABLET: 8.6; 5 TABLET ORAL at 21:54

## 2021-01-08 RX ADMIN — MELATONIN TAB 3 MG 6 MG: 3 TAB at 21:54

## 2021-01-08 RX ADMIN — FAMOTIDINE 20 MG: 20 TABLET ORAL at 08:27

## 2021-01-08 RX ADMIN — LORAZEPAM 1 MG: 1 TABLET ORAL at 08:34

## 2021-01-08 RX ADMIN — TRAZODONE HYDROCHLORIDE 100 MG: 100 TABLET ORAL at 21:54

## 2021-01-08 RX ADMIN — FAMOTIDINE 20 MG: 20 TABLET ORAL at 20:10

## 2021-01-08 RX ADMIN — ACETAMINOPHEN 650 MG: 325 TABLET, FILM COATED ORAL at 20:16

## 2021-01-08 RX ADMIN — FLUTICASONE PROPIONATE 2 SPRAY: 50 SPRAY, METERED NASAL at 08:30

## 2021-01-08 NOTE — CASE MANAGEMENT
Pt likely to discharge in 48 to 72 hours with new home 02, sats continuing to drop significantly  [facility] able to accept for care at discharge  CM department to follow

## 2021-01-08 NOTE — ASSESSMENT & PLAN NOTE
· Acute respiratory failure/hypoxia secondary to COVID-19  · Completed remdesivir and received convalescent plasma and Actemra  · Completed two courses dexamethasone  · Attempted to wean oxygen however patient has significant desaturation with ambulation down to 77% 6 L  Re-attempt today desaturation down 72% with ambulation  · ? Progression to fibrotic state, will continue trial of IV methylprednisolone and furosemide  Recheck CT chest with improvement    Small pericardial effusion likely over-read, will benefit from non emergent echocardiogram    Lab Results   Component Value Date    SARSCOV2 Detected (A) 11/29/2020    [address] [place] [address] Not Detected 10/09/2020     Results from last 7 days   Lab Units 01/05/21  0544 01/04/21  0509 01/02/21  0552 01/02/21  0551   D-DIMER QUANTITATIVE ug/ml FEU 0 47 0 27 0 39  --    CRP mg/L <3 0  --   --  <3 0   FERRITIN ng/mL 216  --   --  257

## 2021-01-08 NOTE — PROGRESS NOTES
Progress Note - Susy Hess 1955, 76 y o  female MRN: [id number]    Unit/Bed#: -01 Encounter: [id number]    Primary Care Provider: LUIS CARLOS Jefferson   Date and time admitted to hospital: 12/4/2020  9:15 AM        * Acute hypoxemic respiratory failure due to COVID-19 [facility])  Assessment & Plan  · Acute respiratory failure/hypoxia secondary to COVID-19  · Completed remdesivir and received convalescent plasma and Actemra  · Completed two courses dexamethasone  · Attempted to wean oxygen however patient has significant desaturation with ambulation down to 77% 6 L  Re-attempt today desaturation down 72% with ambulation  · ? Progression to fibrotic state, will continue trial of IV methylprednisolone and furosemide  Recheck CT chest with improvement  Small pericardial effusion likely over-read, will benefit from non emergent echocardiogram    Lab Results   Component Value Date    SARSCOV2 Detected (A) 11/29/2020    [facility] 98 Not Detected 10/09/2020     Results from last 7 days   Lab Units 01/05/21  0544 01/04/21  0509 01/02/21  0552 01/02/21  0551   D-DIMER QUANTITATIVE ug/ml FEU 0 47 0 27 0 39  --    CRP mg/L <3 0  --   --  <3 0   FERRITIN ng/mL 216  --   --  257       Possible sinusitis  Assessment & Plan  · Pansinusitis completed course of augmentin as recommended by pulmonology    Asthma  Assessment & Plan  · Asthma without exacerbation  Continue albuterol HFA    Bipolar disorder   Assessment & Plan  · Bipolar mood stable trazodone sertraline lorazepam and lithium    Transaminitis-resolved as of 1/2/2021  Assessment & Plan  · Transaminitis secondary to viral infection    Results from last 7 days   Lab Units 01/05/21  0544 01/04/21  0509 01/03/21  0626 01/02/21  0551   AST U/L 20 25 38 26   ALT U/L 59 64 71 75   TOTAL BILIRUBIN mg/dL 0 37 0 30 0 40 0 29       VTE Pharmacologic Prophylaxis: VTE Score: 6 High Risk (Score >/= 5) - Pharmacological DVT Prophylaxis Ordered: Enoxaparin (Lovenox)   Sequential Compression Devices Ordered  Patient Centered Rounds: I have performed bedside rounds with nursing staff today  Discussions with Specialists or Other Care Team Provider:  Case management    Education and Discussions with Family / Patient:     Time Spent for Care: 25 mins  More than 50% of total time spent on counseling and coordination of care as described above  Current Length of Stay: 35 day(s)  Current Patient Status: Inpatient   Certification Statement: The patient will continue to require additional inpatient hospital stay due to significant desaturation  Discharge Plan / Estimated Discharge Date: Anticipate discharge in 48-72 hrs to home  Code Status: Level 1 - Full Code      Subjective:   Patient seen and examined  Feeling pretty good however significant desaturation when out of bed    Objective:   Vitals: Blood pressure 127/82, pulse 88, temperature 98 5 °F (36 9 °C), resp  rate 20, height 5' 1" (1 549 m), weight 76 5 kg (168 lb 9 6 oz), SpO2 (!) 88 %  Physical Exam  Vitals signs reviewed  Constitutional:       General: She is not in acute distress  Eyes:      General: No scleral icterus  Cardiovascular:      Rate and Rhythm: Regular rhythm  Heart sounds: Normal heart sounds  Pulmonary:      Breath sounds: Normal breath sounds  No wheezing  Abdominal:      General: Bowel sounds are normal       Palpations: Abdomen is soft  Tenderness: There is no guarding or rebound  Musculoskeletal:         General: No swelling  Skin:     General: Skin is warm  Neurological:      Mental Status: She is alert and oriented to person, place, and time     Psychiatric:         Mood and Affect: Mood normal        Additional Data:   Labs:  Results from last 7 days   Lab Units 01/05/21  0544 01/04/21  0509 01/03/21  0626 01/02/21  0552   WBC Thousand/uL 7 17 7 12 6 72 7 22   HEMOGLOBIN g/dL 11 7 10 3* 10 9* 10 7*   HEMATOCRIT % 37 6 32 5* 35 9 34 9   MCV fL 87 86 88 88   PLATELETS Thousands/uL 240 249 287 270     Results from last 7 days   Lab Units 01/05/21  0544 01/04/21  0509 01/03/21  0626 01/02/21  0551   SODIUM mmol/L 144 142 144 144   POTASSIUM mmol/L 3 7 3 6 4 0 3 6   CHLORIDE mmol/L 114* 112* 114* 114*   CO2 mmol/L 26 25 26 26   ANION GAP mmol/L 4 5 4 4   BUN mg/dL 19 19 20 22   CREATININE mg/dL 0 85 0 77 0 94 0 87   CALCIUM mg/dL 9 3 8 8 9 0 8 9   ALBUMIN g/dL 3 6 3 1* 3 3* 3 2*   TOTAL BILIRUBIN mg/dL 0 37 0 30 0 40 0 29   ALK PHOS U/L 104 92 93 94   ALT U/L 59 64 71 75   AST U/L 20 25 38 26   EGFR ml/min/1 73sq m 72 81 63 70   GLUCOSE RANDOM mg/dL 84 87 84 89                                  * I Have Reviewed All Lab Data Listed Above  Cultures:             Results from last 7 days   Lab Units 01/04/21  2255   FECAL OCCULT BLOOD DIAGNOSTIC  Negative   FECAL OCCULT BLOOD DIAGNOSTIC 2  Negative   FECAL OCCULT BLOOD DIAGNOSTIC 3  Negative       Lines/Drains:  Invasive Devices     Peripheral Intravenous Line            Peripheral IV 01/08/21 Left;Proximal;Ventral (anterior) Forearm less than 1 day              Telemetry:      Imaging:  Imaging Reports Reviewed Today Include:   Xr Chest 1 View Portable    Result Date: 12/4/2020  Impression: Left lung base infiltrate  In the setting of clinically suspected/proven COVID-19, this plain film appearance does not contain findings that raise concern for viral pneumonia such as COVID-19, but does not rule out this diagnosis  The study was marked in EPIC for significant notification   Workstation performed: [id number]     Scheduled Meds:  Current Facility-Administered Medications   Medication Dose Route Frequency Provider Last Rate    acetaminophen  650 mg Oral Q6H PRN Sabiha Sewell MD      albuterol  2 puff Inhalation Q4H PRN Sabiha Sewell MD      albuterol  2 puff Inhalation TID Sabiha Sewell MD      aluminum-magnesium hydroxide-simethicone  30 mL Oral Q4H PRN Sabiha Sewell MD      artificial tear   Both Eyes Daily PRN MD Wesley Vega atorvastatin  40 mg Oral Daily With Ben Curran MD      azelastine  1 spray Each Nare BID Los Morton MD      benzonatate  100 mg Oral TID PRN Los Morton, MD      bisacodyl  10 mg Rectal Daily PRN Vance Doing, CRNP      cholecalciferol  2,000 Units Oral Daily Arlean Swapnil, CRNP      enoxaparin  30 mg Subcutaneous Q12H [facility] [address], MD      famotidine  20 mg Oral Q12H Los Morton MD      fluticasone  2 spray Each Nare Daily Los Morton MD      furosemide  20 mg Intravenous BID (diuretic) Patrick Strickland DO      gabapentin  300 mg Oral Daily Los oMrton MD      lidocaine  1 patch Topical Daily Los Morton MD      lithium carbonate  300 mg Oral HS Los Morton, MD      loratadine  10 mg Oral Daily Los Morton MD      LORazepam  1 mg Oral BID PRN Los Morton MD      LORazepam  1 5 mg Oral HS Los Morton MD      melatonin  6 mg Oral HS Los Morton MD      menthol-methyl salicylate   Apply externally 4x Daily PRN Los Morton MD      methylPREDNISolone sodium succinate  40 mg Intravenous [facility] Patrick Strickland DO      multivitamin-minerals  1 tablet Oral Daily Praveen Swapnil, LUIS CARLOS      ondansetron  4 mg Intravenous Q6H PRN Los Morton MD      polyethylene glycol  17 g Oral Daily PRN Vance Doing, CRNP      polyethylene glycol  17 g Oral Daily Patrick Strickland DO      senna-docusate sodium  1 tablet Oral HS Patrick Strickland DO      sertraline  100 mg Oral HS Los Morton MD      sodium chloride  2 spray Each Nare Q2H PRN Los Morton MD      traZODone  100 mg Oral HS MD Patrick Triplett DO  [facility] Internal Medicine  Hospitalist    ** Please Note: This note has been constructed using a voice recognition system   **

## 2021-01-09 LAB
ALBUMIN SERPL BCP-MCNC: 4 G/DL (ref 3.5–5)
ALP SERPL-CCNC: 123 U/L (ref 46–116)
ALT SERPL W P-5'-P-CCNC: 84 U/L (ref 12–78)
ANION GAP SERPL CALCULATED.3IONS-SCNC: 4 MMOL/L (ref 4–13)
AST SERPL W P-5'-P-CCNC: 32 U/L (ref 5–45)
BILIRUB SERPL-MCNC: 0.45 MG/DL (ref 0.2–1)
BUN SERPL-MCNC: 29 MG/DL (ref 5–25)
CALCIUM SERPL-MCNC: 9.7 MG/DL (ref 8.3–10.1)
CHLORIDE SERPL-SCNC: 103 MMOL/L (ref 100–108)
CO2 SERPL-SCNC: 31 MMOL/L (ref 21–32)
CREAT SERPL-MCNC: 1.06 MG/DL (ref 0.6–1.3)
CRP SERPL QL: <3 MG/L
D DIMER PPP FEU-MCNC: <0.27 UG/ML FEU
ERYTHROCYTE [DISTWIDTH] IN BLOOD BY AUTOMATED COUNT: 16.3 % (ref 11.6–15.1)
GFR SERPL CREATININE-BSD FRML MDRD: 55 ML/MIN/1.73SQ M
GLUCOSE SERPL-MCNC: 130 MG/DL (ref 65–140)
HCT VFR BLD AUTO: 41.2 % (ref 34.8–46.1)
HGB BLD-MCNC: 13.1 G/DL (ref 11.5–15.4)
MCH RBC QN AUTO: 27.3 PG (ref 26.8–34.3)
MCHC RBC AUTO-ENTMCNC: 31.8 G/DL (ref 31.4–37.4)
MCV RBC AUTO: 86 FL (ref 82–98)
PLATELET # BLD AUTO: 239 THOUSANDS/UL (ref 149–390)
PMV BLD AUTO: 10.7 FL (ref 8.9–12.7)
POTASSIUM SERPL-SCNC: 3.8 MMOL/L (ref 3.5–5.3)
PROT SERPL-MCNC: 7.1 G/DL (ref 6.4–8.2)
RBC # BLD AUTO: 4.8 MILLION/UL (ref 3.81–5.12)
SODIUM SERPL-SCNC: 138 MMOL/L (ref 136–145)
WBC # BLD AUTO: 14.18 THOUSAND/UL (ref 4.31–10.16)

## 2021-01-09 PROCEDURE — 85027 COMPLETE CBC AUTOMATED: CPT | Performed by: INTERNAL MEDICINE

## 2021-01-09 PROCEDURE — 86140 C-REACTIVE PROTEIN: CPT | Performed by: INTERNAL MEDICINE

## 2021-01-09 PROCEDURE — 85379 FIBRIN DEGRADATION QUANT: CPT | Performed by: INTERNAL MEDICINE

## 2021-01-09 PROCEDURE — 99232 SBSQ HOSP IP/OBS MODERATE 35: CPT | Performed by: INTERNAL MEDICINE

## 2021-01-09 PROCEDURE — 80053 COMPREHEN METABOLIC PANEL: CPT | Performed by: INTERNAL MEDICINE

## 2021-01-09 RX ADMIN — POLYETHYLENE GLYCOL 3350 17 G: 17 POWDER, FOR SOLUTION ORAL at 21:13

## 2021-01-09 RX ADMIN — FLUTICASONE PROPIONATE 2 SPRAY: 50 SPRAY, METERED NASAL at 09:00

## 2021-01-09 RX ADMIN — METHYLPREDNISOLONE SODIUM SUCCINATE 40 MG: 40 INJECTION, POWDER, FOR SOLUTION INTRAMUSCULAR; INTRAVENOUS at 17:27

## 2021-01-09 RX ADMIN — ENOXAPARIN SODIUM 30 MG: 30 INJECTION SUBCUTANEOUS at 08:56

## 2021-01-09 RX ADMIN — DOCUSATE SODIUM AND SENNOSIDES 1 TABLET: 8.6; 5 TABLET ORAL at 21:10

## 2021-01-09 RX ADMIN — LIDOCAINE 5% 1 PATCH: 700 PATCH TOPICAL at 08:56

## 2021-01-09 RX ADMIN — MELATONIN TAB 3 MG 6 MG: 3 TAB at 21:10

## 2021-01-09 RX ADMIN — ALBUTEROL SULFATE 2 PUFF: 90 AEROSOL, METERED RESPIRATORY (INHALATION) at 17:29

## 2021-01-09 RX ADMIN — TRAZODONE HYDROCHLORIDE 100 MG: 100 TABLET ORAL at 21:10

## 2021-01-09 RX ADMIN — FAMOTIDINE 20 MG: 20 TABLET ORAL at 21:10

## 2021-01-09 RX ADMIN — ALBUTEROL SULFATE 2 PUFF: 90 AEROSOL, METERED RESPIRATORY (INHALATION) at 09:04

## 2021-01-09 RX ADMIN — ENOXAPARIN SODIUM 30 MG: 30 INJECTION SUBCUTANEOUS at 21:11

## 2021-01-09 RX ADMIN — BENZONATATE 100 MG: 100 CAPSULE ORAL at 09:09

## 2021-01-09 RX ADMIN — METHYLPREDNISOLONE SODIUM SUCCINATE 40 MG: 40 INJECTION, POWDER, FOR SOLUTION INTRAMUSCULAR; INTRAVENOUS at 21:10

## 2021-01-09 RX ADMIN — LITHIUM CARBONATE 300 MG: 300 CAPSULE, GELATIN COATED ORAL at 21:13

## 2021-01-09 RX ADMIN — FUROSEMIDE 20 MG: 10 INJECTION, SOLUTION INTRAMUSCULAR; INTRAVENOUS at 08:56

## 2021-01-09 RX ADMIN — LORATADINE 10 MG: 10 TABLET ORAL at 08:55

## 2021-01-09 RX ADMIN — Medication 1 TABLET: at 08:55

## 2021-01-09 RX ADMIN — SERTRALINE HYDROCHLORIDE 100 MG: 100 TABLET ORAL at 21:14

## 2021-01-09 RX ADMIN — LORAZEPAM 1.5 MG: 1 TABLET ORAL at 21:10

## 2021-01-09 RX ADMIN — ALBUTEROL SULFATE 2 PUFF: 90 AEROSOL, METERED RESPIRATORY (INHALATION) at 21:16

## 2021-01-09 RX ADMIN — FAMOTIDINE 20 MG: 20 TABLET ORAL at 08:55

## 2021-01-09 RX ADMIN — ATORVASTATIN CALCIUM 40 MG: 40 TABLET, FILM COATED ORAL at 17:27

## 2021-01-09 RX ADMIN — AZELASTINE HYDROCHLORIDE 1 SPRAY: 137 SPRAY, METERED NASAL at 09:00

## 2021-01-09 RX ADMIN — GABAPENTIN 300 MG: 300 CAPSULE ORAL at 08:55

## 2021-01-09 RX ADMIN — AZELASTINE HYDROCHLORIDE 1 SPRAY: 137 SPRAY, METERED NASAL at 17:29

## 2021-01-09 RX ADMIN — Medication 2000 UNITS: at 08:55

## 2021-01-09 RX ADMIN — METHYLPREDNISOLONE SODIUM SUCCINATE 40 MG: 40 INJECTION, POWDER, FOR SOLUTION INTRAMUSCULAR; INTRAVENOUS at 06:18

## 2021-01-09 RX ADMIN — FUROSEMIDE 20 MG: 10 INJECTION, SOLUTION INTRAMUSCULAR; INTRAVENOUS at 17:27

## 2021-01-09 NOTE — ASSESSMENT & PLAN NOTE
· Acute respiratory failure/hypoxia secondary to COVID-19  · Completed remdesivir and received convalescent plasma and Actemra  · Completed two courses dexamethasone  · Attempted to wean oxygen however patient has significant desaturation with ambulation down to 77% 6 L  Re-attempt today desaturation down 72% with ambulation  · ? Progression to fibrotic state, will continue trial of IV methylprednisolone and furosemide  · Recheck CT chest with improvement    Small pericardial effusion likely over-read, will benefit from non emergent echocardiogram  · reattempt home O2 eval tomorrow and if requiring 6 L or less with ambulation possible DC home with oxygen    Lab Results   Component Value Date    SARSCOV2 Detected (A) 11/29/2020    Jemal Hendrix Not Detected 10/09/2020     Results from last 7 days   Lab Units 01/09/21  0521 01/05/21  0544 01/04/21  0509   D-DIMER QUANTITATIVE ug/ml FEU <0 27 0 47 0 27   CRP mg/L <3 0 <3 0  --    FERRITIN ng/mL  --  216  --

## 2021-01-09 NOTE — PROGRESS NOTES
Progress Note - Chely Ponce 1955, 76 y o  female MRN: [id number]    Unit/Bed#: -01 Encounter: [id number]    Primary Care Provider: Thomasenia Cheadle, CRNP   Date and time admitted to hospital: 12/4/2020  9:15 AM        * Acute hypoxemic respiratory failure due to COVID-19 [facility])  Assessment & Plan  · Acute respiratory failure/hypoxia secondary to COVID-19  · Completed remdesivir and received convalescent plasma and Actemra  · Completed two courses dexamethasone  · Attempted to wean oxygen however patient has significant desaturation with ambulation down to 77% 6 L  Re-attempt today desaturation down 72% with ambulation  · ? Progression to fibrotic state, will continue trial of IV methylprednisolone and furosemide  · Recheck CT chest with improvement  Small pericardial effusion likely over-read, will benefit from non emergent echocardiogram  · reattempt home O2 eval tomorrow and if requiring 6 L or less with ambulation possible DC home with oxygen    Lab Results   Component Value Date    SARSCOV2 Detected (A) 11/29/2020    6000 [facility] 98 Not Detected 10/09/2020     Results from last 7 days   Lab Units 01/09/21  0521 01/05/21  0544 01/04/21  0509   D-DIMER QUANTITATIVE ug/ml FEU <0 27 0 47 0 27   CRP mg/L <3 0 <3 0  --    FERRITIN ng/mL  --  216  --        COVID-19 virus infection  Assessment & Plan  · Originally diagnosed on 11/29/2020  · Successfully completed treatment with remdesivir, convalescent plasma, Actemra, Decadron    Possible sinusitis  Assessment & Plan  · Pansinusitis completed course of augmentin as recommended by pulmonology    Asthma  Assessment & Plan  · Asthma without exacerbation    Continue albuterol HFA    GERD (gastroesophageal reflux disease)  Assessment & Plan  · GERD continue famotidine    Bipolar disorder   Assessment & Plan  · Bipolar mood stable trazodone sertraline lorazepam and lithium      VTE Pharmacologic Prophylaxis:   Pharmacologic: Enoxaparin (Lovenox)  Mechanical VTE Prophylaxis in Place: Yes    Patient Centered Rounds: I have performed bedside rounds with nursing staff today  Discussions with Specialists or Other Care Team Provider:     Education and Discussions with Family / Patient: pt    Time Spent for Care: 30 minutes  More than 50% of total time spent on counseling and coordination of care as described above  Current Length of Stay: 36 day(s)    Current Patient Status: Inpatient   Certification Statement: The patient will continue to require additional inpatient hospital stay due to above    Discharge Plan:     Code Status: Level 1 - Full Code      Subjective:   Pt seen and examined by me this morning  Pt said she is feeling better  Minimal SOB    Objective:     Vitals:   Temp (24hrs), Av 4 °F (36 9 °C), Min:97 9 °F (36 6 °C), Max:98 7 °F (37 1 °C)    Temp:  [97 9 °F (36 6 °C)-98 7 °F (37 1 °C)] 97 9 °F (36 6 °C)  HR:  [76-88] 76  Resp:  [18-20] 18  BP: (107-127)/(61-82) 118/62  SpO2:  [88 %-95 %] 92 %  Body mass index is 31 69 kg/m²  Input and Output Summary (last 24 hours): Intake/Output Summary (Last 24 hours) at 2021 1135  Last data filed at 2021 0900  Gross per 24 hour   Intake 480 ml   Output 1950 ml   Net -1470 ml       Physical Exam:     Physical Exam    Constitutional: Pt appears comfortable  Not in any acute distress  Cardiovascular: Normal rate, regular rhythm, normal heart sounds  No murmur heard  Pulmonary/Chest: Effort normal, air entry b/l decreased  No respiratory distress  Pt has no wheezes or crackles  Abdominal: Soft  Non-distended, Non-tender  Bowel sounds are normal    Musculoskeletal: Normal range of motion  Neurological: awake, alert  Moving all extremities spontaneously  Psychiatric: normal mood and affect       Additional Data:     Labs:    Results from last 7 days   Lab Units 21  0521   WBC Thousand/uL 14 18*   HEMOGLOBIN g/dL 13 1   HEMATOCRIT % 41 2   PLATELETS Thousands/uL 239     Results from last 7 days   Lab Units 01/09/21  0521   SODIUM mmol/L 138   POTASSIUM mmol/L 3 8   CHLORIDE mmol/L 103   CO2 mmol/L 31   BUN mg/dL 29*   CREATININE mg/dL 1 06   ANION GAP mmol/L 4   CALCIUM mg/dL 9 7   ALBUMIN g/dL 4 0   TOTAL BILIRUBIN mg/dL 0 45   ALK PHOS U/L 123*   ALT U/L 84*   AST U/L 32   GLUCOSE RANDOM mg/dL 130                           * I Have Reviewed All Lab Data Listed Above  * Additional Pertinent Lab Tests Reviewed:  Sahil 66 Admission Reviewed    Imaging:    Imaging Reports Reviewed Today Include:   Imaging Personally Reviewed by Myself Includes:      Recent Cultures (last 7 days):           Last 24 Hours Medication List:   Current Facility-Administered Medications   Medication Dose Route Frequency Provider Last Rate    acetaminophen  650 mg Oral Q6H PRN Jadene Necessary, MD      albuterol  2 puff Inhalation Q4H PRN Jadene Necessary, MD      albuterol  2 puff Inhalation TID Jadene Necessary, MD      aluminum-magnesium hydroxide-simethicone  30 mL Oral Q4H PRN Jadene Necessary, MD      artificial tear   Both Eyes Daily PRN Rani Patricio MD      atorvastatin  40 mg Oral Daily With Orest Apo, MD      azelastine  1 spray Each Nare BID Jadene Necessary, MD      benzonatate  100 mg Oral TID PRN Jadene Necessary, MD      bisacodyl  10 mg Rectal Daily PRN LUIS CARLOS Zamora      cholecalciferol  2,000 Units Oral Daily LUIS CARLOS Back      enoxaparin  30 mg Subcutaneous Q12H [address], MD      famotidine  20 mg Oral Q12H Jadene Necessary, MD      fluticasone  2 spray Each Nare Daily Jadene Necessary, MD      furosemide  20 mg Intravenous BID (diuretic) [facility],       gabapentin  300 mg Oral Daily Jadene Necessary, MD      lidocaine  1 patch Topical Daily Jadene Necessary, MD      lithium carbonate  300 mg Oral HS Jadene Necessary, MD      loratadine  10 mg Oral Daily Jadene Necessary, MD      LORazepam  1 mg Oral BID PRN Jadene Necessary, MD      LORazepam  1 5 mg Oral HS Jadene Necessary, MD  melatonin  6 mg Oral HS Yong Caban MD      menthol-methyl salicylate   Apply externally 4x Daily PRN Yong Caban MD      methylPREDNISolone sodium succinate  40 mg Intravenous [facility] Jocelyn Man DO      multivitamin-minerals  1 tablet Oral Daily Baez Arabella, LUIS CARLOS      ondansetron  4 mg Intravenous Q6H PRN Yong Caban MD      polyethylene glycol  17 g Oral Daily PRN Angeline Drone, CRNP      polyethylene glycol  17 g Oral Daily Jocelyn Man DO      senna-docusate sodium  1 tablet Oral HS Jocelyn Man DO      sertraline  100 mg Oral HS Yong Caban MD      sodium chloride  2 spray Each Nare Q2H PRN Yong Caban MD      traZODone  100 mg Oral HS Yong Caban MD          Today, Patient Was Seen By: Ryan Bacon MD    ** Please Note: Dictation voice to text software may have been used in the creation of this document   **

## 2021-01-10 LAB
ALBUMIN SERPL BCP-MCNC: 3.8 G/DL (ref 3.5–5)
ALP SERPL-CCNC: 118 U/L (ref 46–116)
ALT SERPL W P-5'-P-CCNC: 69 U/L (ref 12–78)
ANION GAP SERPL CALCULATED.3IONS-SCNC: 9 MMOL/L (ref 4–13)
AST SERPL W P-5'-P-CCNC: 22 U/L (ref 5–45)
BILIRUB SERPL-MCNC: 0.46 MG/DL (ref 0.2–1)
BUN SERPL-MCNC: 33 MG/DL (ref 5–25)
CALCIUM SERPL-MCNC: 9.4 MG/DL (ref 8.3–10.1)
CHLORIDE SERPL-SCNC: 100 MMOL/L (ref 100–108)
CO2 SERPL-SCNC: 28 MMOL/L (ref 21–32)
CREAT SERPL-MCNC: 1.12 MG/DL (ref 0.6–1.3)
ERYTHROCYTE [DISTWIDTH] IN BLOOD BY AUTOMATED COUNT: 16.3 % (ref 11.6–15.1)
GFR SERPL CREATININE-BSD FRML MDRD: 51 ML/MIN/1.73SQ M
GLUCOSE SERPL-MCNC: 198 MG/DL (ref 65–140)
HCT VFR BLD AUTO: 39.2 % (ref 34.8–46.1)
HGB BLD-MCNC: 12.7 G/DL (ref 11.5–15.4)
MCH RBC QN AUTO: 27.7 PG (ref 26.8–34.3)
MCHC RBC AUTO-ENTMCNC: 32.4 G/DL (ref 31.4–37.4)
MCV RBC AUTO: 86 FL (ref 82–98)
PLATELET # BLD AUTO: 200 THOUSANDS/UL (ref 149–390)
PMV BLD AUTO: 10.9 FL (ref 8.9–12.7)
POTASSIUM SERPL-SCNC: 3.3 MMOL/L (ref 3.5–5.3)
PROT SERPL-MCNC: 6.6 G/DL (ref 6.4–8.2)
RBC # BLD AUTO: 4.58 MILLION/UL (ref 3.81–5.12)
SODIUM SERPL-SCNC: 137 MMOL/L (ref 136–145)
WBC # BLD AUTO: 10.56 THOUSAND/UL (ref 4.31–10.16)

## 2021-01-10 PROCEDURE — 94761 N-INVAS EAR/PLS OXIMETRY MLT: CPT

## 2021-01-10 PROCEDURE — 80053 COMPREHEN METABOLIC PANEL: CPT | Performed by: INTERNAL MEDICINE

## 2021-01-10 PROCEDURE — 85027 COMPLETE CBC AUTOMATED: CPT | Performed by: INTERNAL MEDICINE

## 2021-01-10 PROCEDURE — 99232 SBSQ HOSP IP/OBS MODERATE 35: CPT | Performed by: INTERNAL MEDICINE

## 2021-01-10 RX ORDER — POTASSIUM CHLORIDE 20 MEQ/1
20 TABLET, EXTENDED RELEASE ORAL ONCE
Status: COMPLETED | OUTPATIENT
Start: 2021-01-10 | End: 2021-01-10

## 2021-01-10 RX ADMIN — LORATADINE 10 MG: 10 TABLET ORAL at 08:28

## 2021-01-10 RX ADMIN — Medication 1 TABLET: at 08:28

## 2021-01-10 RX ADMIN — Medication 2000 UNITS: at 08:28

## 2021-01-10 RX ADMIN — LIDOCAINE 5% 1 PATCH: 700 PATCH TOPICAL at 08:28

## 2021-01-10 RX ADMIN — ALBUTEROL SULFATE 2 PUFF: 90 AEROSOL, METERED RESPIRATORY (INHALATION) at 08:37

## 2021-01-10 RX ADMIN — ALBUTEROL SULFATE 2 PUFF: 90 AEROSOL, METERED RESPIRATORY (INHALATION) at 15:26

## 2021-01-10 RX ADMIN — POTASSIUM CHLORIDE 20 MEQ: 1500 TABLET, EXTENDED RELEASE ORAL at 08:28

## 2021-01-10 RX ADMIN — LITHIUM CARBONATE 300 MG: 300 CAPSULE, GELATIN COATED ORAL at 22:45

## 2021-01-10 RX ADMIN — AZELASTINE HYDROCHLORIDE 1 SPRAY: 137 SPRAY, METERED NASAL at 09:01

## 2021-01-10 RX ADMIN — AZELASTINE HYDROCHLORIDE 1 SPRAY: 137 SPRAY, METERED NASAL at 17:50

## 2021-01-10 RX ADMIN — BENZONATATE 100 MG: 100 CAPSULE ORAL at 08:41

## 2021-01-10 RX ADMIN — ENOXAPARIN SODIUM 30 MG: 30 INJECTION SUBCUTANEOUS at 08:28

## 2021-01-10 RX ADMIN — FLUTICASONE PROPIONATE 2 SPRAY: 50 SPRAY, METERED NASAL at 08:37

## 2021-01-10 RX ADMIN — LORAZEPAM 1.5 MG: 1 TABLET ORAL at 21:00

## 2021-01-10 RX ADMIN — GABAPENTIN 300 MG: 300 CAPSULE ORAL at 08:28

## 2021-01-10 RX ADMIN — SERTRALINE HYDROCHLORIDE 100 MG: 100 TABLET ORAL at 22:45

## 2021-01-10 RX ADMIN — FAMOTIDINE 20 MG: 20 TABLET ORAL at 20:50

## 2021-01-10 RX ADMIN — TRAZODONE HYDROCHLORIDE 100 MG: 100 TABLET ORAL at 21:00

## 2021-01-10 RX ADMIN — METHYLPREDNISOLONE SODIUM SUCCINATE 40 MG: 40 INJECTION, POWDER, FOR SOLUTION INTRAMUSCULAR; INTRAVENOUS at 21:00

## 2021-01-10 RX ADMIN — METHYLPREDNISOLONE SODIUM SUCCINATE 40 MG: 40 INJECTION, POWDER, FOR SOLUTION INTRAMUSCULAR; INTRAVENOUS at 13:27

## 2021-01-10 RX ADMIN — MELATONIN TAB 3 MG 6 MG: 3 TAB at 21:00

## 2021-01-10 RX ADMIN — ALBUTEROL SULFATE 2 PUFF: 90 AEROSOL, METERED RESPIRATORY (INHALATION) at 21:02

## 2021-01-10 RX ADMIN — POLYETHYLENE GLYCOL 3350 17 G: 17 POWDER, FOR SOLUTION ORAL at 21:00

## 2021-01-10 RX ADMIN — POLYETHYLENE GLYCOL 3350 17 G: 17 POWDER, FOR SOLUTION ORAL at 08:28

## 2021-01-10 RX ADMIN — FAMOTIDINE 20 MG: 20 TABLET ORAL at 08:28

## 2021-01-10 RX ADMIN — ENOXAPARIN SODIUM 30 MG: 30 INJECTION SUBCUTANEOUS at 20:51

## 2021-01-10 RX ADMIN — METHYLPREDNISOLONE SODIUM SUCCINATE 40 MG: 40 INJECTION, POWDER, FOR SOLUTION INTRAMUSCULAR; INTRAVENOUS at 05:19

## 2021-01-10 RX ADMIN — ATORVASTATIN CALCIUM 40 MG: 40 TABLET, FILM COATED ORAL at 16:49

## 2021-01-10 RX ADMIN — WHITE PETROLATUM 57.7 %-MINERAL OIL 31.9 % EYE OINTMENT: at 08:38

## 2021-01-10 RX ADMIN — DOCUSATE SODIUM AND SENNOSIDES 1 TABLET: 8.6; 5 TABLET ORAL at 21:00

## 2021-01-10 NOTE — ASSESSMENT & PLAN NOTE
· Acute respiratory failure/hypoxia secondary to COVID-19  · Completed remdesivir and received convalescent plasma and Actemra  · Completed two courses dexamethasone  · Attempted to wean oxygen however patient has significant desaturation with ambulation down to 77% 6 L  Re-attempt today desaturation down 72% with ambulation  · ? Progression to fibrotic state, will continue trial of IV methylprednisolone, d/c furosemide  · Recheck CT chest with improvement    Small pericardial effusion likely over-read, will benefit from non emergent echocardiogram  · Still requiring oxygen and desaturating with ambulation, possible discharge home tomorrow when home oxygen can be arranged    Lab Results   Component Value Date    SARSCOV2 Detected (A) 11/29/2020    Nicol Saúl Not Detected 10/09/2020     Results from last 7 days   Lab Units 01/09/21  0521 01/05/21  0544 01/04/21  0509   D-DIMER QUANTITATIVE ug/ml FEU <0 27 0 47 0 27   CRP mg/L <3 0 <3 0  --    FERRITIN ng/mL  --  216  --

## 2021-01-10 NOTE — PROGRESS NOTES
Progress Note - Melvin Saenz 1955, 76 y o  female MRN: [id number]    Unit/Bed#: -01 Encounter: [id number]    Primary Care Provider: LUIS CARLOS Brunner   Date and time admitted to hospital: 12/4/2020  9:15 AM        * Acute hypoxemic respiratory failure due to COVID-19 [facility])  Assessment & Plan  · Acute respiratory failure/hypoxia secondary to COVID-19  · Completed remdesivir and received convalescent plasma and Actemra  · Completed two courses dexamethasone  · Attempted to wean oxygen however patient has significant desaturation with ambulation down to 77% 6 L  Re-attempt today desaturation down 72% with ambulation  · ? Progression to fibrotic state, will continue trial of IV methylprednisolone, d/c furosemide  · Recheck CT chest with improvement  Small pericardial effusion likely over-read, will benefit from non emergent echocardiogram  · Still requiring oxygen and desaturating with ambulation, possible discharge home tomorrow when home oxygen can be arranged    Lab Results   Component Value Date    SARSCOV2 Detected (A) 11/29/2020    Kimberley Harrell Not Detected 10/09/2020     Results from last 7 days   Lab Units 01/09/21  0521 01/05/21  0544 01/04/21  0509   D-DIMER QUANTITATIVE ug/ml FEU <0 27 0 47 0 27   CRP mg/L <3 0 <3 0  --    FERRITIN ng/mL  --  216  --        COVID-19 virus infection  Assessment & Plan  · Originally diagnosed on 11/29/2020  · Successfully completed treatment with remdesivir, convalescent plasma, Actemra, Decadron    Asthma  Assessment & Plan  · Asthma without exacerbation    Continue albuterol HFA    Bipolar disorder   Assessment & Plan  · Bipolar mood stable trazodone sertraline lorazepam and lithium    GERD (gastroesophageal reflux disease)  Assessment & Plan  · GERD continue famotidine    Possible sinusitis  Assessment & Plan  · Pansinusitis completed course of augmentin as recommended by pulmonology        VTE Pharmacologic Prophylaxis:   Pharmacologic: Heparin  Mechanical VTE Prophylaxis in Place: Yes    Patient Centered Rounds: I have performed bedside rounds with nursing staff today  Education and Discussions with Family / Patient: patient, plan of care    Time Spent for Care: 20 minutes  More than 50% of total time spent on counseling and coordination of care as described above  Current Length of Stay: 37 day(s)    Current Patient Status: Inpatient   Certification Statement: The patient will continue to require additional inpatient hospital stay due to discharge planning    Discharge Plan: needs home oxygen    Code Status: Level 1 - Full Code      Subjective:   Denies any new complaints  She does become dyspneic with ambulation  Objective:     Vitals:   Temp (24hrs), Av 6 °F (37 °C), Min:98 2 °F (36 8 °C), Max:99 3 °F (37 4 °C)    Temp:  [98 2 °F (36 8 °C)-99 3 °F (37 4 °C)] 99 3 °F (37 4 °C)  HR:  [75-92] 92  Resp:  [18-19] 18  BP: ()/(50-64) 93/50  SpO2:  [89 %-96 %] 96 %  Body mass index is 31 74 kg/m²  Input and Output Summary (last 24 hours): Intake/Output Summary (Last 24 hours) at 1/10/2021 1653  Last data filed at 1/10/2021 1500  Gross per 24 hour   Intake 460 ml   Output 1425 ml   Net -965 ml       Physical Exam:     Physical Exam  Constitutional:       Appearance: Normal appearance  HENT:      Head: Normocephalic  Nose: Nose normal       Mouth/Throat:      Mouth: Mucous membranes are moist    Eyes:      Extraocular Movements: Extraocular movements intact  Neck:      Musculoskeletal: Normal range of motion and neck supple  Cardiovascular:      Rate and Rhythm: Normal rate and regular rhythm  Pulmonary:      Effort: Pulmonary effort is normal       Breath sounds: No wheezing or rales  Skin:     General: Skin is warm and dry  Neurological:      General: No focal deficit present  Mental Status: She is alert and oriented to person, place, and time     Psychiatric:         Mood and Affect: Mood normal          Behavior: Behavior normal            Additional Data:     Labs:    Results from last 7 days   Lab Units 01/10/21  0526   WBC Thousand/uL 10 56*   HEMOGLOBIN g/dL 12 7   HEMATOCRIT % 39 2   PLATELETS Thousands/uL 200     Results from last 7 days   Lab Units 01/10/21  0526   SODIUM mmol/L 137   POTASSIUM mmol/L 3 3*   CHLORIDE mmol/L 100   CO2 mmol/L 28   BUN mg/dL 33*   CREATININE mg/dL 1 12   ANION GAP mmol/L 9   CALCIUM mg/dL 9 4   ALBUMIN g/dL 3 8   TOTAL BILIRUBIN mg/dL 0 46   ALK PHOS U/L 118*   ALT U/L 69   AST U/L 22   GLUCOSE RANDOM mg/dL 198*                           * I Have Reviewed All Lab Data Listed Above  * Additional Pertinent Lab Tests Reviewed:  All Labs Within Last 24 Hours Reviewed      Recent Cultures (last 7 days):           Last 24 Hours Medication List:   Current Facility-Administered Medications   Medication Dose Route Frequency Provider Last Rate    acetaminophen  650 mg Oral Q6H PRN Sander Brink MD      albuterol  2 puff Inhalation Q4H PRN Sander Brink MD      albuterol  2 puff Inhalation TID Sander Brink MD      aluminum-magnesium hydroxide-simethicone  30 mL Oral Q4H PRN Sander Brink MD      artificial tear   Both Eyes Daily PRN Asia Orozco MD      atorvastatin  40 mg Oral Daily With Travis Jaimes MD      azelastine  1 spray Each Nare BID Sander Brink MD      benzonatate  100 mg Oral TID PRN Sander Brink MD      bisacodyl  10 mg Rectal Daily PRN LUIS CARLOS Guzman      cholecalciferol  2,000 Units Oral Daily LUIS CARLOS Foreman      enoxaparin  30 mg Subcutaneous Q12H [address], MD      famotidine  20 mg Oral Q12H Sander Brink MD      fluticasone  2 spray Each Nare Daily Sander Brink MD      gabapentin  300 mg Oral Daily Sander Brink MD      lidocaine  1 patch Topical Daily Sander Brink MD      lithium carbonate  300 mg Oral HS Sander Brink MD      loratadine  10 mg Oral Daily Sander Brink MD      LORazepam  1 mg Oral BID PRN MD Maya Delgado LORazepam  1 5 mg Oral HS Kelsey Carbone MD      melatonin  6 mg Oral HS Kelsey Carbone MD      menthol-methyl salicylate   Apply externally 4x Daily PRN Kelsey Carbone MD      methylPREDNISolone sodium succinate  40 mg Intravenous [facility] Lum Bread, DO      multivitamin-minerals  1 tablet Oral Daily Cheryle Baltimore, CRNP      ondansetron  4 mg Intravenous Q6H PRN Kelsey Carbone MD      polyethylene glycol  17 g Oral Daily PRN LUIS CARLOS Ac      polyethylene glycol  17 g Oral Daily Lum Bread, DO      senna-docusate sodium  1 tablet Oral HS Lum Bread, DO      sertraline  100 mg Oral HS Kelsey Carbone MD      sodium chloride  2 spray Each Nare Q2H PRN Kelsey Carbone MD      traZODone  100 mg Oral HS Kelsey Carbone MD          Today, Patient Was Seen By: Gaston Sosa MD    ** Please Note: Dictation voice to text software may have been used in the creation of this document   **

## 2021-01-10 NOTE — RESPIRATORY THERAPY NOTE
Home Oxygen Qualifying Test       Patient name: Marleen Little        : 1955   Date of Test:  January 10, 2021  Diagnosis:      Home Oxygen Test:    **Medicare Guidelines require item(s) 1-5 on all ambulatory patients or 1 and 2 on non-ambulatory patients  1   Baseline SPO2 on Room Air at rest 82 % sitting on edge of bed     2  SPO2 during exercise on Room Air - N/A              During exercise monitor SpO2  If SPO2 increases >=89% with ambulation do not add supplemental             oxygen  If <= 88% on room air add O2 via NC and titrate patient  Patient must be ambulated with O2 and titrated to > 88% with exertion  3   SPO2 on Oxygen at rest 96% 4 lpm laying on right side    4  SPO2 during exercise on Oxygen  86% a liter flow of 6 lpm     5   Exercise performed:          duration 6 (min)          [x]  Supplemental Home Oxygen is indicated  Requires >6 LPM    []  Client does not qualify for home oxygen        Respiratory Additional Notes- Patient desat >88% on 6 LPM with ambulation    Tara Topete, RT

## 2021-01-10 NOTE — CASE MANAGEMENT
Cm informed pt stable for d/c home with oxygen  Pt will need new Home O2 eval done to have oxygen ordered

## 2021-01-11 VITALS
DIASTOLIC BLOOD PRESSURE: 61 MMHG | WEIGHT: 170.86 LBS | HEART RATE: 76 BPM | BODY MASS INDEX: 32.26 KG/M2 | RESPIRATION RATE: 18 BRPM | TEMPERATURE: 97.8 F | SYSTOLIC BLOOD PRESSURE: 116 MMHG | OXYGEN SATURATION: 98 % | HEIGHT: 61 IN

## 2021-01-11 PROCEDURE — 99239 HOSP IP/OBS DSCHRG MGMT >30: CPT | Performed by: INTERNAL MEDICINE

## 2021-01-11 RX ORDER — ECHINACEA PURPUREA EXTRACT 125 MG
2 TABLET ORAL EVERY 2 HOUR PRN
Qty: 30 ML | Refills: 0 | Status: SHIPPED | OUTPATIENT
Start: 2021-01-11

## 2021-01-11 RX ORDER — PREDNISONE 10 MG/1
TABLET ORAL
Qty: 30 TABLET | Refills: 0 | Status: ON HOLD | OUTPATIENT
Start: 2021-01-11 | End: 2021-01-20 | Stop reason: SDUPTHER

## 2021-01-11 RX ORDER — MELATONIN
2000 DAILY
Refills: 0
Start: 2021-01-12

## 2021-01-11 RX ORDER — ATORVASTATIN CALCIUM 40 MG/1
40 TABLET, FILM COATED ORAL
Qty: 14 TABLET | Refills: 0 | Status: SHIPPED | OUTPATIENT
Start: 2021-01-11 | End: 2022-02-03

## 2021-01-11 RX ORDER — LORATADINE 10 MG/1
10 TABLET ORAL DAILY
Qty: 30 TABLET | Refills: 0 | Status: ON HOLD | OUTPATIENT
Start: 2021-01-12 | End: 2021-01-20 | Stop reason: CLARIF

## 2021-01-11 RX ORDER — FLUTICASONE PROPIONATE 50 MCG
2 SPRAY, SUSPENSION (ML) NASAL DAILY
Qty: 1 BOTTLE | Refills: 0 | Status: SHIPPED | OUTPATIENT
Start: 2021-01-12

## 2021-01-11 RX ADMIN — LORAZEPAM 1 MG: 1 TABLET ORAL at 10:40

## 2021-01-11 RX ADMIN — AZELASTINE HYDROCHLORIDE 1 SPRAY: 137 SPRAY, METERED NASAL at 10:38

## 2021-01-11 RX ADMIN — FAMOTIDINE 20 MG: 20 TABLET ORAL at 10:41

## 2021-01-11 RX ADMIN — Medication 1 TABLET: at 10:40

## 2021-01-11 RX ADMIN — LORATADINE 10 MG: 10 TABLET ORAL at 10:40

## 2021-01-11 RX ADMIN — METHYLPREDNISOLONE SODIUM SUCCINATE 40 MG: 40 INJECTION, POWDER, FOR SOLUTION INTRAMUSCULAR; INTRAVENOUS at 14:11

## 2021-01-11 RX ADMIN — FLUTICASONE PROPIONATE 2 SPRAY: 50 SPRAY, METERED NASAL at 10:38

## 2021-01-11 RX ADMIN — GABAPENTIN 300 MG: 300 CAPSULE ORAL at 10:40

## 2021-01-11 RX ADMIN — ENOXAPARIN SODIUM 30 MG: 30 INJECTION SUBCUTANEOUS at 10:40

## 2021-01-11 RX ADMIN — ALBUTEROL SULFATE 2 PUFF: 90 AEROSOL, METERED RESPIRATORY (INHALATION) at 10:39

## 2021-01-11 RX ADMIN — METHYLPREDNISOLONE SODIUM SUCCINATE 40 MG: 40 INJECTION, POWDER, FOR SOLUTION INTRAMUSCULAR; INTRAVENOUS at 05:19

## 2021-01-11 RX ADMIN — Medication 2000 UNITS: at 10:40

## 2021-01-11 RX ADMIN — POLYETHYLENE GLYCOL 3350 17 G: 17 POWDER, FOR SOLUTION ORAL at 05:18

## 2021-01-11 RX ADMIN — ATORVASTATIN CALCIUM 40 MG: 40 TABLET, FILM COATED ORAL at 15:19

## 2021-01-11 RX ADMIN — BISACODYL 10 MG: 10 SUPPOSITORY RECTAL at 05:18

## 2021-01-11 RX ADMIN — LIDOCAINE 5% 1 PATCH: 700 PATCH TOPICAL at 10:41

## 2021-01-11 NOTE — RESTORATIVE TECHNICIAN NOTE
Restorative Specialist Mobility Note       Patient ambulated in room and transferred from bed to chair  Patient sitting up in chair set up with needs to wash      Lacie Harada present during session

## 2021-01-11 NOTE — INCIDENTAL FINDINGS
The following findings require follow up:  Radiographic finding   Finding: reticular and groundglass opacities of lungs   Follow up required: pulmonology office visit   Follow up should be done within 2 week(s)    Please notify the following clinician to assist with the follow up:   Dr Sawyer Ordnoez, [facility] pulmonology

## 2021-01-11 NOTE — DISCHARGE SUMMARY
Discharge Summary - [facility]    Patient Information: Marielos Pryor 76 y o  female MRN: [id number]  Unit/Bed#: -01 Encounter: [id number]    Discharging Physician / Practitioner: Sabrina Bahena MD  PCP: Kassandra Rodriguez  Admission Date: 12/4/2020  Discharge Date: 01/11/21    Disposition:     Home with VNA Services (Reminder: Complete face to face encounter)    Reason for Admission:  Respiratory failure  Discharge Diagnoses:     Principal Problem:    Acute hypoxemic respiratory failure due to COVID-19 [facility])  Active Problems:    COVID-19 virus infection    Asthma    Bipolar disorder     GERD (gastroesophageal reflux disease)    Possible sinusitis  Resolved Problems:    Transaminitis      Consultations During Hospital Stay:  · Critical care  · Pulmonology    Procedures Performed:     · Home O2 evaluation    Significant Findings / Test Results:     · Positive for SARS-CoV-2 Coronavirus  Chest x-ray: Left lung base infiltrate  · In the setting of clinically suspected/proven COVID-19, this plain film appearance does not contain findings that raise concern for viral pneumonia such as COVID-19, but does not rule out this diagnosis  · CT scan 12/21/2020: Diffuse bilateral groundglass opacities with crazy paving and foci of peripheral consolidation in the right lung due to known Covid 19 pneumonia  CT scan 01/07/2021: Small pericardial effusion    Groundglass opacities and consolidation have improved since the study of December 21 but still present  This may be related to residual/recurrent pneumonia  There is persistent reticular opacities as well as peripheral honeycombing as well as some   areas of emphysema  A component of chronic fibrosis should be considered and should be correlated clinically  Incidental Findings:   As above    Test Results Pending at Discharge (will require follow up):    · None     Outpatient Tests Requested:  · None    Complications:  None    Hospital Course:     Rowena Mendoza is a 76 y o  female patient who originally presented to the hospital on 12/4/2020 due to respiratory failure  She was found to be COVID positive on admission and was admitted for COVID protocol treatment  Hospital course was complicated with prolonged and severe hypoxia resulting in a 38 day hospital stay  With time she was eventually weaned down to 4-6 L of oxygen  She was discharged home with home care and home oxygen and counseled to follow up with pulmonology as an outpatient  She was discharged home with a steroid taper  As this is only a brief summary of a 30 a day hospital stay please refer to the patient's chart for further details  Condition at Discharge: stable     Discharge Day Visit / Exam:     Subjective:  Reports her shortness of breath is stable, mostly present with exertion  Vitals: Blood Pressure: 116/61 (01/11/21 0740)  Pulse: 76 (01/11/21 0740)  Temperature: 97 8 °F (36 6 °C) (01/11/21 0740)  Temp Source: Oral (01/09/21 1912)  Respirations: 18 (01/11/21 0740)  Height: 5' 1" (154 9 cm) (01/07/21 0555)  Weight - Scale: 77 5 kg (170 lb 13 7 oz) (01/11/21 0600)  SpO2: 98 % (01/11/21 0740)  Exam:   Physical Exam  Constitutional:       Appearance: Normal appearance  HENT:      Head: Normocephalic  Mouth/Throat:      Mouth: Mucous membranes are moist       Pharynx: Oropharynx is clear  Eyes:      Extraocular Movements: Extraocular movements intact  Cardiovascular:      Rate and Rhythm: Normal rate and regular rhythm  Pulmonary:      Effort: Pulmonary effort is normal       Breath sounds: No wheezing or rales  Abdominal:      General: Abdomen is flat  Palpations: Abdomen is soft  Skin:     General: Skin is warm and dry  Neurological:      General: No focal deficit present  Mental Status: She is alert and oriented to person, place, and time     Psychiatric:         Mood and Affect: Mood normal          Behavior: Behavior normal  Discharge instructions/Information to patient and family:   See after visit summary for information provided to patient and family  Provisions for Follow-Up Care:  See after visit summary for information related to follow-up care and any pertinent home health orders  Planned Readmission: No     Discharge Statement:  I spent 40 minutes discharging the patient  This time was spent on the day of discharge  I had direct contact with the patient on the day of discharge  Greater than 50% of the total time was spent examining patient, answering all patient questions, arranging and discussing plan of care with patient as well as directly providing post-discharge instructions  Additional time then spent on discharge activities  Discharge Medications:  See after visit summary for reconciled discharge medications provided to patient and family        ** Please Note: This note has been constructed using a voice recognition system **

## 2021-01-11 NOTE — DISCHARGE INSTR - AVS FIRST PAGE
· Home oxygen to be used continuously at home and when walking  · Monitor oxygen levels at home and notify your physician or return to the emergency department if persistently below 88%  · Take prednisone and taper the dose by 10mg every 3 days as directed on the bottle  · Take atorvastatin for 14 days then stop

## 2021-01-11 NOTE — CASE MANAGEMENT
CM was informed by Dr Veronica Olivas that pt is medically stable for discharge with new home 02  CM sent respiratory 02 test and order for home 02 with portability to [facility] via [facility]  CM called and spoke with pt who reported that her significant other, Rasheed Cortez ([phone]) should be contacted to schedule delivery of home 02 and reported that he would transport her at time of discharge  Pt reported that Respiratory told pt she was not stable for discharge yet but that [facility] told her she is medically stable for discharge  CM sent TT to Dr Veronica Olivas informing of same and that pt is looking to speak with the medical team for clarification

## 2021-01-11 NOTE — CASE MANAGEMENT
REE TC to patient room  Pt confirmed that she has received an oxygen tank delivered to her room and received a call from [facility] that the drop off for pt O2 delivery between 2:30 and 4:30 today  REE confirmed that her s/o Pattie Vasquez has been made aware and knows to wait for delivery and will be picking up the pt after delivery  REE reviewed IMM document with pt  Pt confirmed that she understands and is in agreement with dcp

## 2021-01-11 NOTE — NURSING NOTE
Reviewed discharge instructions, follow-up appointments, medications and O2 use  with the patient   All questions answered

## 2021-01-12 ENCOUNTER — TRANSITIONAL CARE MANAGEMENT (OUTPATIENT)
Dept: FAMILY MEDICINE CLINIC | Facility: CLINIC | Age: 66
End: 2021-01-12

## 2021-01-14 ENCOUNTER — TELEMEDICINE (OUTPATIENT)
Dept: FAMILY MEDICINE CLINIC | Facility: CLINIC | Age: 66
End: 2021-01-14
Payer: MEDICARE

## 2021-01-14 ENCOUNTER — DOCUMENTATION (OUTPATIENT)
Dept: SOCIAL WORK | Facility: HOSPITAL | Age: 66
End: 2021-01-14

## 2021-01-14 DIAGNOSIS — R06.02 SOB (SHORTNESS OF BREATH) ON EXERTION: ICD-10-CM

## 2021-01-14 DIAGNOSIS — Z76.89 ENCOUNTER FOR SUPPORT AND COORDINATION OF TRANSITION OF CARE: ICD-10-CM

## 2021-01-14 DIAGNOSIS — U07.1 COVID-19: Primary | ICD-10-CM

## 2021-01-14 DIAGNOSIS — R06.2 WHEEZING: ICD-10-CM

## 2021-01-14 PROCEDURE — 99495 TRANSJ CARE MGMT MOD F2F 14D: CPT | Performed by: NURSE PRACTITIONER

## 2021-01-14 RX ORDER — ALBUTEROL SULFATE 90 UG/1
2 AEROSOL, METERED RESPIRATORY (INHALATION) EVERY 6 HOURS PRN
Qty: 2 INHALER | Refills: 3 | Status: SHIPPED | OUTPATIENT
Start: 2021-01-14

## 2021-01-14 NOTE — PROGRESS NOTES
Admission Report at [facility] has admitted your patient to [address] [place] [address] service with the following disciplines:      SN, PT, OT and HHA  Response needed, please respond via tiger text or inbasket  Primary focus of home health care: pulmonary assess  Patient stated goals of care: Improve breathing and endurance  Be able to take a ride in the car  Anticipated visit pattern: 2w2 1w2 and next visit date: 1/18/21pulmonary assess  Significant clinical findings:  New tremors only after albuterol  Upper back pain  Request for additional disciplines: HHA 2w4 bathing dressing  Request for medication clarification: bacitracin for toe  Potential barriers to goal achievement: mouth breather and holds breath with exertion  Other pertinent information: TC to ordering MD spoke to Elisabeth Severino to notify of need for script for humidification to be called into [facility]  Thank you for allowing us to participate in the care of your patient        Igor Latham RN

## 2021-01-14 NOTE — CASE MANAGEMENT
Cm informed this am that pt will need a script for a humidifying unit for home o2  Script sent to Noemi Hernandez in [place]

## 2021-01-14 NOTE — PROGRESS NOTES
Assessment/Plan:        Problem List Items Addressed This Visit     None      Visit Diagnoses     COVID-19    -  Primary    Encounter for support and coordination of transition of care        SOB (shortness of breath) on exertion        Wheezing                 Reason for visit is patient is here for recent    Encounter provider Ney Oseguera, Kassandra Ramos       Provider located at [address] [facility] [address]  [phone]      Recent Visits  No visits were found meeting these conditions  Showing recent visits within past 7 days and meeting all other requirements     Today's Visits  Date Type Provider Dept   01/14/21 819 [facility], [address] [facility]   Showing today's visits and meeting all other requirements     Future Appointments  No visits were found meeting these conditions  Showing future appointments within next 150 days and meeting all other requirements        After connecting through [employer], the patient was identified by name and date of birth  Rowena Mendoza was informed that this is a telemedicine visit and that the visit is being conducted through [employer] and patient was informed that this is a secure, HIPAA-compliant platform  She agrees to proceed     My office door was closed  No one else was in the room  She acknowledged consent and understanding of privacy and security of the video platform  The patient has agreed to participate and understands they can discontinue the visit at any time  Patient is aware this is a billable service  Subjective:     Patient ID: Rowena Mendoza is a 76 y o  female  Patient is here for a transition of care from her recent hospitalization for COVID-19  This visit is being conducted via video call as patient does not feel comfortable coming into the office  Patient is currently on 6 L of oxygen continuous flow    Patient states that she is in process of getting humidification for the oxygen  The patient also states that her breathing has significantly improved she feels more energy every day  She has home health coming to her home multiple times per week but states she does need a refill on her albuterol inhaler  She denies any other new complaints  Review of Systems   Constitutional: Negative for appetite change and fever  HENT: Negative for sinus pressure and sore throat  Eyes: Negative for pain  Respiratory: Positive for cough, shortness of breath and wheezing  Cardiovascular: Negative for chest pain  Gastrointestinal: Negative for abdominal pain  Genitourinary: Negative for dysuria  Musculoskeletal: Negative for arthralgias and myalgias  Skin: Negative for color change  Neurological: Negative for light-headedness  Psychiatric/Behavioral: Negative for behavioral problems  Objective: There were no vitals filed for this visit  Physical Exam  Constitutional:       Appearance: She is ill-appearing  Pulmonary:      Breath sounds: Wheezing present  Neurological:      Mental Status: She is alert and oriented to person, place, and time  Psychiatric:         Mood and Affect: Mood normal          Behavior: Behavior normal              Transitional Care Management Review:  Brandon Deluna is a 76 y o  female here for TCM follow up       During the TCM phone call patient stated:    TCM Call (since 12/14/2020)     Date and time call was made  1/12/2021  9:04 AM    Patient was hospitialized at  [facility]        Date of Admission  12/04/20    Date of discharge  01/11/21    Diagnosis  Acute hypoxemic respiratory failure due to COVID-19 [facility])    Disposition  Home; Home health services    Were the patients medications reviewed and updated  No    Current Symptoms  Shortness of breath; Weakness    Shortness of breath severity  Moderate    Weakness severity  Moderate      TCM Call (since 12/14/2020)     Post hospital issues  Reduced activity    Should patient be enrolled in anticoag monitoring? No    Scheduled for follow up? Yes    Patients specialists  Pulmonlolgist    Pulmonologist name  [facility]    Pulmonologist contact #  [phone]    Endocrinologist name  [phone]    Did you obtain your prescribed medications  Yes    Do you need help managing your prescriptions or medications  No    Is transportation to your appointment needed  No    Living   [address]  -- (Comment)  Home Services    Are you recieving any outpatient services  No    Are you recieving home care services  Yes    Types of home care services  Home health aid    Are you using any community resources  No          I spent 20 minutes with the patient during this visit      LUIS CARLOS Antunez

## 2021-01-18 ENCOUNTER — TELEPHONE (OUTPATIENT)
Dept: FAMILY MEDICINE CLINIC | Facility: CLINIC | Age: 66
End: 2021-01-18

## 2021-01-18 NOTE — TELEPHONE ENCOUNTER
Nichole English called she is a visiting nurse from [employer]  Nerissa Rainer is feeling tachy today her pulse is 98, her respirations are between 20-22  She is stating she is short of breath  Lungs sound good no fluid, no temp currently  On 6 liters of O2  I did try and urge that she should go to the ER but patient is declining ER at the moment

## 2021-01-19 ENCOUNTER — APPOINTMENT (EMERGENCY)
Dept: RADIOLOGY | Facility: HOSPITAL | Age: 66
End: 2021-01-19
Payer: MEDICARE

## 2021-01-19 ENCOUNTER — HOSPITAL ENCOUNTER (OUTPATIENT)
Facility: HOSPITAL | Age: 66
Setting detail: OBSERVATION
Discharge: HOME HEALTH | End: 2021-01-20
Attending: EMERGENCY MEDICINE | Admitting: INTERNAL MEDICINE
Payer: MEDICARE

## 2021-01-19 DIAGNOSIS — J45.909 ASTHMA, UNSPECIFIED ASTHMA SEVERITY, UNSPECIFIED WHETHER COMPLICATED, UNSPECIFIED WHETHER PERSISTENT: ICD-10-CM

## 2021-01-19 DIAGNOSIS — R09.02 HYPOXIA: ICD-10-CM

## 2021-01-19 DIAGNOSIS — R06.02 SHORTNESS OF BREATH: ICD-10-CM

## 2021-01-19 DIAGNOSIS — R06.00 DYSPNEA ON EXERTION: Primary | ICD-10-CM

## 2021-01-19 DIAGNOSIS — R07.89 CHEST WALL TENDERNESS: ICD-10-CM

## 2021-01-19 DIAGNOSIS — R91.1 INCIDENTAL LUNG NODULE: ICD-10-CM

## 2021-01-19 DIAGNOSIS — U07.1 COVID-19: ICD-10-CM

## 2021-01-19 DIAGNOSIS — Z86.16 HISTORY OF COVID-19: ICD-10-CM

## 2021-01-19 PROBLEM — R53.81 PHYSICAL DECONDITIONING: Status: ACTIVE | Noted: 2021-01-19

## 2021-01-19 PROBLEM — R00.0 TACHYCARDIA: Status: ACTIVE | Noted: 2021-01-19

## 2021-01-19 LAB
ALBUMIN SERPL BCP-MCNC: 3.9 G/DL (ref 3.5–5)
ALP SERPL-CCNC: 92 U/L (ref 46–116)
ALT SERPL W P-5'-P-CCNC: 34 U/L (ref 12–78)
ANION GAP SERPL CALCULATED.3IONS-SCNC: 3 MMOL/L (ref 4–13)
APTT PPP: 25 SECONDS (ref 23–37)
AST SERPL W P-5'-P-CCNC: 20 U/L (ref 5–45)
BASOPHILS # BLD AUTO: 0.01 THOUSANDS/ΜL (ref 0–0.1)
BASOPHILS NFR BLD AUTO: 0 % (ref 0–1)
BILIRUB SERPL-MCNC: 0.55 MG/DL (ref 0.2–1)
BUN SERPL-MCNC: 11 MG/DL (ref 5–25)
CALCIUM SERPL-MCNC: 8.9 MG/DL (ref 8.3–10.1)
CHLORIDE SERPL-SCNC: 109 MMOL/L (ref 100–108)
CO2 SERPL-SCNC: 28 MMOL/L (ref 21–32)
CREAT SERPL-MCNC: 1 MG/DL (ref 0.6–1.3)
EOSINOPHIL # BLD AUTO: 0.03 THOUSAND/ΜL (ref 0–0.61)
EOSINOPHIL NFR BLD AUTO: 0 % (ref 0–6)
ERYTHROCYTE [DISTWIDTH] IN BLOOD BY AUTOMATED COUNT: 16.6 % (ref 11.6–15.1)
GFR SERPL CREATININE-BSD FRML MDRD: 59 ML/MIN/1.73SQ M
GLUCOSE SERPL-MCNC: 124 MG/DL (ref 65–140)
HCT VFR BLD AUTO: 37.1 % (ref 34.8–46.1)
HGB BLD-MCNC: 11.6 G/DL (ref 11.5–15.4)
IMM GRANULOCYTES # BLD AUTO: 0.04 THOUSAND/UL (ref 0–0.2)
IMM GRANULOCYTES NFR BLD AUTO: 1 % (ref 0–2)
INR PPP: 0.95 (ref 0.84–1.19)
LYMPHOCYTES # BLD AUTO: 0.86 THOUSANDS/ΜL (ref 0.6–4.47)
LYMPHOCYTES NFR BLD AUTO: 11 % (ref 14–44)
MCH RBC QN AUTO: 28 PG (ref 26.8–34.3)
MCHC RBC AUTO-ENTMCNC: 31.3 G/DL (ref 31.4–37.4)
MCV RBC AUTO: 90 FL (ref 82–98)
MONOCYTES # BLD AUTO: 0.24 THOUSAND/ΜL (ref 0.17–1.22)
MONOCYTES NFR BLD AUTO: 3 % (ref 4–12)
NEUTROPHILS # BLD AUTO: 6.65 THOUSANDS/ΜL (ref 1.85–7.62)
NEUTS SEG NFR BLD AUTO: 85 % (ref 43–75)
NRBC BLD AUTO-RTO: 0 /100 WBCS
PLATELET # BLD AUTO: 143 THOUSANDS/UL (ref 149–390)
PMV BLD AUTO: 10.6 FL (ref 8.9–12.7)
POTASSIUM SERPL-SCNC: 3.6 MMOL/L (ref 3.5–5.3)
PROT SERPL-MCNC: 6.7 G/DL (ref 6.4–8.2)
PROTHROMBIN TIME: 12.7 SECONDS (ref 11.6–14.5)
RBC # BLD AUTO: 4.14 MILLION/UL (ref 3.81–5.12)
SODIUM SERPL-SCNC: 140 MMOL/L (ref 136–145)
TROPONIN I SERPL-MCNC: <0.02 NG/ML
WBC # BLD AUTO: 7.83 THOUSAND/UL (ref 4.31–10.16)

## 2021-01-19 PROCEDURE — 36415 COLL VENOUS BLD VENIPUNCTURE: CPT | Performed by: EMERGENCY MEDICINE

## 2021-01-19 PROCEDURE — 80053 COMPREHEN METABOLIC PANEL: CPT | Performed by: EMERGENCY MEDICINE

## 2021-01-19 PROCEDURE — 85025 COMPLETE CBC W/AUTO DIFF WBC: CPT | Performed by: EMERGENCY MEDICINE

## 2021-01-19 PROCEDURE — G1004 CDSM NDSC: HCPCS

## 2021-01-19 PROCEDURE — 96360 HYDRATION IV INFUSION INIT: CPT

## 2021-01-19 PROCEDURE — 84484 ASSAY OF TROPONIN QUANT: CPT | Performed by: EMERGENCY MEDICINE

## 2021-01-19 PROCEDURE — 99220 PR INITIAL OBSERVATION CARE/DAY 70 MINUTES: CPT | Performed by: INTERNAL MEDICINE

## 2021-01-19 PROCEDURE — 99285 EMERGENCY DEPT VISIT HI MDM: CPT | Performed by: EMERGENCY MEDICINE

## 2021-01-19 PROCEDURE — 93005 ELECTROCARDIOGRAM TRACING: CPT

## 2021-01-19 PROCEDURE — 1124F ACP DISCUSS-NO DSCNMKR DOCD: CPT | Performed by: EMERGENCY MEDICINE

## 2021-01-19 PROCEDURE — 85610 PROTHROMBIN TIME: CPT | Performed by: EMERGENCY MEDICINE

## 2021-01-19 PROCEDURE — 99285 EMERGENCY DEPT VISIT HI MDM: CPT

## 2021-01-19 PROCEDURE — 85730 THROMBOPLASTIN TIME PARTIAL: CPT | Performed by: EMERGENCY MEDICINE

## 2021-01-19 PROCEDURE — 71275 CT ANGIOGRAPHY CHEST: CPT

## 2021-01-19 RX ORDER — PREDNISONE 10 MG/1
10 TABLET ORAL DAILY
Status: DISCONTINUED | OUTPATIENT
Start: 2021-01-20 | End: 2021-01-20 | Stop reason: HOSPADM

## 2021-01-19 RX ORDER — HEPARIN SODIUM 5000 [USP'U]/ML
5000 INJECTION, SOLUTION INTRAVENOUS; SUBCUTANEOUS EVERY 8 HOURS SCHEDULED
Status: DISCONTINUED | OUTPATIENT
Start: 2021-01-19 | End: 2021-01-20 | Stop reason: HOSPADM

## 2021-01-19 RX ORDER — ACETAMINOPHEN 325 MG/1
975 TABLET ORAL ONCE
Status: COMPLETED | OUTPATIENT
Start: 2021-01-19 | End: 2021-01-19

## 2021-01-19 RX ORDER — ECHINACEA PURPUREA EXTRACT 125 MG
2 TABLET ORAL EVERY 2 HOUR PRN
Status: DISCONTINUED | OUTPATIENT
Start: 2021-01-19 | End: 2021-01-20 | Stop reason: HOSPADM

## 2021-01-19 RX ORDER — TRAZODONE HYDROCHLORIDE 100 MG/1
200 TABLET ORAL
Status: DISCONTINUED | OUTPATIENT
Start: 2021-01-19 | End: 2021-01-20 | Stop reason: HOSPADM

## 2021-01-19 RX ORDER — PANTOPRAZOLE SODIUM 40 MG/1
40 TABLET, DELAYED RELEASE ORAL
Status: DISCONTINUED | OUTPATIENT
Start: 2021-01-20 | End: 2021-01-20 | Stop reason: HOSPADM

## 2021-01-19 RX ORDER — ONDANSETRON 2 MG/ML
4 INJECTION INTRAMUSCULAR; INTRAVENOUS EVERY 4 HOURS PRN
Status: DISCONTINUED | OUTPATIENT
Start: 2021-01-19 | End: 2021-01-20 | Stop reason: HOSPADM

## 2021-01-19 RX ORDER — LITHIUM CARBONATE 300 MG/1
300 CAPSULE ORAL
Status: DISCONTINUED | OUTPATIENT
Start: 2021-01-19 | End: 2021-01-20 | Stop reason: HOSPADM

## 2021-01-19 RX ORDER — GUAIFENESIN/DEXTROMETHORPHAN 100-10MG/5
10 SYRUP ORAL EVERY 6 HOURS
Status: DISCONTINUED | OUTPATIENT
Start: 2021-01-19 | End: 2021-01-20 | Stop reason: HOSPADM

## 2021-01-19 RX ORDER — MELATONIN
2000 DAILY
Status: DISCONTINUED | OUTPATIENT
Start: 2021-01-20 | End: 2021-01-20 | Stop reason: HOSPADM

## 2021-01-19 RX ORDER — ATORVASTATIN CALCIUM 40 MG/1
40 TABLET, FILM COATED ORAL
Status: DISCONTINUED | OUTPATIENT
Start: 2021-01-19 | End: 2021-01-20 | Stop reason: HOSPADM

## 2021-01-19 RX ORDER — GABAPENTIN 300 MG/1
300 CAPSULE ORAL DAILY
Status: DISCONTINUED | OUTPATIENT
Start: 2021-01-20 | End: 2021-01-20 | Stop reason: HOSPADM

## 2021-01-19 RX ORDER — ACETAMINOPHEN 325 MG/1
650 TABLET ORAL EVERY 6 HOURS PRN
Status: DISCONTINUED | OUTPATIENT
Start: 2021-01-19 | End: 2021-01-20 | Stop reason: HOSPADM

## 2021-01-19 RX ORDER — ALBUTEROL SULFATE 90 UG/1
2 AEROSOL, METERED RESPIRATORY (INHALATION) EVERY 4 HOURS PRN
Status: DISCONTINUED | OUTPATIENT
Start: 2021-01-19 | End: 2021-01-20 | Stop reason: HOSPADM

## 2021-01-19 RX ORDER — LORAZEPAM 1 MG/1
2 TABLET ORAL
Status: DISCONTINUED | OUTPATIENT
Start: 2021-01-19 | End: 2021-01-20 | Stop reason: HOSPADM

## 2021-01-19 RX ORDER — FLUTICASONE PROPIONATE 50 MCG
2 SPRAY, SUSPENSION (ML) NASAL DAILY
Status: DISCONTINUED | OUTPATIENT
Start: 2021-01-20 | End: 2021-01-20 | Stop reason: HOSPADM

## 2021-01-19 RX ORDER — PREDNISONE 20 MG/1
20 TABLET ORAL ONCE
Status: COMPLETED | OUTPATIENT
Start: 2021-01-19 | End: 2021-01-19

## 2021-01-19 RX ORDER — LIDOCAINE 50 MG/G
1 PATCH TOPICAL ONCE
Status: COMPLETED | OUTPATIENT
Start: 2021-01-19 | End: 2021-01-19

## 2021-01-19 RX ADMIN — LORAZEPAM 2 MG: 1 TABLET ORAL at 21:09

## 2021-01-19 RX ADMIN — TRAZODONE HYDROCHLORIDE 200 MG: 100 TABLET ORAL at 21:09

## 2021-01-19 RX ADMIN — GUAIFENESIN AND DEXTROMETHORPHAN 10 ML: 100; 10 SYRUP ORAL at 21:10

## 2021-01-19 RX ADMIN — SODIUM CHLORIDE 500 ML: 0.9 INJECTION, SOLUTION INTRAVENOUS at 11:15

## 2021-01-19 RX ADMIN — SERTRALINE HYDROCHLORIDE 150 MG: 100 TABLET ORAL at 21:11

## 2021-01-19 RX ADMIN — LIDOCAINE 1 PATCH: 50 PATCH TOPICAL at 11:22

## 2021-01-19 RX ADMIN — ACETAMINOPHEN 975 MG: 325 TABLET, FILM COATED ORAL at 11:22

## 2021-01-19 RX ADMIN — HEPARIN SODIUM 5000 UNITS: 5000 INJECTION INTRAVENOUS; SUBCUTANEOUS at 21:10

## 2021-01-19 RX ADMIN — PREDNISONE 20 MG: 20 TABLET ORAL at 21:09

## 2021-01-19 RX ADMIN — LITHIUM CARBONATE 300 MG: 300 CAPSULE, GELATIN COATED ORAL at 21:10

## 2021-01-19 RX ADMIN — ATORVASTATIN CALCIUM 40 MG: 40 TABLET, FILM COATED ORAL at 21:09

## 2021-01-19 RX ADMIN — IOHEXOL 85 ML: 350 INJECTION, SOLUTION INTRAVENOUS at 13:52

## 2021-01-19 NOTE — H&P
History and Physical - [facility] Internal Medicine    Patient Information: Mercedes Guerrero 76 y o  female MRN: [id number]  Unit/Bed#: ED 14 Encounter: [id number]  Admitting Physician: Ronald Mittal  PCP: LUIS CARLOS Antunez  Date of Admission:  01/19/21    Assessment/Plan:    Hospital Problem List:     Principal Problem:    SOB (shortness of breath)  Active Problems:    Bipolar disorder     GERD (gastroesophageal reflux disease)    Insomnia    Asthma    Tachycardia      Plan for the Primary Problem(s):  · SOB    · Pulmonary Consult   · Incentive spirometry   · Monitor O2Sat; Continue 6L O2   · PT/OT to evaluate and treat   · Consider home with home health vs rehab facility   · Albuterol inhaler 2 puff q4hrs prn      Plan for Additional Problems:   · Tachycardia   · A/w primary problem   · CTA negative for PE   · EKG with nonspecific findings; Troponin negative  · Continue Monitoring for symptoms  · Depression/Bipolar/Insomnia   · Patient on well controlled on long term psych meds  · Continue Sertraline 150mg qd   · Continue Trazadone 200mg nightly   · Continue Lorazepam 2mg nightly  · Continue Gabapentin 300mg qd  · Continue Li 300 mg nightly    · Dyslipidemia   · Continue Atorvastatin 40mg nightly    · Prednisone Taper   · Continue prednisone taper as scheduled   · GERD  · Continue Pantoprazole 40mg daily          VTE Prophylaxis: Heparin    Code Status: FULL   Diet: Regular     Anticipated Length of Stay:  Patient will be admitted on an Observation basis with an anticipated length of stay of  LESS 2 midnights  Justification for Hospital Stay: Acute Dyspnea; O2 dependent    Total Time for Visit, including Counseling / Coordination of Care: 70   Greater than 50% of this total time spent on direct patient counseling and coordination of care      Chief Complaint:   SOB    History of Present Illness:    Mercedes Guerrero is a 76 y o  female who presents with past medical history of Hx of COVID infection with 38 days admission d/c on 2021, asthma, depression, Bipolar, insomnia that presents to ED via EMS for dyspnea and tachycardia  Since [place] discharge 21 patient has been oxygen dependent on 6L O2 at home; lives alone; has been comfortable performing her ADL until 1 day ago when symptoms began  She denies fever, nausea, vomiting, diarrhea, constipation, headache, or chest pain  Does admit to chronic cough, fatigue, and mild intermittent left sided chest discomfort that is characterized as achy  Patient denies being on anticoagulation since discharge  In the ED, CTA negative for PE or consolidation; however scattered ground glass opacities and pleuroparenchymal nodular intensity in right lobe are appreciated  EKG showing normal rate rhythm interval however ST depression and T wave inversions appreciated  Troponin's negative; WBC negative; PT/INR normal  Patient to be admitted for Observation           Review of Systems:    Review of Systems   Constitutional: Negative for fever  HENT: Negative for sinus pain and voice change  Eyes: Negative for visual disturbance  Respiratory: Positive for cough and shortness of breath  Cardiovascular: Positive for palpitations  Negative for chest pain and leg swelling  Gastrointestinal: Negative for abdominal pain, constipation, diarrhea and nausea  Musculoskeletal: Negative for joint swelling and myalgias  Skin: Negative for rash  Neurological: Positive for weakness  Negative for light-headedness and headaches  Psychiatric/Behavioral: Negative for behavioral problems and confusion         Past Medical and Surgical History:     Past Medical History:   Diagnosis Date    Asthma     Bipolar disorder ([address] )     COVID-19     GERD (gastroesophageal reflux disease)        Past Surgical History:   Procedure Laterality Date     SECTION      x3    CHOLECYSTECTOMY      HYSTERECTOMY         Meds/Allergies:    Prior to Admission medications    Medication Sig Start Date End Date Taking? Authorizing Provider   albuterol (PROVENTIL HFA,VENTOLIN HFA) 90 mcg/act inhaler Inhale 2 puffs every 4 (four) hours as needed for wheezing 10/25/19  Yes Felice Ferrer MD   albuterol (PROVENTIL HFA,VENTOLIN HFA) 90 mcg/act inhaler Inhale 2 puffs every 6 (six) hours as needed for wheezing or shortness of breath 1/14/21  Yes LUIS CARLOS Ochoa   atorvastatin (LIPITOR) 40 mg tablet Take 1 tablet (40 mg total) by mouth daily with dinner 1/11/21  Yes Kay Geiger MD   cholecalciferol (VITAMIN D3) 1,000 units tablet Take 2 tablets (2,000 Units total) by mouth daily 1/12/21  Yes Kay Geiger MD   fluticasone (FLONASE) 50 mcg/act nasal spray 2 sprays into each nostril daily 1/12/21  Yes Kay Geiger MD   gabapentin (NEURONTIN) 300 mg capsule Take 300 mg by mouth daily   Yes Historical Provider, MD   Humidifier MISC Use as needed (humidification) Use as needed for humidification 1/14/21  Yes Michelle Madsen MD   lithium carbonate 300 mg capsule Take 300 mg by mouth daily at bedtime   Yes Historical Provider, MD   LORazepam (ATIVAN) 1 mg tablet Take 2 mg by mouth daily at bedtime    Yes Historical Provider, MD   omeprazole (PriLOSEC) 20 mg delayed release capsule Take 20 mg by mouth daily   Yes Historical Provider, MD   predniSONE 10 mg tablet Take 4 tablets (40 mg total) by mouth daily for 3 days, THEN 3 tablets (30 mg total) daily for 3 days, THEN 2 tablets (20 mg total) daily for 3 days, THEN 1 tablet (10 mg total) daily for 3 days   1/11/21 1/23/21 Yes Kay Geiger MD   sertraline (ZOLOFT) 100 mg tablet Take 150 mg by mouth daily   Yes Historical Provider, MD   sodium chloride (OCEAN) 0 65 % nasal spray 2 sprays into each nostril every 2 (two) hours as needed for congestion 1/11/21  Yes Kay Geiger MD   traZODone (DESYREL) 100 mg tablet Take 200 mg by mouth daily at bedtime   Yes Historical Provider, MD   diphenhydrAMINE (BENADRYL) 25 mg tablet Take 25 mg by mouth daily at bedtime Historical Provider, MD   erythromycin (ILOTYCIN) ophthalmic ointment Administer 0 5 inches into the left eye daily at bedtime  Patient not taking: Reported on 2021   LUIS CARLOS Cervantes   Fluocinonide Emulsified Base 0 05 % CREA APPLY TO AFFECTED AREA(S) TOPICALLY TWO TIMES DAILY TO LOWER LEGS FOR 14 DAYS MAX AS NEEDED 20   Historical Provider, MD   imiquimod (ALDARA) 5 % cream APPLY TO AFFECTED AREA(S) TOPICALLY EVERY DAY AT BEDTIME 20   Historical Provider, MD   loratadine (CLARITIN) 10 mg tablet Take 1 tablet (10 mg total) by mouth daily  Patient not taking: Reported on 2021   Ernestina Husbands, MD   mupirocin (BACTROBAN) 2 % ointment APPLY TO AFFECTED AREA(S) TOPICALLY TWO TIMES DAILY 20   Historical Provider, MD     I have reviewed home medications with patient personally  Allergies:    Allergies   Allergen Reactions    Amphetamine-Dextroamphetamine Other (See Comments)     Chest pain- was placed on Adderall after son passed away for depression, and she developed chest pain in ; she had full cardiac work up, and was negative, so she has allergy to Adderall  Chest pain- was placed on Adderall after son passed away for depression, and she developed chest pain in ; she had full cardiac work up, and was negative, so she has allergy to Adderall    Molds & Smuts     Other      Cats    Sulfa Antibiotics Other (See Comments)       Social History:     Marital Status: Single; Lives Alone     Substance Use History:   Social History     Substance and Sexual Activity   Alcohol Use Yes    Frequency: 2-4 times a month    Drinks per session: 1 or 2    Comment: rarely     Social History     Tobacco Use   Smoking Status Former Smoker    Packs/day: 0 10    Years: 5 00    Pack years: 0 50    Types: Cigarettes    Quit date: 2018    Years since quittin 2   Smokeless Tobacco Never Used     Social History     Substance and Sexual Activity   Drug Use Never Family History:    Family History   Problem Relation Age of Onset    Heart disease Mother     Hypertension Mother     Heart disease Father     Hypertension Father        Physical Exam:     Vitals:   Blood Pressure: 103/54 (01/19/21 1730)  Pulse: 72 (01/19/21 1730)  Temperature: 98 °F (36 7 °C) (01/19/21 1045)  Temp Source: Oral (01/19/21 1045)  Respirations: 20 (01/19/21 1730)  Height: 5' 1" (154 9 cm) (01/19/21 1045)  Weight - Scale: 78 4 kg (172 lb 12 8 oz) (01/19/21 1045)  SpO2: 99 % (01/19/21 1730)    Physical Exam  Constitutional:       Appearance: Normal appearance  HENT:      Head: Normocephalic  Eyes:      Extraocular Movements: Extraocular movements intact  Conjunctiva/sclera: Conjunctivae normal    Neck:      Musculoskeletal: Normal range of motion  Cardiovascular:      Rate and Rhythm: Normal rate  Pulses: Normal pulses  Heart sounds: Normal heart sounds  No murmur  Pulmonary:      Effort: Pulmonary effort is normal       Breath sounds: Normal breath sounds  Abdominal:      Palpations: Abdomen is soft  Tenderness: There is no abdominal tenderness  Musculoskeletal:         General: No swelling or tenderness  Skin:     General: Skin is warm  Neurological:      General: No focal deficit present  Mental Status: She is alert  Psychiatric:         Mood and Affect: Mood normal          Behavior: Behavior normal            Additional Data:     Lab Results: I have personally reviewed pertinent reports        Results from last 7 days   Lab Units 01/19/21  1111   WBC Thousand/uL 7 83   HEMOGLOBIN g/dL 11 6   HEMATOCRIT % 37 1   PLATELETS Thousands/uL 143*   NEUTROS PCT % 85*   LYMPHS PCT % 11*   MONOS PCT % 3*   EOS PCT % 0     Results from last 7 days   Lab Units 01/19/21  1111   POTASSIUM mmol/L 3 6   CHLORIDE mmol/L 109*   CO2 mmol/L 28   BUN mg/dL 11   CREATININE mg/dL 1 00   CALCIUM mg/dL 8 9   ALK PHOS U/L 92   ALT U/L 34   AST U/L 20     Results from last 7 days   Lab Units 01/19/21  1111   INR  0 95       Imaging: I have personally reviewed pertinent reports  Ct Chest Wo Contrast    Result Date: 1/8/2021  Narrative: CT CHEST WITHOUT IV CONTRAST INDICATION:   Shortness of breath sob  Patient with Covid 19 positivity on 11/29/2020 with continued respiratory failure due to Covid 19 by history COMPARISON:  12/21/2020; chest x-ray from 12/24/2020 and 7/1/2019 TECHNIQUE: CT examination of the chest was performed without intravenous contrast   Axial, sagittal, and coronal 2D reformatted images were created from the source data and submitted for interpretation  Radiation dose length product (DLP) for this visit:  308 16 mGy-cm   This examination, like all CT scans performed in the [facility], was performed utilizing techniques to minimize radiation dose exposure, including the use of iterative  reconstruction and automated exposure control  FINDINGS: LUNGS: There is a mosaic attenuation are seen suggesting air trapping and probable underlying emphysema  There is peripheral honeycombing identified fairly diffusely as well as mixed reticular and groundglass opacity scattered throughout the lungs  A groundglass component appears improved  Findings suggest the possibility of underlying chronic pulmonary fibrosis with superimposed pneumonia with some improvement since December 21  No dense consolidation is identified  Consolidation right lower lobe  noted in December has improved  Granulomas are noted in the right lung  There is no tracheal or endobronchial lesion  Bronchiectasis is noted diffusely  PLEURA:  Unremarkable  HEART/GREAT VESSELS:  Trace pericardial effusion is seen MEDIASTINUM AND YG:  Mildly prominent nodes are seen in the paratracheal region without change and may be reactive  CHEST WALL AND LOWER NECK:   Unremarkable  VISUALIZED STRUCTURES IN THE UPPER ABDOMEN:  The patient is status post cholecystectomy   OSSEOUS STRUCTURES:  No acute fracture or destructive osseous lesion  Impression: Small pericardial effusion  Groundglass opacities and consolidation have improved since the study of December 21 but still present  This may be related to residual/recurrent pneumonia  There is persistent reticular opacities as well as peripheral honeycombing as well as some areas of emphysema  A component of chronic fibrosis should be considered and should be correlated clinically  Further follow-up when patient is no longer acutely ill is advised unless patient should worsen  Workstation performed: [id number]     Ct Chest Wo Contrast    Result Date: 12/22/2020  Narrative: CT CHEST WITHOUT IV CONTRAST INDICATION:   worsening O2 requirements  Patient has confirmed COVID-19  COMPARISON:  Chest x-ray dated 12/17/2020 TECHNIQUE: CT examination of the chest was performed without intravenous contrast   Axial, sagittal, and coronal 2D reformatted images were created from the source data and submitted for interpretation  Radiation dose length product (DLP) for this visit:  307 04 mGy-cm   This examination, like all CT scans performed in the [facility], was performed utilizing techniques to minimize radiation dose exposure, including the use of iterative  reconstruction and automated exposure control  FINDINGS: LUNGS:  Diffuse bilateral ground glass opacities again seen  There is interstitial thickening and small amount of peripheral consolidation in the right lung  There is no tracheal or endobronchial lesion  PLEURA:  No effusions or pneumothorax  Small peripheral pleural-based calcification in the right lower lobe  d HEART/GREAT VESSELS:  Unremarkable for patient's age  MEDIASTINUM AND YG:  Small hiatal hernia noted  No mediastinal or hilar lymphadenopathy  CHEST WALL AND LOWER NECK:   Unremarkable  VISUALIZED STRUCTURES IN THE UPPER ABDOMEN:  Partially imaged cyst in the right upper pole measuring 7 cm  Status post cholecystectomy  OSSEOUS STRUCTURES:  Spinal degenerative changes are noted  No acute fracture or destructive osseous lesion  Impression: Diffuse bilateral groundglass opacities with crazy paving and foci of peripheral consolidation in the right lung due to known Covid 19 pneumonia  Workstation performed: [id number]     Xr Chest Portable Icu    Result Date: 12/24/2020  Narrative: CHEST INDICATION:   hypoxia  Patient has confirmed COVID-19  COMPARISON:  12/17/2020 EXAM PERFORMED/VIEWS:  XR CHEST PORTABLE ICU FINDINGS: Cardiomediastinal silhouette appears unremarkable  Bilateral patchy and reticular opacities with decreased airspace opacity on the left  No pneumothorax or pleural effusion  Osseous structures appear within normal limits for patient age  Impression: Improved pneumonia due to COVID -19 with decreased left-sided airspace disease  Workstation performed: [id number]     Ct Sinus Wo Contrast    Addendum Date: 12/22/2020 Addendum:   ADDENDUM: Patient has confirmed COVID-19  Result Date: 12/22/2020  Narrative: CT SINUSES INDICATION:   sinus pressure, worsening inflammatory markers  COMPARISON:  None  TECHNIQUE:  Axial CT imaging through the sinuses was performed  In addition, sagittal and coronal reformatted images were submitted for interpretation  Radiation dose length product (DLP) for this visit:  312 09 mGy-cm   This examination, like all CT scans performed in the [facility], was performed utilizing techniques to minimize radiation dose exposure, including the use of iterative  reconstruction and automated exposure control  IMAGE QUALITY:  Diagnostic  FINDINGS: FRONTAL SINUSES: Right frontal sinus clear  Hypoplastic left frontal sinus with opacification of the left frontoethmoidal recess  ETHMOID AIR CELLS:  Mild mucosal thickening  No fluid level  MAXILLARY SINUSES:  Mild mucosal thickening along the floor of the right maxillary sinus  Left is clear  No fluid level   SPHENOID SINUSES:  Small mucous retention cysts  No fluid levels NASAL SEPTUM AND CAVITY:  Septum is midline  Frothy mucous in the anterior nasal cavity  ANTERIOR OSTEOMEATAL COMPLEX:  Right is partially opacified  Left is patent  OSSEOUS STRUCTURES:  No bony erosion or destruction  Normal cribriform plate and planum sphenoidale  Visualized mastoid temporal bones are clear  The bony walls of the carotid canals are intact  SOFT TISSUES:  Normal      Impression: Mild sinus disease as described  No fluid levels  Workstation performed: [id number]     Cta Ed Chest Pe Study    Result Date: 1/19/2021  Narrative: CTA - CHEST WITH IV CONTRAST - PULMONARY ANGIOGRAM INDICATION:   known COVID, acute worsening of GUZMAN and tachy with left sided CP, hypoxic  COMPARISON: December 21, 2020 TECHNIQUE: CTA examination of the chest was performed using angiographic technique according to a protocol specifically tailored to evaluate for pulmonary embolism  Axial, sagittal, and coronal 2D reformatted images were created from the source data and  submitted for interpretation  In addition, coronal 3D MIP postprocessing was performed on the acquisition scanner  Radiation dose length product (DLP) for this visit:  532 07 mGy-cm   This examination, like all CT scans performed in the [facility], was performed utilizing techniques to minimize radiation dose exposure, including the use of iterative  reconstruction and automated exposure control  IV Contrast:  85 mL of iohexol (OMNIPAQUE)  FINDINGS: PULMONARY ARTERIAL TREE:  No pulmonary embolus is seen  LUNGS there are scattered areas of groundglass density in the lung  There is a reticulation with peripheral predominance  There are traction bronchiectatic changes in the left upper lobe in image 95 series 2   No airspace consolidation seen Calcification seen right lower lung A pleuroparenchymal density seen at the right lower lobe, measuring about 1 4 cm, seen in image 90 series 2 and in image 111 series 603 PLEURA:  No pleural effusion seen HEART/GREAT VESSELS:  Unremarkable for patient's age  MEDIASTINUM AND YG:  No significant mediastinal lymph node enlargement CHEST WALL AND LOWER NECK:   Unremarkable  VISUALIZED STRUCTURES IN THE UPPER ABDOMEN:  Adrenal glands appear unremarkable The liver appear unremarkable Rounded hypodensity seen in the with attenuation of fluid compatible with cysts OSSEOUS STRUCTURES:  No acute compression collapse vertebra No gross lytic lesion     Impression: No pulmonary embolism  Scattered areas of groundglass density in the both lungs mildly more pronounced from the previous study May be inflammatory or infectious No acute airspace consolidation There are areas of subpleural reticulation with groundglass density with some traction bronchiectatic changes suggest developing fibrosis A pleuroparenchymal nodular density seen at the right lower lobe in its superior aspect in image 111 series 603 and image 91 series 2 probable atelectasis  Consider follow-up chest CT at 3 months The study was marked in [facility] for immediate notification   Workstation performed: [id number]       EKG, Pathology, and Other Studies Reviewed on Admission:   · EKG: normal rate, rhythm, interval; abnormal ST depression and T wave inversion    Epic / Care Everywhere Records Reviewed: Yes       [address] [employer] Medical Student

## 2021-01-19 NOTE — ED NOTES
Ambulatory to the bathroom with portable oxygen tank        Stephanie Luna  01/19/21 [address]  01/19/21 8119

## 2021-01-19 NOTE — ED PROVIDER NOTES
History  Chief Complaint   Patient presents with    Shortness of Breath     pt arrives via EMS from home with c/o increasing SOB with exertion, patients O2 sat 78% without oxygen, pt has been wearing 6L NC at home since being diagnosed with COVID back in Nov      76year-old female history of asthma, GERD, COVID with recent 5 week stay in the hospital from 12/4or until 1/11 discharged on to 6 L of home O2 presenting with worsened dyspnea on exertion  Patient reports she has been doing relatively okay at home since discharge but then yesterday morning she had acutely worsening dyspnea on exertion tachycardia  She reports that she gets significantly more short of breath with exertion since yesterday morning and her heart rate that has been largely in the 60s to 70s is now consistently upwards of the 90s to low 100s even at rest   She reports that she does feel more dehydrated than usual with poor skin turgor and dry mouth  She also reports intermittent left-sided chest discomfort along the infracostal margin that she describes as an ache/cramp  She reports that she was on anticoagulation in the hospital but has not been on any since discharge  She denies any prior history of blood clots and denies any lower extremity swelling  She denies any headache, vision changes, abdominal pain, nausea vomiting, fever/chills, or any bladder or bowel changes  Prior to Admission Medications   Prescriptions Last Dose Informant Patient Reported? Taking?    Fluocinonide Emulsified Base 0 05 % CREA More than a month at Unknown time  Yes No   Sig: APPLY TO AFFECTED AREA(S) TOPICALLY TWO TIMES DAILY TO LOWER LEGS FOR 14 DAYS MAX AS NEEDED   Humidifier MISC 1/19/2021 at Unknown time  No Yes   Sig: Use as needed (humidification) Use as needed for humidification   LORazepam (ATIVAN) 1 mg tablet 1/18/2021 at Unknown time Self Yes Yes   Sig: Take 2 mg by mouth daily at bedtime    albuterol (PROVENTIL HFA,VENTOLIN HFA) 90 mcg/act inhaler 2021 at Unknown time  No Yes   Sig: Inhale 2 puffs every 4 (four) hours as needed for wheezing   albuterol (PROVENTIL HFA,VENTOLIN HFA) 90 mcg/act inhaler 2021 at Unknown time  No Yes   Sig: Inhale 2 puffs every 6 (six) hours as needed for wheezing or shortness of breath   atorvastatin (LIPITOR) 40 mg tablet 2021 at Unknown time  No Yes   Sig: Take 1 tablet (40 mg total) by mouth daily with dinner   cholecalciferol (VITAMIN D3) 1,000 units tablet 2021 at Unknown time  No Yes   Sig: Take 2 tablets (2,000 Units total) by mouth daily   diphenhydrAMINE (BENADRYL) 25 mg tablet Not Taking at Unknown time Self Yes No   Sig: Take 25 mg by mouth daily at bedtime   erythromycin (ILOTYCIN) ophthalmic ointment Not Taking at Unknown time  No No   Sig: Administer 0 5 inches into the left eye daily at bedtime   Patient not taking: Reported on 2021   fluticasone (FLONASE) 50 mcg/act nasal spray 2021 at Unknown time  No Yes   Si sprays into each nostril daily   gabapentin (NEURONTIN) 300 mg capsule 2021 at Unknown time Self Yes Yes   Sig: Take 300 mg by mouth daily   imiquimod (ALDARA) 5 % cream Not Taking at Unknown time  Yes No   Sig: APPLY TO AFFECTED AREA(S) TOPICALLY EVERY DAY AT BEDTIME   lithium carbonate 300 mg capsule 2021 at Unknown time Self Yes Yes   Sig: Take 300 mg by mouth daily at bedtime   loratadine (CLARITIN) 10 mg tablet Not Taking at Unknown time  No No   Sig: Take 1 tablet (10 mg total) by mouth daily   Patient not taking: Reported on 2021   mupirocin (BACTROBAN) 2 % ointment Not Taking at Unknown time  Yes No   Sig: APPLY TO AFFECTED AREA(S) TOPICALLY TWO TIMES DAILY   omeprazole (PriLOSEC) 20 mg delayed release capsule 2021 at Unknown time Self Yes Yes   Sig: Take 20 mg by mouth daily   predniSONE 10 mg tablet 2021 at Unknown time  No Yes   Sig: Take 4 tablets (40 mg total) by mouth daily for 3 days, THEN 3 tablets (30 mg total) daily for 3 days, THEN 2 tablets (20 mg total) daily for 3 days, THEN 1 tablet (10 mg total) daily for 3 days  sertraline (ZOLOFT) 100 mg tablet 2021 at Unknown time Self Yes Yes   Sig: Take 150 mg by mouth daily   sodium chloride (OCEAN) 0 65 % nasal spray 2021 at Unknown time  No Yes   Si sprays into each nostril every 2 (two) hours as needed for congestion   traZODone (DESYREL) 100 mg tablet 2021 at Unknown time Self Yes Yes   Sig: Take 200 mg by mouth daily at bedtime      Facility-Administered Medications Last Administration Doses Remaining   betamethasone dipropionate (DIPROSONE) 0 05 % ointment None recorded           Past Medical History:   Diagnosis Date    Asthma     Bipolar disorder ([facility] [address] )     COVID-19     GERD (gastroesophageal reflux disease)        Past Surgical History:   Procedure Laterality Date     SECTION      x3    CHOLECYSTECTOMY      HYSTERECTOMY         Family History   Problem Relation Age of Onset    Heart disease Mother     Hypertension Mother     Heart disease Father     Hypertension Father      I have reviewed and agree with the history as documented  E-Cigarette/Vaping    E-Cigarette Use Never User      E-Cigarette/Vaping Substances     Social History     Tobacco Use    Smoking status: Former Smoker     Packs/day: 0 10     Years: 5 00     Pack years: 0 50     Types: Cigarettes     Quit date: 2018     Years since quittin 2    Smokeless tobacco: Never Used   Substance Use Topics    Alcohol use: Yes     Frequency: 2-4 times a month     Drinks per session: 1 or 2     Comment: rarely    Drug use: Never        Review of Systems   Constitutional: Negative for appetite change, chills, diaphoresis, fever and unexpected weight change  HENT: Negative for congestion and rhinorrhea  Eyes: Negative for photophobia and visual disturbance  Respiratory: Positive for cough and shortness of breath  Negative for chest tightness      Cardiovascular: Positive for chest pain  Negative for palpitations and leg swelling  Gastrointestinal: Negative for abdominal distention, abdominal pain, blood in stool, constipation, diarrhea, nausea and vomiting  Genitourinary: Negative for dysuria and hematuria  Musculoskeletal: Negative for back pain, joint swelling, neck pain and neck stiffness  Skin: Negative for color change, pallor, rash and wound  Neurological: Negative for dizziness, syncope, weakness, light-headedness and headaches  Psychiatric/Behavioral: Negative for agitation  All other systems reviewed and are negative  Physical Exam  ED Triage Vitals   Temperature Pulse Respirations Blood Pressure SpO2   01/19/21 1045 01/19/21 1045 01/19/21 1045 01/19/21 1045 01/19/21 1045   98 °F (36 7 °C) 94 22 121/59 96 %      Temp Source Heart Rate Source Patient Position - Orthostatic VS BP Location FiO2 (%)   01/19/21 1045 01/19/21 1045 01/19/21 1045 01/19/21 1045 --   Oral Monitor Lying Right arm       Pain Score       01/19/21 1122       6             Orthostatic Vital Signs  Vitals:    01/19/21 1830 01/19/21 1915 01/19/21 2310 01/20/21 0735   BP: 93/51 101/59 90/53 109/60   Pulse: 68 74 68 67   Patient Position - Orthostatic VS: Lying          Physical Exam  Vitals signs and nursing note reviewed  Constitutional:       General: She is not in acute distress  Appearance: Normal appearance  She is well-developed  She is not diaphoretic  HENT:      Head: Normocephalic and atraumatic  Nose: Nose normal  No congestion or rhinorrhea  Mouth/Throat:      Mouth: Mucous membranes are moist       Pharynx: Oropharynx is clear  No oropharyngeal exudate or posterior oropharyngeal erythema  Eyes:      General: No scleral icterus  Right eye: No discharge  Left eye: No discharge  Extraocular Movements: Extraocular movements intact  Conjunctiva/sclera: Conjunctivae normal       Pupils: Pupils are equal, round, and reactive to light  Neck:      Musculoskeletal: Normal range of motion and neck supple  No neck rigidity or muscular tenderness  Vascular: No JVD  Trachea: No tracheal deviation  Cardiovascular:      Rate and Rhythm: Normal rate and regular rhythm  Heart sounds: Normal heart sounds  No murmur  No friction rub  No gallop  Pulmonary:      Effort: Pulmonary effort is normal  No tachypnea, accessory muscle usage or respiratory distress  Breath sounds: No stridor  Examination of the right-middle field reveals decreased breath sounds  Examination of the left-middle field reveals decreased breath sounds  Examination of the right-lower field reveals decreased breath sounds  Examination of the left-lower field reveals decreased breath sounds  Decreased breath sounds present  No wheezing, rhonchi or rales  Comments: Mildly decreased breath sounds middle and lower lobes bilaterally  No wheezing, rhonchi, rales appreciated  Normal respiratory rate on 6 L nasal cannula  Chest:      Chest wall: No tenderness  Abdominal:      General: Bowel sounds are normal  There is no distension  Palpations: Abdomen is soft  Tenderness: There is no abdominal tenderness  There is no guarding or rebound  Musculoskeletal: Normal range of motion  General: No swelling, tenderness, deformity or signs of injury  Right lower leg: No edema  Left lower leg: No edema  Lymphadenopathy:      Cervical: No cervical adenopathy  Skin:     General: Skin is warm and dry  Coloration: Skin is not pale  Findings: No erythema or rash  Neurological:      General: No focal deficit present  Mental Status: She is alert and oriented to person, place, and time  Mental status is at baseline  Cranial Nerves: No cranial nerve deficit  Sensory: No sensory deficit  Motor: No weakness or abnormal muscle tone        Coordination: Coordination normal    Psychiatric:         Behavior: Behavior normal  Thought Content:  Thought content normal          ED Medications  Medications   albuterol (PROVENTIL HFA,VENTOLIN HFA) inhaler 2 puff (2 puffs Inhalation Given 1/20/21 0541)   atorvastatin (LIPITOR) tablet 40 mg (40 mg Oral Given 1/19/21 2109)   cholecalciferol (VITAMIN D3) tablet 2,000 Units (has no administration in time range)   fluticasone (FLONASE) 50 mcg/act nasal spray 2 spray (has no administration in time range)   gabapentin (NEURONTIN) capsule 300 mg (has no administration in time range)   lithium carbonate capsule 300 mg (300 mg Oral Given 1/19/21 2110)   LORazepam (ATIVAN) tablet 2 mg (2 mg Oral Given 1/19/21 2109)   mupirocin (BACTROBAN) 2 % ointment (0 application Topical Hold 1/19/21 2021)   pantoprazole (PROTONIX) EC tablet 40 mg (40 mg Oral Given 1/20/21 0515)   predniSONE tablet 10 mg (has no administration in time range)   sodium chloride (OCEAN) 0 65 % nasal spray 2 spray (has no administration in time range)   traZODone (DESYREL) tablet 200 mg (200 mg Oral Given 1/19/21 2109)   ondansetron (ZOFRAN) injection 4 mg (has no administration in time range)   acetaminophen (TYLENOL) tablet 650 mg (has no administration in time range)   heparin (porcine) subcutaneous injection 5,000 Units (5,000 Units Subcutaneous Given 1/20/21 0515)   sertraline (ZOLOFT) tablet 150 mg (150 mg Oral Given 1/19/21 2111)   dextromethorphan-guaiFENesin (ROBITUSSIN DM) oral syrup 10 mL (10 mL Oral Given 1/20/21 0210)   sodium chloride 0 9 % bolus 500 mL (0 mL Intravenous Stopped 1/19/21 1217)   acetaminophen (TYLENOL) tablet 975 mg (975 mg Oral Given 1/19/21 1122)   lidocaine (LIDODERM) 5 % patch 1 patch (1 patch Topical Patch Removed 1/19/21 8027)   iohexol (OMNIPAQUE) 350 MG/ML injection (MULTI-DOSE) 85 mL (85 mL Intravenous Given 1/19/21 1352)   predniSONE tablet 20 mg (20 mg Oral Given 1/19/21 2109)       Diagnostic Studies  Results Reviewed     Procedure Component Value Units Date/Time    Troponin I [[id number]] (Normal) Collected: 01/19/21 1111    Lab Status: Final result Specimen: Blood from Arm, Right Updated: 01/19/21 1152     Troponin I <0 02 ng/mL     Comprehensive metabolic panel [[id number]]  (Abnormal) Collected: 01/19/21 1111    Lab Status: Final result Specimen: Blood from Arm, Right Updated: 01/19/21 1152     Sodium 140 mmol/L      Potassium 3 6 mmol/L      Chloride 109 mmol/L      CO2 28 mmol/L      ANION GAP 3 mmol/L      BUN 11 mg/dL      Creatinine 1 00 mg/dL      Glucose 124 mg/dL      Calcium 8 9 mg/dL      AST 20 U/L      ALT 34 U/L      Alkaline Phosphatase 92 U/L      Total Protein 6 7 g/dL      Albumin 3 9 g/dL      Total Bilirubin 0 55 mg/dL      eGFR 59 ml/min/1 73sq m     Narrative:      [place] guidelines for Chronic Kidney Disease (CKD):     Stage 1 with normal or high GFR (GFR > 90 mL/min/1 73 square meters)    Stage 2 Mild CKD (GFR = 60-89 mL/min/1 73 square meters)    Stage 3A Moderate CKD (GFR = 45-59 mL/min/1 73 square meters)    Stage 3B Moderate CKD (GFR = 30-44 mL/min/1 73 square meters)    Stage 4 Severe CKD (GFR = 15-29 mL/min/1 73 square meters)    Stage 5 End Stage CKD (GFR <15 mL/min/1 73 square meters)  Note: GFR calculation is accurate only with a steady state creatinine    Protime-INR [[id number]]  (Normal) Collected: 01/19/21 1111    Lab Status: Final result Specimen: Blood from Arm, Right Updated: 01/19/21 1149     Protime 12 7 seconds      INR 0 95    APTT [[id number]]  (Normal) Collected: 01/19/21 1111    Lab Status: Final result Specimen: Blood from Arm, Right Updated: 01/19/21 1149     PTT 25 seconds     CBC and differential [[id number]]  (Abnormal) Collected: 01/19/21 1111    Lab Status: Final result Specimen: Blood from Arm, Right Updated: 01/19/21 1134     WBC 7 83 Thousand/uL      RBC 4 14 Million/uL      Hemoglobin 11 6 g/dL      Hematocrit 37 1 %      MCV 90 fL      MCH 28 0 pg      MCHC 31 3 g/dL      RDW 16 6 %      MPV 10 6 fL Platelets 634 Thousands/uL      nRBC 0 /100 WBCs      Neutrophils Relative 85 %      Immat GRANS % 1 %      Lymphocytes Relative 11 %      Monocytes Relative 3 %      Eosinophils Relative 0 %      Basophils Relative 0 %      Neutrophils Absolute 6 65 Thousands/µL      Immature Grans Absolute 0 04 Thousand/uL      Lymphocytes Absolute 0 86 Thousands/µL      Monocytes Absolute 0 24 Thousand/µL      Eosinophils Absolute 0 03 Thousand/µL      Basophils Absolute 0 01 Thousands/µL                  CTA ED chest PE study   Final Result by Pattie Parsons MD (01/19 5760)      No pulmonary embolism  Scattered areas of groundglass density in the both lungs mildly more pronounced from the previous study May be inflammatory or infectious      No acute airspace consolidation      There are areas of subpleural reticulation with groundglass density with some traction bronchiectatic changes suggest developing fibrosis         A pleuroparenchymal nodular density seen at the right lower lobe in its superior aspect in image 111 series 603 and image 91 series 2 probable atelectasis  Consider follow-up chest CT at 3 months      The study was marked in [facility] for immediate notification                        Workstation performed: [id number]               Procedures  ECG 12 Lead Documentation Only    Date/Time: 1/19/2021 11:13 AM  Performed by: Yohana Christy MD  Authorized by: Yohana Christy MD     Previous ECG:     Previous ECG:  Compared to current    Similarity:  No change    Comparison to cardiac monitor: Yes    Interpretation:     Interpretation: abnormal    Rate:     ECG rate:  93    ECG rate assessment: normal    Rhythm:     Rhythm: sinus rhythm    Ectopy:     Ectopy: none    QRS:     QRS axis:  Normal    QRS intervals:  Normal  Conduction:     Conduction: normal    ST segments:     ST segments:  Abnormal    Depression:  V6, V5, V4, I and II  T waves:     T waves: inverted      Inverted:  V6, V5, V4, I, II and aVL          ED Course               Identification of Seniors at Risk      Most Recent Value   (ISAR) Identification of Seniors at Risk   Before the illness or injury that brought you to the Emergency, did you need someone to help you on a regular basis? 1 Filed at: 01/19/2021 1049   In the last 24 hours, have you needed more help than usual?  1 Filed at: 01/19/2021 1049   Have you been hospitalized for one or more nights during the past 6 months? 1 Filed at: 01/19/2021 1049   In general, do you see well?  0 Filed at: 01/19/2021 1049   In general, do you have serious problems with your memory? 0 Filed at: 01/19/2021 1049   Do you take more than three different medications every day? 1 Filed at: 01/19/2021 1049   ISAR Score  4 Filed at: 01/19/2021 1049                Initial Sepsis Screening     Row Name 01/19/21 1109                Is the patient's history suggestive of a new or worsening infection? No  -CE        Suspected source of infection          Are two or more of the following signs & symptoms of infection both present and new to the patient?         Indicate SIRS criteria          If the answer is yes to both questions, suspicion of sepsis is present          If severe sepsis is present AND tissue hypoperfusion perists in the hour after fluid resuscitation or lactate > 4, the patient meets criteria for SEPTIC SHOCK          Are any of the following organ dysfunction criteria present within 6 hours of suspected infection and SIRS criteria that are NOT considered to be chronic conditions?         Organ dysfunction          Date of presentation of severe sepsis          Time of presentation of severe sepsis          Tissue hypoperfusion persists in the hour after crystalloid fluid administration, evidenced, by either:          Was hypotension present within one hour of the conclusion of crystalloid fluid administration?           Date of presentation of septic shock          Time of presentation of septic shock            User Key  (r) = Recorded By, (t) = Taken By, (c) = Cosigned By    [address] Name Provider Tiana Hunter MD Resident          SBIRT 22yo+      Most Recent Value   SBIRT (23 yo +)   In order to provide better care to our patients, we are screening all of our patients for alcohol and drug use  Would it be okay to ask you these screening questions? No Filed at: 01/19/2021 1050          Wells' Criteria for PE      Most Recent Value   Wells' Criteria for PE   Clinical signs and symptoms of DVT  0 Filed at: 01/19/2021 1109   PE is primary diagnosis or equally likely  3 Filed at: 01/19/2021 1109   HR >100  1 5 Filed at: 01/19/2021 1109   Immobilization at least 3 days or Surgery in the previous 4 weeks  0 Filed at: 01/19/2021 1109   Previous, objectively diagnosed PE or DVT  0 Filed at: 01/19/2021 1109   Hemoptysis  0 Filed at: 01/19/2021 1109   Malignancy with treatment within 6 months or palliative  0 Filed at: 01/19/2021 1109   Wells' Criteria Total  4 5 Filed at: 01/19/2021 1109            MDM  Number of Diagnoses or Management Options  Chest wall tenderness:   Dyspnea on exertion:   Hypoxia:   Shortness of breath:   Diagnosis management comments: 58-year-old female history of asthma, GERD, COVID with recent 5 week stay in the hospital from 12/4 until 1/11 discharged on to 6 L of home O2 presenting with worsened dyspnea on exertion  Patient with known COVID in setting of acutely worsening dyspnea on exertion and tachycardia plus chest pain, concerning for possible blood clot  Plan to proceed directly to CTA PE imaging given moderate risk wells plus increased risk given COVID and clinical picture  Plan to also evaluate with cardiac evaluation including EKG and troponin  Basic blood work  500 cc bolus for dehydration  Limiting fluids given COVID  Oral Tylenol and topical lidocaine for left-sided infracostal chest pain  Reassess      CT notable for a "pleuroparenchymal nodular density seen at the right lower lobe in its superior aspect in image 111 series 603 and image 91 series 2 probable atelectasis  Consider follow-up chest CT at 3 months " Informed patient of read and need for CT follow-up with PCP  Pain improved after medication  Patient still satting mid 90s on 6 L nasal cannula  Oxygen saturation low to mid 70s without O2  Labs no acute process  Troponin negative  EKG notable for lateral ST depressions and T-wave inversions stable from previous  Tachycardia improved after fluids  Patient concerned about help and resources at home and concerns about similar situations happening in the future while home alone  Plan to admit for observation for continued monitoring and possible additional home resources          Amount and/or Complexity of Data Reviewed  Clinical lab tests: reviewed and ordered  Tests in the radiology section of CPT®: reviewed and ordered  Tests in the medicine section of CPT®: reviewed and ordered  Review and summarize past medical records: yes  Independent visualization of images, tracings, or specimens: yes    Risk of Complications, Morbidity, and/or Mortality  Presenting problems: moderate  Diagnostic procedures: moderate  Management options: moderate    Patient Progress  Patient progress: improved      Disposition  Final diagnoses:   Dyspnea on exertion   Shortness of breath   Hypoxia   Chest wall tenderness   Incidental lung nodule     Time reflects when diagnosis was documented in both MDM as applicable and the Disposition within this note     Time User Action Codes Description Comment    1/19/2021 11:12 AM Nya Linh Add [R06 00] Dyspnea on exertion     1/19/2021 11:12 AM Nya Linh Add [R06 02] Shortness of breath     1/19/2021 11:12 AM Nya Linh Add [R09 02] Hypoxia     1/19/2021 11:12 AM Nya Linh Add [R07 89] Chest wall tenderness     1/19/2021  6:43 PM Kitty Belle Add [J45 [id number]] Asthma, unspecified asthma severity, unspecified whether complicated, unspecified whether persistent     1/19/2021  6:44 PM Ernestina Merino Add [Z86 [id number]] History of COVID-19     1/20/2021  9:08 AM Helene Mac Add [R91 1] Incidental lung nodule       ED Disposition     ED Disposition Condition Date/Time Comment    Admit Stable Tue Jan 19, 2021  4:12 PM Case was discussed with PANDA and the patient's admission status was agreed to be Admission Status: observation status to the service of Dr Sarina Prince   Follow-up Information    None         Current Discharge Medication List      CONTINUE these medications which have NOT CHANGED    Details   !! albuterol (PROVENTIL HFA,VENTOLIN HFA) 90 mcg/act inhaler Inhale 2 puffs every 4 (four) hours as needed for wheezing  Qty: 18 g, Refills: 2    Comments: Substitution to a formulary equivalent within the same pharmaceutical class is authorized  Associated Diagnoses: Mild intermittent asthma with exacerbation      !! albuterol (PROVENTIL HFA,VENTOLIN HFA) 90 mcg/act inhaler Inhale 2 puffs every 6 (six) hours as needed for wheezing or shortness of breath  Qty: 2 Inhaler, Refills: 3    Comments: Substitution to a formulary equivalent within the same pharmaceutical class is authorized  Associated Diagnoses: SOB (shortness of breath) on exertion;  Wheezing      atorvastatin (LIPITOR) 40 mg tablet Take 1 tablet (40 mg total) by mouth daily with dinner  Qty: 14 tablet, Refills: 0    Associated Diagnoses: COVID-19      cholecalciferol (VITAMIN D3) 1,000 units tablet Take 2 tablets (2,000 Units total) by mouth daily  Qty:  , Refills: 0    Associated Diagnoses: COVID-19      fluticasone (FLONASE) 50 mcg/act nasal spray 2 sprays into each nostril daily  Qty: 1 Bottle, Refills: 0    Associated Diagnoses: COVID-19      gabapentin (NEURONTIN) 300 mg capsule Take 300 mg by mouth daily      Humidifier MISC Use as needed (humidification) Use as needed for humidification  Qty: 1 each, Refills: 0    Associated Diagnoses: COVID-19 virus infection      lithium carbonate 300 mg capsule Take 300 mg by mouth daily at bedtime      LORazepam (ATIVAN) 1 mg tablet Take 2 mg by mouth daily at bedtime       omeprazole (PriLOSEC) 20 mg delayed release capsule Take 20 mg by mouth daily      predniSONE 10 mg tablet Take 4 tablets (40 mg total) by mouth daily for 3 days, THEN 3 tablets (30 mg total) daily for 3 days, THEN 2 tablets (20 mg total) daily for 3 days, THEN 1 tablet (10 mg total) daily for 3 days  Qty: 30 tablet, Refills: 0    Associated Diagnoses: COVID-19      sertraline (ZOLOFT) 100 mg tablet Take 150 mg by mouth daily      sodium chloride (OCEAN) 0 65 % nasal spray 2 sprays into each nostril every 2 (two) hours as needed for congestion  Qty: 30 mL, Refills: 0    Associated Diagnoses: COVID-19      traZODone (DESYREL) 100 mg tablet Take 200 mg by mouth daily at bedtime      diphenhydrAMINE (BENADRYL) 25 mg tablet Take 25 mg by mouth daily at bedtime      erythromycin (ILOTYCIN) ophthalmic ointment Administer 0 5 inches into the left eye daily at bedtime  Qty: 3 5 g, Refills: 0    Associated Diagnoses: Hordeolum externum of left lower eyelid      Fluocinonide Emulsified Base 0 05 % CREA APPLY TO AFFECTED AREA(S) TOPICALLY TWO TIMES DAILY TO LOWER LEGS FOR 14 DAYS MAX AS NEEDED      imiquimod (ALDARA) 5 % cream APPLY TO AFFECTED AREA(S) TOPICALLY EVERY DAY AT BEDTIME      loratadine (CLARITIN) 10 mg tablet Take 1 tablet (10 mg total) by mouth daily  Qty: 30 tablet, Refills: 0    Associated Diagnoses: COVID-19      mupirocin (BACTROBAN) 2 % ointment APPLY TO AFFECTED AREA(S) TOPICALLY TWO TIMES DAILY       ! ! - Potential duplicate medications found  Please discuss with provider  No discharge procedures on file  PDMP Review     None           ED Provider  Attending physically available and evaluated Nakul Desir  I managed the patient along with the ED Attending      Electronically Signed by         Abram Orantes MD  01/20/21 8600

## 2021-01-19 NOTE — ASSESSMENT & PLAN NOTE
-CTA negative for PE   -Pulmonary consult   -Consider Incentive Spiromerty   -Monitor O2 Sat; Continue 6L O2

## 2021-01-19 NOTE — ASSESSMENT & PLAN NOTE
· A/w primary problem   · CTA negative for PE   · EKG with nonspecific findings; Troponin negative  · Continue Monitoring for symptoms

## 2021-01-19 NOTE — ASSESSMENT & PLAN NOTE
· Patient on well controlled on long term psych meds  · Continue Sertraline 150mg qd   · Continue Trazadone 200mg nightly   · Continue Lorazepam 2mg nightly  · Continue Gabapentin 300mg qd  · Continue Li 300 mg nightly

## 2021-01-19 NOTE — ED NOTES
Patient wondering an update on plan of care and if she will be admitted  Dr Koby Gant made aware  He states he will come and talk to the patient        Foster Valentina  01/19/21 0378

## 2021-01-20 VITALS
SYSTOLIC BLOOD PRESSURE: 109 MMHG | OXYGEN SATURATION: 94 % | HEART RATE: 67 BPM | BODY MASS INDEX: 32.62 KG/M2 | WEIGHT: 172.8 LBS | HEIGHT: 61 IN | DIASTOLIC BLOOD PRESSURE: 60 MMHG | TEMPERATURE: 98.5 F | RESPIRATION RATE: 16 BRPM

## 2021-01-20 LAB
ANION GAP SERPL CALCULATED.3IONS-SCNC: 4 MMOL/L (ref 4–13)
ATRIAL RATE: 93 BPM
BASOPHILS # BLD AUTO: 0.01 THOUSANDS/ΜL (ref 0–0.1)
BASOPHILS NFR BLD AUTO: 0 % (ref 0–1)
BUN SERPL-MCNC: 14 MG/DL (ref 5–25)
CALCIUM SERPL-MCNC: 8.8 MG/DL (ref 8.3–10.1)
CHLORIDE SERPL-SCNC: 113 MMOL/L (ref 100–108)
CO2 SERPL-SCNC: 27 MMOL/L (ref 21–32)
CREAT SERPL-MCNC: 0.78 MG/DL (ref 0.6–1.3)
EOSINOPHIL # BLD AUTO: 0.03 THOUSAND/ΜL (ref 0–0.61)
EOSINOPHIL NFR BLD AUTO: 1 % (ref 0–6)
ERYTHROCYTE [DISTWIDTH] IN BLOOD BY AUTOMATED COUNT: 16.8 % (ref 11.6–15.1)
GFR SERPL CREATININE-BSD FRML MDRD: 79 ML/MIN/1.73SQ M
GLUCOSE SERPL-MCNC: 133 MG/DL (ref 65–140)
HCT VFR BLD AUTO: 31.8 % (ref 34.8–46.1)
HGB BLD-MCNC: 9.9 G/DL (ref 11.5–15.4)
IMM GRANULOCYTES # BLD AUTO: 0.03 THOUSAND/UL (ref 0–0.2)
IMM GRANULOCYTES NFR BLD AUTO: 1 % (ref 0–2)
LYMPHOCYTES # BLD AUTO: 0.82 THOUSANDS/ΜL (ref 0.6–4.47)
LYMPHOCYTES NFR BLD AUTO: 13 % (ref 14–44)
MCH RBC QN AUTO: 28.1 PG (ref 26.8–34.3)
MCHC RBC AUTO-ENTMCNC: 31.1 G/DL (ref 31.4–37.4)
MCV RBC AUTO: 90 FL (ref 82–98)
MONOCYTES # BLD AUTO: 0.25 THOUSAND/ΜL (ref 0.17–1.22)
MONOCYTES NFR BLD AUTO: 4 % (ref 4–12)
NEUTROPHILS # BLD AUTO: 5.35 THOUSANDS/ΜL (ref 1.85–7.62)
NEUTS SEG NFR BLD AUTO: 81 % (ref 43–75)
NRBC BLD AUTO-RTO: 0 /100 WBCS
P AXIS: 45 DEGREES
PLATELET # BLD AUTO: 132 THOUSANDS/UL (ref 149–390)
PMV BLD AUTO: 11.1 FL (ref 8.9–12.7)
POTASSIUM SERPL-SCNC: 4.6 MMOL/L (ref 3.5–5.3)
PR INTERVAL: 114 MS
QRS AXIS: 53 DEGREES
QRSD INTERVAL: 80 MS
QT INTERVAL: 328 MS
QTC INTERVAL: 407 MS
RBC # BLD AUTO: 3.52 MILLION/UL (ref 3.81–5.12)
SODIUM SERPL-SCNC: 144 MMOL/L (ref 136–145)
T WAVE AXIS: 195 DEGREES
VENTRICULAR RATE: 93 BPM
WBC # BLD AUTO: 6.49 THOUSAND/UL (ref 4.31–10.16)

## 2021-01-20 PROCEDURE — 85025 COMPLETE CBC W/AUTO DIFF WBC: CPT | Performed by: INTERNAL MEDICINE

## 2021-01-20 PROCEDURE — 97166 OT EVAL MOD COMPLEX 45 MIN: CPT

## 2021-01-20 PROCEDURE — 97162 PT EVAL MOD COMPLEX 30 MIN: CPT

## 2021-01-20 PROCEDURE — 80048 BASIC METABOLIC PNL TOTAL CA: CPT | Performed by: INTERNAL MEDICINE

## 2021-01-20 PROCEDURE — 99217 PR OBSERVATION CARE DISCHARGE MANAGEMENT: CPT | Performed by: NURSE PRACTITIONER

## 2021-01-20 PROCEDURE — 93010 ELECTROCARDIOGRAM REPORT: CPT | Performed by: INTERNAL MEDICINE

## 2021-01-20 RX ORDER — GUAIFENESIN/DEXTROMETHORPHAN 100-10MG/5
10 SYRUP ORAL EVERY 6 HOURS
Qty: 118 ML | Refills: 0 | Status: SHIPPED | OUTPATIENT
Start: 2021-01-20

## 2021-01-20 RX ORDER — POLYETHYLENE GLYCOL 3350 17 G/17G
17 POWDER, FOR SOLUTION ORAL DAILY
Status: DISCONTINUED | OUTPATIENT
Start: 2021-01-20 | End: 2021-01-20 | Stop reason: HOSPADM

## 2021-01-20 RX ORDER — DOCUSATE SODIUM 100 MG/1
100 CAPSULE, LIQUID FILLED ORAL 2 TIMES DAILY
Status: DISCONTINUED | OUTPATIENT
Start: 2021-01-20 | End: 2021-01-20 | Stop reason: HOSPADM

## 2021-01-20 RX ORDER — PREDNISONE 10 MG/1
10 TABLET ORAL DAILY
Qty: 30 TABLET | Refills: 0
Start: 2021-01-20 | End: 2021-01-22

## 2021-01-20 RX ORDER — SENNOSIDES 8.6 MG
1 TABLET ORAL
Status: DISCONTINUED | OUTPATIENT
Start: 2021-01-20 | End: 2021-01-20 | Stop reason: HOSPADM

## 2021-01-20 RX ADMIN — ALBUTEROL SULFATE 2 PUFF: 90 AEROSOL, METERED RESPIRATORY (INHALATION) at 05:41

## 2021-01-20 RX ADMIN — GUAIFENESIN AND DEXTROMETHORPHAN 10 ML: 100; 10 SYRUP ORAL at 02:10

## 2021-01-20 RX ADMIN — PANTOPRAZOLE SODIUM 40 MG: 40 TABLET, DELAYED RELEASE ORAL at 05:15

## 2021-01-20 RX ADMIN — GUAIFENESIN AND DEXTROMETHORPHAN 10 ML: 100; 10 SYRUP ORAL at 09:08

## 2021-01-20 RX ADMIN — FLUTICASONE PROPIONATE 2 SPRAY: 50 SPRAY, METERED NASAL at 09:08

## 2021-01-20 RX ADMIN — HEPARIN SODIUM 5000 UNITS: 5000 INJECTION INTRAVENOUS; SUBCUTANEOUS at 05:15

## 2021-01-20 RX ADMIN — PREDNISONE 10 MG: 10 TABLET ORAL at 09:08

## 2021-01-20 RX ADMIN — Medication 2000 UNITS: at 09:08

## 2021-01-20 NOTE — PLAN OF CARE
Problem: PAIN - ADULT  Goal: Verbalizes/displays adequate comfort level or baseline comfort level  Description: Interventions:  - Encourage patient to monitor pain and request assistance  - Assess pain using appropriate pain scale  - Administer analgesics based on type and severity of pain and evaluate response  - Implement non-pharmacological measures as appropriate and evaluate response  - Consider cultural and social influences on pain and pain management  - Notify physician/advanced practitioner if interventions unsuccessful or patient reports new pain  1/20/2021 0954 by Cris Gustafson RN  Outcome: Completed  1/20/2021 0748 by Cris Gustafson RN  Outcome: Progressing     Problem: INFECTION - ADULT  Goal: Absence or prevention of progression during hospitalization  Description: INTERVENTIONS:  - Assess and monitor for signs and symptoms of infection  - Monitor lab/diagnostic results  - Monitor all insertion sites, i e  indwelling lines, tubes, and drains  - Monitor endotracheal if appropriate and nasal secretions for changes in amount and color  - [place] appropriate cooling/warming therapies per order  - Administer medications as ordered  - Instruct and encourage patient and family to use good hand hygiene technique  - Identify and instruct in appropriate isolation precautions for identified infection/condition  1/20/2021 0954 by Cris Gustafson RN  Outcome: Completed  1/20/2021 0748 by Cris Gustafson RN  Outcome: Progressing  Goal: Absence of fever/infection during neutropenic period  Description: INTERVENTIONS:  - Monitor WBC    1/20/2021 0954 by Cris Gustafson RN  Outcome: Completed  1/20/2021 0748 by Cris Gustafson RN  Outcome: Progressing     Problem: SAFETY ADULT  Goal: Patient will remain free of falls  Description: INTERVENTIONS:  - Assess patient frequently for physical needs  -  Identify cognitive and physical deficits and behaviors that affect risk of falls    -  [place] fall precautions as indicated by assessment   - Educate patient/family on patient safety including physical limitations  - Instruct patient to call for assistance with activity based on assessment  - Modify environment to reduce risk of injury  - Consider OT/PT consult to assist with strengthening/mobility  1/20/2021 0954 by Rio Manzano RN  Outcome: Completed  1/20/2021 0748 by Rio Manzano RN  Outcome: Progressing  Goal: Maintain or return to baseline ADL function  Description: INTERVENTIONS:  -  Assess patient's ability to carry out ADLs; assess patient's baseline for ADL function and identify physical deficits which impact ability to perform ADLs (bathing, care of mouth/teeth, toileting, grooming, dressing, etc )  - Assess/evaluate cause of self-care deficits   - Assess range of motion  - Assess patient's mobility; develop plan if impaired  - Assess patient's need for assistive devices and provide as appropriate  - Encourage maximum independence but intervene and supervise when necessary  - Involve family in performance of ADLs  - Assess for home care needs following discharge   - Consider OT consult to assist with ADL evaluation and planning for discharge  - Provide patient education as appropriate  1/20/2021 0954 by Rio Manzano RN  Outcome: Completed  1/20/2021 0748 by Rio Manzano RN  Outcome: Progressing  Goal: Maintain or return mobility status to optimal level  Description: INTERVENTIONS:  - Assess patient's baseline mobility status (ambulation, transfers, stairs, etc )    - Identify cognitive and physical deficits and behaviors that affect mobility  - Identify mobility aids required to assist with transfers and/or ambulation (gait belt, sit-to-stand, lift, walker, cane, etc )  - [place] fall precautions as indicated by assessment  - Record patient progress and toleration of activity level on Mobility SBAR; progress patient to next Phase/Stage  - Instruct patient to call for assistance with activity based on assessment  - Consider rehabilitation consult to assist with strengthening/weightbearing, etc   1/20/2021 0954 by Inge Gu RN  Outcome: Completed  1/20/2021 0748 by Inge Gu RN  Outcome: Progressing     Problem: DISCHARGE PLANNING  Goal: Discharge to home or other facility with appropriate resources  Description: INTERVENTIONS:  - Identify barriers to discharge w/patient and caregiver  - Arrange for needed discharge resources and transportation as appropriate  - Identify discharge learning needs (meds, wound care, etc )  - Arrange for interpretive services to assist at discharge as needed  - Refer to Case Management Department for coordinating discharge planning if the patient needs post-hospital services based on physician/advanced practitioner order or complex needs related to functional status, cognitive ability, or social support system  1/20/2021 0954 by Inge Gu RN  Outcome: Completed  1/20/2021 0748 by Inge Gu RN  Outcome: Progressing     Problem: Knowledge Deficit  Goal: Patient/family/caregiver demonstrates understanding of disease process, treatment plan, medications, and discharge instructions  Description: Complete learning assessment and assess knowledge base    Interventions:  - Provide teaching at level of understanding  - Provide teaching via preferred learning methods  1/20/2021 0954 by Inge Gu RN  Outcome: Completed  1/20/2021 0748 by Inge Gu RN  Outcome: Progressing     Problem: RESPIRATORY - ADULT  Goal: Achieves optimal ventilation and oxygenation  Description: INTERVENTIONS:  - Assess for changes in respiratory status  - Assess for changes in mentation and behavior  - Position to facilitate oxygenation and minimize respiratory effort  - Oxygen administered by appropriate delivery if ordered  - Initiate smoking cessation education as indicated  - Encourage broncho-pulmonary hygiene including cough, deep breathe, Incentive Spirometry  - Assess the need for suctioning and aspirate as needed  - Assess and instruct to report SOB or any respiratory difficulty  - Respiratory Therapy support as indicated  1/20/2021 0954 by Bruce Ferraro RN  Outcome: Completed  1/20/2021 0752 by Bruce Ferraro RN  Outcome: Progressing

## 2021-01-20 NOTE — CASE MANAGEMENT
Pt is not a readmission  Pt is not a bundle  Met with pt & explained CM role  Pt lives alone in [address] flr apt with 1 shereen  Reports was iPTA, works & drives  DME home o2 via [facility]  Open with [facility] for [facility], RN & PT  Agreeable to referral to resume services  No h/o rhb  Denies any MH, D&A tx  Uses [facility] [address]  Emergency contact, boyfriend Randy Mann [phone]  Will have ride home  CM reviewed d/c planning process including the following: identifying help at home, patient preference for d/c planning needs, Discharge Lounge, [facility] Meds to Bed program, availability of treatment team to discuss questions or concerns patient and/or family may have regarding understanding medications and recognizing signs and symptoms once discharged  CM also encouraged patient to follow up with all recommended appointments after discharge  Patient advised of importance for patient and family to participate in managing patients medical well being

## 2021-01-20 NOTE — UTILIZATION REVIEW
Initial Clinical Review    Admission: Date/Time/Statement:   Admission Orders (From admission, onward)     Ordered        01/19/21 1612  Place in Observation  Once                   Orders Placed This Encounter   Procedures    Place in Observation     Standing Status:   Standing     Number of Occurrences:   1     Order Specific Question:   Level of Care     Answer:   Med Surg [16]     ED Arrival Information     Expected Arrival Acuity Means of Arrival Escorted By Service Admission Type    - 1/19/2021 10:37 [facility] Hospitalist Emergency    Arrival Complaint    SOB        Chief Complaint   Patient presents with    Shortness of Breath     pt arrives via EMS from home with c/o increasing SOB with exertion, patients O2 sat 78% without oxygen, pt has been wearing 6L NC at home since being diagnosed with COVID back in Nov      Assessment/Plan:   76 y o  female who presents with past medical history of Hx of COVID infection with 38 days admission d/c on 1/11/2021, asthma, depression, Bipolar, insomnia that presents to ED via EMS for dyspnea and tachycardia  Since Elyn Ordonez discharge 1/11/21 patient has been oxygen dependent on 6L O2 at home; lives alone; has been comfortable performing her ADL until 1 day ago when symptoms began  She denies fever, nausea, vomiting, diarrhea, constipation, headache, or chest pain  Does admit to chronic cough, fatigue, and mild intermittent left sided chest discomfort that is characterized as achy  Patient denies being on anticoagulation since discharge  In the ED, CTA negative for PE or consolidation; however scattered ground glass opacities and pleuroparenchymal nodular intensity in right lobe are appreciated  EKG showing normal rate rhythm interval however ST depression and T wave inversions appreciated   Troponin's negative; WBC negative; PT/INR normal     Assessment/Plan:     Hospital Problem List:      Principal Problem:    SOB (shortness of breath)  Active Problems:    Bipolar disorder     GERD (gastroesophageal reflux disease)    Insomnia    Asthma    Tachycardia        Plan for the Primary Problem(s):  · SOB                   ? Pulmonary Consult   ? Incentive spirometry   ? Monitor O2Sat; Continue 6L O2   ? PT/OT to evaluate and treat   ? Consider home with home health vs rehab facility   ? Albuterol inhaler 2 puff q4hrs prn       Plan for Additional Problems:   · Tachycardia   ? A/w primary problem   ? CTA negative for PE   ? EKG with nonspecific findings; Troponin negative  ? Continue Monitoring for symptoms  · Depression/Bipolar/Insomnia   ? Patient on well controlled on long term psych meds  ? Continue Sertraline 150mg qd   ? Continue Trazadone 200mg nightly   ? Continue Lorazepam 2mg nightly  ? Continue Gabapentin 300mg qd  ? Continue Li 300 mg nightly    · Dyslipidemia   ? Continue Atorvastatin 40mg nightly    · Prednisone Taper   ? Continue prednisone taper as scheduled   · GERD  ?  Continue Pantoprazole 40mg daily         VTE Prophylaxis: Heparin      ED Triage Vitals   Temperature Pulse Respirations Blood Pressure SpO2   01/19/21 1045 01/19/21 1045 01/19/21 1045 01/19/21 1045 01/19/21 1045   98 °F (36 7 °C) 94 22 121/59 96 %      Temp Source Heart Rate Source Patient Position - Orthostatic VS BP Location FiO2 (%)   01/19/21 1045 01/19/21 1045 01/19/21 1045 01/19/21 1045 --   Oral Monitor Lying Right arm       Pain Score       01/19/21 1122       6          Wt Readings from Last 1 Encounters:   01/19/21 78 4 kg (172 lb 12 8 oz)     Additional Vital Signs:   01/20/21 07:35:49  98 5 °F (36 9 °C)  67  16  109/60  76  94 %           01/20/21 0000              44  6 L/min  Nasal cannula     01/19/21 23:10:48  98 6 °F (37 °C)  68  18  90/53  65  97 %           01/19/21 2051            97 %  44  6 L/min  Nasal cannula     01/19/21 19:15:17  98 5 °F (36 9 °C)  74  17  101/59  73  97 %           01/19/21 1830    68  20  93/51 64  99 %  44  6 L/min  Nasal cannula  Lying   01/19/21 1730    72  20  103/54  76  99 %  44  6 L/min  Nasal cannula  Sitting   01/19/21 1630    60  14  104/58  75  100 %  44  6 L/min  Nasal cannula  Lying   01/19/21 1530    76  20  107/63  80  100 %  44  6 L/min  Nasal cannula         Pertinent Labs/Diagnostic Test Results:   1/19 CTA ED chest PE study - No pulmonary embolism  Scattered areas of groundglass density in the both lungs mildly more pronounced from the previous study May be inflammatory or infectious  No acute airspace consolidation  There are areas of subpleural reticulation with groundglass density with some traction bronchiectatic changes suggest developing fibrosis   A pleuroparenchymal nodular density seen at the right lower lobe in its superior aspect in image 111 series 603 and image 91 series 2 probable atelectasis    Consider follow-up chest CT at 3 months      Results from last 7 days   Lab Units 01/20/21  0459 01/19/21  1111   WBC Thousand/uL 6 49 7 83   HEMOGLOBIN g/dL 9 9* 11 6   HEMATOCRIT % 31 8* 37 1   PLATELETS Thousands/uL 132* 143*   NEUTROS ABS Thousands/µL 5 35 6 65         Results from last 7 days   Lab Units 01/20/21  0459 01/19/21  1111   SODIUM mmol/L 144 140   POTASSIUM mmol/L 4 6 3 6   CHLORIDE mmol/L 113* 109*   CO2 mmol/L 27 28   ANION GAP mmol/L 4 3*   BUN mg/dL 14 11   CREATININE mg/dL 0 78 1 00   EGFR ml/min/1 73sq m 79 59   CALCIUM mg/dL 8 8 8 9     Results from last 7 days   Lab Units 01/19/21  1111   AST U/L 20   ALT U/L 34   ALK PHOS U/L 92   TOTAL PROTEIN g/dL 6 7   ALBUMIN g/dL 3 9   TOTAL BILIRUBIN mg/dL 0 55         Results from last 7 days   Lab Units 01/20/21  0459 01/19/21  1111   GLUCOSE RANDOM mg/dL 133 124       Results from last 7 days   Lab Units 01/19/21  1111   TROPONIN I ng/mL <0 02         Results from last 7 days   Lab Units 01/19/21  1111   PROTIME seconds 12 7   INR  0 95   PTT seconds 25     ED Treatment:   Medication Administration from 01/19/2021 1037 to 01/19/2021 1850       Date/Time Order Dose Route Action Comments     01/19/2021 1115 sodium chloride 0 9 % bolus 500 mL 500 mL Intravenous New Bag      01/19/2021 1122 acetaminophen (TYLENOL) tablet 975 mg 975 mg Oral Given      01/19/2021 1122 lidocaine (LIDODERM) 5 % patch 1 patch 1 patch Topical Medication Applied Right upper chest     01/19/2021 1352 iohexol (OMNIPAQUE) 350 MG/ML injection (MULTI-DOSE) 85 mL 85 mL Intravenous Given         Past Medical History:   Diagnosis Date    Asthma     Bipolar disorder ([facility] [address] )     COVID-19     GERD (gastroesophageal reflux disease)      Present on Admission:   SOB (shortness of breath)   Bipolar disorder    GERD (gastroesophageal reflux disease)   Asthma   Insomnia      Admitting Diagnosis: Shortness of breath [R06 02]  SOB (shortness of breath) [R06 02]  Dyspnea on exertion [R06 00]  Hypoxia [R09 02]  Chest wall tenderness [R07 89]  Asthma, unspecified asthma severity, unspecified whether complicated, unspecified whether persistent [J45 909]  History of COVID-19 [Z86 16]  Age/Sex: 76 y o  female     Admission Orders:  Scheduled Medications:  atorvastatin, 40 mg, Oral, Daily With Dinner  cholecalciferol, 2,000 Units, Oral, Daily  dextromethorphan-guaiFENesin, 10 mL, Oral, Q6H  fluticasone, 2 spray, Nasal, Daily  gabapentin, 300 mg, Oral, Daily  heparin (porcine), 5,000 Units, Subcutaneous, Q8H [address]  lithium carbonate, 300 mg, Oral, HS  LORazepam, 2 mg, Oral, HS  mupirocin, , Topical, BID  pantoprazole, 40 mg, Oral, Early Morning  predniSONE, 10 mg, Oral, Daily  sertraline, 150 mg, Oral, HS  traZODone, 200 mg, Oral, HS      Continuous IV Infusions: None     PRN Meds:  acetaminophen, 650 mg, Oral, Q6H PRN  albuterol, 2 puff, Inhalation, Q4H PRN  ondansetron, 4 mg, Intravenous, Q4H PRN  sodium chloride, 2 spray, Each Nare, Q2H PRN        IP CONSULT TO PULMONOLOGY    Network Utilization Review Department  ATTENTION: Please call with any questions or concerns to [phone] and carefully listen to the prompts so that you are directed to the right person  All voicemails are confidential   Sheri Ingles all requests for admission clinical reviews, approved or denied determinations and any other requests to dedicated fax number below belonging to the campus where the patient is receiving treatment   List of dedicated fax numbers for the Facilities:  [address] DENIALS (Administrative/Medical Necessity) [phone]   [address] (Maternity/NICU/Pediatrics) [phone]   [address] [place] [address] [address] ([place]lin Pham) [address] [place] [address] [address] Joy Val[address] [place] [address] [phone]

## 2021-01-20 NOTE — PLAN OF CARE
Problem: PAIN - ADULT  Goal: Verbalizes/displays adequate comfort level or baseline comfort level  Description: Interventions:  - Encourage patient to monitor pain and request assistance  - Assess pain using appropriate pain scale  - Administer analgesics based on type and severity of pain and evaluate response  - Implement non-pharmacological measures as appropriate and evaluate response  - Consider cultural and social influences on pain and pain management  - Notify physician/advanced practitioner if interventions unsuccessful or patient reports new pain  Outcome: Progressing     Problem: INFECTION - ADULT  Goal: Absence or prevention of progression during hospitalization  Description: INTERVENTIONS:  - Assess and monitor for signs and symptoms of infection  - Monitor lab/diagnostic results  - Monitor all insertion sites, i e  indwelling lines, tubes, and drains  - Monitor endotracheal if appropriate and nasal secretions for changes in amount and color  - [place] appropriate cooling/warming therapies per order  - Administer medications as ordered  - Instruct and encourage patient and family to use good hand hygiene technique  - Identify and instruct in appropriate isolation precautions for identified infection/condition  Outcome: Progressing  Goal: Absence of fever/infection during neutropenic period  Description: INTERVENTIONS:  - Monitor WBC    Outcome: Progressing     Problem: SAFETY ADULT  Goal: Patient will remain free of falls  Description: INTERVENTIONS:  - Assess patient frequently for physical needs  -  Identify cognitive and physical deficits and behaviors that affect risk of falls    -  [place] fall precautions as indicated by assessment   - Educate patient/family on patient safety including physical limitations  - Instruct patient to call for assistance with activity based on assessment  - Modify environment to reduce risk of injury  - Consider OT/PT consult to assist with strengthening/mobility  Outcome: Progressing  Goal: Maintain or return to baseline ADL function  Description: INTERVENTIONS:  -  Assess patient's ability to carry out ADLs; assess patient's baseline for ADL function and identify physical deficits which impact ability to perform ADLs (bathing, care of mouth/teeth, toileting, grooming, dressing, etc )  - Assess/evaluate cause of self-care deficits   - Assess range of motion  - Assess patient's mobility; develop plan if impaired  - Assess patient's need for assistive devices and provide as appropriate  - Encourage maximum independence but intervene and supervise when necessary  - Involve family in performance of ADLs  - Assess for home care needs following discharge   - Consider OT consult to assist with ADL evaluation and planning for discharge  - Provide patient education as appropriate  Outcome: Progressing  Goal: Maintain or return mobility status to optimal level  Description: INTERVENTIONS:  - Assess patient's baseline mobility status (ambulation, transfers, stairs, etc )    - Identify cognitive and physical deficits and behaviors that affect mobility  - Identify mobility aids required to assist with transfers and/or ambulation (gait belt, sit-to-stand, lift, walker, cane, etc )  - [place] fall precautions as indicated by assessment  - Record patient progress and toleration of activity level on Mobility SBAR; progress patient to next Phase/Stage  - Instruct patient to call for assistance with activity based on assessment  - Consider rehabilitation consult to assist with strengthening/weightbearing, etc   Outcome: Progressing     Problem: DISCHARGE PLANNING  Goal: Discharge to home or other facility with appropriate resources  Description: INTERVENTIONS:  - Identify barriers to discharge w/patient and caregiver  - Arrange for needed discharge resources and transportation as appropriate  - Identify discharge learning needs (meds, wound care, etc )  - Arrange for interpretive services to assist at discharge as needed  - Refer to Case Management Department for coordinating discharge planning if the patient needs post-hospital services based on physician/advanced practitioner order or complex needs related to functional status, cognitive ability, or social support system  Outcome: Progressing     Problem: Knowledge Deficit  Goal: Patient/family/caregiver demonstrates understanding of disease process, treatment plan, medications, and discharge instructions  Description: Complete learning assessment and assess knowledge base    Interventions:  - Provide teaching at level of understanding  - Provide teaching via preferred learning methods  Outcome: Progressing

## 2021-01-20 NOTE — OCCUPATIONAL THERAPY NOTE
Occupational Therapy Evaluation     Patient Name: Nakul Desir  UGKQG'[id number] Date: 2021  Problem List  Principal Problem:    SOB (shortness of breath)  Active Problems:    Bipolar disorder     GERD (gastroesophageal reflux disease)    Insomnia    Asthma    Tachycardia    Physical deconditioning    History of COVID-19 infection    Past Medical History  Past Medical History:   Diagnosis Date    Asthma     Bipolar disorder ([address] )     COVID-19     GERD (gastroesophageal reflux disease)      Past Surgical History  Past Surgical History:   Procedure Laterality Date     SECTION      x3    CHOLECYSTECTOMY      HYSTERECTOMY           21 0855   OT Last Visit   OT Visit Date 21   Note Type   Note type Evaluation   Restrictions/Precautions   Weight Bearing Precautions Per Order No   Other Precautions O2  (6L O2 )   Pain Assessment   Pain Assessment Tool Pain Assessment not indicated - pt denies pain   Pain Score No Pain   Home Living   Type of Home Apartment   Home Layout One level;Stairs to enter with rails  (1+1+1 YESSENIA )   Bathroom Shower/Tub Tub/shower unit   Bathroom Toilet Standard   Bathroom Equipment Shower chair   Bathroom Accessibility Accessible   Additional Comments USE OF SC FOLLOWING RECENT RETURN HOME FROM [facility]   Prior Function   Level of [place] Independent with ADLs and functional mobility   Lives With Alone   Receives Help From Family   ADL Assistance Independent   IADLs Independent   Falls in the last 6 months 0   Vocational Full time employment   Lifestyle   [employer] PT REPORTS BEING I WITH ADLS/IADLS/DRIVING AT BASELINE  FOLLOWING PROLONGED ADMISSION FOR [facility], PT REQUIRED ADDITIONAL ASSIST WITH IADLS INCLUDING LAUNDRY IN BASEMENT   Reciprocal Relationships LIVES ALONE   SUPPORTIVE S/O AND NEIGHBOR ASSIST AS NEEDED   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 [employer]   Intrinsic Gratification ENJOYS WATCHING TV    Psychosocial   Psychosocial (WDL) WDL   Subjective Subjective "I KNOW MY LIMITATIONS"   ADL   Eating Assistance 7  Independent   Grooming Assistance 7  Independent   UB Bathing Assistance 5  Supervision/Setup   LB Bathing Assistance 5  Supervision/Setup   UB Dressing Assistance 5  Supervision/Setup   LB Dressing Assistance 5  Supervision/Setup   Toileting Assistance  5  Supervision/Setup   Functional Assistance 5  Supervision/Setup   Bed Mobility   Supine to Sit 5  Supervision   Additional items Assist x 1; Increased time required   Transfers   Sit to Stand 5  Supervision   Additional items Assist x 1; Increased time required   Stand to Sit 5  Supervision   Additional items Assist x 1; Increased time required   Toilet transfer 5  Supervision   Additional items Assist x 1; Increased time required;Standard toilet   Functional Mobility   Functional Mobility 5  Supervision   Additional Comments NO AD  SPO2 AT 89% ON 6L O2 WITH ACTIVITY, RETURNED TO MID 90'S AT REST    Balance   Static Sitting Fair +   Static Standing Fair   Ambulatory Fair -   Activity Tolerance   Activity Tolerance Patient limited by fatigue;Patient tolerated treatment well   Medical Staff Made Aware Carl Zmaora PT  CM    Nurse Made Aware APPROPRIATE TO SEE    RUE Assessment   RUE Assessment WFL   LUE Assessment   LUE Assessment WFL   Hand Function   Gross Motor Coordination Functional   Fine Motor Coordination Functional   Cognition   Overall Cognitive Status WFL   Arousal/Participation Alert; Cooperative   Attention Within functional limits   Orientation Level Oriented X4   Memory Within functional limits   Following Commands Follows all commands and directions without difficulty   Comments PT IS PLEASANT AND COOPERATIVE   Assessment   Assessment 75 YO Female SEEN FOR INITIAL OCCUPATIONAL THERAPY EVALUATION FOLLOWING ADMISSION TO [facility] WITH GUZMAN AND TACHYCARDIA  PT WITH RECENT PROLONGED HOSPITALIZATION (38 DAYS PER PT) 2' COVID  PT REPORTS RETURN HOME ON 6L O2   PROBLEMS LIST INCLUDES DEPRESSION, BIPOLAR DISORDER, DYSLIPIDEMIA, AND GERD  PT IS FROM AN APT ALONE WHERE SHE REPORTS BEING INDEPENDENT WITH ADLS/IADLS/DRIVING AT BASELINE WITH INCREASED ASSIST WITH IADLS FOLLOWING RECENT RETURN HOME  PT CURRENTLY REQUIRES OVERALL SUPERVISION WITH ADLS, TRANSFERS AND FUNCTIONAL MOBILITY WITHOUT USE OF AD  PT IS EXPERIENCING EXPECTED LIMITATIONS 2' PAIN, FATIGUE, SOB and OVERALL LIMITED ACTIVITY TOLERANCE  PT EDUCATED ON DEEP BREATHING TECHNIQUES T/O ACTIVITY, SLOWING OF PACE, ENERGY CONSERVATION TECHNIQUES FOR CARRY OVER UPON D/C, INCREASED FAMILY SUPPORT and CONTINUE PARTICIPATION IN SELF-CARE/MOBILITY WITH STAFF [address]   FROM AN OCCUPATIONAL THERAPY PERSPECTIVE, PT CAN RETURN HOME WITH INCREASED FAMILY SUPPORT WHEN MEDICALLY CLEARED  DME RECS INCLUDE USE OF SC, PT AGREEABLE  ALL QUESTIONS/CONCERNS ADDRESSED  NO ADDITIONAL ACUTE CARE OT NEEDS  D/C OT      Goals   Patient Goals TO RETURN HOME    Recommendation   OT Discharge Recommendation Return to previous environment with social support   OT - OK to Discharge Yes   Modified [place] Scale   Modified [place] Scale 3       Documentation completed by Donis Willett, RULA, OTR/L

## 2021-01-20 NOTE — INCIDENTAL FINDINGS
The following findings require follow up:  Radiographic finding   Finding:   A pleuroparenchymal nodular density seen at the right lower lobe in its superior aspect    Follow up required: Repeat CT chest    Follow up should be done within 3 month(s)    Please notify the following clinician to assist with the follow up:   Jo Chew, [address] (PCP) or pulmonologist

## 2021-01-20 NOTE — PLAN OF CARE
Problem: PAIN - ADULT  Goal: Verbalizes/displays adequate comfort level or baseline comfort level  Description: Interventions:  - Encourage patient to monitor pain and request assistance  - Assess pain using appropriate pain scale  - Administer analgesics based on type and severity of pain and evaluate response  - Implement non-pharmacological measures as appropriate and evaluate response  - Consider cultural and social influences on pain and pain management  - Notify physician/advanced practitioner if interventions unsuccessful or patient reports new pain  Outcome: Progressing     Problem: INFECTION - ADULT  Goal: Absence or prevention of progression during hospitalization  Description: INTERVENTIONS:  - Assess and monitor for signs and symptoms of infection  - Monitor lab/diagnostic results  - Monitor all insertion sites, i e  indwelling lines, tubes, and drains  - Monitor endotracheal if appropriate and nasal secretions for changes in amount and color  - [place] appropriate cooling/warming therapies per order  - Administer medications as ordered  - Instruct and encourage patient and family to use good hand hygiene technique  - Identify and instruct in appropriate isolation precautions for identified infection/condition  Outcome: Progressing  Goal: Absence of fever/infection during neutropenic period  Description: INTERVENTIONS:  - Monitor WBC    Outcome: Progressing     Problem: SAFETY ADULT  Goal: Patient will remain free of falls  Description: INTERVENTIONS:  - Assess patient frequently for physical needs  -  Identify cognitive and physical deficits and behaviors that affect risk of falls    -  [place] fall precautions as indicated by assessment   - Educate patient/family on patient safety including physical limitations  - Instruct patient to call for assistance with activity based on assessment  - Modify environment to reduce risk of injury  - Consider OT/PT consult to assist with strengthening/mobility  Outcome: Progressing  Goal: Maintain or return to baseline ADL function  Description: INTERVENTIONS:  -  Assess patient's ability to carry out ADLs; assess patient's baseline for ADL function and identify physical deficits which impact ability to perform ADLs (bathing, care of mouth/teeth, toileting, grooming, dressing, etc )  - Assess/evaluate cause of self-care deficits   - Assess range of motion  - Assess patient's mobility; develop plan if impaired  - Assess patient's need for assistive devices and provide as appropriate  - Encourage maximum independence but intervene and supervise when necessary  - Involve family in performance of ADLs  - Assess for home care needs following discharge   - Consider OT consult to assist with ADL evaluation and planning for discharge  - Provide patient education as appropriate  Outcome: Progressing  Goal: Maintain or return mobility status to optimal level  Description: INTERVENTIONS:  - Assess patient's baseline mobility status (ambulation, transfers, stairs, etc )    - Identify cognitive and physical deficits and behaviors that affect mobility  - Identify mobility aids required to assist with transfers and/or ambulation (gait belt, sit-to-stand, lift, walker, cane, etc )  - [place] fall precautions as indicated by assessment  - Record patient progress and toleration of activity level on Mobility SBAR; progress patient to next Phase/Stage  - Instruct patient to call for assistance with activity based on assessment  - Consider rehabilitation consult to assist with strengthening/weightbearing, etc   Outcome: Progressing     Problem: DISCHARGE PLANNING  Goal: Discharge to home or other facility with appropriate resources  Description: INTERVENTIONS:  - Identify barriers to discharge w/patient and caregiver  - Arrange for needed discharge resources and transportation as appropriate  - Identify discharge learning needs (meds, wound care, etc )  - Arrange for interpretive services to assist at discharge as needed  - Refer to Case Management Department for coordinating discharge planning if the patient needs post-hospital services based on physician/advanced practitioner order or complex needs related to functional status, cognitive ability, or social support system  Outcome: Progressing     Problem: Knowledge Deficit  Goal: Patient/family/caregiver demonstrates understanding of disease process, treatment plan, medications, and discharge instructions  Description: Complete learning assessment and assess knowledge base    Interventions:  - Provide teaching at level of understanding  - Provide teaching via preferred learning methods  Outcome: Progressing

## 2021-01-20 NOTE — DISCHARGE SUMMARY
Discharge Summary - [facility] Internal Medicine    Patient Information: Melvin Saenz 76 y o  female MRN: [id number]  Unit/Bed#: [facility] 725-34 Encounter: [id number]    Discharging Physician / Practitioner: Kassandra Tyson  PCP: Kassandra Brunner  Admission Date: 1/19/2021  Discharge Date: 01/20/21    Reason for Admission: SOB, tachycardia in the setting of recent COVID 19 infection    Discharge Diagnoses:     Principal Problem:    SOB (shortness of breath)  Active Problems:    Bipolar disorder     GERD (gastroesophageal reflux disease)    Insomnia    Asthma    Tachycardia    Physical deconditioning    History of COVID-19 infection  Resolved Problems:    * No resolved hospital problems  *      Consultations During Hospital Stay:  · PT/OT  · Case management    Procedures Performed:     · None    Significant Findings / Test Results:     · CTA pe study:  Negative PE  Scattered areas of groundglass density in the both lungs mildly more pronounced from the previous study May be inflammatory or infectious  No acute airspace consolidation  There are areas of subpleural reticulation with groundglass density with some traction bronchiectatic changes suggest developing fibrosis  A pleuroparenchymal nodular density seen at the right lower lobe in its superior aspect in image 111 series 603 and image 91 series 2 probable atelectasis  Consider follow-up chest CT at 3 months  Incidental Findings:   ·  A pleuroparenchymal nodular density seen at the right lower lobe in its superior aspect  Consider follow-up chest CT at 3 months        Test Results Pending at Discharge (will require follow up):   · none     Outpatient Tests Requested:  · Outpatient f/u with PCP  · Reschedule follow up appointment with pulmonary  · Repeat CT chest in 3 months     Complications:  None     Hospital Course:     Melvin Saenz is a 76 y o  female patient with past medical history of bipolar disorder, GERD, asthma, recent COVID-19 infection, hyperlipidemia who originally presented to the hospital on 1/19/2021 due to shortness of breath and tachycardia noted on her home pulse ox  Patient was recently hospitalized for COVID-19 infection for over a month and discharged on 01/11  She has been oxygen dependent on 4-6 L at home  She has been comfortable at home feeling well until about 1 day ago she began having worsening shortness of breath  CTA in the emergency department was negative for PE, did show ground-glass opacities slightly more pronounced on this study compared to prior  Patient has been on prednisone taper since discharge, was given an PO extra dose here in the hospital   Currently, feels well  Has been ambulating in the room as well as with physical therapy  Currently requiring 4-6 L which has been her baseline since discharge from the hospital     At this point, patient is medically stable for discharge  Will hold off on pulmonology evaluation as patient stable, lungs clear and oxygen requirements stable  Looks and feels well  Will need to reschedule outpatient pulmonology follow-up and would recommend repeat CT chest in 3 months  Continue at-home pulmonary toilet with incentive spirometer  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 to keep oxygen saturations 92% above  VNA in place  Condition at Discharge: stable     Discharge Day Visit / Exam:     Subjective:  Patient offers no acute complaints  Feels well  No complaints of shortness of breath, palpitations  Vitals: Blood Pressure: 109/60 (01/20/21 0735)  Pulse: 67 (01/20/21 0735)  Temperature: 98 5 °F (36 9 °C) (01/20/21 0735)  Temp Source: Oral (01/19/21 1045)  Respirations: 16 (01/20/21 0735)  Height: 5' 1" (154 9 cm) (01/19/21 1045)  Weight - Scale: 78 4 kg (172 lb 12 8 oz) (01/19/21 1045)  SpO2: 94 % (01/20/21 0735)     Exam:   Physical Exam  Vitals signs and nursing note reviewed  Constitutional:       Interventions: Nasal cannula in place  Cardiovascular:      Rate and Rhythm: Normal rate  Pulmonary:      Effort: No respiratory distress  Breath sounds: Normal breath sounds  No wheezing, rhonchi or rales  Abdominal:      Tenderness: There is no abdominal tenderness  Musculoskeletal:         General: No swelling  Skin:     General: Skin is warm  Neurological:      Mental Status: She is alert and oriented to person, place, and time  Mental status is at baseline  Psychiatric:         Mood and Affect: Mood normal          Discussion with Family:  Patient    Discharge instructions/Information to patient and family:   See after visit summary for information provided to patient and family  Provisions for Follow-Up Care:  See after visit summary for information related to follow-up care and any pertinent home health orders  Disposition:     Home    For Discharges to [facility]:   · Not Applicable to this Patient - Not Applicable to this Patient    Planned Readmission: no     Discharge Statement:  I spent 40 minutes discharging the patient  This time was spent on the day of discharge  I had direct contact with the patient on the day of discharge  Greater than 50% of the total time was spent examining patient, answering all patient questions, arranging and discussing plan of care with patient as well as directly providing post-discharge instructions  Additional time then spent on discharge activities  Discharge Medications:  See after visit summary for reconciled discharge medications provided to patient and family        ** Please Note: This note has been constructed using a voice recognition system **

## 2021-01-20 NOTE — PHYSICAL THERAPY NOTE
PHYSICAL THERAPY EVALUATION  NAME:  Madina Crowley  DATE: 21    AGE:   76 y o  Mrn:   [id number]  ADMIT DX:  Shortness of breath [R06 02]  SOB (shortness of breath) [R06 02]  Dyspnea on exertion [R06 00]  Hypoxia [R09 02]  Chest wall tenderness [R07 89]  Asthma, unspecified asthma severity, unspecified whether complicated, unspecified whether persistent [J45 909]  History of COVID-19 [Z86 16]    Past Medical History:   Diagnosis Date    Asthma     Bipolar disorder ([address] )     COVID-19     GERD (gastroesophageal reflux disease)        Past Surgical History:   Procedure Laterality Date     SECTION      x3    CHOLECYSTECTOMY      HYSTERECTOMY         Length Of Stay: 0    PHYSICAL THERAPY EVALUATION:        21 0854   Note Type   Note type Evaluation   Pain Assessment   Pain Assessment Tool Pain Assessment not indicated - pt denies pain   Home Living   Type of [address] One level;Stairs to enter with rails  (1 + 1 + 1 YESSENIA )   Additional Comments Patient reports living alone however states she has boyfriend was able to assist as needed    Patient also states she has a neighbor who is able to assist as well   Prior Function   Level of [place] Independent with ADLs and functional mobility   Lives With Alone   Receives Help From Family;Friend(s)   ADL Assistance Independent   Falls in the last 6 months 0   Comments Patient denies use of assistive device for ambulation prior to admission   Restrictions/Precautions   Weight Bearing Precautions Per Order No   Other Precautions O2  (6 L O2 via NC )   General   Additional Pertinent History During ambulation Pts O2 sats were 89% on 6L, 2 min post ambulation pts O2 sats returned to 92% on 6L    Family/Caregiver Present No   Cognition   Overall Cognitive Status WFL   Arousal/Participation Alert   Orientation Level Oriented X4   Memory Within functional limits   Following Commands Follows all commands and directions without difficulty RUE Assessment   RUE Assessment WFL   LUE Assessment   LUE Assessment WFL   RLE Assessment   RLE Assessment WFL   LLE Assessment   LLE Assessment WFL   Bed Mobility   Supine to Sit 5  Supervision   Additional items Increased time required   Transfers   Sit to Stand 5  Supervision   Additional items Increased time required   Stand to Sit 5  Supervision   Additional items Increased time required   Toilet transfer 5  Supervision   Additional items Increased time required   Additional Comments Increased time required to complete due to GUZMAN   Ambulation/Elevation   Gait pattern [place] flexed   Gait Assistance 5  Supervision   Assistive Device None   Distance 55ft x 2    Stair Management Assistance 5  Supervision   Stair Management Technique Two rails   Number of Stairs 3   Balance   Static Sitting Fair +   Static Standing Fair   Ambulatory Fair -   Endurance Deficit   Endurance Deficit Yes   Endurance Deficit Description GUZMAN   Activity Tolerance   Activity Tolerance Other (Comment)  (GUZMAN )   Medical Staff Made Aware TESFAYE Rojas; Fallon Webb OT student    Nurse Made Aware Patient appropriate to be seen and mobilized per nursing   Assessment   Prognosis Good   Problem List Decreased endurance;Decreased mobility   Assessment Pt is 76 y o  female seen for PT evaluation s/p admit to [facility] 1/19/2021  Two pt identifiers were used to confirm  Pt presented w/ dyspnea, tachycardia  Patient was recently discharged from [facility] on 1/11/21 where she was admitted for 38 days due to COVID infection  Pt was represented at [facility] and was placed under observation with a primary dx of:  Shortness of breath, and other active problems including tachycardia, depression/bipolar/insomnia, dyslipidemia, prednisone taper, GERD  PT now consulted for assessment of mobility and d/c needs     Pts current co morbidities affecting treatment include:  Asthma, bipolar disorder, COVID-19, GERD, and personal factors including steps enter home and living alone  Pts current clinical presentation is Evolving (medium complexity) due to Ongoing medical management for primary dx, Decreased activity tolerance compared to baseline, Continuous pulse oximetry monitoring     Upon evaluation, pt currently is requiring Supervision for bed mobility; Supervision for transfers and Supervision for ambulation w/ no AD   Patient denies any lightheadedness or dizziness with ambulation  Pt presents at PT eval functioning below baseline and currently w/ overall mobility deficits 2* to: decreased endurance, decreased activity tolerance compared to baseline, SOB upon exertion  At conclusion of PT session pt returned back in chair with phone and call bell within reach  Pt denies any further questions at this time  PT is currently recommending Home with increased family support and Outpatient PT  Pt agreeable to plan and goals as stated on evaluation  D/C acute care PT at this time due to pt being near baseline in terms of functional mobility  Pt denies any mobility or safety concerns about returning home at d/c  Recommend pt continues to mobilize with nsg and restorative techs during hospital stay  Barriers to Discharge None   Barriers to Discharge Comments Patient denies any mobility or safety concerns about returning home at time of discharge  Patient states her supportive boyfriend step to assist her as needed   Goals   Patient Goals " to go home"   Plan   PT Frequency Other (Comment)  (D/C PT )   Recommendation   PT Discharge Recommendation Return to previous environment with social support;Home with skilled therapy; Other (Comment)  (home with increased support, OPPT )   Equipment Recommended   (None at this time)   PT - OK to Discharge Yes  (When medically cleared)   Modified [place] Scale   Modified [place] Scale 3   Barthel Index   Feeding 10   Bathing 5   Grooming Score 5   Dressing Score 10   Bladder Score 10   Bowels Score 10   Toilet Use Score 10   Transfers (Bed/Chair) Score 10   Mobility (Level Surface) Score 10   Stairs Score 5   Barthel Index Score 85   Portions of the documentation may have been created using voice recognition software  Occasional wrong word or sound alike substitutions may have occurred due to the inherent limitations of the voice recognition software  Read the chart carefully and recognize, using context, where substitutions have occurred      Andrea Rapp, PT, DPT

## 2021-01-21 ENCOUNTER — TRANSITIONAL CARE MANAGEMENT (OUTPATIENT)
Dept: FAMILY MEDICINE CLINIC | Facility: CLINIC | Age: 66
End: 2021-01-21

## 2021-01-21 NOTE — ED ATTENDING ATTESTATION
1/19/2021  IKatiana MD, saw and evaluated the patient  I have discussed the patient with the resident/non-physician practitioner and agree with the resident's/non-physician practitioner's findings, Plan of Care, and MDM as documented in the resident's/non-physician practitioner's note, except where noted  All available labs and Radiology studies were reviewed  I was present for key portions of any procedure(s) performed by the resident/non-physician practitioner and I was immediately available to provide assistance  At this point I agree with the current assessment done in the Emergency Department  I have conducted an independent evaluation of this patient a history and physical is as follows:    ED Course     Patient presents for evaluation due to worsening shortness of breath on exertion  Patient recently had a prolonged hospital stay after being diagnosed with COVID and was discharged on 6 L of oxygen  Patient states that since discharge, she has been getting progressively more short of breath  She does report feeling dehydrated  No chest pain  No additional complaints  A/P:  Dyspnea on exertion  Will check labs, will CT chest to rule out PE given prolonged hospital stay and COVID, and will likely need to admit      Critical Care Time  Procedures

## 2021-01-26 ENCOUNTER — TELEMEDICINE (OUTPATIENT)
Dept: FAMILY MEDICINE CLINIC | Facility: CLINIC | Age: 66
End: 2021-01-26
Payer: MEDICARE

## 2021-01-26 DIAGNOSIS — Z76.89 ENCOUNTER FOR SUPPORT AND COORDINATION OF TRANSITION OF CARE: Primary | ICD-10-CM

## 2021-01-26 DIAGNOSIS — U07.1 ACUTE HYPOXEMIC RESPIRATORY FAILURE DUE TO COVID-19 (HCC): ICD-10-CM

## 2021-01-26 DIAGNOSIS — J96.01 ACUTE HYPOXEMIC RESPIRATORY FAILURE DUE TO COVID-19 (HCC): ICD-10-CM

## 2021-01-26 PROCEDURE — 99214 OFFICE O/P EST MOD 30 MIN: CPT | Performed by: NURSE PRACTITIONER

## 2021-01-26 NOTE — PROGRESS NOTES
Assessment/Plan:   Significant improvement in patient shortness of breath her fatigue and overall feelings of inability to catch her breath  she remains on 6 L oxygen however she states that she is able to turn it down and stay saturated to 93% - 94% during the day she states she does remain on 6 L while sleeping  She did ask about the COVID vaccine I stated she must wait 90 days but I advised she sign up for MyChart and register for an appointment for that time frame  The anomaly found on the CT of the chest warrants a repeat CT scan with contrast in 3 months I will facilitate that for her at her next follow-up visit  Patient denies any need for refill of medication  Patient verbalized agreement and understanding of the plan of care as outlined during her office visit today  She should be seen back in the office in 1-2 months  Problem List Items Addressed This Visit        Respiratory    Acute hypoxemic respiratory failure due to COVID-19 [facility])      Other Visit Diagnoses     Encounter for support and coordination of transition of care    -  Primary             Reason for visit is transition of care from recent hospitalization for acute hypoxia and pneumonia related to COVID-19  Encounter provider LUIS CARLOS Ruiz       Provider located at [address] [place] [id number] [phone]      Recent Visits  No visits were found meeting these conditions  Showing recent visits within past 7 days and meeting all other requirements     Today's Visits  Date Type Provider Dept   01/26/21 819 [facility], [address] [facility]   Showing today's visits and meeting all other requirements     Future Appointments  No visits were found meeting these conditions  Showing future appointments within next 150 days and meeting all other requirements        After connecting through [employer], the patient was identified by name and date of birth  Andres Callejas was informed that this is a telemedicine visit and that the visit is being conducted through telephone  My office door was closed  No one else was in the room  She acknowledged consent and understanding of privacy and security of the video platform  The patient has agreed to participate and understands they can discontinue the visit at any time  Patient is aware this is a billable service  Subjective:     Patient ID: Andres Callejas is a 76 y o  female  Patient is being seen today via virtual telephone visit for a transition of care and facilitation of her home management from a recent hospitalization due to hypoxia related to COVID-19 viral infection  Patient was in the hospital for only 24 hours however was released on 6 L of oxygen  Patient states that she has significant improvement with her symptoms and she is able to titrate herself down intermittently throughout the day  She denies any nausea vomiting diarrhea chest pains but is little bit short of breath with exertion  Review of Systems   Constitutional: Positive for fatigue  Negative for appetite change and fever  HENT: Negative for sinus pressure and sore throat  Eyes: Negative for pain  Respiratory: Positive for shortness of breath  Cardiovascular: Negative for chest pain  Gastrointestinal: Negative for abdominal pain  Genitourinary: Negative for dysuria  Musculoskeletal: Negative for arthralgias and myalgias  Skin: Negative for color change  Neurological: Negative for light-headedness  Psychiatric/Behavioral: Negative for behavioral problems  Objective: There were no vitals filed for this visit  Physical Exam  Constitutional:       General: She is not in acute distress  Appearance: She is not diaphoretic  HENT:      Head: Atraumatic  Cardiovascular:      Rate and Rhythm: Normal rate     Pulmonary:      Effort: Pulmonary effort is normal              Transitional Care Management Review:  Brandon Deluna is a 76 y o  female here for TCM follow up  During the TCM phone call patient stated:    TCM Call (since 12/26/2020)     Date and time call was made  1/21/2021 11:21 AM    Patient was hospitial[facility] at  [facility]        Date of Admission  01/19/21    Date of discharge  01/20/21    Diagnosis  Acute hypoxemic respiratory failure due to COVID-19 [facility])    Disposition  Home    Were the patients medications reviewed and updated  No    Current Symptoms  None    Shortness of breath severity  Moderate    Weakness severity  Moderate      TCM Call (since 12/26/2020)     Post hospital issues  None    Should patient be enrolled in anticoag monitoring? No    Scheduled for follow up? Yes    Patients specialists  Pulmonlolgist    Pulmonologist name  [facility]    Pulmonologist contact #  [phone]    Endocrinologist name  [phone]    Did you obtain your prescribed medications  Yes    Do you need help managing your prescriptions or medications  No    Is transportation to your appointment needed  No    Living [address]; Friends    The type of support provided  Emotional    Do you have social support  Yes, as much as I need    Are you recieving any outpatient services  Yes    What type of services  PT nursing and home health aide     Are you recieving home care services  Yes    Types of home care services  Home PT; Nurse visit    Are you using any community resources  No    Current waiver services  No    Have you fallen in the last 12 months  No    Interperter language line needed  No          I spent 16 minutes with the patient during this visit      LUIS CARLOS Andrade

## 2021-01-28 DIAGNOSIS — Z23 ENCOUNTER FOR IMMUNIZATION: ICD-10-CM

## 2021-02-12 ENCOUNTER — OFFICE VISIT (OUTPATIENT)
Dept: PULMONOLOGY | Facility: CLINIC | Age: 66
End: 2021-02-12
Payer: MEDICARE

## 2021-02-12 VITALS
HEIGHT: 61 IN | WEIGHT: 178.4 LBS | DIASTOLIC BLOOD PRESSURE: 70 MMHG | OXYGEN SATURATION: 98 % | HEART RATE: 78 BPM | SYSTOLIC BLOOD PRESSURE: 118 MMHG | RESPIRATION RATE: 18 BRPM | TEMPERATURE: 97.3 F | BODY MASS INDEX: 33.68 KG/M2

## 2021-02-12 DIAGNOSIS — U07.1 ACUTE HYPOXEMIC RESPIRATORY FAILURE DUE TO COVID-19 (HCC): ICD-10-CM

## 2021-02-12 DIAGNOSIS — R06.02 SOB (SHORTNESS OF BREATH): ICD-10-CM

## 2021-02-12 DIAGNOSIS — J84.10 PULMONARY FIBROSIS (HCC): Primary | ICD-10-CM

## 2021-02-12 DIAGNOSIS — J96.01 ACUTE HYPOXEMIC RESPIRATORY FAILURE DUE TO COVID-19 (HCC): ICD-10-CM

## 2021-02-12 PROCEDURE — 99214 OFFICE O/P EST MOD 30 MIN: CPT | Performed by: INTERNAL MEDICINE

## 2021-02-12 NOTE — PROGRESS NOTES
Progress Note - Pulmonary   Dolphus Mater 76 y o  female MRN: [id number]   Encounter: [id number]      Assessment/Plan:    Patient is a 50-year-old female  With recent admission for COVID-19 pneumonia in December of 2020  She presents for routine follow-up  Overall, she reports she is feeling better but has a significant oxygen requirement  At this time will try in separate the acute fibrotic changes associated with COVID-19 pneumonia versus underlying causes  Patient would benefit from pulmonary function testing as well as high-resolution CT scan  Patient may resume to work on an as tolerated basis  A letter was provided for the patient  She may follow up in approximately 3 months or sooner as necessary  Patient may follow up in 3 months or sooner as necessary  Orders:  Orders Placed This Encounter   Procedures    CT chest high resolution     Standing Status:   Future     Standing Expiration Date:   2/12/2025     Scheduling Instructions: There is no prep for this study  Please bring your insurance cards, a form of photo ID and a list of your medications with you  Arrive 15 minutes prior to your appointment time to register  On the day of your test, please bring any prior CT or MRI studies of this area with you that were not performed at a [facility]  To schedule this appointment, please contact Central Scheduling at [phone]  Order Specific Question:   What is the patient's sedation requirement? Answer:   No Sedation     Order Specific Question:   [facility]     Answer:   Immediate     Order Specific Question:   Reason for Exam (FREE TEXT)     Answer:   post covid fibrosis    Complete PFT with post bronchodilator     Standing Status:   Future     Standing Expiration Date:   2/12/2022     Order Specific Question:   Would you like to add MVV, MIP, MEP into this order? 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  The patient was admitted to [facility] in the beginning of December  The patient denies fevers, chills, nausea or vomiting  She has fatigue  Inhaler Regimen:  Albuterol HFA - 2-3x/daily    Remainder of review of systems negative except as described in HPI  The following portions of the patient's history were reviewed and updated as appropriate: allergies, current medications, past family history, past medical history, past social history, past surgical history and problem list      Objective:   Vitals: Blood pressure 118/70, pulse 78, temperature (!) 97 3 °F (36 3 °C), temperature source Tympanic, resp  rate 18, height 5' 1" (1 549 m), weight 80 9 kg (178 lb 6 4 oz), SpO2 98 %  , 4l NC, Body mass index is 33 71 kg/m²  Physical Exam  Gen: Pleasant, awake, alert, oriented x 3, no acute distress  HEENT: Mucous membranes moist, no oral lesions, no thrush  NECK: No accessory muscle use, JVP not elevated  Cardiac: RRR, single S1, single S2, no murmurs, no rubs, no gallops  Lungs: decreased breath sounds  Abdomen: normoactive bowel sounds, soft nontender, nondistended, no rebound or rigidity, no guarding  Extremities: no cyanosis, no clubbing, no LE edema  MSK:  Strength equal in all extremities  Derm:  No rashes/lesions noted  Neuro:  Appropriate mood/affect    Labs: I have personally reviewed pertinent lab results  Lab Results   Component Value Date    WBC 6 49 01/20/2021    HGB 9 9 (L) 01/20/2021     (L) 01/20/2021     Lab Results   Component Value Date    CREATININE 0 78 01/20/2021        Imaging and other studies: I have personally reviewed pertinent reports  and I have personally reviewed pertinent films in PACS  CTA PE 1/19/21  My interpretation:  pleuroparenchymal density  Radiology findings:  PULMONARY ARTERIAL TREE:  No pulmonary embolus is seen     LUNGS there are scattered areas of groundglass density in the lung  There is a reticulation with peripheral predominance  There are traction bronchiectatic changes in the left upper lobe in image 95 series 2  No airspace consolidation seen  Calcification seen right lower lung  A pleuroparenchymal density seen at the right lower lobe, measuring about 1 4 cm, seen in image 90 series 2 and in image 111 series 603  PLEURA:  No pleural effusion seen  HEART/GREAT VESSELS:  Unremarkable for patient's age  MEDIASTINUM AND YG:  No significant mediastinal lymph node enlargement    Pulmonary Function Testing:   No pulmonary function testing available for review  Nicki Olvera

## 2021-02-12 NOTE — LETTER
February 12, 2021     Patient: Rowena Mendoza   YOB: 1955   Date of Visit: 2/12/2021       To Whom it May Concern:    Rowena Mendoza is under my professional care  She was seen in my office on 2/12/2021  She may return to work on March 8, 2021 with limitations including the necessity of supplemental oxygen  Given her recovery, please limit initial hours to no more than 25 hours per week  She will be reevaluated in the pulmonary office routinely to determine necessity of work restrictions  If you have any questions or concerns, please don't hesitate to call           Sincerely,          Idania Guerrero MD        CC: Rowena Mendoza

## 2021-02-19 ENCOUNTER — TELEMEDICINE (OUTPATIENT)
Dept: FAMILY MEDICINE CLINIC | Facility: CLINIC | Age: 66
End: 2021-02-19
Payer: MEDICARE

## 2021-02-19 DIAGNOSIS — J45.20 MILD INTERMITTENT ASTHMA, UNSPECIFIED WHETHER COMPLICATED: Primary | ICD-10-CM

## 2021-02-19 DIAGNOSIS — J06.9 UPPER RESPIRATORY TRACT INFECTION, UNSPECIFIED TYPE: ICD-10-CM

## 2021-02-19 PROCEDURE — 99442 PR PHYS/QHP TELEPHONE EVALUATION 11-20 MIN: CPT | Performed by: NURSE PRACTITIONER

## 2021-02-19 RX ORDER — AZITHROMYCIN 250 MG/1
TABLET, FILM COATED ORAL
Qty: 6 TABLET | Refills: 0 | Status: SHIPPED | OUTPATIENT
Start: 2021-02-19 | End: 2021-02-24

## 2021-02-19 RX ORDER — PREDNISONE 10 MG/1
10 TABLET ORAL 2 TIMES DAILY WITH MEALS
Qty: 10 TABLET | Refills: 0 | Status: SHIPPED | OUTPATIENT
Start: 2021-02-19 | End: 2021-02-24

## 2021-02-19 NOTE — PROGRESS NOTES
Virtual Brief Visit    Assessment/Plan:      Advised patient via telephone we will approach this conservative therapy if she is not significantly improved she is to be seen in the office for follow-up  Dosing all possible side effects of the prescribed medications reviewed all questions were answered  Patient verbalized agreement and understanding of the plan of care as outlined during her office visit today  Return to office as needed or as indicated  Problem List Items Addressed This Visit        Respiratory    Asthma - Primary      Other Visit Diagnoses     Upper respiratory tract infection, unspecified type                    Reason for visit is No chief complaint on file  Encounter provider LUIS CARLOS Cheung    Provider located at [address] [place] [id number] [phone]    Recent Visits  No visits were found meeting these conditions  Showing recent visits within past 7 days and meeting all other requirements     Today's Visits  Date Type Provider Dept   02/19/21 819 [facility], [address] [facility]   Showing today's visits and meeting all other requirements     Future Appointments  No visits were found meeting these conditions  Showing future appointments within next 150 days and meeting all other requirements        After connecting through telephone, the patient was identified by name and date of birth  Filomena Bernal was informed that this is a telemedicine visit and that the visit is being conducted through telephone  My office door was closed  No one else was in the room  She acknowledged consent and understanding of privacy and security of the platform  The patient has agreed to participate and understands she can discontinue the visit at any time  Patient is aware this is a billable service  Subjective    Filomena Bernal is a 76 y o  female     Patient is stating that she is having a little bit of upper respiratory complaints  She denies any nausea vomiting diarrhea chest pains or shortness of breath patient recently recovered from [facility] and does have asthma  Her breathing has significantly improved since COVID however she feels that a burst of steroids will improve her situation as she has taken in the past and they been well tolerated         Past Medical History:   Diagnosis Date    Asthma     Bipolar disorder ([facility] [address] )     COVID-19     GERD (gastroesophageal reflux disease)        Past Surgical History:   Procedure Laterality Date     SECTION      x3    CHOLECYSTECTOMY      HYSTERECTOMY         Current Outpatient Medications   Medication Sig Dispense Refill    albuterol (PROVENTIL HFA,VENTOLIN HFA) 90 mcg/act inhaler Inhale 2 puffs every 4 (four) hours as needed for wheezing 18 g 2    albuterol (PROVENTIL HFA,VENTOLIN HFA) 90 mcg/act inhaler Inhale 2 puffs every 6 (six) hours as needed for wheezing or shortness of breath 2 Inhaler 3    atorvastatin (LIPITOR) 40 mg tablet Take 1 tablet (40 mg total) by mouth daily with dinner 14 tablet 0    cholecalciferol (VITAMIN D3) 1,000 units tablet Take 2 tablets (2,000 Units total) by mouth daily  0    dextromethorphan-guaiFENesin (ROBITUSSIN DM)  mg/5 mL syrup Take 10 mL by mouth every 6 (six) hours 118 mL 0    Fluocinonide Emulsified Base 0 05 % CREA APPLY TO AFFECTED AREA(S) TOPICALLY TWO TIMES DAILY TO LOWER LEGS FOR 14 DAYS MAX AS NEEDED      fluticasone (FLONASE) 50 mcg/act nasal spray 2 sprays into each nostril daily 1 Bottle 0    gabapentin (NEURONTIN) 300 mg capsule Take 300 mg by mouth daily      Humidifier MISC Use as needed (humidification) Use as needed for humidification 1 each 0    lithium carbonate 300 mg capsule Take 300 mg by mouth daily at bedtime      LORazepam (ATIVAN) 1 mg tablet Take 2 mg by mouth daily at bedtime       omeprazole (PriLOSEC) 20 mg delayed release capsule Take 20 mg by mouth daily      sertraline (ZOLOFT) 100 mg tablet Take 150 mg by mouth daily      sodium chloride (OCEAN) 0 65 % nasal spray 2 sprays into each nostril every 2 (two) hours as needed for congestion 30 mL 0    traZODone (DESYREL) 100 mg tablet Take 200 mg by mouth daily at bedtime       Current Facility-Administered Medications   Medication Dose Route Frequency Provider Last Rate Last Admin    betamethasone dipropionate (DIPROSONE) 0 05 % ointment   Topical Daily Sarah Cartagena, CRNP            Allergies   Allergen Reactions    Amphetamine-Dextroamphetamine Other (See Comments)     Chest pain- was placed on Adderall after son passed away for depression, and she developed chest pain in 1990's; she had full cardiac work up, and was negative, so she has allergy to Adderall  Chest pain- was placed on Adderall after son passed away for depression, and she developed chest pain in 1990's; she had full cardiac work up, and was negative, so she has allergy to Adderall    Molds & Smuts     Other      Cats    Sulfa Antibiotics Other (See Comments)       Review of Systems   Constitutional: Negative for chills  HENT: Positive for sore throat  Negative for congestion and rhinorrhea  Respiratory: Positive for cough and wheezing  There were no vitals filed for this visit  I spent 12 minutes directly with the patient during this visit    VIRTUAL VISIT DISCLAIMER    Melvin Saenz acknowledges that she has consented to an online visit or consultation  She understands that the online visit is based solely on information provided by her, and that, in the absence of a face-to-face physical evaluation by the physician, the diagnosis she receives is both limited and provisional in terms of accuracy and completeness  This is not intended to replace a full medical face-to-face evaluation by the physician  Melvin Saenz understands and accepts these terms

## 2021-04-06 ENCOUNTER — IMMUNIZATIONS (OUTPATIENT)
Dept: FAMILY MEDICINE CLINIC | Facility: HOSPITAL | Age: 66
End: 2021-04-06

## 2021-04-06 DIAGNOSIS — Z23 ENCOUNTER FOR IMMUNIZATION: Primary | ICD-10-CM

## 2021-04-06 PROCEDURE — 0001A SARS-COV-2 / COVID-19 MRNA VACCINE (PFIZER-BIONTECH) 30 MCG: CPT

## 2021-04-06 PROCEDURE — 91300 SARS-COV-2 / COVID-19 MRNA VACCINE (PFIZER-BIONTECH) 30 MCG: CPT

## 2021-04-29 ENCOUNTER — IMMUNIZATIONS (OUTPATIENT)
Dept: FAMILY MEDICINE CLINIC | Facility: HOSPITAL | Age: 66
End: 2021-04-29

## 2021-04-29 DIAGNOSIS — Z23 ENCOUNTER FOR IMMUNIZATION: Primary | ICD-10-CM

## 2021-04-29 PROCEDURE — 0002A SARS-COV-2 / COVID-19 MRNA VACCINE (PFIZER-BIONTECH) 30 MCG: CPT

## 2021-04-29 PROCEDURE — 91300 SARS-COV-2 / COVID-19 MRNA VACCINE (PFIZER-BIONTECH) 30 MCG: CPT

## 2021-06-02 NOTE — ASSESSMENT & PLAN NOTE
· CT of sinuses on 12/21 revealed mild sinus disease  · Completed seven days Augmentin course per pulmonology along with symptomatic relief agents  · Today gave pt 3 days of Afrin - advised pt that this medicine is temporary as it can lead to rebound symptoms Azithromycin Pregnancy And Lactation Text: This medication is considered safe during pregnancy and is also secreted in breast milk.

## 2021-09-21 NOTE — PROGRESS NOTES
Immediate Brief Procedure Note    Patient Name: Ambar Hargrove  YOB: 1942  DATE OF PROCEDURE: 9/21/2021  PROCEDURALIST: Sacha Doss MD  ASSISTANT(S): None  ANESTHESIA TYPE: Moderate  ANESTHESIOLOGIST: No anesthesia staff entered.    PROCEDURE PERFORMED: Fluid collection drain placement    Pre-procedure Dx:   Patient Active Problem List   Diagnosis   • Hypertension   • Shoulder pain   • LBP (low back pain)   • Headache(784.0)   • Cough   • Hyperthyroidism   • Abnormal weight loss   • Anemia   • Muscle spasm   • Sinusitis chronic, frontal   • History of postmenopausal osteoporosis   • Vitamin D deficiency   • HPTH (hyperparathyroidism) (CMS/[facility])   • Medicare annual wellness visit, subsequent   • Acute nasopharyngitis   • Nausea   • Dysfunction of right eustachian tube   • Dizziness   • Anxiety   • Palpitations   • Eustachian tube dysfunction, left   • Skin lesion of cheek   • Multinodular goiter   • Osteoporosis   • Common bile duct dilation   • Renal cyst, left   • DDD (degenerative disc disease), lumbar   • Sinusitis, acute frontal   • Graves disease   • Persistent cough   • Hyperopia with presbyopia, bilateral   • Nuclear sclerotic cataract of both eyes   • Diverticulitis of sigmoid colon       Post-procedure Dx: Same    Findings: L colonic fluid collection drain placement; 30mL purulent output; 10F drain.     Estimated Blood Loss: Less than 5 ml    Complications: None    Specimens Removed: Yes     Progress Note - Andres Callejas 1955, 76 y o  female MRN: [id number]    Unit/Bed#: -01 Encounter: [id number]    Primary Care Provider: LUIS CARLOS Morris   Date and time admitted to hospital: 12/4/2020  9:15 AM        * Acute respiratory failure with hypoxia ([address] )  Assessment & Plan  Acute hypoxic respiratory failure likely due to COVID-19 infection  Patient is hypoxic  Requiring 12-15 L of oxygen saturating in the high 80s and low 90s     Plan  Continue supplemental oxygen maintain O2 sats more than 92-94%  Encourage proning- patient needs continuous encouragement and reminder to prone  Pulmonary contacted due to worsening hypoxemia and oxygen requirement  See below for details of COVID 19 management  Looks euvolemic   P r n  Lasix    COVID-19 virus infection  Assessment & Plan  COVID-19 infection, tested positive on 11/29/2020  Patient be placed on moderate treatment protocol   Noted to have worsening of oxygenation requiring 15 later mid flow, with saturations in low 90s  Labs: Inflammatory markers worsening  · CRP- 75-->44-> 43 --> 153  · Ferritin 513--> 526-> 496  · D-dimer 0 8--> 0 76--1 13  · procalcitonin negative x2  Plan  · Continue dexamethasone 7/10  · Completed remdesivir  12/8  · Continue vitamin-D, zinc, vitamin-C  · Antibiotic discontinued  · s/p convalescent plasma 12/7/2020  · Continue self proning   · interleukin 6 pending   · Pulmonary team contacted due to worsening respiratory status and hypoxemia   · Monitor CRP, ferritin, D-dimer      Elevated troponin  Assessment & Plan  Elevated troponin, peak troponin 0 32- likely due to Covid-19 infection  Trended down to 0 14  ECG - T wave inversions noted  Monitor     GERD (gastroesophageal reflux disease)  Assessment & Plan  Continue Famotidine and Mylanta     Bipolar disorder (HCC)  Assessment & Plan  Continue lithium sertraline, trazodone  Patient looks less anxious    Continue bedtime Ativan        VTE Pharmacologic Prophylaxis: Pharmacologic: Enoxaparin (Lovenox)  Mechanical VTE Prophylaxis in Place: Yes    Discussions with Specialists or Other Care Team Provider: yes    Education and Discussions with Family / Patient: Tracee Estrada (significant other) was updated all questions were answered  Current Length of Stay: 6 day(s)    Current Patient Status: Inpatient     Discharge Plan / Estimated Discharge Date: To be determined    Code Status: Level 1 - Full Code      Subjective:   Patient was seen and examined at the bedside this morning  She was able to on a little bit more yesterday and overnight  She was on the 12 L mid flow oxygen but she was desaturating in the mid 80s/low 80s when she lays on her back  Her oxygenation increased to 15 L this morning  Patient is encouraged in prone  Complaining of right hip pain when she lays on that side  Lidoderm ordered  Denies any shortness of breath chest pain or palpitations  Objective:     Vitals:   Temp (24hrs), Av 1 °F (36 7 °C), Min:97 °F (36 1 °C), Max:98 9 °F (37 2 °C)    Temp:  [97 °F (36 1 °C)-98 9 °F (37 2 °C)] 97 °F (36 1 °C)  HR:  [78-88] 87  Resp:  [20-21] 20  BP: ()/(56-60) 111/60  SpO2:  [90 %-96 %] 92 %  Body mass index is 32 95 kg/m²  Input and Output Summary (last 24 hours): Intake/Output Summary (Last 24 hours) at 12/10/2020 1449  Last data filed at 2020 2100  Gross per 24 hour   Intake --   Output 250 ml   Net -250 ml       Physical Exam:     Physical Exam  HENT:      Head: Normocephalic and atraumatic  Mouth/Throat:      Mouth: Mucous membranes are moist    Neck:      Musculoskeletal: Normal range of motion  Cardiovascular:      Rate and Rhythm: Normal rate and regular rhythm  Pulmonary:      Effort: Pulmonary effort is normal    Abdominal:      General: There is no distension  Palpations: Abdomen is soft  Tenderness: There is no abdominal tenderness  Musculoskeletal: Normal range of motion  General: No swelling  Comments: No swelling or limitation of movement in the right hip   Skin:     General: Skin is warm  Neurological:      General: No focal deficit present  Mental Status: She is alert  Additional Data:     Labs:    Results from last 7 days   Lab Units 12/10/20  0548   WBC Thousand/uL 8 41   HEMOGLOBIN g/dL 13 4   HEMATOCRIT % 41 2   PLATELETS Thousands/uL 236   NEUTROS PCT % 88*   LYMPHS PCT % 8*   MONOS PCT % 2*   EOS PCT % 1     Results from last 7 days   Lab Units 12/10/20  0549   POTASSIUM mmol/L 3 9   CHLORIDE mmol/L 105   CO2 mmol/L 29   BUN mg/dL 25   CREATININE mg/dL 0 80   CALCIUM mg/dL 9 1   ALK PHOS U/L 81   ALT U/L 30   AST U/L 24           * I Have Reviewed All Lab Data Listed Above  * Additional Pertinent Lab Tests Reviewed:  [address] Admission Reviewed    Imaging:    Imaging Reports Reviewed Today Include:  No new imaging      Recent Cultures (last 7 days):           Last 24 Hours Medication List:   Current Facility-Administered Medications   Medication Dose Route Frequency Provider Last Rate    acetaminophen  650 mg Oral Q6H PRN Saira Hernandez MD      albuterol  2 puff Inhalation Q4H PRN Saira Hernandez MD      aluminum-magnesium hydroxide-simethicone  30 mL Oral Q4H PRN Saira Hernandez MD      ascorbic acid  1,000 mg Oral Q12H [facility] Saira Hernandez MD      atorvastatin  40 mg Oral HS Saira Hernandez MD      bisacodyl  10 mg Rectal Daily PRN Saira Hernandez MD      cholecalciferol  2,000 Units Oral Daily Saira Hernandez MD      dexamethasone  6 mg Intravenous Q24H Saira Hernandez MD      enoxaparin  30 mg Subcutaneous Q12H [facility] Saira Hernandez MD      famotidine  20 mg Oral Q12H Saira Hernandez MD      fluticasone  2 spray Each Nare Daily Saira Hernandez MD      gabapentin  300 mg Oral Daily Saira Hernandez MD      lidocaine  1 patch Topical Daily Saira Hernandez MD      lithium carbonate  300 mg Oral HS Saira Hernandez MD      LORazepam  1 mg Oral BID PRN Jennifer Varma MD      LORazepam  1 5 mg Oral HS Jennifer Varma MD      melatonin  6 mg Oral HS Jennifer Varma MD      [START ON 12/11/2020] multivitamin with iron-minerals  15 mL Per NG Tube Daily Jennifer Varma MD      ondansetron  4 mg Intravenous Q6H PRN Jennifer Varma MD      polyethylene glycol  17 g Oral Daily Jennifer Varma MD      senna-docusate sodium  1 tablet Oral BID Jennifer Varma MD      sertraline  100 mg Oral HS Jennifer Varma MD      sodium chloride  1 spray Each Nare Q2H PRN Jennifer Varma MD      traZODone  100 mg Oral HS Jennifer Varma MD          Today, Patient Was Seen By: Jennifer Varma MD    ** Please Note: This note has been constructed using a voice recognition system   **

## 2022-01-19 ENCOUNTER — TELEPHONE (OUTPATIENT)
Dept: FAMILY MEDICINE CLINIC | Facility: CLINIC | Age: 67
End: 2022-01-19

## 2022-01-19 ENCOUNTER — TELEPHONE (OUTPATIENT)
Dept: PULMONOLOGY | Facility: CLINIC | Age: 67
End: 2022-01-19

## 2022-01-19 NOTE — TELEPHONE ENCOUNTER
Patient does not have a documented allergy to vaccine products I do not give exam shins to COVID this is a personal decision I recommend that she protect herself as much as she can with her current history of asthma    She should also consult her pulmonologist concerning this request

## 2022-01-19 NOTE — TELEPHONE ENCOUNTER
Patient called stating her employer is requiring her to get the booster covid dose  She is asking for an exemption letter  She stated she got very sick from the second dose and was hospitalized for covid so she feels she has natural immunity and does not want to deal with the side effects again   Please advise [phone]

## 2022-01-19 NOTE — TELEPHONE ENCOUNTER
Pt called regarding an exemption for the booster shot  Employer is requiring her to have this  She stated that she had 2 shots in April 2021 second shot made her very sick  Also she was hospitalized from 12/05/2020 to 01/11/2021      Please Advise

## 2022-02-03 ENCOUNTER — APPOINTMENT (OUTPATIENT)
Dept: LAB | Facility: HOSPITAL | Age: 67
End: 2022-02-03
Payer: MEDICARE

## 2022-02-03 ENCOUNTER — OFFICE VISIT (OUTPATIENT)
Dept: FAMILY MEDICINE CLINIC | Facility: CLINIC | Age: 67
End: 2022-02-03
Payer: MEDICARE

## 2022-02-03 VITALS
HEART RATE: 122 BPM | DIASTOLIC BLOOD PRESSURE: 84 MMHG | TEMPERATURE: 99 F | SYSTOLIC BLOOD PRESSURE: 122 MMHG | RESPIRATION RATE: 16 BRPM | OXYGEN SATURATION: 96 %

## 2022-02-03 DIAGNOSIS — E78.41 ELEVATED LIPOPROTEIN(A): ICD-10-CM

## 2022-02-03 DIAGNOSIS — Z13.6 SCREENING FOR CARDIOVASCULAR CONDITION: ICD-10-CM

## 2022-02-03 DIAGNOSIS — Z12.11 SCREENING FOR COLON CANCER: ICD-10-CM

## 2022-02-03 DIAGNOSIS — M41.9 SCOLIOSIS OF THORACIC SPINE, UNSPECIFIED SCOLIOSIS TYPE: ICD-10-CM

## 2022-02-03 DIAGNOSIS — Z11.1 SCREENING-PULMONARY TB: Primary | ICD-10-CM

## 2022-02-03 DIAGNOSIS — D51.9 ANEMIA DUE TO VITAMIN B12 DEFICIENCY, UNSPECIFIED B12 DEFICIENCY TYPE: ICD-10-CM

## 2022-02-03 DIAGNOSIS — D64.9 ANEMIA, UNSPECIFIED TYPE: ICD-10-CM

## 2022-02-03 DIAGNOSIS — J45.20 MILD INTERMITTENT ASTHMA, UNSPECIFIED WHETHER COMPLICATED: ICD-10-CM

## 2022-02-03 DIAGNOSIS — Z11.1 SCREENING-PULMONARY TB: ICD-10-CM

## 2022-02-03 DIAGNOSIS — R00.0 TACHYCARDIA: ICD-10-CM

## 2022-02-03 DIAGNOSIS — Z13.820 SCREENING FOR OSTEOPOROSIS: ICD-10-CM

## 2022-02-03 DIAGNOSIS — Z78.0 ASYMPTOMATIC MENOPAUSAL STATE: ICD-10-CM

## 2022-02-03 DIAGNOSIS — F31.9 BIPOLAR AFFECTIVE DISORDER, REMISSION STATUS UNSPECIFIED (HCC): ICD-10-CM

## 2022-02-03 LAB
BASOPHILS # BLD AUTO: 0.05 THOUSANDS/ΜL (ref 0–0.1)
BASOPHILS NFR BLD AUTO: 1 % (ref 0–1)
EOSINOPHIL # BLD AUTO: 0.07 THOUSAND/ΜL (ref 0–0.61)
EOSINOPHIL NFR BLD AUTO: 1 % (ref 0–6)
ERYTHROCYTE [DISTWIDTH] IN BLOOD BY AUTOMATED COUNT: 14.7 % (ref 11.6–15.1)
FERRITIN SERPL-MCNC: 40 NG/ML (ref 8–388)
HCT VFR BLD AUTO: 39 % (ref 34.8–46.1)
HGB BLD-MCNC: 13.1 G/DL (ref 11.5–15.4)
IMM GRANULOCYTES # BLD AUTO: 0.01 THOUSAND/UL (ref 0–0.2)
IMM GRANULOCYTES NFR BLD AUTO: 0 % (ref 0–2)
IRON SATN MFR SERPL: 12 % (ref 15–50)
IRON SERPL-MCNC: 52 UG/DL (ref 50–170)
LYMPHOCYTES # BLD AUTO: 2.35 THOUSANDS/ΜL (ref 0.6–4.47)
LYMPHOCYTES NFR BLD AUTO: 43 % (ref 14–44)
MCH RBC QN AUTO: 27 PG (ref 26.8–34.3)
MCHC RBC AUTO-ENTMCNC: 33.6 G/DL (ref 31.4–37.4)
MCV RBC AUTO: 80 FL (ref 82–98)
MONOCYTES # BLD AUTO: 0.32 THOUSAND/ΜL (ref 0.17–1.22)
MONOCYTES NFR BLD AUTO: 6 % (ref 4–12)
NEUTROPHILS # BLD AUTO: 2.7 THOUSANDS/ΜL (ref 1.85–7.62)
NEUTS SEG NFR BLD AUTO: 49 % (ref 43–75)
NRBC BLD AUTO-RTO: 0 /100 WBCS
PLATELET # BLD AUTO: 169 THOUSANDS/UL (ref 149–390)
PMV BLD AUTO: 11.8 FL (ref 8.9–12.7)
RBC # BLD AUTO: 4.86 MILLION/UL (ref 3.81–5.12)
TIBC SERPL-MCNC: 431 UG/DL (ref 250–450)
VIT B12 SERPL-MCNC: 1190 PG/ML (ref 100–900)
WBC # BLD AUTO: 5.5 THOUSAND/UL (ref 4.31–10.16)

## 2022-02-03 PROCEDURE — 85025 COMPLETE CBC W/AUTO DIFF WBC: CPT

## 2022-02-03 PROCEDURE — 86480 TB TEST CELL IMMUN MEASURE: CPT

## 2022-02-03 PROCEDURE — 83918 ORGANIC ACIDS TOTAL QUANT: CPT

## 2022-02-03 PROCEDURE — 99214 OFFICE O/P EST MOD 30 MIN: CPT | Performed by: FAMILY MEDICINE

## 2022-02-03 PROCEDURE — 82607 VITAMIN B-12: CPT

## 2022-02-03 PROCEDURE — 82728 ASSAY OF FERRITIN: CPT

## 2022-02-03 PROCEDURE — 83550 IRON BINDING TEST: CPT

## 2022-02-03 PROCEDURE — 83540 ASSAY OF IRON: CPT

## 2022-02-03 PROCEDURE — 36415 COLL VENOUS BLD VENIPUNCTURE: CPT

## 2022-02-03 RX ORDER — METHOCARBAMOL 500 MG/1
500 TABLET, FILM COATED ORAL
Qty: 21 TABLET | Refills: 0 | Status: SHIPPED | OUTPATIENT
Start: 2022-02-03 | End: 2022-02-03

## 2022-02-03 RX ORDER — DEXAMETHASONE 4 MG/1
2 TABLET ORAL 2 TIMES DAILY
Qty: 12 G | Refills: 0 | Status: SHIPPED | OUTPATIENT
Start: 2022-02-03 | End: 2022-03-07

## 2022-02-03 RX ORDER — TIZANIDINE 2 MG/1
2 TABLET ORAL
Qty: 21 TABLET | Refills: 0 | Status: SHIPPED | OUTPATIENT
Start: 2022-02-03

## 2022-02-03 NOTE — ASSESSMENT & PLAN NOTE
Right sided thoracic curvature noted on exam   No know history of scoliosis and note documented on imaging   May be acquired from the initial Health system hospitalization    This may also be contributing to her back pain   She also sees a chiropractor   Anibal ordered as need for stiffness and spasms   Made aware of the s/e and cautioned not to use before driving

## 2022-02-03 NOTE — PROGRESS NOTES
Assessment/Plan:       Problem List Items Addressed This Visit        Respiratory    Asthma     Diagnosed many years ago and had a PFT but not records available   Using O2 intermittently following COVID in 2020  Requiring albuterol 1-2 x a day  This is consistent with mod-severe persistent asthma  Start Flovent BID with prn albuterol          Relevant Medications    fluticasone (Flovent HFA) 110 MCG/ACT inhaler       Musculoskeletal and Integument    Scoliosis of thoracic spine     Right sided thoracic curvature noted on exam   No know history of scoliosis and note documented on imaging   May be acquired from the initial [facility] hospitalization  This may also be contributing to her back pain   She also sees a chiropractor   Robaxin ordered as need for stiffness and spasms   Made aware of the s/e and cautioned not to use before driving           Relevant Medications    methocarbamol (ROBAXIN) 500 mg tablet       Other    Bipolar disorder      Stable and managed by psychiatrist, Dr Maggie Martini          Anemia     Last Hb 9 9 with a precipitous drop from 11     Likely multifactorial ( ACD vs MORRO vs B12 vs dilutional?)  Already receiving monthly B12 injections   No signs of overt bleeding  Will recheck with panel          Relevant Orders    Vitamin B12    CBC and differential    Iron Panel (Includes Ferritin, Iron Sat%, Iron, and TIBC)    Vitamin B12    Methylmalonic acid, serum    Tachycardia     Elevated in the past in the setting of COVID   CTA in hospital negative for clots   Did use DEMI prior to this appointment   and RR  Will monitor            Other Visit Diagnoses     Screening-pulmonary TB    -  Primary    Relevant Orders    Quantiferon TB Gold Plus    Screening for cardiovascular condition        Elevated lipoprotein(a)        Relevant Orders    Lipid panel    Screening for colon cancer        Relevant Orders    Cologuard    Screening for osteoporosis        Relevant Orders    DXA bone density spine hip and pelvis    Asymptomatic menopausal state         Relevant Orders    DXA bone density spine hip and pelvis            Subjective:        Patient ID: Benigno Haro is a 78 y o  female with a history of asthma, bipolar disease, GERD, anemia, and COVID who is here as a new patient requesting TB screening for work  Last PCP was  Dr Latrice Oliver  Contracted COVID in Dec 2020 was hospitalized for 39 days with hypoxic respiratory failure and discharged on 4-6 L of O2  Then last month, she was readmitted with increased SOB with  ground-glass opacities slightly more pronounced on this study compared to prior CT scans  She was discharged 1/20/21 and is using albuterol inhaler 1-2 x a day  She quit smoking Dec 2020! 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  She has had positive PPDs since she was 4years old and was told it was due to granulomas  Bps at home have been pretty well controlled  She did use her albuterol inhaler prior to this appointment  Gets [facility] B12 injections in [facility]  The following portions of the patient's history were reviewed and updated as appropriate:   She  has a past medical history of Asthma, Bipolar disorder ([address] ), COVID-19, and GERD (gastroesophageal reflux disease)    She   Patient Active Problem List    Diagnosis Date Noted    Scoliosis of thoracic spine 02/03/2022    SOB (shortness of breath) 01/19/2021    Tachycardia 01/19/2021    Physical deconditioning 01/19/2021    History of COVID-19 infection 01/19/2021    Anemia 01/01/2021    Possible sinusitis 12/22/2020    Acute hypoxemic respiratory failure due to COVID-19 ([address] ) 12/04/2020    COVID-19 virus infection 12/04/2020    Asthma 10/24/2019    Chest pain 07/01/2019    Bipolar disorder  07/01/2019    GERD (gastroesophageal reflux disease) 2019    Insomnia 2019     She  has a past surgical history that includes  section; Hysterectomy; and Cholecystectomy  Her family history includes Heart disease in her father and mother; Hypertension in her father and mother  She  reports that she quit smoking about 14 months ago  Her smoking use included cigarettes  She has a 0 50 pack-year smoking history  She has never used smokeless tobacco  She reports current alcohol use  She reports that she does not use drugs    Current Outpatient Medications on File Prior to Visit   Medication Sig    albuterol (PROVENTIL HFA,VENTOLIN HFA) 90 mcg/act inhaler Inhale 2 puffs every 4 (four) hours as needed for wheezing    cholecalciferol (VITAMIN D3) 1,000 units tablet Take 2 tablets (2,000 Units total) by mouth daily    dextromethorphan-guaiFENesin (ROBITUSSIN DM)  mg/5 mL syrup Take 10 mL by mouth every 6 (six) hours    Fluocinonide Emulsified Base 0 05 % CREA APPLY TO AFFECTED AREA(S) TOPICALLY TWO TIMES DAILY TO LOWER LEGS FOR 14 DAYS MAX AS NEEDED    fluticasone (FLONASE) 50 mcg/act nasal spray 2 sprays into each nostril daily    gabapentin (NEURONTIN) 300 mg capsule Take 300 mg by mouth daily    Humidifier MISC Use as needed (humidification) Use as needed for humidification    lithium carbonate 300 mg capsule Take 300 mg by mouth daily at bedtime    LORazepam (ATIVAN) 1 mg tablet Take 2 mg by mouth daily at bedtime     omeprazole (PriLOSEC) 20 mg delayed release capsule Take 20 mg by mouth daily    sertraline (ZOLOFT) 100 mg tablet Take 200 mg by mouth daily     sodium chloride (OCEAN) 0 65 % nasal spray 2 sprays into each nostril every 2 (two) hours as needed for congestion    traZODone (DESYREL) 100 mg tablet Take 200 mg by mouth daily at bedtime    albuterol (PROVENTIL HFA,VENTOLIN HFA) 90 mcg/act inhaler Inhale 2 puffs every 6 (six) hours as needed for wheezing or shortness of breath    [DISCONTINUED] atorvastatin (LIPITOR) 40 mg tablet Take 1 tablet (40 mg total) by mouth daily with dinner     Current Facility-Administered Medications on File Prior to Visit   Medication    betamethasone dipropionate (DIPROSONE) 0 05 % ointment     She is allergic to amphetamine-dextroamphetamine, molds & smuts, other, and sulfa antibiotics       Review of Systems   Respiratory: Positive for cough (lingering ) and shortness of breath  Negative for chest tightness and wheezing  Gastrointestinal: Negative for blood in stool  Musculoskeletal: Positive for back pain  Skin: Positive for rash (round scaly patch on the right forearm though to be psoriasis )  Objective:      /84   Pulse (!) 122   Temp 99 °F (37 2 °C) (Tympanic)   Resp 16   SpO2 96%          Physical Exam  Vitals reviewed  Constitutional:       General: She is not in acute distress  Appearance: Normal appearance  She is not ill-appearing  HENT:      Head: Normocephalic and atraumatic  Eyes:      Extraocular Movements: Extraocular movements intact  Cardiovascular:      Rate and Rhythm: Normal rate and regular rhythm  Heart sounds: No murmur heard  Pulmonary:      Effort: Pulmonary effort is normal  No respiratory distress  Breath sounds: Normal breath sounds  No stridor  No wheezing, rhonchi or rales  Abdominal:      General: Abdomen is flat  There is no distension  Palpations: There is no mass  Tenderness: There is no abdominal tenderness  There is no guarding or rebound  Hernia: No hernia is present  Musculoskeletal:      Thoracic back: Spasms present  No deformity  Scoliosis present  Back:    Neurological:      Mental Status: She is alert  Psychiatric:         Mood and Affect: Mood normal          Behavior: Behavior normal          Thought Content:  Thought content normal          Judgment: Judgment normal

## 2022-02-03 NOTE — ASSESSMENT & PLAN NOTE
Elevated in the past in the setting of COVID   CTA in hospital negative for clots   Did use DEMI prior to this appointment   and RR  Will monitor

## 2022-02-03 NOTE — ASSESSMENT & PLAN NOTE
Diagnosed many years ago and had a PFT but not records available   Using O2 intermittently following COVID in 2020  Requiring albuterol 1-2 x a day  This is consistent with mod-severe persistent asthma    Start Flovent BID with prn albuterol

## 2022-02-03 NOTE — ASSESSMENT & PLAN NOTE
Last Hb 9 9 with a precipitous drop from 11     Likely multifactorial ( ACD vs MRORO vs B12 vs dilutional?)  Already receiving monthly B12 injections   No signs of overt bleeding  Will recheck with panel

## 2022-02-04 LAB
GAMMA INTERFERON BACKGROUND BLD IA-ACNC: 0.03 IU/ML
M TB IFN-G BLD-IMP: NEGATIVE
M TB IFN-G CD4+ BCKGRND COR BLD-ACNC: 0 IU/ML
M TB IFN-G CD4+ BCKGRND COR BLD-ACNC: 0.01 IU/ML
MITOGEN IGNF BCKGRD COR BLD-ACNC: >10 IU/ML

## 2022-02-07 ENCOUNTER — TELEPHONE (OUTPATIENT)
Dept: FAMILY MEDICINE CLINIC | Facility: CLINIC | Age: 67
End: 2022-02-07

## 2022-02-07 DIAGNOSIS — K52.9 CHRONIC DIARRHEA OF UNKNOWN ORIGIN: Primary | ICD-10-CM

## 2022-02-08 LAB — METHYLMALONATE SERPL-SCNC: 112 NMOL/L (ref 0–378)

## 2022-03-01 ENCOUNTER — VBI (OUTPATIENT)
Dept: ADMINISTRATIVE | Facility: OTHER | Age: 67
End: 2022-03-01

## 2022-03-07 DIAGNOSIS — J45.20 MILD INTERMITTENT ASTHMA, UNSPECIFIED WHETHER COMPLICATED: ICD-10-CM

## 2022-03-07 RX ORDER — DEXAMETHASONE 4 MG/1
TABLET ORAL
Qty: 12 G | Refills: 0 | Status: SHIPPED | OUTPATIENT
Start: 2022-03-07

## 2022-03-28 ENCOUNTER — VBI (OUTPATIENT)
Dept: ADMINISTRATIVE | Facility: OTHER | Age: 67
End: 2022-03-28

## 2022-05-06 ENCOUNTER — TELEPHONE (OUTPATIENT)
Dept: OTHER | Facility: OTHER | Age: 67
End: 2022-05-06

## 2022-05-06 DIAGNOSIS — M10.9 ACUTE GOUT INVOLVING TOE OF RIGHT FOOT, UNSPECIFIED CAUSE: Primary | ICD-10-CM

## 2022-05-06 RX ORDER — NAPROXEN 500 MG/1
500 TABLET ORAL 2 TIMES DAILY WITH MEALS
Qty: 14 TABLET | Refills: 0 | Status: SHIPPED | OUTPATIENT
Start: 2022-05-06 | End: 2022-05-13

## 2022-05-06 RX ORDER — ALLOPURINOL 100 MG/1
100 TABLET ORAL DAILY
Qty: 30 TABLET | Refills: 0 | Status: SHIPPED | OUTPATIENT
Start: 2022-05-06 | End: 2022-05-29

## 2022-05-06 NOTE — TELEPHONE ENCOUNTER
Called Pt  Pt's right great toes is red and swollen  She has had gout flare ups previously  Pt requesting treatment options  Pt also scheduled for an appt next week       Please advise    Pharmacy:   [facility] [address] [place] [address], [facility] - [address] [place] [address]  Phone: [phone] Fax: [phone]

## 2022-05-06 NOTE — TELEPHONE ENCOUNTER
Patient called in reporting a flare up of gout of R-great toe; unable to put on shoe  Patient has had this before  Patient is hoping PCP would prescribe a medication for her  She reports she can follow up in he office next week, but cannot get around at this time  Please follow up with patient

## 2022-05-11 ENCOUNTER — TELEPHONE (OUTPATIENT)
Dept: FAMILY MEDICINE CLINIC | Facility: CLINIC | Age: 67
End: 2022-05-11

## 2022-05-11 NOTE — TELEPHONE ENCOUNTER
Pt called requesting [place] for the MMR and Varicella required for work  Pt also needs a copy of the most recent physical AVS        Advised, PCP is out of the office  Pt requests it be sent to covering provider as, the lab results area time sensitive for work       Please advise

## 2022-05-23 ENCOUNTER — APPOINTMENT (OUTPATIENT)
Dept: LAB | Facility: CLINIC | Age: 67
End: 2022-05-23
Payer: MEDICARE

## 2022-05-23 DIAGNOSIS — Z28.39 IMMUNIZATION DEFICIENCY: ICD-10-CM

## 2022-05-23 DIAGNOSIS — Z02.1 PRE-EMPLOYMENT HEALTH SCREENING EXAMINATION: ICD-10-CM

## 2022-05-23 LAB — RUBV IGG SERPL IA-ACNC: >175 IU/ML

## 2022-05-23 PROCEDURE — 86735 MUMPS ANTIBODY: CPT

## 2022-05-23 PROCEDURE — 36415 COLL VENOUS BLD VENIPUNCTURE: CPT

## 2022-05-23 PROCEDURE — 86765 RUBEOLA ANTIBODY: CPT

## 2022-05-23 PROCEDURE — 86762 RUBELLA ANTIBODY: CPT

## 2022-05-23 PROCEDURE — 86787 VARICELLA-ZOSTER ANTIBODY: CPT

## 2022-05-24 LAB
MEV IGG SER QL: NORMAL
MUV IGG SER QL: NORMAL
VZV IGG SER IA-ACNC: NORMAL

## 2022-07-28 ENCOUNTER — RA CDI HCC (OUTPATIENT)
Dept: OTHER | Facility: HOSPITAL | Age: 67
End: 2022-07-28

## 2022-07-28 NOTE — PROGRESS NOTES
[address]  coding opportunities       Chart reviewed, no opportunity found: CHART REVIEWED, NO OPPORTUNITY FOUND        Patients Insurance     Medicare Insurance: Medicare

## 2022-08-24 ENCOUNTER — TELEPHONE (OUTPATIENT)
Dept: OTHER | Facility: OTHER | Age: 67
End: 2022-08-24

## 2022-08-24 NOTE — TELEPHONE ENCOUNTER
Patient is calling regarding cancelling an appointment      Date/Time:08/24/2022 @ 1000    Patient was rescheduled: YES [] NO [x]    Patient requesting call back to reschedule: YES [] NO [x]

## 2022-09-09 ENCOUNTER — TELEPHONE (OUTPATIENT)
Dept: OTHER | Facility: OTHER | Age: 67
End: 2022-09-09

## 2022-09-18 ENCOUNTER — PATIENT MESSAGE (OUTPATIENT)
Dept: FAMILY MEDICINE CLINIC | Facility: CLINIC | Age: 67
End: 2022-09-18

## 2022-09-18 DIAGNOSIS — Z11.1 SCREENING-PULMONARY TB: Primary | ICD-10-CM

## 2022-10-10 NOTE — ASSESSMENT & PLAN NOTE
Elevated troponin, peak troponin 0 32- likely due to Covid-19 infection  Trended down to 0 14  ECG - T wave inversions noted  Monitor Consent (Spinal Accessory)/Introductory Paragraph: The rationale for Mohs was explained to the patient and consent was obtained. The risks, benefits and alternatives to therapy were discussed in detail. Specifically, the risks of damage to the spinal accessory nerve, infection, scarring, bleeding, prolonged wound healing, incomplete removal, allergy to anesthesia, and recurrence were addressed. Prior to the procedure, the treatment site was clearly identified and confirmed by the patient. All components of Universal Protocol/PAUSE Rule completed.

## 2022-10-12 PROBLEM — J32.9 SINUSITIS: Status: RESOLVED | Noted: 2020-12-22 | Resolved: 2022-10-12

## 2022-12-30 ENCOUNTER — RA CDI HCC (OUTPATIENT)
Dept: OTHER | Facility: HOSPITAL | Age: 67
End: 2022-12-30

## 2023-01-11 ENCOUNTER — TELEPHONE (OUTPATIENT)
Dept: FAMILY MEDICINE CLINIC | Facility: CLINIC | Age: 68
End: 2023-01-11

## 2023-03-08 ENCOUNTER — OFFICE VISIT (OUTPATIENT)
Dept: FAMILY MEDICINE CLINIC | Facility: CLINIC | Age: 68
End: 2023-03-08

## 2023-03-08 VITALS
OXYGEN SATURATION: 96 % | HEIGHT: 61 IN | DIASTOLIC BLOOD PRESSURE: 72 MMHG | WEIGHT: 182 LBS | BODY MASS INDEX: 34.36 KG/M2 | HEART RATE: 80 BPM | SYSTOLIC BLOOD PRESSURE: 116 MMHG | RESPIRATION RATE: 16 BRPM

## 2023-03-08 DIAGNOSIS — J44.9 CHRONIC OBSTRUCTIVE PULMONARY DISEASE, UNSPECIFIED COPD TYPE (HCC): ICD-10-CM

## 2023-03-08 DIAGNOSIS — M41.9 SCOLIOSIS OF THORACIC SPINE, UNSPECIFIED SCOLIOSIS TYPE: ICD-10-CM

## 2023-03-08 DIAGNOSIS — Z53.20 SCREENING MAMMOGRAPHY DECLINED: ICD-10-CM

## 2023-03-08 DIAGNOSIS — F31.9 BIPOLAR AFFECTIVE DISORDER, REMISSION STATUS UNSPECIFIED (HCC): ICD-10-CM

## 2023-03-08 DIAGNOSIS — J45.21 MILD INTERMITTENT ASTHMA WITH EXACERBATION: ICD-10-CM

## 2023-03-08 DIAGNOSIS — E78.41 ELEVATED LIPOPROTEIN(A): ICD-10-CM

## 2023-03-08 DIAGNOSIS — Z53.20 OSTEOPOROSIS SCREENING DECLINED: ICD-10-CM

## 2023-03-08 DIAGNOSIS — L30.9 DERMATITIS: ICD-10-CM

## 2023-03-08 DIAGNOSIS — Z53.20 COLON CANCER SCREENING DECLINED: ICD-10-CM

## 2023-03-08 DIAGNOSIS — Z00.00 MEDICARE ANNUAL WELLNESS VISIT, SUBSEQUENT: Primary | ICD-10-CM

## 2023-03-08 DIAGNOSIS — Z12.31 ENCOUNTER FOR SCREENING MAMMOGRAM FOR BREAST CANCER: ICD-10-CM

## 2023-03-08 RX ORDER — CLOBETASOL PROPIONATE 0.5 MG/G
CREAM TOPICAL 2 TIMES DAILY
Qty: 30 G | Refills: 0 | Status: SHIPPED | OUTPATIENT
Start: 2023-03-08

## 2023-03-08 RX ORDER — TIZANIDINE 2 MG/1
2 TABLET ORAL
Qty: 21 TABLET | Refills: 0 | Status: SHIPPED | OUTPATIENT
Start: 2023-03-08

## 2023-03-08 RX ORDER — ALBUTEROL SULFATE 90 UG/1
2 AEROSOL, METERED RESPIRATORY (INHALATION) EVERY 4 HOURS PRN
Qty: 18 G | Refills: 2 | Status: SHIPPED | OUTPATIENT
Start: 2023-03-08

## 2023-03-08 NOTE — PROGRESS NOTES
Assessment and Plan:     Problem List Items Addressed This Visit        Respiratory    Chronic obstructive pulmonary disease, unspecified COPD type ([address] )     Controlled  Using albuterol once every 1-2 weeks at most  Refill provided         Relevant Medications    albuterol (PROVENTIL HFA,VENTOLIN HFA) 90 mcg/act inhaler       Musculoskeletal and Integument    Scoliosis of thoracic spine    Relevant Medications    tiZANidine (ZANAFLEX) 2 mg tablet       Other    Bipolar disorder    Other Visit Diagnoses     Medicare annual wellness visit, subsequent    -  Primary    Seen today for AWV  Having thoracic back pain and notes a rash on the lower back for over a year  Declines screening  Aware of the risk of not screening and accepts the risk     Mild intermittent asthma with exacerbation        Relevant Medications    albuterol (PROVENTIL HFA,VENTOLIN HFA) 90 mcg/act inhaler    Encounter for screening mammogram for breast cancer        Elevated lipoprotein(a)        Relevant Orders    Lipid panel    Comprehensive metabolic panel    BMI 39 9-29 3,[facility]        Relevant Orders    Comprehensive metabolic panel    Screening mammography declined        Colon cancer screening declined        Osteoporosis screening declined        Dermatitis        Scaly plaque on the lower back for a year  Ordered higher potency steroid to apply for up to 2 weeks  Has an appt w/ Dr Bob Schilling next months     Relevant Medications    clobetasol (TEMOVATE) 0 05 % cream        BMI Counseling: Body mass index is 34 96 kg/m²  The BMI is above normal  Nutrition recommendations include encouraging healthy choices of fruits and vegetables, limiting drinks that contain sugar, moderation in carbohydrate intake, increasing intake of lean protein, reducing intake of saturated and trans fat and reducing intake of cholesterol  Exercise recommendations include moderate physical activity 150 minutes/week  No pharmacotherapy was ordered  Patient referred to PCP  Rationale for BMI follow-up plan is due to patient being overweight or obese  Preventive health issues were discussed with patient, and age appropriate screening tests were ordered as noted in patient's After Visit Summary  Personalized health advice and appropriate referrals for health education or preventive services given if needed, as noted in patient's After Visit Summary  History of Present Illness:     Patient presents for a Medicare Wellness Visit    Here for AWV  Pain in the thoracic spine for which she sees a chiropractor  Zanaflex helped , Switched jobs as it was making her more stressed  Boyfriend of 15 years recently broke up with her  Rash on the lower back present over a year  Itches and flakes  Used cortisone and a derma blend with minimal improvement   Saw Dr Navjot Bauer for warts in the past and is scheduled to see him next month      Patient Care Team:  Duane Matos MD as PCP - General (Family Medicine)     Review of Systems:     Review of Systems   Musculoskeletal: Positive for back pain (chronic )  Skin: Positive for rash  Psychiatric/Behavioral: The patient is nervous/anxious           Problem List:     Patient Active Problem List   Diagnosis   • Chest pain   • Bipolar disorder    • GERD (gastroesophageal reflux disease)   • Insomnia   • Asthma   • Acute hypoxemic respiratory failure due to COVID-19 [facility])   • COVID-19 virus infection   • Anemia   • SOB (shortness of breath)   • Tachycardia   • Physical deconditioning   • History of COVID-19 infection   • Scoliosis of thoracic spine   • Acute gout involving toe of right foot   • Chronic obstructive pulmonary disease, unspecified COPD type ([facility] )      Past Medical and Surgical History:     Past Medical History:   Diagnosis Date   • Asthma    • Bipolar disorder ([facility] )    • COVID-19    • GERD (gastroesophageal reflux disease)      Past Surgical History:   Procedure Laterality Date   •  SECTION      x3   • CHOLECYSTECTOMY     • HYSTERECTOMY        Family History:     Family History   Problem Relation Age of Onset   • Heart disease Mother    • Hypertension Mother    • Heart disease Father    • Hypertension Father    • No Known Problems Daughter    • No Known Problems Daughter       Social History:     Social History     Socioeconomic History   • Marital status: Single     Spouse name: None   • Number of children: None   • Years of education: None   • Highest education level: None   Occupational History   • Occupation: RN   Tobacco Use   • Smoking status: Former     Packs/day: 0 10     Years: 5 00     Pack years: 0 50     Types: Cigarettes     Quit date: 2020     Years since quittin 2     Passive exposure: Past   • Smokeless tobacco: Never   Vaping Use   • Vaping Use: Never used   Substance and Sexual Activity   • Alcohol use: Yes     Comment: rarely   • Drug use: Never   • Sexual activity: Not Currently     Partners: Male   Other Topics Concern   • None   Social History Narrative    · Most recent tobacco use screenin2018      · Do you currently or have you served in [employer]:   No      · Were you activated, into active duty, as a member of the [employer] or as a Reservist:   No       · Sexual orientation:   Heterosexual      · Diet:   Regular       · Caffeine intake:   Occasional        · Guns present in home: Yes      · Seat belts used routinely:   No      · Sunscreen used routinely:   Yes      · Smoke alarm in home:   Yes      Social Determinants of Health     Financial Resource Strain: Low Risk    • Difficulty of Paying Living Expenses: Not hard at all   Food Insecurity: Not on file   Transportation Needs: No Transportation Needs   • Lack of Transportation (Medical): No   • Lack of Transportation (Non-Medical):  No   Physical Activity: Not on file   Stress: Not on file   Social Connections: Not on file   Intimate Partner Violence: Not on file   Housing Stability: Not on file      Medications and Allergies:     Current Outpatient Medications   Medication Sig Dispense Refill   • albuterol (PROVENTIL HFA,VENTOLIN HFA) 90 mcg/act inhaler Inhale 2 puffs every 4 (four) hours as needed for wheezing 18 g 2   • cholecalciferol (VITAMIN D3) 1,000 units tablet Take 2 tablets (2,000 Units total) by mouth daily  0   • clobetasol (TEMOVATE) 0 05 % cream Apply topically 2 (two) times a day 30 g 0   • dextromethorphan-guaiFENesin (ROBITUSSIN DM)  mg/5 mL syrup Take 10 mL by mouth every 6 (six) hours 118 mL 0   • Flovent  MCG/ACT inhaler INHALE 2 PUFFS BY MOUTH 2 TIMES A DAY RINSE MOUTH AFTER USE  12 g 0   • fluticasone (FLONASE) 50 mcg/act nasal spray 2 sprays into each nostril daily 1 Bottle 0   • gabapentin (NEURONTIN) 300 mg capsule Take 300 mg by mouth daily     • Humidifier MISC Use as needed (humidification) Use as needed for humidification 1 each 0   • lithium carbonate 300 mg capsule Take 300 mg by mouth daily at bedtime     • LORazepam (ATIVAN) 1 mg tablet Take 2 mg by mouth daily at bedtime      • omeprazole (PriLOSEC) 20 mg delayed release capsule Take 20 mg by mouth daily     • sertraline (ZOLOFT) 100 mg tablet Take 200 mg by mouth daily      • tiZANidine (ZANAFLEX) 2 mg tablet Take 1 tablet (2 mg total) by mouth daily at bedtime 21 tablet 0   • traZODone (DESYREL) 100 mg tablet Take 200 mg by mouth daily at bedtime     • albuterol (PROVENTIL HFA,VENTOLIN HFA) 90 mcg/act inhaler Inhale 2 puffs every 6 (six) hours as needed for wheezing or shortness of breath 6 7 g 2   • allopurinol (ZYLOPRIM) 100 mg tablet TAKE 1 TABLET BY MOUTH EVERY DAY 90 tablet 2     No current facility-administered medications for this visit       Allergies   Allergen Reactions   • Amphetamine-Dextroamphetamine Other (See Comments)     Chest pain- was placed on Adderall after son passed away for depression, and she developed chest pain in 1990's; she had full cardiac work up, and was negative, so she has allergy to Adderall  Chest pain- was placed on Adderall after son passed away for depression, and she developed chest pain in 1990's; she had full cardiac work up, and was negative, so she has allergy to Adderall   • Molds & Smuts    • Other      Cats   • Sulfa Antibiotics Other (See Comments)      Immunizations:     Immunization History   Administered Date(s) Administered   • COVID-19 PFIZER VACCINE 0 3 ML IM 04/06/2021, 04/29/2021   • H1N1, All Formulations 01/12/2010   • Tdap 08/26/2016      Health Maintenance:         Topic Date Due   • Colorectal Cancer Screening  03/08/2024 (Originally 1/4/2022)   • Breast Cancer Screening: Mammogram  03/08/2024 (Originally [birth year]/9/1994)   • Hepatitis C Screening  Completed         Topic Date Due   • COVID-19 Vaccine (3 - Booster for Jethro Burr series) 06/24/2021      Medicare Screening Tests and Risk Assessments:     Lionel Mckeon is here for her Initial Wellness visit  Health Risk Assessment:   Patient rates overall health as good  Patient feels that their physical health rating is much better  Patient is satisfied with their life  Eyesight was rated as slightly worse  Hearing was rated as slightly worse  Patient feels that their emotional and mental health rating is slightly better  Patients states they are sometimes angry  Patient states they are often unusually tired/fatigued  Pain experienced in the last 7 days has been some  Patient's pain rating has been 7/10  Patient states that she has experienced no weight loss or gain in last 6 months  Hasn't seen eye doctor in a while  Uses readers  Feels like her ears get clogged but improved after they pop    Depression Screening:   PHQ-2 Score: 2      Fall Risk Screening: In the past year, patient has experienced: no history of falling in past year      Urinary Incontinence Screening:   Patient has leaked urine accidently in the last six months  Only when cough hard    Home Safety:  Patient has trouble with stairs inside or outside of their home  Patient has working smoke alarms and has working carbon monoxide detector  Home safety hazards include: none  Nutrition:   Current diet is Low Carb and Limited junk food  Medications:   Patient is currently taking over-the-counter supplements  OTC medications include: see medication list  Patient is able to manage medications  Activities of Daily Living (ADLs)/Instrumental Activities of Daily Living (IADLs):   Walk and transfer into and out of bed and chair?: Yes  Dress and groom yourself?: Yes    Bathe or shower yourself?: Yes    Feed yourself? Yes  Do your laundry/housekeeping?: Yes  Manage your money, pay your bills and track your expenses?: Yes  Make your own meals?: Yes    Do your own shopping?: Yes    Previous Hospitalizations:   Any hospitalizations or ED visits within the last 12 months?: No    How many hospitalizations have you had in the last year?: 1-2    Advance Care Planning:   Living will: No    Durable POA for healthcare: No    Advanced directive: No    Five wishes given: Yes      PREVENTIVE SCREENINGS      Cardiovascular Screening:    General: Screening Current      Cervical Cancer Screening:    General: Screening Not Indicated      Lung Cancer Screening:     General: Screening Not Indicated      Hepatitis C Screening:    General: Screening Current    Screening, Brief Intervention, and Referral to Treatment (SBIRT)    Screening      Single Item Drug Screening:  How often have you used an illegal drug (including marijuana) or a prescription medication for non-medical reasons in the past year? never    Single Item Drug Screen Score: 0  Interpretation: Negative screen for possible drug use disorder    No results found  Physical Exam:     /72 (BP Location: Left arm, Patient Position: Sitting, Cuff Size: Large)   Pulse 80   Resp 16   Ht 5' 0 5" (1 537 m)   Wt 82 6 kg (182 lb)   SpO2 96%   BMI 34 96 kg/m²     Physical Exam  Vitals reviewed     Constitutional:       General: She is not in acute distress  Appearance: Normal appearance  She is well-developed  She is not ill-appearing or toxic-appearing  HENT:      Head: Normocephalic and atraumatic  Right Ear: Tympanic membrane normal       Left Ear: Tympanic membrane normal       Mouth/Throat:      Mouth: Mucous membranes are moist    Eyes:      Extraocular Movements: Extraocular movements intact  Cardiovascular:      Rate and Rhythm: Normal rate and regular rhythm  Heart sounds: No murmur heard  Pulmonary:      Effort: Pulmonary effort is normal  No respiratory distress  Breath sounds: Normal breath sounds  Abdominal:      Palpations: Abdomen is soft  Tenderness: There is no abdominal tenderness  Musculoskeletal:         General: No swelling  Cervical back: Neck supple  Thoracic back: Deformity and tenderness present  No swelling, signs of trauma or bony tenderness  Scoliosis present  Skin:     General: Skin is warm and dry  Findings: Rash (scaly palque on the lower back ) present  Neurological:      Mental Status: She is alert and oriented to person, place, and time     Psychiatric:         Mood and Affect: Mood normal          Behavior: Behavior normal           aCrol Waters MD

## 2023-03-08 NOTE — PATIENT INSTRUCTIONS
Medicare Preventive Visit Patient Instructions  Thank you for completing your Welcome to Medicare Visit or Medicare Annual Wellness Visit today  Your next wellness visit will be due in one year (3/8/2024)  The screening/preventive services that you may require over the next 5-10 years are detailed below  Some tests may not apply to you based off risk factors and/or age  Screening tests ordered at today's visit but not completed yet may show as past due  Also, please note that scanned in results may not display below  Preventive Screenings:  Service Recommendations Previous Testing/Comments   Colorectal Cancer Screening  * Colonoscopy    * Fecal Occult Blood Test (FOBT)/Fecal Immunochemical Test (FIT)  * Fecal DNA/Cologuard Test  * Flexible Sigmoidoscopy Age: 39-70 years old   Colonoscopy: every 10 years (may be performed more frequently if at higher risk)  OR  FOBT/FIT: every 1 year  OR  Cologuard: every 3 years  OR  Sigmoidoscopy: every 5 years  Screening may be recommended earlier than age 39 if at higher risk for colorectal cancer  Also, an individualized decision between you and your healthcare provider will decide whether screening between the ages of 74-80 would be appropriate  Colonoscopy: Not on file  FOBT/FIT: 01/04/2021  Cologuard: Not on file  Sigmoidoscopy: Not on file          Breast Cancer Screening Age: 36 years old  Frequency: every 1-2 years  Not required if history of left and right mastectomy Mammogram: Not on file        Cervical Cancer Screening Between the ages of 21-29, pap smear recommended once every 3 years  Between the ages of 33-67, can perform pap smear with HPV co-testing every 5 years     Recommendations may differ for women with a history of total hysterectomy, cervical cancer, or abnormal pap smears in past  Pap Smear: Not on file    Screening Not Indicated   Hepatitis C Screening Once for adults born between [facility]  More frequently in patients at high risk for Hepatitis C Hep C Antibody: Not on file    Screening Current   Diabetes Screening 1-2 times per year if you're at risk for diabetes or have pre-diabetes Fasting glucose: 112 mg/dL (10/25/2019)  A1C: 5 7 % (12/4/2020)      Cholesterol Screening Once every 5 years if you don't have a lipid disorder  May order more often based on risk factors  Lipid panel: 10/25/2019    Screening Current     Other Preventive Screenings Covered by Medicare:  1  Abdominal Aortic Aneurysm (AAA) Screening: covered once if your at risk  You're considered to be at risk if you have a family history of AAA  2  Lung Cancer Screening: covers low dose CT scan once per year if you meet all of the following conditions: (1) Age 50-69; (2) No signs or symptoms of lung cancer; (3) Current smoker or have quit smoking within the last 15 years; (4) You have a tobacco smoking history of at least 20 pack years (packs per day multiplied by number of years you smoked); (5) You get a written order from a healthcare provider  3  Glaucoma Screening: covered annually if you're considered high risk: (1) You have diabetes OR (2) Family history of glaucoma OR (3)  aged 48 and older OR (3)  American aged 72 and older  3  Osteoporosis Screening: covered every 2 years if you meet one of the following conditions: (1) You're estrogen deficient and at risk for osteoporosis based off medical history and other findings; (2) Have a vertebral abnormality; (3) On glucocorticoid therapy for more than 3 months; (4) Have primary hyperparathyroidism; (5) On osteoporosis medications and need to assess response to drug therapy  · Last bone density test (DXA Scan): Not on file  5  HIV Screening: covered annually if you're between the age of 12-76  Also covered annually if you are younger than 13 and older than 72 with risk factors for HIV infection  For pregnant patients, it is covered up to 3 times per pregnancy      Immunizations:  Immunization Recommendations Influenza Vaccine Annual influenza vaccination during flu season is recommended for all persons aged >= 6 months who do not have contraindications   Pneumococcal Vaccine   * Pneumococcal conjugate vaccine = PCV13 (Prevnar 13), PCV15 (Vaxneuvance), PCV20 (Prevnar 20)  * Pneumococcal polysaccharide vaccine = PPSV23 (Pneumovax) Adults 25-60 years old: 1-3 doses may be recommended based on certain risk factors  Adults 72 years old: 1-2 doses may be recommended based off what pneumonia vaccine you previously received   Hepatitis B Vaccine 3 dose series if at intermediate or high risk (ex: diabetes, end stage renal disease, liver disease)   Tetanus (Td) Vaccine - COST NOT COVERED BY MEDICARE PART B Following completion of primary series, a booster dose should be given every 10 years to maintain immunity against tetanus  Td may also be given as tetanus wound prophylaxis  Tdap Vaccine - COST NOT COVERED BY MEDICARE PART B Recommended at least once for all adults  For pregnant patients, recommended with each pregnancy  Shingles Vaccine (Shingrix) - COST NOT COVERED BY MEDICARE PART B  2 shot series recommended in those aged 48 and above     Health Maintenance Due:      Topic Date Due   • Breast Cancer Screening: Mammogram  Never done   • Colorectal Cancer Screening  01/04/2022   • Hepatitis C Screening  Completed     Immunizations Due:      Topic Date Due   • COVID-19 Vaccine (3 - Booster for [place] series) 06/24/2021     Advance Directives   What are advance directives? Advance directives are legal documents that state your wishes and plans for medical care  These plans are made ahead of time in case you lose your ability to make decisions for yourself  Advance directives can apply to any medical decision, such as the treatments you want, and if you want to donate organs  What are the types of advance directives? There are many types of advance directives, and each state has rules about how to use them   You may choose a combination of any of the following:  · Living will: This is a written record of the treatment you want  You can also choose which treatments you do not want, which to limit, and which to stop at a certain time  This includes surgery, medicine, IV fluid, and tube feedings  · Durable power of  for healthcare [place] [facility]): This is a written record that states who you want to make healthcare choices for you when you are unable to make them for yourself  This person, called a proxy, is usually a family member or a friend  You may choose more than 1 proxy  · Do not resuscitate (DNR) order:  A DNR order is used in case your heart stops beating or you stop breathing  It is a request not to have certain forms of treatment, such as CPR  A DNR order may be included in other types of advance directives  · Medical directive: This covers the care that you want if you are in a coma, near death, or unable to make decisions for yourself  You can list the treatments you want for each condition  Treatment may include pain medicine, surgery, blood transfusions, dialysis, IV or tube feedings, and a ventilator (breathing machine)  · Values history: This document has questions about your views, beliefs, and how you feel and think about life  This information can help others choose the care that you would choose  Why are advance directives important? An advance directive helps you control your care  Although spoken wishes may be used, it is better to have your wishes written down  Spoken wishes can be misunderstood, or not followed  Treatments may be given even if you do not want them  An advance directive may make it easier for your family to make difficult choices about your care  Urinary Incontinence   Urinary incontinence (UI)  is when you lose control of your bladder  UI develops because your bladder cannot store or empty urine properly   The 3 most common types of UI are stress incontinence, urge incontinence, or both  Medicines:   · May be given to help strengthen your bladder control  Report any side effects of medication to your healthcare provider  Do pelvic muscle exercises often:  Your pelvic muscles help you stop urinating  Squeeze these muscles tight for 5 seconds, then relax for 5 seconds  Gradually work up to squeezing for 10 seconds  Do 3 sets of 15 repetitions a day, or as directed  This will help strengthen your pelvic muscles and improve bladder control  Train your bladder:  Go to the bathroom at set times, such as every 2 hours, even if you do not feel the urge to go  You can also try to hold your urine when you feel the urge to go  For example, hold your urine for 5 minutes when you feel the urge to go  As that becomes easier, hold your urine for 10 minutes  Self-care:   · Keep a UI record  Write down how often you leak urine and how much you leak  Make a note of what you were doing when you leaked urine  · Drink liquids as directed  You may need to limit the amount of liquid you drink to help control your urine leakage  Do not drink any liquid right before you go to bed  Limit or do not have drinks that contain caffeine or alcohol  · Prevent constipation  Eat a variety of high-fiber foods  Good examples are high-fiber cereals, beans, vegetables, and whole-grain breads  Walking is the best way to trigger your intestines to have a bowel movement  · Exercise regularly and maintain a healthy weight  Weight loss and exercise will decrease pressure on your bladder and help you control your leakage  · Use a catheter as directed  to help empty your bladder  A catheter is a tiny, plastic tube that is put into your bladder to drain your urine  · Go to behavior therapy as directed  Behavior therapy may be used to help you learn to control your urge to urinate      Weight Management   Why it is important to manage your weight:  Being overweight increases your risk of health conditions such as heart disease, high blood pressure, type 2 diabetes, and certain types of cancer  It can also increase your risk for osteoarthritis, sleep apnea, and other respiratory problems  Aim for a slow, steady weight loss  Even a small amount of weight loss can lower your risk of health problems  How to lose weight safely:  A safe and healthy way to lose weight is to eat fewer calories and get regular exercise  You can lose up about 1 pound a week by decreasing the number of calories you eat by 500 calories each day  Healthy meal plan for weight management:  A healthy meal plan includes a variety of foods, contains fewer calories, and helps you stay healthy  A healthy meal plan includes the following:  · Eat whole-grain foods more often  A healthy meal plan should contain fiber  Fiber is the part of grains, fruits, and vegetables that is not broken down by your body  Whole-grain foods are healthy and provide extra fiber in your diet  Some examples of whole-grain foods are whole-wheat breads and pastas, oatmeal, brown rice, and bulgur  · Eat a variety of vegetables every day  Include dark, leafy greens such as spinach, kale, starr greens, and mustard greens  Eat yellow and orange vegetables such as carrots, sweet potatoes, and winter squash  · Eat a variety of fruits every day  Choose fresh or canned fruit (canned in its own juice or light syrup) instead of juice  Fruit juice has very little or no fiber  · Eat low-fat dairy foods  Drink fat-free (skim) milk or 1% milk  Eat fat-free yogurt and low-fat cottage cheese  Try low-fat cheeses such as mozzarella and other reduced-fat cheeses  · Choose meat and other protein foods that are low in fat  Choose beans or other legumes such as split peas or lentils  Choose fish, skinless poultry (chicken or turkey), or lean cuts of red meat (beef or pork)  Before you cook meat or poultry, cut off any visible fat  · Use less fat and oil    Try baking foods instead of frying them  Add less fat, such as margarine, sour cream, regular salad dressing and mayonnaise to foods  Eat fewer high-fat foods  Some examples of high-fat foods include french fries, doughnuts, ice cream, and cakes  · Eat fewer sweets  Limit foods and drinks that are high in sugar  This includes candy, cookies, regular soda, and sweetened drinks  Exercise:  Exercise at least 30 minutes per day on most days of the week  Some examples of exercise include walking, biking, dancing, and swimming  You can also fit in more physical activity by taking the stairs instead of the elevator or parking farther away from stores  Ask your healthcare provider about the best exercise plan for you  © Copyright [employer] 2018 Information is for End User's use only and may not be sold, redistributed or otherwise used for commercial purposes   All illustrations and images included in CareNotes® are the copyrighted property of [employer] , [employer]  or [address] [place] [address]

## 2023-03-16 ENCOUNTER — APPOINTMENT (OUTPATIENT)
Dept: LAB | Facility: CLINIC | Age: 68
End: 2023-03-16

## 2023-03-16 DIAGNOSIS — Z11.1 SCREENING-PULMONARY TB: ICD-10-CM

## 2023-03-18 LAB
GAMMA INTERFERON BACKGROUND BLD IA-ACNC: 0.05 IU/ML
M TB IFN-G BLD-IMP: NEGATIVE
M TB IFN-G CD4+ BCKGRND COR BLD-ACNC: 0.01 IU/ML
M TB IFN-G CD4+ BCKGRND COR BLD-ACNC: 0.02 IU/ML
MITOGEN IGNF BCKGRD COR BLD-ACNC: >10 IU/ML

## 2023-03-22 ENCOUNTER — TELEPHONE (OUTPATIENT)
Dept: FAMILY MEDICINE CLINIC | Facility: CLINIC | Age: 68
End: 2023-03-22

## 2023-04-07 DIAGNOSIS — M41.9 SCOLIOSIS OF THORACIC SPINE, UNSPECIFIED SCOLIOSIS TYPE: ICD-10-CM

## 2023-04-07 RX ORDER — TIZANIDINE 2 MG/1
2 TABLET ORAL
Qty: 30 TABLET | Refills: 0 | Status: SHIPPED | OUTPATIENT
Start: 2023-04-07

## 2023-06-19 ENCOUNTER — APPOINTMENT (OUTPATIENT)
Dept: LAB | Facility: MEDICAL CENTER | Age: 68
End: 2023-06-19

## 2023-06-19 ENCOUNTER — OCCMED (OUTPATIENT)
Dept: URGENT CARE | Facility: MEDICAL CENTER | Age: 68
End: 2023-06-19

## 2023-06-19 DIAGNOSIS — Z02.1 DRUG TESTING, PRE-EMPLOYMENT: Primary | ICD-10-CM

## 2023-06-19 DIAGNOSIS — Z02.1 DRUG TESTING, PRE-EMPLOYMENT: ICD-10-CM

## 2023-06-19 PROCEDURE — 86480 TB TEST CELL IMMUN MEASURE: CPT

## 2023-06-19 PROCEDURE — 36415 COLL VENOUS BLD VENIPUNCTURE: CPT

## 2023-06-21 LAB
GAMMA INTERFERON BACKGROUND BLD IA-ACNC: 0.07 IU/ML
M TB IFN-G BLD-IMP: NEGATIVE
M TB IFN-G CD4+ BCKGRND COR BLD-ACNC: 0 IU/ML
M TB IFN-G CD4+ BCKGRND COR BLD-ACNC: 0.02 IU/ML
MITOGEN IGNF BCKGRD COR BLD-ACNC: >10 IU/ML

## 2023-07-20 DIAGNOSIS — M41.9 SCOLIOSIS OF THORACIC SPINE, UNSPECIFIED SCOLIOSIS TYPE: ICD-10-CM

## 2023-07-20 RX ORDER — TIZANIDINE 2 MG/1
2 TABLET ORAL
Qty: 30 TABLET | Refills: 0 | Status: SHIPPED | OUTPATIENT
Start: 2023-07-20

## 2023-07-20 NOTE — TELEPHONE ENCOUNTER
Requested medication(s) are due for refill today: Yes  Patient has already received a courtesy refill: No  Other reason request has been forwarded to provider: Per Protocol 26-Jan-2021 18:30

## 2023-09-08 NOTE — ASSESSMENT & PLAN NOTE
· Acute hypoxic respiratory failure likely due to COVID-19 infection  · Patient is hypoxic, improving, weaned from 15 L midflow to 6-8 L   · Given 40 mg Lasix x1 12/10-- which seemed to help  · Continue supplemental oxygen maintain O2 sats more than 92-94%  · Encourage proning- patient needs continuous encouragement and reminder to prone  · Pulmonary on board, now signing off   · See below for details of COVID 19 management   · Will need ambulatory pulse ox prior to discharge  · P r n  Lasix --> crackles noted on exam today  Will give 20 mg IV Lasix and monitor response  [Negative] : Endocrine

## 2023-10-11 DIAGNOSIS — K21.9 GASTROESOPHAGEAL REFLUX DISEASE WITHOUT ESOPHAGITIS: Primary | ICD-10-CM

## 2023-10-11 DIAGNOSIS — M41.9 SCOLIOSIS OF THORACIC SPINE, UNSPECIFIED SCOLIOSIS TYPE: ICD-10-CM

## 2023-10-11 DIAGNOSIS — U07.1 COVID-19: ICD-10-CM

## 2023-10-11 NOTE — TELEPHONE ENCOUNTER
----- Message from Marlen Dao sent at 10/10/2023  6:11 AM EDT -----  Regarding: Prescriptions   Contact: [phone]  I need a refill on zanaflex omprazole and Flonase generics

## 2023-10-12 RX ORDER — OMEPRAZOLE 20 MG/1
20 CAPSULE, DELAYED RELEASE ORAL DAILY
Qty: 90 CAPSULE | Refills: 1 | Status: SHIPPED | OUTPATIENT
Start: 2023-10-12

## 2023-10-12 RX ORDER — TIZANIDINE 2 MG/1
2 TABLET ORAL
Qty: 30 TABLET | Refills: 0 | Status: SHIPPED | OUTPATIENT
Start: 2023-10-12

## 2023-10-12 RX ORDER — FLUTICASONE PROPIONATE 50 MCG
2 SPRAY, SUSPENSION (ML) NASAL DAILY
Qty: 9.9 ML | Refills: 3 | Status: SHIPPED | OUTPATIENT
Start: 2023-10-12

## 2023-10-19 ENCOUNTER — RA CDI HCC (OUTPATIENT)
Dept: OTHER | Facility: HOSPITAL | Age: 68
End: 2023-10-19

## 2023-10-25 NOTE — RESTORATIVE TECHNICIAN NOTE
Medication history reviewed with pt. Med rec is complete.  Allergies reviewed, per pt    Pt reports no vitamins or OTC's in the last 30 days or longer.    Patient has had outpatient antibiotics in the last 30 days. Pt started taking METRONIDAZOLE VAGINAL GEL 0.75% on 7/28/2023, pt reports that her doctor still has her taking every evening.  Pt also started  KEFLEX 500MG on 10/13/2023 for 10 day course, per pt reports that she did finish course.    Pt is not on any anticoagulants       Restorative Specialist Mobility Note       Activity: Ambulate in room, Bathroom privileges, Chair, Stand at bedside           Ambulation Response: Tolerated well      Time: 10:30am    Patient ambulated in room x's 3 and stood for an additional 20 minutes  O2 Sat prior to ambulation 92% and during ambulation 88-86% saturation on 4L  Patient tolerate well and states this is the best I have done  Upon returning to bed O2 remained at 92%  RN aware  Gianna Odom Mobility Coordinator LCFo, LCOF, ASOP R  O T, O B T

## 2023-11-15 ENCOUNTER — TELEMEDICINE (OUTPATIENT)
Dept: FAMILY MEDICINE CLINIC | Facility: CLINIC | Age: 68
End: 2023-11-15
Payer: COMMERCIAL

## 2023-11-15 ENCOUNTER — TELEPHONE (OUTPATIENT)
Dept: FAMILY MEDICINE CLINIC | Facility: CLINIC | Age: 68
End: 2023-11-15

## 2023-11-15 VITALS — OXYGEN SATURATION: 97 % | HEART RATE: 78 BPM

## 2023-11-15 DIAGNOSIS — M10.9 ACUTE GOUT INVOLVING TOE OF LEFT FOOT, UNSPECIFIED CAUSE: Primary | ICD-10-CM

## 2023-11-15 PROCEDURE — 99213 OFFICE O/P EST LOW 20 MIN: CPT | Performed by: FAMILY MEDICINE

## 2023-11-15 RX ORDER — PREDNISONE 10 MG/1
TABLET ORAL
Qty: 24 TABLET | Refills: 0 | Status: SHIPPED | OUTPATIENT
Start: 2023-11-15 | End: 2023-11-27

## 2023-11-15 NOTE — TELEPHONE ENCOUNTER
Pt called back and said she spoke to [employer] and was able to get her camera working so she is able to do the virtual visit. She would like to continue with that. Thank you.

## 2023-11-15 NOTE — PROGRESS NOTES
Virtual Regular Visit    Verification of patient location:    Patient is located at Home in the following state in which I hold an active license [place]      Assessment/Plan:    Problem List Items Addressed This Visit    None  Visit Diagnoses       Acute gout involving toe of left foot, unspecified cause    -  Primary    Acute on chronic gout. Affected the right foot in 2021 and previously on allopurinol. Left toes is red and swollen. No fevers or recent injuries. No recent labs to assess kidney function. Start prednisone taper. Avoid using NSAIDs with prednisone. Will hold ppx for now until we have a uric acid level. Also reminded to completed blood work that was ordered earlier this year    Relevant Medications    predniSONE 10 mg tablet    Other Relevant Orders    Uric acid                   Reason for visit is   Chief Complaint   Patient presents with   • gout        Encounter provider Renuka Lopez MD    Provider located at [address] [place] [phone]      Recent Visits  No visits were found meeting these conditions. Showing recent visits within past 7 days and meeting all other requirements  Today's Visits  Date Type Provider Dept   11/15/23 Telephone [facility] Pg Kevin Charley Quezada    11/15/23 Telemedicine Renuka Lopez MD Pg [facility] today's visits and meeting all other requirements  Future Appointments  No visits were found meeting these conditions. Showing future appointments within next 150 days and meeting all other requirements       The patient was identified by name and date of birth. Deyanira Ventura was informed that this is a telemedicine visit and that the visit is being conducted through the [employer]. She agrees to proceed. .  My office door was closed. The patient was notified the following individuals were present in the room [facility].   She acknowledged consent and understanding of privacy and security of the video platform. The patient has agreed to participate and understands they can discontinue the visit at any time. Patient is aware this is a billable service. Matthew Marcano  is a 70 y.o. female presents with gouty attack. History of gout, last attack was in . Pain in the left great toe since Monday. Swollen and red. No recent injuries. No fever. Started taking tart cherry, ibuprofen, and is elevating the leg. Consuming more sugar but denies ETOH use.          Past Medical History:   Diagnosis Date   • Asthma    • Bipolar disorder ([address])    • COVID-19    • GERD (gastroesophageal reflux disease)        Past Surgical History:   Procedure Laterality Date   •  SECTION      x3   • CHOLECYSTECTOMY     • HYSTERECTOMY         Current Outpatient Medications   Medication Sig Dispense Refill   • albuterol (PROVENTIL HFA,VENTOLIN HFA) 90 mcg/act inhaler Inhale 2 puffs every 6 (six) hours as needed for wheezing or shortness of breath 6.7 g 2   • albuterol (PROVENTIL HFA,VENTOLIN HFA) 90 mcg/act inhaler Inhale 2 puffs every 4 (four) hours as needed for wheezing 18 g 2   • cholecalciferol (VITAMIN D3) 1,000 units tablet Take 2 tablets (2,000 Units total) by mouth daily  0   • clobetasol (TEMOVATE) 0.05 % cream Apply topically 2 (two) times a day 30 g 0   • dextromethorphan-guaiFENesin (ROBITUSSIN DM)  mg/5 mL syrup Take 10 mL by mouth every 6 (six) hours 118 mL 0   • Flovent  MCG/ACT inhaler INHALE 2 PUFFS BY MOUTH 2 TIMES A DAY RINSE MOUTH AFTER USE. 12 g 0   • fluticasone (FLONASE) 50 mcg/act nasal spray 2 sprays into each nostril daily 9.9 mL 3   • gabapentin (NEURONTIN) 300 mg capsule Take 300 mg by mouth daily     • Humidifier MISC Use as needed (humidification) Use as needed for humidification 1 each 0   • lithium carbonate 300 mg capsule Take 300 mg by mouth daily at bedtime     • LORazepam (ATIVAN) 1 mg tablet Take 2 mg by mouth daily at bedtime      • omeprazole (PriLOSEC) 20 mg delayed release capsule Take 1 capsule (20 mg total) by mouth daily 90 capsule 1   • predniSONE 10 mg tablet Take 3 tablets (30 mg total) by mouth daily for 4 days, THEN 2 tablets (20 mg total) daily for 4 days, THEN 1 tablet (10 mg total) daily for 4 days. 24 tablet 0   • sertraline (ZOLOFT) 100 mg tablet Take 200 mg by mouth daily      • tiZANidine (ZANAFLEX) 2 mg tablet Take 1 tablet (2 mg total) by mouth daily at bedtime 30 tablet 0   • traZODone (DESYREL) 100 mg tablet Take 200 mg by mouth daily at bedtime       No current facility-administered medications for this visit. Allergies   Allergen Reactions   • Amphetamine-Dextroamphetamine Other (See Comments)     Chest pain- was placed on Adderall after son passed away for depression, and she developed chest pain in 1990's; she had full cardiac work up, and was negative, so she has allergy to Adderall  Chest pain- was placed on Adderall after son passed away for depression, and she developed chest pain in 1990's; she had full cardiac work up, and was negative, so she has allergy to Adderall   • Molds & Smuts    • Other      Cats   • Sulfa Antibiotics Other (See Comments)       Review of Systems    Video Exam    Vitals:    11/15/23 1203   Pulse: 78   SpO2: 97%       Physical Exam  Vitals reviewed. Constitutional:       General: She is not in acute distress. Appearance: Normal appearance. She is not ill-appearing or toxic-appearing. HENT:      Head: Normocephalic and atraumatic. Pulmonary:      Effort: Pulmonary effort is normal.   Musculoskeletal:         General: Swelling present. Feet:       Comments: Swollen and erythematous left MCP joint    Neurological:      Mental Status: She is alert and oriented to person, place, and time.           Visit Time  Total Visit Duration: 8

## 2023-11-15 NOTE — TELEPHONE ENCOUNTER
Patient called and stated that she is having phone issues and wanted to know if this visit could just be a phone call, I told access that most likely not as its for gout and we would need to be able to see the area. Please advise if this is correct and she needs to be in person or a video visit.

## 2023-11-24 ENCOUNTER — TELEPHONE (OUTPATIENT)
Dept: LAB | Facility: HOSPITAL | Age: 68
End: 2023-11-24

## 2023-11-28 ENCOUNTER — TRANSCRIBE ORDERS (OUTPATIENT)
Dept: LAB | Facility: CLINIC | Age: 68
End: 2023-11-28

## 2023-11-28 ENCOUNTER — APPOINTMENT (OUTPATIENT)
Dept: LAB | Facility: CLINIC | Age: 68
End: 2023-11-28
Payer: COMMERCIAL

## 2023-11-28 ENCOUNTER — TELEPHONE (OUTPATIENT)
Age: 68
End: 2023-11-28

## 2023-11-28 DIAGNOSIS — E78.41 ELEVATED LIPOPROTEIN(A): ICD-10-CM

## 2023-11-28 DIAGNOSIS — E78.41 ELEVATED LIPOPROTEIN(A): Primary | ICD-10-CM

## 2023-11-28 DIAGNOSIS — M10.9 ACUTE GOUT INVOLVING TOE OF LEFT FOOT, UNSPECIFIED CAUSE: ICD-10-CM

## 2023-11-28 DIAGNOSIS — M10.9 ACUTE GOUT OF MULTIPLE SITES, UNSPECIFIED CAUSE: Primary | ICD-10-CM

## 2023-11-28 LAB
ALBUMIN SERPL BCP-MCNC: 4.4 G/DL (ref 3.5–5)
ALP SERPL-CCNC: 87 U/L (ref 34–104)
ALT SERPL W P-5'-P-CCNC: 15 U/L (ref 7–52)
ANION GAP SERPL CALCULATED.3IONS-SCNC: 9 MMOL/L
AST SERPL W P-5'-P-CCNC: 14 U/L (ref 13–39)
BILIRUB SERPL-MCNC: 0.39 MG/DL (ref 0.2–1)
BUN SERPL-MCNC: 15 MG/DL (ref 5–25)
CALCIUM SERPL-MCNC: 9.7 MG/DL (ref 8.4–10.2)
CHLORIDE SERPL-SCNC: 105 MMOL/L (ref 96–108)
CHOLEST SERPL-MCNC: 216 MG/DL
CO2 SERPL-SCNC: 26 MMOL/L (ref 21–32)
CREAT SERPL-MCNC: 0.91 MG/DL (ref 0.6–1.3)
GFR SERPL CREATININE-BSD FRML MDRD: 64 ML/MIN/1.73SQ M
GLUCOSE P FAST SERPL-MCNC: 128 MG/DL (ref 65–99)
HDLC SERPL-MCNC: 38 MG/DL
LDLC SERPL CALC-MCNC: 127 MG/DL (ref 0–100)
NONHDLC SERPL-MCNC: 178 MG/DL
POTASSIUM SERPL-SCNC: 4.4 MMOL/L (ref 3.5–5.3)
PROT SERPL-MCNC: 7 G/DL (ref 6.4–8.4)
SODIUM SERPL-SCNC: 140 MMOL/L (ref 135–147)
TRIGL SERPL-MCNC: 254 MG/DL
URATE SERPL-MCNC: 5.7 MG/DL (ref 2–7.5)

## 2023-11-28 PROCEDURE — 36415 COLL VENOUS BLD VENIPUNCTURE: CPT

## 2023-11-28 PROCEDURE — 80053 COMPREHEN METABOLIC PANEL: CPT

## 2023-11-28 PROCEDURE — 80061 LIPID PANEL: CPT

## 2023-11-28 PROCEDURE — 84550 ASSAY OF BLOOD/URIC ACID: CPT

## 2023-11-28 NOTE — TELEPHONE ENCOUNTER
Patient calling because the gout spread to her other foot . She wants the office to call back for further advise on what to do next.   Josh Ramos

## 2023-11-29 RX ORDER — INDOMETHACIN 50 MG/1
50 CAPSULE ORAL
Qty: 15 CAPSULE | Refills: 0 | Status: SHIPPED | OUTPATIENT
Start: 2023-11-29 | End: 2023-12-04

## 2023-11-29 RX ORDER — ALLOPURINOL 100 MG/1
100 TABLET ORAL DAILY
Qty: 30 TABLET | Refills: 1 | Status: SHIPPED | OUTPATIENT
Start: 2023-11-29

## 2023-11-29 NOTE — TELEPHONE ENCOUNTER
Patient called back and did not want to schedule an appointment to be seen. Her gout is causing mobility issues and she is unable to come in.   She would a call back for further advisement

## 2023-11-29 NOTE — TELEPHONE ENCOUNTER
Marcie Perez called back, stating she hasn't heard back from anyone in the office. She would like a call back at [phone].

## 2023-11-29 NOTE — TELEPHONE ENCOUNTER
S/w patient. Was treating the left toe with prednisone which she completed over the weekend. 2 days ago she develop pain and swelling along the joint of the right great toe, similar to what she felt in the left. Left toe pain has resolved. No fever or systemic sx. Uric acid level < 6. Unable to come in due to pain. Start allopurinol 100 g daily and indomethacin 50 mg TID for 1-2 days then taper. Asked to take with omeprazole.

## 2023-11-29 NOTE — TELEPHONE ENCOUNTER
Patient called as she has a lot of pain in the foot. I spoke with the provider but she needs to speak her directly. Patient agreed.

## 2023-11-29 NOTE — TELEPHONE ENCOUNTER
Patient called back follow up on call from [facility]. Spoke to [facility] in office. Message was sent to Dr. Phyllis Washington. Office will call patient back with next steps for patient when Dr. Phyllis Washington gets back to us.

## 2023-12-07 ENCOUNTER — TELEPHONE (OUTPATIENT)
Age: 68
End: 2023-12-07

## 2023-12-07 NOTE — TELEPHONE ENCOUNTER
Patient called in to report shortness of breath since yesterday. Reports she was lightheaded when she stands up. Using 3L O2 she had at home. O2 sat=97-98%; HR in 120's; 80's when resting. Patient was concerned about PVC's, a-fib, or palpitations; none in patient history. Denies luisa lightheaded episodes since she has been on oxygen supplement. Also reports weakness in her legs. OV scheduled with [facility] CARLOS NINO for Friday 12/8 at 2:15 PM. ER precautions reviewed with patient and patient agrees she will go to ER is symptoms worsen.

## 2023-12-18 ENCOUNTER — TELEPHONE (OUTPATIENT)
Age: 68
End: 2023-12-18

## 2023-12-18 NOTE — TELEPHONE ENCOUNTER
Patient had 2 appointments last week that were canceled. She should go to the ED. If stable, she can come tomorrow. I have an opening.

## 2023-12-18 NOTE — TELEPHONE ENCOUNTER
Pt called in stating she has SOB and rapid HR. Spoke with Debbie in the office and she suggested I send you a message to see if  you are able to squeeze her in today.

## 2024-01-30 DIAGNOSIS — M10.9 ACUTE GOUT OF MULTIPLE SITES, UNSPECIFIED CAUSE: ICD-10-CM

## 2024-01-30 RX ORDER — ALLOPURINOL 100 MG/1
100 TABLET ORAL DAILY
Qty: 30 TABLET | Refills: 2 | Status: SHIPPED | OUTPATIENT
Start: 2024-01-30

## 2024-02-21 NOTE — PROGRESS NOTES
Progress Note - Delfina Lies 1955, 76 y o  female MRN: [id number]    [facility]: [id number]    Primary Care Provider: LUIS CARLOS Gonzalez   Date and time admitted to hospital: 12/4/2020  9:15 AM        Transaminitis  Assessment & Plan  -Isolated AST elevation in the setting of COVID 19 infection  -Continue to trend with CMP  -Discontinue atorvastatin if AST/ALT >3x ULN      Elevated troponin  Assessment & Plan  -Elevated in the setting of COVID 19 infection  -Peaked 0 32      COVID-19 virus infection  Assessment & Plan  -11/29/20 COVID-19: Positive  -Continue anti-inflammatories/COVID therapies:   § Remdesivir (Day # 5/5)  § Dexamethasone 6 mg IV daily, day # 5/10  § Vitamin C 1000mg BID x7 days   § Zinc 220mg daily x7 days   § Atorvastatin 40mg qHS   § Famotidine 20mg BID  § Vitamin D3 2000 IU daily  § Lovenox 30 q 12  -Completed therapies:   § Convalescent Plasma (12/6/20)  -Inflammatory markers:   § Ferritin: 369 - 435 - 513 - 526 - 496  § CRP: 36 6 - 75 0 - 49 1 - 44 5 - 43  § Follow q1-3 days as needed   -Continue to closely monitor for signs of secondary bacterial infection   § Procalcitonin: <0 05 x4  § Stopped doxycycline/ceftriaxone   -Continue to monitor fever and WBC curve off antibiotics   ? CBC daily    GERD (gastroesophageal reflux disease)  Assessment & Plan  -Continue famotidine 20 mg per COVID protocol  -Mylanta PRN  -Holding home omeprazole  -Continue bowel regimen    Bipolar disorder ([facility] [address] )  Assessment & Plan  -Home meds ordered: zoloft, trazadone, lithium  -Continue HS and PRN ativan for anxiety       * Acute respiratory failure with hypoxia ([facility] [address] )  Assessment & Plan  - ARF secondary to COVID 19 infection  -Patient weaned from 13 L mid-flow to 8 L NC  -Diuresed yesterday, appears euvolemic today  -Continue to self-prone as able  -Maintain O2 >90%      Code Status: Level 1 - Full Code  POA:    POLST:      Reason for ICU admission:   COVID 19 PNA with increasing oxygen requirements    Active problems:   Principal Problem:    Acute respiratory failure with hypoxia (HCC)  Active Problems:    Bipolar disorder (HCC)    GERD (gastroesophageal reflux disease)    COVID-19 virus infection    Elevated troponin    Transaminitis  Resolved Problems:    * No resolved hospital problems  *      Consultants:   None    History of Present Illness:   Jaun Whalen is a 76 y o  female who presented with worsening shortness of breath  Patient diagnosed with COVID-19 infection reports her symptoms started around Thanksgiving  She reported worsening shortness of breath since last couple of days, diarrhea watery multiple episodes generalized weakness fatigue poor appetite  Presently she reports dyspneic at rest and desaturates on minimal exertion, she is being admitted for further management  Summary of clinical course:   Patient initially admitted to general medical floor on 12/4 for hypoxia requiring 2-3 L NC  On 12/7 patient was transferred to MICU as a step-down level 2 for worsening hypoxia requiring midflow  Patient was up to 12 L midflow yesterday, and O2 was weaned down overnight  Patient is currently on 8 L NC  Recent or scheduled procedures:   N/A    Outstanding/pending diagnostics:   N/A    Cultures:   N/A       Mobilization Plan:   OOB as saniya  Encourage self-proning    Nutrition Plan:   Regular diet    Invasive Devices Review  Invasive Devices     Peripheral Intravenous Line            Peripheral IV 12/07/20 Distal;Right;Dorsal (posterior) Forearm less than 1 day          Drain            External Urinary Catheter 1 day                Rationale for remaining devices: Medications    VTE Pharmacologic Prophylaxis: Enoxaparin (Lovenox)  VTE Mechanical Prophylaxis: sequential compression device        Home medications that are not reordered and reason why:   Omeprazole- on famotidine per COVID protocol    Spoke with Dr Shravan Rodriges  regarding transfer   Please contact critical care via [facility] Connect with any questions or concerns  Portions of the record may have been created with voice recognition software  Occasional wrong word or "sound a like" substitutions may have occurred due to the inherent limitations of voice recognition software  Read the chart carefully and recognize, using context, where substitutions have occurred  [Negative] : Heme/Lymph

## 2024-03-11 ENCOUNTER — TELEPHONE (OUTPATIENT)
Age: 69
End: 2024-03-11

## 2024-03-11 NOTE — TELEPHONE ENCOUNTER
Patient called in asking about Dr [facility].  She wanted to schedule a new patient appt with him, but when got her in with Dr. Salmeron.   She would like a call back about if the rehab is still accepting patients and how she can join.  [phone]

## 2024-03-11 NOTE — TELEPHONE ENCOUNTER
3/11 10:12am: Spoke w/PT, informed her that Dr Salmeron will be able to assist her and direct her when she comes in for her appt this Friday, 3/15.

## 2024-04-03 DIAGNOSIS — K21.9 GASTROESOPHAGEAL REFLUX DISEASE WITHOUT ESOPHAGITIS: ICD-10-CM

## 2024-04-03 DIAGNOSIS — U07.1 COVID-19: ICD-10-CM

## 2024-04-03 DIAGNOSIS — M41.9 SCOLIOSIS OF THORACIC SPINE, UNSPECIFIED SCOLIOSIS TYPE: ICD-10-CM

## 2024-04-03 RX ORDER — OMEPRAZOLE 20 MG/1
20 CAPSULE, DELAYED RELEASE ORAL DAILY
Qty: 90 CAPSULE | Refills: 1 | Status: SHIPPED | OUTPATIENT
Start: 2024-04-03

## 2024-04-03 RX ORDER — TIZANIDINE 2 MG/1
2 TABLET ORAL
Qty: 30 TABLET | Refills: 0 | Status: SHIPPED | OUTPATIENT
Start: 2024-04-03

## 2024-04-03 RX ORDER — FLUTICASONE PROPIONATE 50 MCG
2 SPRAY, SUSPENSION (ML) NASAL DAILY
Qty: 9.9 ML | Refills: 1 | Status: SHIPPED | OUTPATIENT
Start: 2024-04-03

## 2024-05-01 DIAGNOSIS — M41.9 SCOLIOSIS OF THORACIC SPINE, UNSPECIFIED SCOLIOSIS TYPE: ICD-10-CM

## 2024-05-02 RX ORDER — TIZANIDINE 2 MG/1
2 TABLET ORAL
Qty: 30 TABLET | Refills: 0 | Status: SHIPPED | OUTPATIENT
Start: 2024-05-02

## 2024-05-24 ENCOUNTER — TELEPHONE (OUTPATIENT)
Age: 69
End: 2024-05-24

## 2024-05-24 NOTE — TELEPHONE ENCOUNTER
Pt called in requesting a Rheumatology referral to [facility] in [place]. Pt states she suspects she might have psoriatic arthritis and would like to see a specialist. Pt states she recently saw a nurse practitioner for a work physical and it was suggested based off of the pt's current symptoms that she might have it.    Please advise, if possible.    [facility]  [address] [place] [address] [phone]

## 2024-05-28 ENCOUNTER — TELEPHONE (OUTPATIENT)
Dept: FAMILY MEDICINE CLINIC | Facility: CLINIC | Age: 69
End: 2024-05-28

## 2024-05-30 ENCOUNTER — OFFICE VISIT (OUTPATIENT)
Dept: FAMILY MEDICINE CLINIC | Facility: CLINIC | Age: 69
End: 2024-05-30
Payer: COMMERCIAL

## 2024-05-30 VITALS
RESPIRATION RATE: 16 BRPM | HEIGHT: 61 IN | SYSTOLIC BLOOD PRESSURE: 140 MMHG | HEART RATE: 103 BPM | BODY MASS INDEX: 35.38 KG/M2 | DIASTOLIC BLOOD PRESSURE: 82 MMHG | TEMPERATURE: 97.8 F | OXYGEN SATURATION: 97 % | WEIGHT: 187.4 LBS

## 2024-05-30 DIAGNOSIS — M25.50 POLYARTHRALGIA: Primary | ICD-10-CM

## 2024-05-30 DIAGNOSIS — L40.9 PSORIASIS: ICD-10-CM

## 2024-05-30 PROBLEM — J96.01 ACUTE HYPOXEMIC RESPIRATORY FAILURE DUE TO COVID-19 (HCC): Status: RESOLVED | Noted: 2020-12-04 | Resolved: 2024-05-30

## 2024-05-30 PROBLEM — U07.1 ACUTE HYPOXEMIC RESPIRATORY FAILURE DUE TO COVID-19 (HCC): Status: RESOLVED | Noted: 2020-12-04 | Resolved: 2024-05-30

## 2024-05-30 PROCEDURE — 99214 OFFICE O/P EST MOD 30 MIN: CPT | Performed by: NURSE PRACTITIONER

## 2024-05-30 PROCEDURE — G2211 COMPLEX E/M VISIT ADD ON: HCPCS | Performed by: NURSE PRACTITIONER

## 2024-05-30 RX ORDER — PREDNISONE 10 MG/1
TABLET ORAL
Qty: 21 TABLET | Refills: 0 | Status: SHIPPED | OUTPATIENT
Start: 2024-05-30

## 2024-05-30 NOTE — ASSESSMENT & PLAN NOTE
All joints are experiencing pain and stiffness; otc nsaids not helping.  History of psoriasis and family history of auto-immune diseases.  Will get basic rheumatologic labs and start prednisone taper after labs are completed.  Follow up pending results.

## 2024-05-30 NOTE — ASSESSMENT & PLAN NOTE
Rash consistent with psoriasis, started around 2019.  Prescription steroid cream was helpful but too expensive.  She is using head and shoulders shampoo on it, she feels it helps.  Now experiencing joint pains, will initiate basic rheumatologic workup and refer pt to rheumatology for further evaluation.

## 2024-05-30 NOTE — PROGRESS NOTES
Ambulatory Visit  Name: Nancy Laird      : 1955      MRN: [id number]  Encounter Provider: LUIS CARLOS Alonso  Encounter Date: 2024   Encounter department: [facility]    Assessment & Plan   1. Polyarthralgia  Assessment & Plan:  All joints are experiencing pain and stiffness; otc nsaids not helping.  History of psoriasis and family history of auto-immune diseases.  Will get basic rheumatologic labs and start prednisone taper after labs are completed.  Follow up pending results.   Orders:  -     CBC and differential; Future  -     Comprehensive metabolic panel; Future  -     C-reactive protein; Future  -     RF Screen w/ Reflex to Titer; Future  -     YARI Screen w/ Reflex to Titer/Pattern; Future  -     Cyclic citrul peptide antibody, IgG; Future  -     Ambulatory Referral to Rheumatology; Future  -     predniSONE 10 mg tablet; Take 4 tabs x 2 days, 3 tabs x 2 days, 2 tabs x 2 days, 1 tab x 2 days 1/2 tab x 2 days.  Take with food.  2. Psoriasis  Assessment & Plan:  Rash consistent with psoriasis, started around 2019.  Prescription steroid cream was helpful but too expensive.  She is using head and shoulders shampoo on it, she feels it helps.  Now experiencing joint pains, will initiate basic rheumatologic workup and refer pt to rheumatology for further evaluation.  Orders:  -     Ambulatory Referral to Rheumatology; Future       History of Present Illness     Pt is a 69 y.o. y/o female who is seen today for evaluation of possible psoriatic arthritis.  Past medical history of copd, GERD, scoliosis, bipolar disorder, anemia, insomnia.  She first noticed the patch of psoriais-like rash on her back in late  and has since developed other eruptions on her legs and her bottom.  She started with generalized arthralgias about a month or so ago.  She has history of back issues and had back surgery so initially she attributed this to that but says it seems different at this point.   "She says she is having trouble doing anything because she has pain in her joints.  She has taken advil, indomethacin.  The advil did not help.  Indomethacin did help but it made her feel very dizzy.  She also reports palpitations and sob with ambulation; no chest pain but notes that her chest hurts if she touches it.  She has family history of autoimmune disease including scleroderma, lupus.        Review of Systems   Constitutional:  Positive for fatigue. Negative for activity change and appetite change.   Respiratory:  Positive for shortness of breath (only with exertion).    Cardiovascular:  Positive for palpitations. Negative for chest pain and leg swelling.   Gastrointestinal:  Negative for nausea and vomiting.   Musculoskeletal:  Positive for arthralgias and back pain. Negative for joint swelling and myalgias.   Skin:  Positive for rash. Negative for color change and wound.   Neurological:  Negative for dizziness, weakness, light-headedness and headaches.       Objective     /82 (BP Location: Left arm, Patient Position: Sitting, Cuff Size: Standard)   Pulse 103   Temp 97.8 °F (36.6 °C) (Tympanic)   Resp 16   Ht 5' 1\" (1.549 m)   Wt 85 kg (187 lb 6.4 oz)   SpO2 97%   BMI 35.41 kg/m²     Physical Exam  Vitals reviewed.   Constitutional:       General: She is awake. She is not in acute distress.     Appearance: Normal appearance. She is well-developed and well-groomed. She is not ill-appearing.   Neck:      Vascular: No carotid bruit or JVD.   Cardiovascular:      Rate and Rhythm: Normal rate and regular rhythm.      Pulses: Normal pulses.      Heart sounds: Normal heart sounds.   Pulmonary:      Effort: Pulmonary effort is normal.      Breath sounds: Normal breath sounds.   Musculoskeletal:      Right lower leg: No edema.      Left lower leg: No edema.      Comments: Rom wnl    Skin:     General: Skin is warm and dry.      Findings: Rash present. Rash is scaling.      Comments: Rash consistent with " plaque psoriasis on back, posterior thigh, buttocks   Neurological:      Mental Status: She is alert and oriented to person, place, and time.      Motor: No weakness or tremor.   Psychiatric:         Attention and Perception: Attention normal.         Mood and Affect: Mood normal.         Speech: Speech normal.         Behavior: Behavior normal. Behavior is cooperative.         Thought Content: Thought content normal.         Cognition and Memory: Cognition normal.         Judgment: Judgment normal.       Administrative Statements

## 2024-06-01 ENCOUNTER — APPOINTMENT (OUTPATIENT)
Dept: LAB | Age: 69
End: 2024-06-01
Payer: COMMERCIAL

## 2024-06-01 DIAGNOSIS — M10.9 ACUTE GOUT INVOLVING TOE OF LEFT FOOT, UNSPECIFIED CAUSE: ICD-10-CM

## 2024-06-01 DIAGNOSIS — M25.50 POLYARTHRALGIA: ICD-10-CM

## 2024-06-01 LAB
ALBUMIN SERPL BCP-MCNC: 4.8 G/DL (ref 3.5–5)
ALP SERPL-CCNC: 85 U/L (ref 34–104)
ALT SERPL W P-5'-P-CCNC: 26 U/L (ref 7–52)
ANA SER QL IA: NEGATIVE
ANION GAP SERPL CALCULATED.3IONS-SCNC: 13 MMOL/L (ref 4–13)
AST SERPL W P-5'-P-CCNC: 27 U/L (ref 13–39)
BASOPHILS # BLD AUTO: 0.02 THOUSANDS/ÂΜL (ref 0–0.1)
BASOPHILS NFR BLD AUTO: 0 % (ref 0–1)
BILIRUB SERPL-MCNC: 0.43 MG/DL (ref 0.2–1)
BUN SERPL-MCNC: 18 MG/DL (ref 5–25)
CALCIUM SERPL-MCNC: 9.8 MG/DL (ref 8.4–10.2)
CHLORIDE SERPL-SCNC: 104 MMOL/L (ref 96–108)
CO2 SERPL-SCNC: 23 MMOL/L (ref 21–32)
CREAT SERPL-MCNC: 0.97 MG/DL (ref 0.6–1.3)
CRP SERPL QL: 10.1 MG/L
EOSINOPHIL # BLD AUTO: 0.04 THOUSAND/ÂΜL (ref 0–0.61)
EOSINOPHIL NFR BLD AUTO: 1 % (ref 0–6)
ERYTHROCYTE [DISTWIDTH] IN BLOOD BY AUTOMATED COUNT: 14.2 % (ref 11.6–15.1)
GFR SERPL CREATININE-BSD FRML MDRD: 59 ML/MIN/1.73SQ M
GLUCOSE P FAST SERPL-MCNC: 108 MG/DL (ref 65–99)
HCT VFR BLD AUTO: 44.6 % (ref 34.8–46.1)
HGB BLD-MCNC: 14.5 G/DL (ref 11.5–15.4)
IMM GRANULOCYTES # BLD AUTO: 0.02 THOUSAND/UL (ref 0–0.2)
IMM GRANULOCYTES NFR BLD AUTO: 0 % (ref 0–2)
LYMPHOCYTES # BLD AUTO: 1.88 THOUSANDS/ÂΜL (ref 0.6–4.47)
LYMPHOCYTES NFR BLD AUTO: 24 % (ref 14–44)
MCH RBC QN AUTO: 27.8 PG (ref 26.8–34.3)
MCHC RBC AUTO-ENTMCNC: 32.5 G/DL (ref 31.4–37.4)
MCV RBC AUTO: 86 FL (ref 82–98)
MONOCYTES # BLD AUTO: 0.37 THOUSAND/ÂΜL (ref 0.17–1.22)
MONOCYTES NFR BLD AUTO: 5 % (ref 4–12)
NEUTROPHILS # BLD AUTO: 5.59 THOUSANDS/ÂΜL (ref 1.85–7.62)
NEUTS SEG NFR BLD AUTO: 70 % (ref 43–75)
NRBC BLD AUTO-RTO: 0 /100 WBCS
PLATELET # BLD AUTO: 168 THOUSANDS/UL (ref 149–390)
PMV BLD AUTO: 11.6 FL (ref 8.9–12.7)
POTASSIUM SERPL-SCNC: 3.9 MMOL/L (ref 3.5–5.3)
PROT SERPL-MCNC: 7.3 G/DL (ref 6.4–8.4)
RBC # BLD AUTO: 5.21 MILLION/UL (ref 3.81–5.12)
SODIUM SERPL-SCNC: 140 MMOL/L (ref 135–147)
URATE SERPL-MCNC: 6.4 MG/DL (ref 2–7.5)
WBC # BLD AUTO: 7.92 THOUSAND/UL (ref 4.31–10.16)

## 2024-06-01 PROCEDURE — 80053 COMPREHEN METABOLIC PANEL: CPT

## 2024-06-01 PROCEDURE — 86200 CCP ANTIBODY: CPT

## 2024-06-01 PROCEDURE — 86430 RHEUMATOID FACTOR TEST QUAL: CPT

## 2024-06-01 PROCEDURE — 86140 C-REACTIVE PROTEIN: CPT

## 2024-06-01 PROCEDURE — 85025 COMPLETE CBC W/AUTO DIFF WBC: CPT

## 2024-06-01 PROCEDURE — 86038 ANTINUCLEAR ANTIBODIES: CPT

## 2024-06-01 PROCEDURE — 36415 COLL VENOUS BLD VENIPUNCTURE: CPT

## 2024-06-01 PROCEDURE — 84550 ASSAY OF BLOOD/URIC ACID: CPT

## 2024-06-02 LAB — RHEUMATOID FACT SER QL LA: NEGATIVE

## 2024-06-03 ENCOUNTER — TELEPHONE (OUTPATIENT)
Age: 69
End: 2024-06-03

## 2024-06-03 NOTE — TELEPHONE ENCOUNTER
One of the test is still pending. The CRP is mildly elevated. The uric acid level is also elevated with a goal less than 6 if on medication. Confirm if taking allopurinol.

## 2024-06-03 NOTE — TELEPHONE ENCOUNTER
Pt calling again.  She wants to see if Dr. Browne can get her into a rheumatologist sooner than August.  She is having a lot of pain and thinks that she may need more steroids.  She is hoping for a call back from Dr. Browne today.  Rn called office, Dr. Browne has just finished seeing her patients. Nancy aware that another message will be sent.

## 2024-06-03 NOTE — TELEPHONE ENCOUNTER
Patient called in post 6/1 lab results and reports C-reactive protein identifies inflammation. Patient has referral for rheumatologist, but no OV available before 8/5/24. Reports she is still in pain, problems with ambulation. Currently on steroids. Patient would like to discuss with provider what she should do. Patient does not have transportation to office today. Please follow up with patient.

## 2024-06-04 LAB — CCP AB SER IA-ACNC: 0.6

## 2024-06-04 NOTE — TELEPHONE ENCOUNTER
Patient called to check status of phone call from Dr Browne advised patient she will be calling when done with patients. No further questions

## 2024-06-05 NOTE — TELEPHONE ENCOUNTER
Patient states she missed the call from Dr. Browne yesterday, and the number she left was not working. She would like a call back from provider. Please follow up.

## 2024-06-07 ENCOUNTER — TELEPHONE (OUTPATIENT)
Age: 69
End: 2024-06-07

## 2024-06-07 DIAGNOSIS — U07.1 COVID-19: ICD-10-CM

## 2024-06-07 RX ORDER — FLUTICASONE PROPIONATE 50 MCG
SPRAY, SUSPENSION (ML) NASAL
Qty: 16 ML | Refills: 1 | Status: SHIPPED | OUTPATIENT
Start: 2024-06-07

## 2024-06-07 NOTE — TELEPHONE ENCOUNTER
Patient called to get MD name for insurance card.     Anthony Sandoval MD.     NPI [id number]     Thank you.

## 2024-06-10 ENCOUNTER — RA CDI HCC (OUTPATIENT)
Dept: OTHER | Facility: HOSPITAL | Age: 69
End: 2024-06-10

## 2024-06-10 PROBLEM — Z86.16 HISTORY OF COVID-19: Status: ACTIVE | Noted: 2020-12-04

## 2024-06-19 ENCOUNTER — PATIENT MESSAGE (OUTPATIENT)
Dept: FAMILY MEDICINE CLINIC | Facility: CLINIC | Age: 69
End: 2024-06-19

## 2024-06-19 DIAGNOSIS — Z11.1 SCREENING-PULMONARY TB: Primary | ICD-10-CM

## 2024-06-24 ENCOUNTER — TELEPHONE (OUTPATIENT)
Dept: FAMILY MEDICINE CLINIC | Facility: CLINIC | Age: 69
End: 2024-06-24

## 2024-06-24 NOTE — TELEPHONE ENCOUNTER
Patient is due for an AWV. Spoke to patient, she stated she will call back to schedule an appointment. Please schedule AWV.

## 2024-07-02 ENCOUNTER — APPOINTMENT (OUTPATIENT)
Dept: LAB | Facility: CLINIC | Age: 69
End: 2024-07-02
Payer: COMMERCIAL

## 2024-07-02 DIAGNOSIS — Z11.1 SCREENING-PULMONARY TB: ICD-10-CM

## 2024-07-02 PROCEDURE — 36415 COLL VENOUS BLD VENIPUNCTURE: CPT

## 2024-07-02 PROCEDURE — 86480 TB TEST CELL IMMUN MEASURE: CPT

## 2024-07-03 LAB
GAMMA INTERFERON BACKGROUND BLD IA-ACNC: 0.07 IU/ML
M TB IFN-G BLD-IMP: NEGATIVE
M TB IFN-G CD4+ BCKGRND COR BLD-ACNC: 0.02 IU/ML
M TB IFN-G CD4+ BCKGRND COR BLD-ACNC: 0.04 IU/ML
MITOGEN IGNF BCKGRD COR BLD-ACNC: 9.93 IU/ML

## 2024-08-14 DIAGNOSIS — U07.1 COVID-19: ICD-10-CM

## 2024-08-15 RX ORDER — FLUTICASONE PROPIONATE 50 MCG
SPRAY, SUSPENSION (ML) NASAL
Qty: 16 ML | Refills: 1 | Status: SHIPPED | OUTPATIENT
Start: 2024-08-15

## 2024-08-28 DIAGNOSIS — K21.9 GASTROESOPHAGEAL REFLUX DISEASE WITHOUT ESOPHAGITIS: ICD-10-CM

## 2024-09-14 DIAGNOSIS — U07.1 COVID-19: ICD-10-CM

## 2024-09-16 RX ORDER — FLUTICASONE PROPIONATE 50 MCG
SPRAY, SUSPENSION (ML) NASAL
Qty: 48 ML | Refills: 1 | Status: SHIPPED | OUTPATIENT
Start: 2024-09-16

## 2024-09-19 ENCOUNTER — TELEPHONE (OUTPATIENT)
Age: 69
End: 2024-09-19

## 2024-09-19 NOTE — PROGRESS NOTES
Rheumatology Initial Outpatient Visit    Name: Nancy Laird      : 1955      MRN: [id number]  Encounter Provider: Alix Esquivel MD  Encounter Date: 2024   Encounter department: [facility] [address] [place] [address]    Assessment & Plan  Polyarthralgia  68 y/o F presenting for evaluation of polyarthralgia in the setting of previously diagnosed psoriasis. No evidence of synovitis or effusion on exam today. Description of symptoms not entirely consistent with inflammatory arthritis, though may be a mixture of likely OA and PsA. I recommend that we get xrays of the most bothersome joints to include hands, knees, and SI joints. We will also start her on scheduled Mobic for 4 weeks to see if there is any improvement.   Orders:    Ambulatory Referral to Rheumatology    XR knee 3 vw right non injury; Future    XR knee 3 vw left non injury; Future    XR hand 3+ vw right; Future    XR hand 3+ vw left; Future    meloxicam (Mobic) 7.5 mg tablet; Take 1 tablet (7.5 mg total) by mouth daily    XR sacroiliac joints 3+ views; Future    Psoriasis  Hx of psoriasis with plaques on elbows, outer thighs, and back. Recommend establishing with Dermatology to help confirm diagnosis and if psoriatic arthritis ruled out, consideration for treatment options for skin.   Orders:    Ambulatory Referral to Rheumatology    Ambulatory Referral to Dermatology; Future    Right ankle swelling  Recent development of R ankle swelling. Swelling is present subcutaneously, no evidence of tibiotalar effusion. Possible ganglion cyst? No pain associated with ankle and not over the Achilles so probably unrelated PsA. Recommend US of area.   Orders:    US MSK limited; Future      History of Present Illness     Nancy Laird is a 69 y.o. female who presents who presents for evaluation of joint pain. PMHx notable for COPD, GERD, bipolar disorder, and DJD of the spine. Patient reports she had COVID in 2019 and was hospitalized for quite some  "time. Then in April 2021 she received the COVID vaccine and since then she has felt not like her self. She describes a lot of fatigue and feeling weak. She developed rash on her back, chest, arms, and legs which was diagnosed as PsO. She did visit Dermatologist, but this was outside the system so no records available to review. Responded to topical treatment, but hard to keep reapplying to all the areas.     Sometime around then/after then she developed pain involving most of her body, but includes knees, ankles, feet, elbows, fingers, and spine. She reports she has not been able to work for the past year due to her symptoms. She reports her knees bother her most when going from sitting to standing and also when she ambulates. She feels like R knee will give out on her and has fallen due to this. Her sacrum bothers when with prolonged sitting. Her hands will lock up and it is hard open her hands when she sees this. She has noticed some swelling around her MCPs and recently the R ankle. The R ankle is not painful.     Mother with hx of scleroderma    Review of Systems  Complete ROS conducted as per HPI. In addition, denies:  Uveitis  Dactylitis    Admits to  Oral ulcers, correlates with food      Objective     /84   Pulse 98   Temp 97.8 °F (36.6 °C) (Tympanic)   Ht 5' 1\" (1.549 m)   Wt 84.8 kg (187 lb)   SpO2 95%   BMI 35.33 kg/m²     Physical Exam  Physical Exam  Constitutional: well appearing, no acute distress  HEENT: normocephalic, sclera clear, no visible oral or nasal ulcers  Neck: supple, no palpable cervical adenopathy  CV: regular rate and rhythm, no murmur  Pulm: normal respiratory effort, lungs clear to auscultation b/l  Skin: +scaly plaques on b/l elbows and mid back; hyperpigmented lesions on outer thighs  Extremities: warm and well perfused, no edema  MSK: no synovitis or effusions, normal ROM, +R ankle with subcutaneous swelling    Labs and Imaging  I have personally reviewed pertinent labs " and imaging.   RF, CCP, YARI negative  CRP 10

## 2024-09-24 ENCOUNTER — CONSULT (OUTPATIENT)
Age: 69
End: 2024-09-24
Payer: COMMERCIAL

## 2024-09-24 VITALS
WEIGHT: 187 LBS | HEIGHT: 61 IN | BODY MASS INDEX: 35.3 KG/M2 | SYSTOLIC BLOOD PRESSURE: 128 MMHG | HEART RATE: 98 BPM | OXYGEN SATURATION: 95 % | TEMPERATURE: 97.8 F | DIASTOLIC BLOOD PRESSURE: 84 MMHG

## 2024-09-24 DIAGNOSIS — M25.50 POLYARTHRALGIA: ICD-10-CM

## 2024-09-24 DIAGNOSIS — M25.471 RIGHT ANKLE SWELLING: Primary | ICD-10-CM

## 2024-09-24 DIAGNOSIS — L40.9 PSORIASIS: ICD-10-CM

## 2024-09-24 PROCEDURE — G2211 COMPLEX E/M VISIT ADD ON: HCPCS | Performed by: STUDENT IN AN ORGANIZED HEALTH CARE EDUCATION/TRAINING PROGRAM

## 2024-09-24 PROCEDURE — 99204 OFFICE O/P NEW MOD 45 MIN: CPT | Performed by: STUDENT IN AN ORGANIZED HEALTH CARE EDUCATION/TRAINING PROGRAM

## 2024-09-24 RX ORDER — MELOXICAM 7.5 MG/1
7.5 TABLET ORAL DAILY
Qty: 30 TABLET | Refills: 2 | Status: SHIPPED | OUTPATIENT
Start: 2024-09-24

## 2024-09-24 NOTE — ASSESSMENT & PLAN NOTE
Hx of psoriasis with plaques on elbows, outer thighs, and back. Recommend establishing with Dermatology to help confirm diagnosis and if psoriatic arthritis ruled out, consideration for treatment options for skin.   Orders:    Ambulatory Referral to Rheumatology    Ambulatory Referral to Dermatology; Future

## 2024-09-24 NOTE — ASSESSMENT & PLAN NOTE
70 y/o F presenting for evaluation of polyarthralgia in the setting of previously diagnosed psoriasis. No evidence of synovitis or effusion on exam today. Description of symptoms not entirely consistent with inflammatory arthritis, though may be a mixture of likely OA and PsA. I recommend that we get xrays of the most bothersome joints to include hands, knees, and SI joints. We will also start her on scheduled Mobic for 4 weeks to see if there is any improvement.   Orders:    Ambulatory Referral to Rheumatology    XR knee 3 vw right non injury; Future    XR knee 3 vw left non injury; Future    XR hand 3+ vw right; Future    XR hand 3+ vw left; Future    meloxicam (Mobic) 7.5 mg tablet; Take 1 tablet (7.5 mg total) by mouth daily    XR sacroiliac joints 3+ views; Future

## 2024-10-01 ENCOUNTER — HOSPITAL ENCOUNTER (OUTPATIENT)
Dept: ULTRASOUND IMAGING | Facility: HOSPITAL | Age: 69
Discharge: HOME | End: 2024-10-01
Attending: STUDENT IN AN ORGANIZED HEALTH CARE EDUCATION/TRAINING PROGRAM
Payer: COMMERCIAL

## 2024-10-01 ENCOUNTER — HOSPITAL ENCOUNTER (OUTPATIENT)
Dept: RADIOLOGY | Facility: HOSPITAL | Age: 69
Discharge: HOME | End: 2024-10-01
Payer: COMMERCIAL

## 2024-10-01 DIAGNOSIS — M25.50 POLYARTHRALGIA: ICD-10-CM

## 2024-10-01 DIAGNOSIS — M25.471 RIGHT ANKLE SWELLING: ICD-10-CM

## 2024-10-01 PROCEDURE — 76882 US LMTD JT/FCL EVL NVASC XTR: CPT

## 2024-10-01 PROCEDURE — 73130 X-RAY EXAM OF HAND: CPT

## 2024-10-01 PROCEDURE — 73562 X-RAY EXAM OF KNEE 3: CPT

## 2024-10-01 PROCEDURE — 72202 X-RAY EXAM SI JOINTS 3/> VWS: CPT

## 2024-10-17 ENCOUNTER — TELEPHONE (OUTPATIENT)
Age: 69
End: 2024-10-17

## 2024-10-17 DIAGNOSIS — M10.9 ACUTE GOUT INVOLVING TOE OF RIGHT FOOT, UNSPECIFIED CAUSE: ICD-10-CM

## 2024-10-17 DIAGNOSIS — M17.0 PRIMARY OSTEOARTHRITIS OF BOTH KNEES: Primary | ICD-10-CM

## 2024-10-17 DIAGNOSIS — N18.2 CKD (CHRONIC KIDNEY DISEASE) STAGE 2, GFR 60-89 ML/MIN: Primary | ICD-10-CM

## 2024-10-17 DIAGNOSIS — E78.2 MIXED HYPERLIPIDEMIA: ICD-10-CM

## 2024-10-17 NOTE — TELEPHONE ENCOUNTER
Returned patient call. Discussed with patient that overall, findings are most consistent with generalized osteoarthritis. Low suspicion for psoriatic arthritis. Recommend referral to Orthopedics for knee OA. She may continue the Mobic for now if she finds it helpful, but recommend follow-up with PCP for periodic blood work monitoring.

## 2024-10-17 NOTE — TELEPHONE ENCOUNTER
Patient is scheduled for  11/7 to follow up with Dr. Browne. Per Rheumatology pt should follow up with PCP to discuss kidney function while taking meloxicam (Mobic) 7.5 mg tablet .   Please note if pt needs lab orders placed prior to appt.

## 2024-10-17 NOTE — TELEPHONE ENCOUNTER
Patient cancelled appointment due to billing.  Patient requesting to speak with Dr. Esquivel regarding her diagnosis, she is asking if she truly has a rheum diagnosis.

## 2024-10-23 ENCOUNTER — TELEPHONE (OUTPATIENT)
Dept: FAMILY MEDICINE CLINIC | Facility: CLINIC | Age: 69
End: 2024-10-23

## 2024-10-23 NOTE — TELEPHONE ENCOUNTER
Patient came into office to give parking placard and Jury duty form to be filled out. Forms given to provider.

## 2024-10-23 NOTE — TELEPHONE ENCOUNTER
Kidney function is borderline and can continue mobic with food. Asked to hydrate well and will order labs to be done prior to her appt with me next month. Thank you

## 2024-10-29 NOTE — TELEPHONE ENCOUNTER
Pt called again as she has not heard back from us about the handicap placard form and a letter for jury duty. Please fax a letter to jury duty that states she is not able to go to jury duty to [phone]. She will print the DMV form from [employer]. Please advise once the letter is sent to jury duty as it is time sensitive.

## 2024-10-29 NOTE — TELEPHONE ENCOUNTER
The DMV document was not completed by Dr. Browne. Needs her signature and the code in the center.     Needs the Jury Duty completed also. Wants to  on Thursday.

## 2024-11-08 ENCOUNTER — OFFICE VISIT (OUTPATIENT)
Dept: OBGYN CLINIC | Facility: MEDICAL CENTER | Age: 69
End: 2024-11-08
Payer: COMMERCIAL

## 2024-11-08 ENCOUNTER — APPOINTMENT (OUTPATIENT)
Dept: LAB | Facility: MEDICAL CENTER | Age: 69
End: 2024-11-08
Payer: COMMERCIAL

## 2024-11-08 VITALS
BODY MASS INDEX: 35.3 KG/M2 | DIASTOLIC BLOOD PRESSURE: 84 MMHG | HEART RATE: 90 BPM | SYSTOLIC BLOOD PRESSURE: 134 MMHG | WEIGHT: 187 LBS | HEIGHT: 61 IN

## 2024-11-08 DIAGNOSIS — M25.561 RIGHT KNEE PAIN, UNSPECIFIED CHRONICITY: ICD-10-CM

## 2024-11-08 DIAGNOSIS — M17.0 PRIMARY OSTEOARTHRITIS OF BOTH KNEES: Primary | ICD-10-CM

## 2024-11-08 DIAGNOSIS — M17.12 PRIMARY OSTEOARTHRITIS OF LEFT KNEE: ICD-10-CM

## 2024-11-08 DIAGNOSIS — E78.2 MIXED HYPERLIPIDEMIA: ICD-10-CM

## 2024-11-08 DIAGNOSIS — N18.2 CKD (CHRONIC KIDNEY DISEASE) STAGE 2, GFR 60-89 ML/MIN: ICD-10-CM

## 2024-11-08 DIAGNOSIS — M10.9 ACUTE GOUT INVOLVING TOE OF RIGHT FOOT, UNSPECIFIED CAUSE: ICD-10-CM

## 2024-11-08 DIAGNOSIS — M25.562 LEFT KNEE PAIN, UNSPECIFIED CHRONICITY: ICD-10-CM

## 2024-11-08 LAB
ALBUMIN SERPL BCG-MCNC: 4.9 G/DL (ref 3.5–5)
ALP SERPL-CCNC: 119 U/L (ref 34–104)
ALT SERPL W P-5'-P-CCNC: 30 U/L (ref 7–52)
ANION GAP SERPL CALCULATED.3IONS-SCNC: 12 MMOL/L (ref 4–13)
AST SERPL W P-5'-P-CCNC: 38 U/L (ref 13–39)
BILIRUB SERPL-MCNC: 0.35 MG/DL (ref 0.2–1)
BUN SERPL-MCNC: 18 MG/DL (ref 5–25)
CALCIUM SERPL-MCNC: 10.2 MG/DL (ref 8.4–10.2)
CHLORIDE SERPL-SCNC: 103 MMOL/L (ref 96–108)
CHOLEST SERPL-MCNC: 217 MG/DL
CO2 SERPL-SCNC: 24 MMOL/L (ref 21–32)
CREAT SERPL-MCNC: 0.92 MG/DL (ref 0.6–1.3)
CREAT UR-MCNC: 183 MG/DL
GFR SERPL CREATININE-BSD FRML MDRD: 63 ML/MIN/1.73SQ M
GLUCOSE P FAST SERPL-MCNC: 101 MG/DL (ref 65–99)
HDLC SERPL-MCNC: 43 MG/DL
LDLC SERPL CALC-MCNC: 132 MG/DL (ref 0–100)
MICROALBUMIN UR-MCNC: 19.8 MG/L
MICROALBUMIN/CREAT 24H UR: 11 MG/G CREATININE (ref 0–30)
NONHDLC SERPL-MCNC: 174 MG/DL
POTASSIUM SERPL-SCNC: 4.5 MMOL/L (ref 3.5–5.3)
PROT SERPL-MCNC: 7 G/DL (ref 6.4–8.4)
SODIUM SERPL-SCNC: 139 MMOL/L (ref 135–147)
TRIGL SERPL-MCNC: 212 MG/DL
URATE SERPL-MCNC: 7.3 MG/DL (ref 2–7.5)

## 2024-11-08 PROCEDURE — 80053 COMPREHEN METABOLIC PANEL: CPT

## 2024-11-08 PROCEDURE — 82570 ASSAY OF URINE CREATININE: CPT

## 2024-11-08 PROCEDURE — 80061 LIPID PANEL: CPT

## 2024-11-08 PROCEDURE — 99204 OFFICE O/P NEW MOD 45 MIN: CPT | Performed by: ORTHOPAEDIC SURGERY

## 2024-11-08 PROCEDURE — 20610 DRAIN/INJ JOINT/BURSA W/O US: CPT | Performed by: ORTHOPAEDIC SURGERY

## 2024-11-08 PROCEDURE — 82043 UR ALBUMIN QUANTITATIVE: CPT

## 2024-11-08 PROCEDURE — 84550 ASSAY OF BLOOD/URIC ACID: CPT

## 2024-11-08 PROCEDURE — 36415 COLL VENOUS BLD VENIPUNCTURE: CPT

## 2024-11-08 RX ORDER — BUPIVACAINE HYDROCHLORIDE 2.5 MG/ML
4 INJECTION, SOLUTION INFILTRATION; PERINEURAL
Status: COMPLETED | OUTPATIENT
Start: 2024-11-08 | End: 2024-11-08

## 2024-11-08 RX ORDER — METHYLPREDNISOLONE ACETATE 40 MG/ML
1 INJECTION, SUSPENSION INTRA-ARTICULAR; INTRALESIONAL; INTRAMUSCULAR; SOFT TISSUE
Status: COMPLETED | OUTPATIENT
Start: 2024-11-08 | End: 2024-11-08

## 2024-11-08 RX ADMIN — BUPIVACAINE HYDROCHLORIDE 4 ML: 2.5 INJECTION, SOLUTION INFILTRATION; PERINEURAL at 11:00

## 2024-11-08 RX ADMIN — METHYLPREDNISOLONE ACETATE 1 ML: 40 INJECTION, SUSPENSION INTRA-ARTICULAR; INTRALESIONAL; INTRAMUSCULAR; SOFT TISSUE at 11:00

## 2024-11-08 NOTE — PROGRESS NOTES
"Orthopaedic Surgery - Office Note  Nancy Laird (69 y.o. female)   : 1955   MRN: [id number]  Encounter Date: 2024    Assessment / Plan  Bilateral knee OA, R>L    CSI of bilateral knee joint was performed  Referral given to begin PT to learn exercises, she understands the importance of working on maintaining motion and keeping up with strengthening   Encouraged low impact exercise as tolerated   Ice, heat and anti-inflammatories prn   Follow-up:  Return if symptoms worsen or fail to improve.      Chief Complaint / Date of Onset  Bilateral knee pain, chronic since  with no injury   Injury Mechanism / Date  None  Surgery / Date  None    History of Present Illness   Nancy Laird is a 69 y.o. female who presents for evaluation of bilateral knee pain. She has been having pain on/off since  with no injury. She was hospitalized with Covid for 39 days, not able to move, she states after this she began noticing pain. She since has continued to with pain, which she describes as being medial and worsening with WB activity. 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 She has not yet had any formal treatments.     Treatment Summary  Medications / Modalities  Oral anti-inflammatories prn   Bracing / Immobilization  None  Physical Therapy  None  Injections  None  Prior Surgeries  None  Other Treatments  None    Employment / Current Status  nurse    Sport / Organization / Current Status  N.A      Review of Systems  Pertinent items are noted in HPI.  All other systems were reviewed and are negative.      Physical Exam  /84   Pulse 90   Ht 5' 1\" (1.549 m)   Wt 84.8 kg (187 lb)   BMI 35.33 kg/m²   Cons: Appears well.  No apparent distress.  Psych: Alert. Oriented x3.  Mood and affect normal.  Eyes: PERRLA, EOMI  Resp: Normal effort.  No audible wheezing or stridor.  CV: Palpable pulse.  No discernable arrhythmia.  No LE edema.  Lymph:  No palpable " "cervical, axillary, or inguinal lymphadenopathy.  Skin: Warm.  No palpable masses.  No visible lesions.  Neuro: Normal muscle tone.  Normal and symmetric DTR's.     Bilateral Knee Exam  Alignment:  Normal knee alignment.  Inspection:  No swelling. No ecchymosis.  Palpation:   mild medial joint line on left and mild lateral on left knee tenderness. No effusion.  ROM:  Knee Extension R 5, L 0. Knee Flexion 120.  Strength:  Able to actively extend knee against gravity.  Stability:  No objective knee instability. Stable Varus / Valgus stress, Lachman, and Posterior drawer.  Tests:  No pertinent positive or negative tests.  Patella:  Normal patellar mobility.  Neurovascular:  Sensation intact in DP/SP/Kaur/Sa/T nerve distributions.  2+ DP & PT pulses.  Gait:  Antalgic.       Studies Reviewed  I have personally reviewed pertinent films in PACS.  XR of right knee- images from 10/01/2024 showing severe medial joint space narrowing   XR of left knee- images from 10/01/2024 showing moderate medial joint space narrowing     Large joint arthrocentesis: bilateral knee  Universal Protocol:  Consent given by: patient  Time out: Immediately prior to procedure a \"time out\" was called to verify the correct patient, procedure, equipment, support staff and site/side marked as required.  Site marked: the operative site was marked  Supporting Documentation  Indications: pain and diagnostic evaluation   Procedure Details  Location: knee - bilateral knee  Preparation: Patient was prepped and draped in the usual sterile fashion  Needle size: 22 G  Ultrasound guidance: no  Approach: lateral    Medications (Right): 4 mL bupivacaine 0.25 %; 1 mL methylPREDNISolone acetate 40 mg/mLMedications (Left): 4 mL bupivacaine 0.25 %; 1 mL methylPREDNISolone acetate 40 mg/mL   Patient tolerance: patient tolerated the procedure well with no immediate complications  Dressing:  Sterile dressing applied            Medical, Surgical, Family, and Social " History  The patient's medical history, family history, and social history, were reviewed and updated as appropriate.    Past Medical History:   Diagnosis Date    Asthma     Bipolar disorder (HCC)     COVID-19     GERD (gastroesophageal reflux disease)        Past Surgical History:   Procedure Laterality Date     SECTION      x3    CHOLECYSTECTOMY      HYSTERECTOMY         Family History   Problem Relation Age of Onset    Heart disease Mother     Hypertension Mother     Heart disease Father     Hypertension Father     No Known Problems Daughter     No Known Problems Daughter        Social History     Occupational History    Occupation: RN   Tobacco Use    Smoking status: Former     Current packs/day: 0.00     Average packs/day: 0.1 packs/day for 5.0 years (0.5 ttl pk-yrs)     Types: Cigarettes     Start date: 2015     Quit date: 2020     Years since quitting: 3.9     Passive exposure: Past    Smokeless tobacco: Never   Vaping Use    Vaping status: Never Used   Substance and Sexual Activity    Alcohol use: Yes     Comment: rarely    Drug use: Never    Sexual activity: Not Currently     Partners: Male       Allergies   Allergen Reactions    Amphetamine-Dextroamphetamine Other (See Comments)     Chest pain- was placed on Adderall after son passed away for depression, and she developed chest pain in ; she had full cardiac work up, and was negative, so she has allergy to Adderall  Chest pain- was placed on Adderall after son passed away for depression, and she developed chest pain in ; she had full cardiac work up, and was negative, so she has allergy to Adderall    Molds & Smuts     Other      Cats    Sulfa Antibiotics Other (See Comments)         Current Outpatient Medications:     cholecalciferol (VITAMIN D3) 1,000 units tablet, Take 2 tablets (2,000 Units total) by mouth daily, Disp:  , Rfl: 0    fluticasone (FLONASE) 50 mcg/act nasal spray, SPRAY 2 SPRAYS INTO EACH NOSTRIL EVERY DAY,  Disp: 48 mL, Rfl: 1    gabapentin (NEURONTIN) 300 mg capsule, Take 300 mg by mouth daily, Disp: , Rfl:     Humidifier MISC, Use as needed (humidification) Use as needed for humidification, Disp: 1 each, Rfl: 0    lithium carbonate 300 mg capsule, Take 300 mg by mouth daily at bedtime, Disp: , Rfl:     LORazepam (ATIVAN) 1 mg tablet, Take 2 mg by mouth daily at bedtime , Disp: , Rfl:     omeprazole (PriLOSEC) 20 mg delayed release capsule, TAKE 1 CAPSULE BY MOUTH EVERY DAY, Disp: 90 capsule, Rfl: 1    sertraline (ZOLOFT) 100 mg tablet, Take 200 mg by mouth daily , Disp: , Rfl:     traZODone (DESYREL) 100 mg tablet, Take 200 mg by mouth daily at bedtime, Disp: , Rfl:     albuterol (PROVENTIL HFA,VENTOLIN HFA) 90 mcg/act inhaler, Inhale 2 puffs every 6 (six) hours as needed for wheezing or shortness of breath, Disp: 6.7 g, Rfl: 2    albuterol (PROVENTIL HFA,VENTOLIN HFA) 90 mcg/act inhaler, Inhale 2 puffs every 4 (four) hours as needed for wheezing (Patient not taking: Reported on 9/24/2024), Disp: 18 g, Rfl: 2    allopurinol (ZYLOPRIM) 100 mg tablet, TAKE 1 TABLET BY MOUTH EVERY DAY (Patient not taking: Reported on 9/24/2024), Disp: 30 tablet, Rfl: 2    clobetasol (TEMOVATE) 0.05 % cream, Apply topically 2 (two) times a day (Patient not taking: Reported on 9/24/2024), Disp: 30 g, Rfl: 0    dextromethorphan-guaiFENesin (ROBITUSSIN DM)  mg/5 mL syrup, Take 10 mL by mouth every 6 (six) hours (Patient not taking: Reported on 9/24/2024), Disp: 118 mL, Rfl: 0    Flovent  MCG/ACT inhaler, INHALE 2 PUFFS BY MOUTH 2 TIMES A DAY RINSE MOUTH AFTER USE. (Patient not taking: Reported on 9/24/2024), Disp: 12 g, Rfl: 0    indomethacin (INDOCIN) 50 mg capsule, Take 1 capsule (50 mg total) by mouth 3 (three) times a day with meals for 5 days, Disp: 15 capsule, Rfl: 0    meloxicam (Mobic) 7.5 mg tablet, Take 1 tablet (7.5 mg total) by mouth daily (Patient not taking: Reported on 11/8/2024), Disp: 30 tablet, Rfl: 2     predniSONE 10 mg tablet, Take 4 tabs x 2 days, 3 tabs x 2 days, 2 tabs x 2 days, 1 tab x 2 days 1/2 tab x 2 days.  Take with food. (Patient not taking: Reported on 9/24/2024), Disp: 21 tablet, Rfl: 0    tiZANidine (ZANAFLEX) 2 mg tablet, TAKE 1 TABLET BY MOUTH DAILY AT BEDTIME (Patient not taking: Reported on 9/24/2024), Disp: 30 tablet, Rfl: 0      Franci Flanagan    Scribe Attestation      I,:   am acting as a scribe while in the presence of the attending physician.:       I,:   personally performed the services described in this documentation    as scribed in my presence.:

## 2024-11-19 ENCOUNTER — TELEPHONE (OUTPATIENT)
Dept: OBGYN CLINIC | Facility: HOSPITAL | Age: 69
End: 2024-11-19

## 2024-11-19 NOTE — TELEPHONE ENCOUNTER
Hey not sure if we usually do these but we can correct this in the office tomorrow if we have the paperwork.

## 2024-11-19 NOTE — TELEPHONE ENCOUNTER
Reasonable Accomodation request for parking space.    Caller: Oksana Larios  / Saul Ramos    Doctor: Madelaine / Rivera     Reason for call: Oksana is calling in regards to documents faxed backed yesterday for appointed parking spot.    Please see last page of document under verification and sign 3 areas missing initials  And return by fax on forms.    Please call with any questions     Call back#: [phone](Oksana )

## 2024-11-29 ENCOUNTER — TELEPHONE (OUTPATIENT)
Age: 69
End: 2024-11-29

## 2024-11-29 NOTE — TELEPHONE ENCOUNTER
Received call from patient to [facility] a new patient appt.    I offered the next available in 2025.    Patient declined

## 2024-12-10 ENCOUNTER — TELEPHONE (OUTPATIENT)
Age: 69
End: 2024-12-10

## 2024-12-10 NOTE — TELEPHONE ENCOUNTER
Re-called patient stated that the fax number would not go through, but I did email the forms to the email provided on the forms.

## 2024-12-10 NOTE — TELEPHONE ENCOUNTER
Caller: Patient     Doctor: Madelaine    Reason for call: Patient stated she received a message regarding her paperwork and she could not understand the message. She would like to make sure the documents that were faxed have Dr. Burkett's initials and signature on them. Please advise.     Call back#: [phone]

## 2024-12-10 NOTE — TELEPHONE ENCOUNTER
If its not related to disability forms we do not complete them. This would go to clinical staff.

## 2024-12-10 NOTE — TELEPHONE ENCOUNTER
Caller: Patient    Doctor: Madelaine    Reason for call: Patient states we received paperwork from her appointment building about parking. Patient states it was not the parking placard. Patient states we are suppose to Initial and sign on the back     Call back#: [phone]

## 2025-01-09 ENCOUNTER — TELEPHONE (OUTPATIENT)
Age: 70
End: 2025-01-09

## 2025-01-09 NOTE — TELEPHONE ENCOUNTER
Called & spoke to patient. I did relay message that Dr. Burkett does perform partial medial knee arthroplasties. Her only other question was how often can she get knee CSI's. I let her know I would confirm if it's typically 3 months. Will call patient back. She tells me she's doing okay as far as knee pain otherwise but may be interested in discussing partial knee sometime after her next CSI scheduled 02/07/2025

## 2025-01-09 NOTE — TELEPHONE ENCOUNTER
Patient is referring to whether or not Dr. Burkett performs Medial Unicompartmental Knee Arthoplasty.

## 2025-01-09 NOTE — TELEPHONE ENCOUNTER
Caller: patient    Doctor: Madelaine    Reason for call: patient would like to know if Dr does Muka surgery and if not does Dr know of anyone that does that surgery     Call back#: [phone]

## 2025-01-21 ENCOUNTER — TELEPHONE (OUTPATIENT)
Dept: FAMILY MEDICINE CLINIC | Facility: CLINIC | Age: 70
End: 2025-01-21

## 2025-01-22 DIAGNOSIS — J45.21 MILD INTERMITTENT ASTHMA WITH EXACERBATION: ICD-10-CM

## 2025-01-22 DIAGNOSIS — K21.9 GASTROESOPHAGEAL REFLUX DISEASE WITHOUT ESOPHAGITIS: ICD-10-CM

## 2025-01-22 DIAGNOSIS — M41.9 SCOLIOSIS OF THORACIC SPINE, UNSPECIFIED SCOLIOSIS TYPE: ICD-10-CM

## 2025-01-22 RX ORDER — ALBUTEROL SULFATE 90 UG/1
2 INHALANT RESPIRATORY (INHALATION) EVERY 4 HOURS PRN
Qty: 18 G | Refills: 2 | Status: SHIPPED | OUTPATIENT
Start: 2025-01-22

## 2025-01-22 RX ORDER — TIZANIDINE 2 MG/1
2 TABLET ORAL
Qty: 30 TABLET | Refills: 0 | Status: SHIPPED | OUTPATIENT
Start: 2025-01-22

## 2025-01-22 NOTE — TELEPHONE ENCOUNTER
Requested medication(s) are due for refill today: Yes  Patient has already received a courtesy refill: No  Other reason request has been forwarded to provider: per protocol     Patient has an appointment on 02/12/2025

## 2025-01-22 NOTE — TELEPHONE ENCOUNTER
Patient called because she is almost out of the albuterol (PROVENTIL HFA,VENTOLIN HFA) 90 mcg/act inhaler and she wants to know if her provider will be sending her refills to the pharmacy before the end of the day please call patient back to advise. Thank you.      Patient also wants to advise the provider that she has followed up with [facility].

## 2025-02-10 ENCOUNTER — TELEPHONE (OUTPATIENT)
Age: 70
End: 2025-02-10

## 2025-02-10 NOTE — TELEPHONE ENCOUNTER
Patient called in regard to wait list status. Patient is currently on [facility] wait list and will be contacted upon availability. Patient understood.

## 2025-02-14 DIAGNOSIS — M41.9 SCOLIOSIS OF THORACIC SPINE, UNSPECIFIED SCOLIOSIS TYPE: ICD-10-CM

## 2025-02-18 RX ORDER — TIZANIDINE 2 MG/1
2 TABLET ORAL
Qty: 90 TABLET | Refills: 1 | Status: SHIPPED | OUTPATIENT
Start: 2025-02-18

## 2025-02-18 NOTE — TELEPHONE ENCOUNTER
Requested medication(s) are due for refill today: Yes  Patient has already received a courtesy refill: No  Other reason request has been forwarded to provider: per priti

## 2025-03-19 ENCOUNTER — VBI (OUTPATIENT)
Dept: ADMINISTRATIVE | Facility: OTHER | Age: 70
End: 2025-03-19

## 2025-03-19 NOTE — TELEPHONE ENCOUNTER
Patient contacted to schedule Annual Wellness Visit .   Patient agreed to schedule appointment.   4/7/2025 already scheduled.

## 2025-03-31 ENCOUNTER — RA CDI HCC (OUTPATIENT)
Dept: OTHER | Facility: HOSPITAL | Age: 70
End: 2025-03-31

## 2025-03-31 NOTE — PROGRESS NOTES
HCC coding opportunities          Chart Reviewed number of suggestions sent to Provider: 1  E66.01     Patients Insurance     Medicare Insurance: [facility] Medicare Advantage

## 2025-04-25 ENCOUNTER — TELEPHONE (OUTPATIENT)
Dept: FAMILY MEDICINE CLINIC | Facility: CLINIC | Age: 70
End: 2025-04-25

## 2025-06-06 ENCOUNTER — VBI (OUTPATIENT)
Dept: ADMINISTRATIVE | Facility: OTHER | Age: 70
End: 2025-06-06

## 2025-06-06 NOTE — TELEPHONE ENCOUNTER
06/06/25 8:33 AM     Chart reviewed for Mammogram ; nothing is submitted to the patient's insurance at this time.     Yakov Cleaning MA   [facility]

## 2025-06-17 ENCOUNTER — TELEPHONE (OUTPATIENT)
Dept: FAMILY MEDICINE CLINIC | Facility: CLINIC | Age: 70
End: 2025-06-17

## 2025-06-18 DIAGNOSIS — U07.1 COVID-19: ICD-10-CM

## 2025-06-20 RX ORDER — FLUTICASONE PROPIONATE 50 MCG
SPRAY, SUSPENSION (ML) NASAL
Qty: 48 ML | Refills: 1 | OUTPATIENT
Start: 2025-06-20

## 2025-06-20 NOTE — TELEPHONE ENCOUNTER
PLEASE REFUSE, PATIENT NEEDS AN APPOINTMENT. MULTIPLE ATTEMPTS TO REACH PATIENT AND SHE HAS CANCELLED ALL APPOINTMENTS.

## 2025-07-02 ENCOUNTER — VBI (OUTPATIENT)
Dept: ADMINISTRATIVE | Facility: OTHER | Age: 70
End: 2025-07-02

## 2025-07-02 NOTE — TELEPHONE ENCOUNTER
07/02/25 11:58 AM     Chart reviewed for CRC: Colonoscopy ; nothing is submitted to the patient's insurance at this time.     Patricia Drew   [facility]